# Patient Record
Sex: FEMALE | Race: WHITE | NOT HISPANIC OR LATINO | Employment: OTHER | URBAN - METROPOLITAN AREA
[De-identification: names, ages, dates, MRNs, and addresses within clinical notes are randomized per-mention and may not be internally consistent; named-entity substitution may affect disease eponyms.]

---

## 2021-01-21 DIAGNOSIS — Z23 ENCOUNTER FOR IMMUNIZATION: ICD-10-CM

## 2021-09-11 ENCOUNTER — APPOINTMENT (EMERGENCY)
Dept: RADIOLOGY | Facility: HOSPITAL | Age: 81
End: 2021-09-11
Attending: EMERGENCY MEDICINE
Payer: MEDICARE

## 2021-09-11 ENCOUNTER — HOSPITAL ENCOUNTER (EMERGENCY)
Facility: HOSPITAL | Age: 81
Discharge: HOME | End: 2021-09-11
Attending: EMERGENCY MEDICINE
Payer: MEDICARE

## 2021-09-11 VITALS
WEIGHT: 122 LBS | TEMPERATURE: 98.8 F | BODY MASS INDEX: 19.61 KG/M2 | HEIGHT: 66 IN | RESPIRATION RATE: 22 BRPM | OXYGEN SATURATION: 94 % | DIASTOLIC BLOOD PRESSURE: 74 MMHG | HEART RATE: 97 BPM | SYSTOLIC BLOOD PRESSURE: 174 MMHG

## 2021-09-11 DIAGNOSIS — S42.032A CLOSED DISPLACED FRACTURE OF ACROMIAL END OF LEFT CLAVICLE, INITIAL ENCOUNTER: ICD-10-CM
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
## 2021-09-11 LAB
ANION GAP SERPL CALC-SCNC: 12 MEQ/L (ref 3–15)
ANISOCYTOSIS BLD QL SMEAR: ABNORMAL
BACTERIA URNS QL MICRO: ABNORMAL /HPF
BASOPHILS # BLD: 0.32 K/UL (ref 0.01–0.1)
BASOPHILS NFR BLD: 2 %
BILIRUB UR QL STRIP.AUTO: NEGATIVE MG/DL
BUN SERPL-MCNC: 9 MG/DL (ref 8–20)
BURR CELLS BLD QL SMEAR: ABNORMAL
CALCIUM SERPL-MCNC: 9.8 MG/DL (ref 8.9–10.3)
CHLORIDE SERPL-SCNC: 107 MEQ/L (ref 98–109)
CLARITY UR REFRACT.AUTO: CLEAR
CO2 SERPL-SCNC: 22 MEQ/L (ref 22–32)
COLOR UR AUTO: YELLOW
CREAT SERPL-MCNC: 0.7 MG/DL (ref 0.6–1.1)
DACRYOCYTES BLD QL SMEAR: ABNORMAL
DIFFERENTIAL METHOD BLD: ABNORMAL
EOSINOPHIL # BLD: 0 K/UL (ref 0.04–0.36)
EOSINOPHIL NFR BLD: 0 %
ERYTHROCYTE [DISTWIDTH] IN BLOOD BY AUTOMATED COUNT: 18.5 % (ref 11.7–14.4)
GFR SERPL CREATININE-BSD FRML MDRD: >60 ML/MIN/1.73M*2
GLUCOSE BLD-MCNC: 124 MG/DL (ref 70–99)
GLUCOSE SERPL-MCNC: 131 MG/DL (ref 70–99)
GLUCOSE UR STRIP.AUTO-MCNC: NEGATIVE MG/DL
HCT VFR BLDCO AUTO: 36.7 % (ref 35–45)
HGB BLD-MCNC: 11.3 G/DL (ref 11.8–15.7)
HGB UR QL STRIP.AUTO: ABNORMAL
HYALINE CASTS #/AREA URNS LPF: ABNORMAL /LPF
KETONES UR STRIP.AUTO-MCNC: NEGATIVE MG/DL
LEUKOCYTE ESTERASE UR QL STRIP.AUTO: 1
LYMPHOCYTES # BLD: 0.47 K/UL (ref 1.2–3.5)
LYMPHOCYTES NFR BLD: 3 %
MCH RBC QN AUTO: 19.7 PG (ref 28–33.2)
MCHC RBC AUTO-ENTMCNC: 30.8 G/DL (ref 32.2–35.5)
MCV RBC AUTO: 63.9 FL (ref 83–98)
MICROCYTES BLD QL SMEAR: ABNORMAL
MONOCYTES # BLD: 0.63 K/UL (ref 0.28–0.8)
MONOCYTES NFR BLD: 4 %
NEUTS BAND # BLD: 0.16 K/UL (ref 0–0.53)
NEUTS BAND # BLD: 14.21 K/UL (ref 1.7–7)
NEUTS BAND NFR BLD: 1 %
NEUTS SEG NFR BLD: 90 %
NITRITE UR QL STRIP.AUTO: NEGATIVE
PDW BLD AUTO: 9.1 FL (ref 9.4–12.3)
PH UR STRIP.AUTO: 6 [PH]
PLAT MORPH BLD: NORMAL
PLATELET # BLD AUTO: 301 K/UL (ref 150–369)
PLATELET # BLD EST: ABNORMAL 10*3/UL
POCT TEST: ABNORMAL
POIKILOCYTOSIS BLD QL SMEAR: ABNORMAL
POTASSIUM SERPL-SCNC: 3.6 MEQ/L (ref 3.6–5.1)
PROT UR QL STRIP.AUTO: NEGATIVE
RBC # BLD AUTO: 5.74 M/UL (ref 3.93–5.22)
RBC #/AREA URNS HPF: ABNORMAL /HPF
SODIUM SERPL-SCNC: 141 MEQ/L (ref 136–144)
SP GR UR REFRACT.AUTO: 1.02
SQUAMOUS URNS QL MICRO: 1 /HPF
TARGETS BLD QL SMEAR: ABNORMAL
TROPONIN I SERPL-MCNC: <0.03 NG/ML
UROBILINOGEN UR STRIP-ACNC: 0.2 EU/DL
WBC # BLD AUTO: 15.79 K/UL (ref 3.8–10.5)
WBC #/AREA URNS HPF: ABNORMAL /HPF

## 2021-09-11 PROCEDURE — G1004 CDSM NDSC: HCPCS

## 2021-09-11 PROCEDURE — 73000 X-RAY EXAM OF COLLAR BONE: CPT | Mod: LT

## 2021-09-11 PROCEDURE — 81001 URINALYSIS AUTO W/SCOPE: CPT | Performed by: EMERGENCY MEDICINE

## 2021-09-11 PROCEDURE — 87086 URINE CULTURE/COLONY COUNT: CPT | Performed by: EMERGENCY MEDICINE

## 2021-09-11 PROCEDURE — 73030 X-RAY EXAM OF SHOULDER: CPT | Mod: LT

## 2021-09-11 PROCEDURE — 80048 BASIC METABOLIC PNL TOTAL CA: CPT | Mod: 59 | Performed by: EMERGENCY MEDICINE

## 2021-09-11 PROCEDURE — 93005 ELECTROCARDIOGRAM TRACING: CPT | Performed by: EMERGENCY MEDICINE

## 2021-09-11 PROCEDURE — 73060 X-RAY EXAM OF HUMERUS: CPT | Mod: LT

## 2021-09-11 PROCEDURE — 70450 CT HEAD/BRAIN W/O DYE: CPT | Mod: MG

## 2021-09-11 PROCEDURE — 36415 COLL VENOUS BLD VENIPUNCTURE: CPT | Performed by: EMERGENCY MEDICINE

## 2021-09-11 PROCEDURE — 84484 ASSAY OF TROPONIN QUANT: CPT | Performed by: EMERGENCY MEDICINE

## 2021-09-11 PROCEDURE — 71250 CT THORAX DX C-: CPT | Mod: ME

## 2021-09-11 PROCEDURE — 99284 EMERGENCY DEPT VISIT MOD MDM: CPT | Mod: 25

## 2021-09-11 PROCEDURE — 85025 COMPLETE CBC W/AUTO DIFF WBC: CPT | Performed by: EMERGENCY MEDICINE

## 2021-09-11 PROCEDURE — 63700000 HC SELF-ADMINISTRABLE DRUG: Performed by: EMERGENCY MEDICINE

## 2021-09-11 RX ORDER — LEVOTHYROXINE SODIUM 75 UG/1
75 TABLET ORAL
COMMUNITY
Start: 2021-07-19

## 2021-09-11 RX ORDER — OXYCODONE AND ACETAMINOPHEN 5; 325 MG/1; MG/1
1 TABLET ORAL EVERY 4 HOURS PRN
Qty: 12 TABLET | Refills: 0 | Status: SHIPPED | OUTPATIENT
Start: 2021-09-11 | End: 2021-09-21

## 2021-09-11 RX ORDER — DORZOLAMIDE HCL 20 MG/ML
1 SOLUTION/ DROPS OPHTHALMIC
COMMUNITY
Start: 2021-08-14

## 2021-09-11 RX ORDER — DILTIAZEM HYDROCHLORIDE 240 MG/1
240 CAPSULE, COATED, EXTENDED RELEASE ORAL DAILY
COMMUNITY
Start: 2021-07-09

## 2021-09-11 RX ORDER — RIFAMPIN 300 MG/1
CAPSULE ORAL
COMMUNITY
Start: 2021-08-17 | End: 2021-12-29 | Stop reason: ALTCHOICE

## 2021-09-11 RX ORDER — DILTIAZEM HYDROCHLORIDE 180 MG/1
CAPSULE, EXTENDED RELEASE ORAL
COMMUNITY
Start: 2021-09-10 | End: 2021-12-29 | Stop reason: DRUGHIGH

## 2021-09-11 RX ORDER — LATANOPROST 50 UG/ML
1 SOLUTION/ DROPS OPHTHALMIC NIGHTLY
COMMUNITY
Start: 2021-09-10

## 2021-09-11 RX ORDER — VIT C/E/ZN/COPPR/LUTEIN/ZEAXAN 250MG-90MG
CAPSULE ORAL
COMMUNITY

## 2021-09-11 RX ORDER — MULTIVITAMIN
1 TABLET ORAL
COMMUNITY

## 2021-09-11 RX ORDER — CLINDAMYCIN HYDROCHLORIDE 300 MG/1
CAPSULE ORAL
COMMUNITY
Start: 2021-07-12 | End: 2021-12-29

## 2021-09-11 RX ORDER — ETHAMBUTOL HYDROCHLORIDE 400 MG/1
TABLET, FILM COATED ORAL
COMMUNITY
Start: 2021-05-11 | End: 2021-12-29 | Stop reason: ALTCHOICE

## 2021-09-11 RX ORDER — OXYCODONE AND ACETAMINOPHEN 5; 325 MG/1; MG/1
1 TABLET ORAL ONCE
Status: COMPLETED | OUTPATIENT
Start: 2021-09-11 | End: 2021-09-11

## 2021-09-11 RX ORDER — DABIGATRAN ETEXILATE MESYLATE 150 MG/1
150 CAPSULE ORAL 2 TIMES DAILY
COMMUNITY
Start: 2021-07-03

## 2021-09-11 RX ORDER — DEXAMETHASONE 4 MG/1
TABLET ORAL
COMMUNITY
Start: 2021-07-24 | End: 2021-12-29 | Stop reason: SDUPTHER

## 2021-09-11 RX ORDER — TIOTROPIUM BROMIDE INHALATION SPRAY 3.12 UG/1
SPRAY, METERED RESPIRATORY (INHALATION)
COMMUNITY
Start: 2021-06-22 | End: 2021-12-29 | Stop reason: SDUPTHER

## 2021-09-11 RX ORDER — ALBUTEROL SULFATE 90 UG/1
2 INHALANT RESPIRATORY (INHALATION) EVERY 4 HOURS PRN
COMMUNITY
Start: 2021-07-16

## 2021-09-11 RX ORDER — AZITHROMYCIN 250 MG/1
TABLET, FILM COATED ORAL
COMMUNITY
Start: 2021-08-11 | End: 2021-12-29 | Stop reason: ALTCHOICE

## 2021-09-11 RX ORDER — DIGOXIN 250 MCG
250 TABLET ORAL
COMMUNITY
Start: 2021-06-21

## 2021-09-11 RX ADMIN — OXYCODONE HYDROCHLORIDE AND ACETAMINOPHEN 1 TABLET: 5; 325 TABLET ORAL at 18:53

## 2021-09-11 NOTE — DISCHARGE INSTRUCTIONS
Follow up with your doctor to have your blood pressure re-checked.  Return for headache, vomiting or confusion

## 2021-09-12 LAB
ATRIAL RATE: 90
P AXIS: 68
PATH REV BLD -IMP: NORMAL
PR INTERVAL: 158
QRS DURATION: 92
QT INTERVAL: 348
QTC CALCULATION(BAZETT): 425
R AXIS: 81
T WAVE AXIS: 58
VENTRICULAR RATE: 90

## 2021-09-12 PROCEDURE — 93010 ELECTROCARDIOGRAM REPORT: CPT | Performed by: INTERNAL MEDICINE

## 2021-09-12 ASSESSMENT — ENCOUNTER SYMPTOMS
LOSS OF CONSCIOUSNESS: 0
DIFFICULTY BREATHING: 0
ABDOMINAL PAIN: 0
HEADACHES: 0

## 2021-09-12 NOTE — PROGRESS NOTES
Orthopedic short note    Emergency department paged regarding left clavicle fracture management recommendations    Imaging was reviewed and consistent with a middle third clavicle shaft fracture.  CT chest reviewed.  Glenohumeral joint appropriate aligned without dislocation. Per emergency department physician there was no skin tenting and the fracture is not open.  ED provider had no concern for impending skin tenting or open fracture.    Plan:   Nonweightbearing and sling to left upper extremity  Outpatient follow-up with Dr. Hopper in his [facility] office the week of 9/13  Pain control per ED provider  Remainder of care per ED provider    Tom Gay DO PGY-3  Orthopedics  Pager [phone]     This note was created using voice-to-text dictation software, please excuse any errors in translation

## 2021-09-13 LAB
BACTERIA UR CULT: NORMAL
BACTERIA UR CULT: NORMAL

## 2021-09-13 NOTE — ED PROVIDER NOTES
Emergency Medicine Note  HPI   HISTORY OF PRESENT ILLNESS     81-year-old female past medical history of A. fib, high cholesterol, hypothyroidism presents status post fall.  She was walking up the basement stairs when she lost her footing.  She fell off the edge of the stairs down to the basement floor.  She landed on her left shoulder.  She does not think she struck her head or had a loss of consciousness.  She does not think she had a preceding syncopal event.  Has some mild left-sided chest pain around her ribs since the fall, no abdominal pain, no lower extremity pain.      History provided by:  Patient and EMS personnel  Trauma  Mechanism of injury: fall  Injury location: shoulder/arm  Injury location detail: L shoulder  Incident location: home  Arrived directly from scene: yes     Fall:       Fall occurred: down stairs       Height of fall: 4 feet       Impact surface: carpet       Point of impact: outstretched arms       Entrapped after fall: no    Protective equipment:        None       Suspicion of alcohol use: no       Suspicion of drug use: no    EMS/PTA data:       Bystander interventions: none       Ambulatory at scene: no       Blood loss: none       Responsiveness: alert       Oriented to: person, place, situation and time       Loss of consciousness: no       Amnesic to event: no       Airway interventions: none       Breathing interventions: none       Medications administered: none       Immobilization: none       Airway condition since incident: stable       Breathing condition since incident: stable       Circulation condition since incident: stable       Mental status condition since incident: stable       Disability condition since incident: stable    Current symptoms:       Pain quality: aching       Pain timing: constant       Associated symptoms:             Denies abdominal pain, chest pain, difficulty breathing, headache and loss of consciousness.     Relevant PMH:       Pharmacological  risk factors:             Anticoagulation therapy.        The patient has not been admitted to the hospital due to injury in the past year.        Patient History   PAST HISTORY     Reviewed from Nursing Triage:      Past Medical History:   Diagnosis Date   • Atrial fibrillation (CMS/HCC)    • Glaucoma    • History of transfusion    • Hyperthyroidism    • Lipid disorder    • Osteoporosis    • PAN (polyarteritis nodosa) (CMS/HCC)    • Thalassemia        Past Surgical History:   Procedure Laterality Date   • CARDIAC SURGERY     • TONSILLECTOMY         History reviewed. No pertinent family history.    Social History     Tobacco Use   • Smoking status: Never Smoker   • Smokeless tobacco: Never Used   Vaping Use   • Vaping Use: Never used   Substance Use Topics   • Alcohol use: Never   • Drug use: Never         Review of Systems   REVIEW OF SYSTEMS     Review of Systems   Cardiovascular: Negative for chest pain.   Gastrointestinal: Negative for abdominal pain.   Neurological: Negative for loss of consciousness and headaches.   All other systems reviewed and are negative.        VITALS     ED Vitals    Date/Time Temp Pulse Resp BP SpO2 [facility]   09/11/21 2000 -- 97 22 174/74 94 % MCA   09/11/21 1900 -- 93 20 192/78 92 % JLS   09/11/21 1800 -- 90 20 177/91 92 % JLS   09/11/21 1740 -- 99 20 210/80 94 % S   09/11/21 1638 37.1 °C (98.8 °F) 85 18 206/82 96 % [facility]                       Physical Exam   PHYSICAL EXAM     Physical Exam  Vitals and nursing note reviewed.   Constitutional:       Appearance: Normal appearance.   HENT:      Head: Normocephalic and atraumatic.      Nose: Nose normal.      Mouth/Throat:      Mouth: Mucous membranes are moist.   Eyes:      Extraocular Movements: Extraocular movements intact.      Pupils: Pupils are equal, round, and reactive to light.   Cardiovascular:      Rate and Rhythm: Normal rate and regular rhythm.      Pulses: Normal pulses.      Heart sounds: Normal heart sounds.   Pulmonary:       Effort: Pulmonary effort is normal.      Breath sounds: Normal breath sounds.   Abdominal:      General: Abdomen is flat.      Tenderness: There is no abdominal tenderness.   Musculoskeletal:         General: Tenderness and deformity present.        Arms:       Cervical back: Normal range of motion and neck supple. No tenderness.      Comments: Tenderness and deformity over left clavicle, tenderness over left shoulder, neurovascular intact   Skin:     General: Skin is warm.      Capillary Refill: Capillary refill takes less than 2 seconds.   Neurological:      General: No focal deficit present.      Mental Status: She is alert and oriented to person, place, and time.   Psychiatric:         Mood and Affect: Mood normal.           PROCEDURES     Procedures     DATA     Results     Procedure Component Value Units Date/Time    UA with reflex culture [[id number]]  (Abnormal) Collected: 09/11/21 1908    Specimen: Urine, Clean Catch Updated: 09/11/21 1933    Narrative:      The following orders were created for panel order UA with reflex culture.  Procedure                               Abnormality         Status                     ---------                               -----------         ------                     UA Reflex to Culture (Ma...[[id number]]  Abnormal            Final result               UA Microscopic[[id number]]               Abnormal            Final result                 Please view results for these tests on the individual orders.    UA Microscopic [[id number]]  (Abnormal) Collected: 09/11/21 1908    Specimen: Urine, Clean Catch Updated: 09/11/21 1933     RBC, Urine 20 TO 35 /HPF      WBC, Urine 4 TO 10 /HPF      Squamous Epithelial +1 /hpf      Hyaline Cast None Seen /lpf      Bacteria, Urine None Seen /HPF     UA Reflex to Culture (Macroscopic) [[id number]]  (Abnormal) Collected: 09/11/21 1908    Specimen: Urine, Clean Catch Updated: 09/11/21 1930     Color, Urine Yellow     Clarity, Urine Clear      Specific Gravity, Urine 1.018     pH, Urine 6.0     Leukocyte Esterase +1     Nitrite, Urine Negative     Protein, Urine Negative     Glucose, Urine Negative mg/dL      Ketones, Urine Negative mg/dL      Urobilinogen, Urine 0.2 EU/dL      Bilirubin, Urine Negative mg/dL      Blood, Urine Trace     Comment: The sensitivity of the occult blood test is equivalent to approximately 4 intact RBC/HPF.       CBC and differential [[id number]]  (Abnormal) Collected: 09/11/21 1654    Specimen: Blood, Venous Updated: 09/11/21 1745     WBC 15.79 K/uL      RBC 5.74 M/uL      Hemoglobin 11.3 g/dL      Hematocrit 36.7 %      MCV 63.9 fL      MCH 19.7 pg      MCHC 30.8 g/dL      RDW 18.5 %      Platelets 301 K/uL      MPV 9.1 fL      Differential Type Manu     Neutrophils 90 %      Lymphocytes 3 %      Monocytes 4 %      Eosinophils 0 %      Basophils 2 %      Bands 1 %      Neutrophils, Absolute 14.21 K/uL      Lymphocytes, Absolute 0.47 K/uL      Monocytes, Absolute 0.63 K/uL      Eosinophils, Absolute 0.00 K/uL      Basophils, Absolute 0.32 K/uL      Bands, Absolute 0.16 K/uL      PLT Morphology Normal     Platelet Estimate Adequate (150,000-400,000)     Anisocytosis 1+     Microcytes 2+     Poikilocytes 1+     Target Cells 1+     Teardrop Cells Occasional     Nandini Cells Occasional    Troponin I [[id number]]  (Normal) Collected: 09/11/21 1654    Specimen: Blood, Venous Updated: 09/11/21 1729     Troponin I <0.03 ng/mL     Basic metabolic panel [[id number]]  (Abnormal) Collected: 09/11/21 1654    Specimen: Blood, Venous Updated: 09/11/21 1726     Sodium 141 mEQ/L      Potassium 3.6 mEQ/L      Comment: Results obtained on plasma. Plasma Potassium values may be up to 0.4 mEQ/L less than serum values. The differences may be greater for patients with high platelet or white cell counts.        Chloride 107 mEQ/L      CO2 22 mEQ/L      BUN 9 mg/dL      Creatinine 0.7 mg/dL      Glucose 131 mg/dL      Calcium 9.8 mg/dL      eGFR >60.0  mL/min/1.73m*2      Anion Gap 12 mEQ/L           Imaging Results          X-RAY CLAVICLE LEFT (Final result)  Result time 09/12/21 08:17:43    Final result                 Impression:    IMPRESSION: Fracture left clavicle.  Minimal anteroinferior subluxation of the  humeral head.    COMMENT: Two views of the left clavicle, three views of the left shoulder and  three additional views of the left humerus were obtained.  There is a fracture  involving the lateral third of the left clavicle with overlap of fracture  fragments.  On images of the left shoulder in the left humerus there appears to  be slight anteroinferior subluxation of the left humeral head however on the  clavicular films it appears to be in appropriate articulation/position.  Correlation is suggested.             Narrative:    CLINICAL HISTORY: Injury.                    ED Interpretation    + clavicular fracture                             CT CERVICAL SPINE WITHOUT IV CONTRAST (Final result)  Result time 09/11/21 17:41:27    Final result                 Impression:    IMPRESSION:    No acute fracture in the cervical spine.  Degenerative changes in the cervical  spine.                 Narrative:    CLINICAL HISTORY: Neck trauma    COMMENT:    TECHNIQUE:Routine unenhanced CT performed through the cervical spine utilizing  department protocol. Sagittal and coronal reconstructions submitted as well.    CT DOSE:  One or more dose reduction techniques (e.g. automated exposure  control, adjustment of the mA and/or kV according to patient size, use of  iterative reconstruction technique) utilized for this examination.    No evidence of acute fracture or traumatic subluxation.  Alignment of the  cervical spine is normal.  Visualized neck and paraspinal soft tissues are  unremarkable.  Heterogeneity in the thyroid gland.  Pleural parenchymal changes in the lung  apices.  MRI can be performed if persistent clinical concern for ligamentous  integrity or  intrathecal injury.                             CT HEAD WITHOUT IV CONTRAST (Final result)  Result time 09/11/21 17:34:52    Final result                 Impression:    IMPRESSION:  No acute intracranial process.               Narrative:    CLINICAL HISTORY:  Dizziness, weakness    TECHNIQUE: CT of the head without contrast.Multiplanar reformats obtained.    COMPARISON: None available.    CT DOSE:  One or more dose reduction techniques (e.g. automated exposure  control, adjustment of the mA and/or kV according to patient size, use of  iterative reconstruction technique) utilized for this examination.    COMMENT:  Sulcal and ventricular prominence, compatible with age related involutional  changes. Patchy areas of hypoattenuation are noted in the subcortical and  periventricular white matter, likely related to microangiopathy. The gray-white  differentiation is preserved.  No extra axial collection. No intracranial  hemorrhage. No mass effect, midline shift, or edema. No acute, large vascular  territory infarct.  No apparent acute osseous findings seen.    Mastoid air cells are clear.  No calvarial fracture.                               CT CHEST WITHOUT IV CONTRAST (Final result)  Result time 09/11/21 18:16:38    Final result                 Impression:    IMPRESSION:    There is an acute left distal third clavicular fracture with overriding of the  fracture fragments and displacement.  Please note evaluation of the vessels is  limited on the noncontrast study.  There is significant bronchiectasis present  bilaterally with the mucous plugging and possibility of superimposed C[place]  infection is not excluded.  Please correlate with patient's history.  No  pneumothorax or pericardial effusion or pleural effusion noted.               Narrative:    CLINICAL HISTORY: Chest trauma, blunt, low energy    CONTRAST:  CT Chest obtained  without contrast. Axial images obtained with  coronal and sagittal  reformats..    COMMENT:    COMPARISON: None.    CT DOSE:  One or more dose reduction techniques (e.g. automated exposure  control, adjustment of the mA and/or kV according to patient size, use of  iterative reconstruction technique) utilized for this examination.      LUNG PARENCHYMA: There is tree-in-bud densities noted bilaterally, in keeping  with the mucous plugging.  There is bilateral bronchiectasis present.  Please  note that tree-in-bud densities bilaterally in the upper lobes, right middle  lobe lung bases can also be seen with the mycobacterium avium intracellulare  inflammation.  There is significant bronchiectatic changes bilaterally.  PULMONARY VESSELS:  Unremarkable within the limits of study.  MEDIASTINUM/MIGUELINA: Normal.  HEART AND PERICARDIUM: There is aortic valvular graft noted.  Atherosclerotic  vascular calcifications present.  Mild to moderate cardiomegaly present.  PLEURAL SPACE: No effusion or abnormal pleural thickening.  CHEST WALL: Normal.  AXILLA: No enlarged lymph nodes.  THYROID: The included portion is unremarkable.  VISUALIZED UPPER ABDOMEN: 2.1 cm right upper renal cyst.  BONES: Acute left clavicular fracture with overriding of the fracture fragments.  Mild-to-moderate degenerative changes in the thoracic spine.                               X-RAY SHOULDER LEFT 2+ VIEWS (Final result)  Result time 09/12/21 08:17:43    Final result                 Impression:    IMPRESSION: Fracture left clavicle.  Minimal anteroinferior subluxation of the  humeral head.    COMMENT: Two views of the left clavicle, three views of the left shoulder and  three additional views of the left humerus were obtained.  There is a fracture  involving the lateral third of the left clavicle with overlap of fracture  fragments.  On images of the left shoulder in the left humerus there appears to  be slight anteroinferior subluxation of the left humeral head however on the  clavicular films it appears to be in  appropriate articulation/position.  Correlation is suggested.             Narrative:    CLINICAL HISTORY: Injury.                               X-RAY HUMERUS LEFT (Final result)  Result time 09/12/21 08:17:43    Final result                 Impression:    IMPRESSION: Fracture left clavicle.  Minimal anteroinferior subluxation of the  humeral head.    COMMENT: Two views of the left clavicle, three views of the left shoulder and  three additional views of the left humerus were obtained.  There is a fracture  involving the lateral third of the left clavicle with overlap of fracture  fragments.  On images of the left shoulder in the left humerus there appears to  be slight anteroinferior subluxation of the left humeral head however on the  clavicular films it appears to be in appropriate articulation/position.  Correlation is suggested.             Narrative:    CLINICAL HISTORY: Injury.                                ECG 12 lead   Final Result          Scoring tools                                 ED Course & MDM   MDM / ED COURSE and CLINICAL IMPRESSIONS     [facility]    ED Course as of Sep 12 2039   Sat Sep 11, 2021   1824 CT CHEST WITHOUT IV CONTRAST [AAMIR]      ED Course User Index  [AAMIR] Stephanie Dent MD         Clinical Impressions as of Sep 12 2039   Fall, initial encounter   Closed displaced fracture of acromial end of left clavicle, initial encounter            Stephanie Dent MD  09/12/21 2043

## 2021-09-20 NOTE — PROGRESS NOTES
Bee Cobb is a left hand dominant 81 y.o. female presenting with 1 week of collarbone pain. Their pain is 4/10 and is localized to their Clavicle. It feels aching. It does not radiate. Better at rest, worse with movement. No fevers or chills. No tingling or numbness.    Review of systems is negative except for what was included in the above HPI.    Past Medical History:     Past Medical History:   Diagnosis Date   • Atrial fibrillation (CMS/HCC)    • Glaucoma    • History of transfusion    • Hyperthyroidism    • Lipid disorder    • Osteoporosis    • PAN (polyarteritis nodosa) (CMS/HCC)    • Thalassemia        Social History:     reports that she has never smoked. She has never used smokeless tobacco. She reports that she does not drink alcohol and does not use drugs.     Family History:  Noncontributory  No family history on file.     Physical Exam:  General: No acute distress  Neuro: Awake, Alert, Oriented x 3  Eyes: Pupils equal and round  Mouth: Oropharynx clear  Respiratory: Breathing unlabored  Cardio: no edema or cyanosis  Skin: no rashes    Left Upper Extremity: swollen, ecchymotic and tender to palpation  ROM: limited due to pain/immobility  +rad/ain/pin/uln motor  Senation intact to light touch rad/med/uln  Cap refill brisk    Imaging:  Xray of Left Clavicle was personally reviewed. My findings are: 100% displaced left clavicle fracture    Procedure: the Left clavicle was manipulated for stability and reduction.       Assessment/Plan:  Non-weight bearing left upper extremity will attemptt nonop reatment  PT Rx  Fu 4 weeks  Xray left clavicle

## 2021-09-21 DIAGNOSIS — M25.512 LEFT SHOULDER PAIN, UNSPECIFIED CHRONICITY: Primary | ICD-10-CM

## 2021-09-22 ENCOUNTER — OFFICE VISIT (OUTPATIENT)
Dept: ORTHOPEDICS | Facility: CLINIC | Age: 81
End: 2021-09-22
Payer: MEDICARE

## 2021-09-22 DIAGNOSIS — S42.022A CLOSED DISPLACED FRACTURE OF SHAFT OF LEFT CLAVICLE, INITIAL ENCOUNTER: Primary | ICD-10-CM

## 2021-09-22 PROCEDURE — 99204 OFFICE O/P NEW MOD 45 MIN: CPT | Mod: 25 | Performed by: ORTHOPAEDIC SURGERY

## 2021-09-22 PROCEDURE — 23505 CLTX CLAVICULAR FX W/MNPJ: CPT | Performed by: ORTHOPAEDIC SURGERY

## 2021-10-19 DIAGNOSIS — S42.022A CLOSED DISPLACED FRACTURE OF SHAFT OF LEFT CLAVICLE, INITIAL ENCOUNTER: Primary | ICD-10-CM

## 2021-10-19 NOTE — PROGRESS NOTES
Bee Cobb is a 81 y.o. female who is 5 weeks status post nonoperative treatment of Left clavicle.    Physical Exam:    Left Upper Extremity: swollen and nontender to palpation  ROM: full ROM but painful at extremes  +rad/ain/pin/uln motor  Senation intact to light touch rad/med/uln  Cap refill brisk    Imaging:  Xray of Left Clavicle was personally reviewed. My findings are: fracture in acceptable alignment. interval callus formation present    Assessment/Plan:  5lb weight bearing, advance as tolerated left upper extremity   PT Rx  Fu 8 weeks  Xray left clavicle

## 2021-10-20 ENCOUNTER — HOSPITAL ENCOUNTER (OUTPATIENT)
Dept: RADIOLOGY | Facility: HOSPITAL | Age: 81
Discharge: HOME | End: 2021-10-20
Attending: ORTHOPAEDIC SURGERY
Payer: MEDICARE

## 2021-10-20 ENCOUNTER — OFFICE VISIT (OUTPATIENT)
Dept: ORTHOPEDICS | Facility: CLINIC | Age: 81
End: 2021-10-20
Payer: MEDICARE

## 2021-10-20 DIAGNOSIS — S42.022A CLOSED DISPLACED FRACTURE OF SHAFT OF LEFT CLAVICLE, INITIAL ENCOUNTER: Primary | ICD-10-CM

## 2021-10-20 DIAGNOSIS — S42.022A CLOSED DISPLACED FRACTURE OF SHAFT OF LEFT CLAVICLE, INITIAL ENCOUNTER: ICD-10-CM

## 2021-10-20 PROCEDURE — 73000 X-RAY EXAM OF COLLAR BONE: CPT | Mod: LT

## 2021-10-20 PROCEDURE — 99024 POSTOP FOLLOW-UP VISIT: CPT | Performed by: ORTHOPAEDIC SURGERY

## 2021-12-14 DIAGNOSIS — S42.022A CLOSED DISPLACED FRACTURE OF SHAFT OF LEFT CLAVICLE, INITIAL ENCOUNTER: Primary | ICD-10-CM

## 2021-12-15 ENCOUNTER — HOSPITAL ENCOUNTER (OUTPATIENT)
Dept: RADIOLOGY | Facility: HOSPITAL | Age: 81
Discharge: HOME | End: 2021-12-15
Attending: ORTHOPAEDIC SURGERY
Payer: MEDICARE

## 2021-12-15 ENCOUNTER — OFFICE VISIT (OUTPATIENT)
Dept: ORTHOPEDICS | Facility: CLINIC | Age: 81
End: 2021-12-15
Payer: MEDICARE

## 2021-12-15 DIAGNOSIS — S42.022A CLOSED DISPLACED FRACTURE OF SHAFT OF LEFT CLAVICLE, INITIAL ENCOUNTER: Primary | ICD-10-CM

## 2021-12-15 DIAGNOSIS — S42.022A CLOSED DISPLACED FRACTURE OF SHAFT OF LEFT CLAVICLE, INITIAL ENCOUNTER: ICD-10-CM

## 2021-12-15 PROCEDURE — 99024 POSTOP FOLLOW-UP VISIT: CPT | Performed by: ORTHOPAEDIC SURGERY

## 2021-12-15 PROCEDURE — 73000 X-RAY EXAM OF COLLAR BONE: CPT | Mod: LT

## 2021-12-15 NOTE — PROGRESS NOTES
Bee Cobb is a 81 y.o. female who is 3 months status post nonoperative treatment of Left clavicle.    Physical Exam:    Left Upper Extremity: clean, dry, and intact  ROM: full ROM but painful at extremes  +rad/ain/pin/uln motor  Senation intact to light touch rad/med/uln  Cap refill brisk    Imaging:  Xray of Left Clavicle was personally reviewed. My findings are:  fracture in good alignment. interval callus formation present    Assessment/Plan:  Weight bearing as tolerated left upper extremity   PT RX  FU PRN

## 2021-12-29 ENCOUNTER — APPOINTMENT (EMERGENCY)
Dept: RADIOLOGY | Facility: HOSPITAL | Age: 81
DRG: 522 | End: 2021-12-29
Attending: EMERGENCY MEDICINE
Payer: MEDICARE

## 2021-12-29 ENCOUNTER — APPOINTMENT (INPATIENT)
Dept: RADIOLOGY | Facility: HOSPITAL | Age: 81
DRG: 522 | End: 2021-12-29
Attending: STUDENT IN AN ORGANIZED HEALTH CARE EDUCATION/TRAINING PROGRAM
Payer: MEDICARE

## 2021-12-29 ENCOUNTER — ANESTHESIA EVENT (INPATIENT)
Dept: OPERATING ROOM | Facility: HOSPITAL | Age: 81
DRG: 522 | End: 2021-12-29
Payer: MEDICARE

## 2021-12-29 ENCOUNTER — HOSPITAL ENCOUNTER (INPATIENT)
Facility: HOSPITAL | Age: 81
LOS: 3 days | DRG: 522 | End: 2022-01-01
Attending: EMERGENCY MEDICINE | Admitting: STUDENT IN AN ORGANIZED HEALTH CARE EDUCATION/TRAINING PROGRAM
Payer: MEDICARE

## 2021-12-29 ENCOUNTER — ANESTHESIA (INPATIENT)
Dept: OPERATING ROOM | Facility: HOSPITAL | Age: 81
DRG: 522 | End: 2021-12-29
Payer: MEDICARE

## 2021-12-29 DIAGNOSIS — S72.002A CLOSED FRACTURE OF LEFT HIP, INITIAL ENCOUNTER (CMS/HCC): Primary | ICD-10-CM

## 2021-12-29 PROBLEM — I50.32 CHRONIC DIASTOLIC CHF (CONGESTIVE HEART FAILURE) (CMS/HCC): Status: ACTIVE | Noted: 2019-02-14

## 2021-12-29 PROBLEM — E03.9 HYPOTHYROIDISM: Status: ACTIVE | Noted: 2019-01-24

## 2021-12-29 PROBLEM — H40.9 GLAUCOMA: Status: ACTIVE | Noted: 2019-11-13

## 2021-12-29 PROBLEM — A31.0 MYCOBACTERIUM AVIUM-INTRACELLULARE COMPLEX (CMS/HCC): Status: ACTIVE | Noted: 2021-01-12

## 2021-12-29 PROBLEM — E78.5 HYPERLIPIDEMIA: Status: ACTIVE | Noted: 2021-03-08

## 2021-12-29 PROBLEM — I10 HYPERTENSION: Status: ACTIVE | Noted: 2019-01-24

## 2021-12-29 LAB
ABO + RH BLD: NORMAL
ABO + RH BLD: NORMAL
ALBUMIN SERPL-MCNC: 3.4 G/DL (ref 3.4–5)
ALBUMIN SERPL-MCNC: 3.7 G/DL (ref 3.4–5)
ALP SERPL-CCNC: 113 IU/L (ref 35–126)
ALP SERPL-CCNC: 123 IU/L (ref 35–126)
ALT SERPL-CCNC: 19 IU/L (ref 11–54)
ALT SERPL-CCNC: 21 IU/L (ref 11–54)
ANION GAP SERPL CALC-SCNC: 10 MEQ/L (ref 3–15)
ANION GAP SERPL CALC-SCNC: 14 MEQ/L (ref 3–15)
ANISOCYTOSIS BLD QL SMEAR: ABNORMAL
ANISOCYTOSIS BLD QL SMEAR: ABNORMAL
APTT PPP: 42 SEC (ref 23–35)
AST SERPL-CCNC: 29 IU/L (ref 15–41)
AST SERPL-CCNC: 31 IU/L (ref 15–41)
ATRIAL RATE: 72
BACTERIA URNS QL MICRO: ABNORMAL /HPF
BASOPHILS # BLD: 0 K/UL (ref 0.01–0.1)
BASOPHILS # BLD: 0.05 K/UL (ref 0.01–0.1)
BASOPHILS NFR BLD: 0 %
BASOPHILS NFR BLD: 0.2 %
BILIRUB SERPL-MCNC: 0.8 MG/DL (ref 0.3–1.2)
BILIRUB SERPL-MCNC: 0.8 MG/DL (ref 0.3–1.2)
BILIRUB UR QL STRIP.AUTO: NEGATIVE MG/DL
BLD GP AB SCN SERPL QL: NEGATIVE
BUN SERPL-MCNC: 5 MG/DL (ref 8–20)
BUN SERPL-MCNC: 6 MG/DL (ref 8–20)
CALCIUM SERPL-MCNC: 8.6 MG/DL (ref 8.9–10.3)
CALCIUM SERPL-MCNC: 9.1 MG/DL (ref 8.9–10.3)
CHLORIDE SERPL-SCNC: 105 MEQ/L (ref 98–109)
CHLORIDE SERPL-SCNC: 98 MEQ/L (ref 98–109)
CLARITY UR REFRACT.AUTO: ABNORMAL
CO2 SERPL-SCNC: 20 MEQ/L (ref 22–32)
CO2 SERPL-SCNC: 23 MEQ/L (ref 22–32)
COLOR UR AUTO: YELLOW
CREAT SERPL-MCNC: 0.5 MG/DL (ref 0.6–1.1)
CREAT SERPL-MCNC: 0.5 MG/DL (ref 0.6–1.1)
D AG BLD QL: POSITIVE
D AG BLD QL: POSITIVE
DACRYOCYTES BLD QL SMEAR: ABNORMAL
DIFFERENTIAL METHOD BLD: ABNORMAL
DIFFERENTIAL METHOD BLD: ABNORMAL
EOSINOPHIL # BLD: 0 K/UL (ref 0.04–0.36)
EOSINOPHIL # BLD: 0.01 K/UL (ref 0.04–0.36)
EOSINOPHIL NFR BLD: 0 %
EOSINOPHIL NFR BLD: 0 %
ERYTHROCYTE [DISTWIDTH] IN BLOOD BY AUTOMATED COUNT: 16.1 % (ref 11.7–14.4)
ERYTHROCYTE [DISTWIDTH] IN BLOOD BY AUTOMATED COUNT: 17.1 % (ref 11.7–14.4)
FLUAV RNA SPEC QL NAA+PROBE: NEGATIVE
FLUBV RNA SPEC QL NAA+PROBE: NEGATIVE
GFR SERPL CREATININE-BSD FRML MDRD: >60 ML/MIN/1.73M*2
GFR SERPL CREATININE-BSD FRML MDRD: >60 ML/MIN/1.73M*2
GLUCOSE SERPL-MCNC: 147 MG/DL (ref 70–99)
GLUCOSE SERPL-MCNC: 161 MG/DL (ref 70–99)
GLUCOSE UR STRIP.AUTO-MCNC: NEGATIVE MG/DL
HCT VFR BLDCO AUTO: 35.3 % (ref 35–45)
HCT VFR BLDCO AUTO: 37.7 % (ref 35–45)
HGB BLD-MCNC: 11.2 G/DL (ref 11.8–15.7)
HGB BLD-MCNC: 12 G/DL (ref 11.8–15.7)
HGB UR QL STRIP.AUTO: 3
HYALINE CASTS #/AREA URNS LPF: ABNORMAL /LPF
IMM GRANULOCYTES # BLD AUTO: 0.13 K/UL (ref 0–0.08)
IMM GRANULOCYTES NFR BLD AUTO: 0.6 %
INR PPP: 1.2
KETONES UR STRIP.AUTO-MCNC: NEGATIVE MG/DL
LABORATORY COMMENT REPORT: NORMAL
LABORATORY COMMENT REPORT: NORMAL
LEUKOCYTE ESTERASE UR QL STRIP.AUTO: NEGATIVE
LYMPHOCYTES # BLD: 0.55 K/UL (ref 1.2–3.5)
LYMPHOCYTES # BLD: 1.87 K/UL (ref 1.2–3.5)
LYMPHOCYTES NFR BLD: 2.5 %
LYMPHOCYTES NFR BLD: 7 %
MCH RBC QN AUTO: 20.2 PG (ref 28–33.2)
MCH RBC QN AUTO: 20.2 PG (ref 28–33.2)
MCHC RBC AUTO-ENTMCNC: 31.7 G/DL (ref 32.2–35.5)
MCHC RBC AUTO-ENTMCNC: 31.8 G/DL (ref 32.2–35.5)
MCV RBC AUTO: 63.5 FL (ref 83–98)
MCV RBC AUTO: 63.7 FL (ref 83–98)
MICROCYTES BLD QL SMEAR: ABNORMAL
MICROCYTES BLD QL SMEAR: ABNORMAL
MONOCYTES # BLD: 0.27 K/UL (ref 0.28–0.8)
MONOCYTES # BLD: 0.5 K/UL (ref 0.28–0.8)
MONOCYTES NFR BLD: 1 %
MONOCYTES NFR BLD: 2.3 %
NEUTROPHILS # BLD: 20.37 K/UL (ref 1.7–7)
NEUTS BAND # BLD: 24.62 K/UL (ref 1.7–7)
NEUTS SEG NFR BLD: 92 %
NEUTS SEG NFR BLD: 94.4 %
NITRITE UR QL STRIP.AUTO: NEGATIVE
NRBC BLD-RTO: 0 %
OVALOCYTES BLD QL SMEAR: ABNORMAL
OVALOCYTES BLD QL SMEAR: ABNORMAL
P AXIS: 63
PDW BLD AUTO: 8.7 FL (ref 9.4–12.3)
PDW BLD AUTO: 9.3 FL (ref 9.4–12.3)
PH UR STRIP.AUTO: 7 [PH]
PLAT MORPH BLD: NORMAL
PLAT MORPH BLD: NORMAL
PLATELET # BLD AUTO: 273 K/UL (ref 150–369)
PLATELET # BLD AUTO: 299 K/UL (ref 150–369)
PLATELET # BLD EST: ABNORMAL 10*3/UL
PLATELET # BLD EST: ABNORMAL 10*3/UL
POIKILOCYTOSIS BLD QL SMEAR: ABNORMAL
POIKILOCYTOSIS BLD QL SMEAR: ABNORMAL
POTASSIUM SERPL-SCNC: 3.4 MEQ/L (ref 3.6–5.1)
POTASSIUM SERPL-SCNC: 3.6 MEQ/L (ref 3.6–5.1)
PR INTERVAL: 172
PROT SERPL-MCNC: 7.3 G/DL (ref 6–8.2)
PROT SERPL-MCNC: 7.6 G/DL (ref 6–8.2)
PROT UR QL STRIP.AUTO: NEGATIVE
PROTHROMBIN TIME: 14.9 SEC (ref 12.2–14.5)
QRS DURATION: 92
QT INTERVAL: 378
QTC CALCULATION(BAZETT): 413
R AXIS: 29
RBC # BLD AUTO: 5.54 M/UL (ref 3.93–5.22)
RBC # BLD AUTO: 5.94 M/UL (ref 3.93–5.22)
RBC #/AREA URNS HPF: ABNORMAL /HPF
RSV RNA SPEC QL NAA+PROBE: NEGATIVE
SARS-COV-2 RNA RESP QL NAA+PROBE: NEGATIVE
SCHISTOCYTES BLD QL SMEAR: ABNORMAL
SODIUM SERPL-SCNC: 135 MEQ/L (ref 136–144)
SODIUM SERPL-SCNC: 135 MEQ/L (ref 136–144)
SP GR UR REFRACT.AUTO: 1.01
SPECIMEN EXP DATE BLD: NORMAL
SQUAMOUS URNS QL MICRO: ABNORMAL /HPF
T WAVE AXIS: 57
TARGETS BLD QL SMEAR: ABNORMAL
TARGETS BLD QL SMEAR: ABNORMAL
UROBILINOGEN UR STRIP-ACNC: 0.2 EU/DL
VENTRICULAR RATE: 72
WBC # BLD AUTO: 21.61 K/UL (ref 3.8–10.5)
WBC # BLD AUTO: 26.76 K/UL (ref 3.8–10.5)
WBC #/AREA URNS HPF: ABNORMAL /HPF

## 2021-12-29 PROCEDURE — C1776 JOINT DEVICE (IMPLANTABLE): HCPCS | Performed by: ORTHOPAEDIC SURGERY

## 2021-12-29 PROCEDURE — 93010 ELECTROCARDIOGRAM REPORT: CPT | Performed by: INTERNAL MEDICINE

## 2021-12-29 PROCEDURE — 37000001 HC ANESTHESIA GENERAL: Performed by: ORTHOPAEDIC SURGERY

## 2021-12-29 PROCEDURE — 96374 THER/PROPH/DIAG INJ IV PUSH: CPT

## 2021-12-29 PROCEDURE — 0SRS01A REPLACEMENT OF LEFT HIP JOINT, FEMORAL SURFACE WITH METAL SYNTHETIC SUBSTITUTE, UNCEMENTED, OPEN APPROACH: ICD-10-PCS | Performed by: ORTHOPAEDIC SURGERY

## 2021-12-29 PROCEDURE — 93005 ELECTROCARDIOGRAM TRACING: CPT | Performed by: STUDENT IN AN ORGANIZED HEALTH CARE EDUCATION/TRAINING PROGRAM

## 2021-12-29 PROCEDURE — 36415 COLL VENOUS BLD VENIPUNCTURE: CPT | Performed by: EMERGENCY MEDICINE

## 2021-12-29 PROCEDURE — G1004 CDSM NDSC: HCPCS

## 2021-12-29 PROCEDURE — 63700000 HC SELF-ADMINISTRABLE DRUG: Performed by: STUDENT IN AN ORGANIZED HEALTH CARE EDUCATION/TRAINING PROGRAM

## 2021-12-29 PROCEDURE — 25000000 HC PHARMACY GENERAL: Performed by: NURSE ANESTHETIST, CERTIFIED REGISTERED

## 2021-12-29 PROCEDURE — 71000011 HC PACU PHASE 1 EA ADDL MIN: Performed by: ORTHOPAEDIC SURGERY

## 2021-12-29 PROCEDURE — 71045 X-RAY EXAM CHEST 1 VIEW: CPT

## 2021-12-29 PROCEDURE — 99285 EMERGENCY DEPT VISIT HI MDM: CPT | Mod: 25

## 2021-12-29 PROCEDURE — 73560 X-RAY EXAM OF KNEE 1 OR 2: CPT | Mod: LT

## 2021-12-29 PROCEDURE — 81001 URINALYSIS AUTO W/SCOPE: CPT | Performed by: EMERGENCY MEDICINE

## 2021-12-29 PROCEDURE — 25800000 HC PHARMACY IV SOLUTIONS: Performed by: NURSE ANESTHETIST, CERTIFIED REGISTERED

## 2021-12-29 PROCEDURE — 63600000 HC DRUGS/DETAIL CODE: Performed by: EMERGENCY MEDICINE

## 2021-12-29 PROCEDURE — 80053 COMPREHEN METABOLIC PANEL: CPT | Performed by: EMERGENCY MEDICINE

## 2021-12-29 PROCEDURE — 63600000 HC DRUGS/DETAIL CODE: Performed by: STUDENT IN AN ORGANIZED HEALTH CARE EDUCATION/TRAINING PROGRAM

## 2021-12-29 PROCEDURE — 1123F ACP DISCUSS/DSCN MKR DOCD: CPT | Performed by: STUDENT IN AN ORGANIZED HEALTH CARE EDUCATION/TRAINING PROGRAM

## 2021-12-29 PROCEDURE — 86920 COMPATIBILITY TEST SPIN: CPT

## 2021-12-29 PROCEDURE — 71000001 HC PACU PHASE 1 INITIAL 30MIN: Performed by: ORTHOPAEDIC SURGERY

## 2021-12-29 PROCEDURE — 85025 COMPLETE CBC W/AUTO DIFF WBC: CPT | Performed by: EMERGENCY MEDICINE

## 2021-12-29 PROCEDURE — 85730 THROMBOPLASTIN TIME PARTIAL: CPT | Performed by: STUDENT IN AN ORGANIZED HEALTH CARE EDUCATION/TRAINING PROGRAM

## 2021-12-29 PROCEDURE — 96375 TX/PRO/DX INJ NEW DRUG ADDON: CPT

## 2021-12-29 PROCEDURE — 36000015 HC OR LEVEL 5 EA ADDL MIN: Performed by: ORTHOPAEDIC SURGERY

## 2021-12-29 PROCEDURE — 25000000 HC PHARMACY GENERAL: Performed by: ORTHOPAEDIC SURGERY

## 2021-12-29 PROCEDURE — 99222 1ST HOSP IP/OBS MODERATE 55: CPT | Performed by: INTERNAL MEDICINE

## 2021-12-29 PROCEDURE — 85025 COMPLETE CBC W/AUTO DIFF WBC: CPT | Performed by: STUDENT IN AN ORGANIZED HEALTH CARE EDUCATION/TRAINING PROGRAM

## 2021-12-29 PROCEDURE — 27200000 HC STERILE SUPPLY: Performed by: ORTHOPAEDIC SURGERY

## 2021-12-29 PROCEDURE — 63600000 HC DRUGS/DETAIL CODE: Performed by: NURSE ANESTHETIST, CERTIFIED REGISTERED

## 2021-12-29 PROCEDURE — 86901 BLOOD TYPING SEROLOGIC RH(D): CPT

## 2021-12-29 PROCEDURE — 99223 1ST HOSP IP/OBS HIGH 75: CPT | Performed by: STUDENT IN AN ORGANIZED HEALTH CARE EDUCATION/TRAINING PROGRAM

## 2021-12-29 PROCEDURE — 63700000 HC SELF-ADMINISTRABLE DRUG: Performed by: NURSE PRACTITIONER

## 2021-12-29 PROCEDURE — 12000000 HC ROOM AND CARE MED/SURG

## 2021-12-29 PROCEDURE — 36000005 HC OR LEVEL 5 INITIAL 30MIN: Performed by: ORTHOPAEDIC SURGERY

## 2021-12-29 PROCEDURE — 80053 COMPREHEN METABOLIC PANEL: CPT | Performed by: STUDENT IN AN ORGANIZED HEALTH CARE EDUCATION/TRAINING PROGRAM

## 2021-12-29 PROCEDURE — 85610 PROTHROMBIN TIME: CPT | Performed by: STUDENT IN AN ORGANIZED HEALTH CARE EDUCATION/TRAINING PROGRAM

## 2021-12-29 PROCEDURE — 25800000 HC PHARMACY IV SOLUTIONS: Performed by: STUDENT IN AN ORGANIZED HEALTH CARE EDUCATION/TRAINING PROGRAM

## 2021-12-29 PROCEDURE — 87637 SARSCOV2&INF A&B&RSV AMP PRB: CPT | Performed by: EMERGENCY MEDICINE

## 2021-12-29 PROCEDURE — 25000000 HC PHARMACY GENERAL: Performed by: STUDENT IN AN ORGANIZED HEALTH CARE EDUCATION/TRAINING PROGRAM

## 2021-12-29 PROCEDURE — 63600000 HC DRUGS/DETAIL CODE

## 2021-12-29 PROCEDURE — 73502 X-RAY EXAM HIP UNI 2-3 VIEWS: CPT | Mod: LT

## 2021-12-29 PROCEDURE — 72170 X-RAY EXAM OF PELVIS: CPT

## 2021-12-29 DEVICE — IMPLANTABLE DEVICE: Type: IMPLANTABLE DEVICE | Site: HIP | Status: FUNCTIONAL

## 2021-12-29 DEVICE — HIP COMPONENT ARTICUL/EZE BALL 28MM +1.5: Type: IMPLANTABLE DEVICE | Site: HIP | Status: FUNCTIONAL

## 2021-12-29 RX ORDER — TIOTROPIUM BROMIDE INHALATION SPRAY 3.12 UG/1
2 SPRAY, METERED RESPIRATORY (INHALATION) DAILY
COMMUNITY

## 2021-12-29 RX ORDER — ASPIRIN 325 MG
325 TABLET, DELAYED RELEASE (ENTERIC COATED) ORAL DAILY
Status: DISCONTINUED | OUTPATIENT
Start: 2021-12-30 | End: 2022-01-01 | Stop reason: HOSPADM

## 2021-12-29 RX ORDER — FENTANYL CITRATE 50 UG/ML
INJECTION, SOLUTION INTRAMUSCULAR; INTRAVENOUS AS NEEDED
Status: DISCONTINUED | OUTPATIENT
Start: 2021-12-29 | End: 2021-12-29 | Stop reason: SURG

## 2021-12-29 RX ORDER — LIDOCAINE 560 MG/1
1 PATCH PERCUTANEOUS; TOPICAL; TRANSDERMAL DAILY
Status: DISCONTINUED | OUTPATIENT
Start: 2021-12-29 | End: 2021-12-29

## 2021-12-29 RX ORDER — FLUTICASONE PROPIONATE 110 UG/1
2 AEROSOL, METERED RESPIRATORY (INHALATION)
Status: DISCONTINUED | OUTPATIENT
Start: 2021-12-29 | End: 2021-12-29

## 2021-12-29 RX ORDER — DIGOXIN 250 MCG
250 TABLET ORAL
Status: DISCONTINUED | OUTPATIENT
Start: 2021-12-29 | End: 2022-01-01 | Stop reason: HOSPADM

## 2021-12-29 RX ORDER — IBUPROFEN 200 MG
16-32 TABLET ORAL AS NEEDED
Status: DISCONTINUED | OUTPATIENT
Start: 2021-12-29 | End: 2022-01-01 | Stop reason: HOSPADM

## 2021-12-29 RX ORDER — POTASSIUM CHLORIDE 1.5 G/1.58G
40 POWDER, FOR SOLUTION ORAL AS NEEDED
Status: DISCONTINUED | OUTPATIENT
Start: 2021-12-29 | End: 2021-12-30

## 2021-12-29 RX ORDER — LEVOTHYROXINE SODIUM 75 UG/1
75 TABLET ORAL
Status: DISCONTINUED | OUTPATIENT
Start: 2021-12-29 | End: 2022-01-01 | Stop reason: HOSPADM

## 2021-12-29 RX ORDER — PHENYLEPHRINE HCL IN 0.9% NACL 1 MG/10 ML
SYRINGE (ML) INTRAVENOUS AS NEEDED
Status: DISCONTINUED | OUTPATIENT
Start: 2021-12-29 | End: 2021-12-29 | Stop reason: SURG

## 2021-12-29 RX ORDER — TRANEXAMIC ACID 10 MG/ML
1000 INJECTION, SOLUTION INTRAVENOUS
Status: COMPLETED | OUTPATIENT
Start: 2021-12-29 | End: 2021-12-29

## 2021-12-29 RX ORDER — ACETAMINOPHEN 500 MG
1000 TABLET ORAL
Status: DISCONTINUED | OUTPATIENT
Start: 2021-12-29 | End: 2022-01-01 | Stop reason: HOSPADM

## 2021-12-29 RX ORDER — ROCURONIUM BROMIDE 10 MG/ML
INJECTION, SOLUTION INTRAVENOUS AS NEEDED
Status: DISCONTINUED | OUTPATIENT
Start: 2021-12-29 | End: 2021-12-29 | Stop reason: SURG

## 2021-12-29 RX ORDER — LATANOPROST 50 UG/ML
1 SOLUTION/ DROPS OPHTHALMIC NIGHTLY
Status: DISCONTINUED | OUTPATIENT
Start: 2021-12-29 | End: 2022-01-01 | Stop reason: HOSPADM

## 2021-12-29 RX ORDER — ROSUVASTATIN CALCIUM 5 MG/1
5 TABLET, COATED ORAL DAILY
COMMUNITY

## 2021-12-29 RX ORDER — IBUPROFEN 200 MG
16-32 TABLET ORAL AS NEEDED
Status: DISCONTINUED | OUTPATIENT
Start: 2021-12-29 | End: 2021-12-29 | Stop reason: HOSPADM

## 2021-12-29 RX ORDER — DEXTROSE 40 %
15-30 GEL (GRAM) ORAL AS NEEDED
Status: DISCONTINUED | OUTPATIENT
Start: 2021-12-29 | End: 2021-12-29 | Stop reason: HOSPADM

## 2021-12-29 RX ORDER — OXYCODONE HYDROCHLORIDE 5 MG/1
2.5 TABLET ORAL EVERY 6 HOURS PRN
Status: DISCONTINUED | OUTPATIENT
Start: 2021-12-29 | End: 2022-01-01 | Stop reason: HOSPADM

## 2021-12-29 RX ORDER — AMOXICILLIN 250 MG
1 CAPSULE ORAL 2 TIMES DAILY
Status: DISCONTINUED | OUTPATIENT
Start: 2021-12-29 | End: 2022-01-01 | Stop reason: HOSPADM

## 2021-12-29 RX ORDER — DILTIAZEM HYDROCHLORIDE 240 MG/1
240 CAPSULE, COATED, EXTENDED RELEASE ORAL DAILY
Status: DISCONTINUED | OUTPATIENT
Start: 2021-12-29 | End: 2022-01-01 | Stop reason: HOSPADM

## 2021-12-29 RX ORDER — BUPIVACAINE HYDROCHLORIDE 5 MG/ML
INJECTION, SOLUTION PERINEURAL
Status: DISCONTINUED | OUTPATIENT
Start: 2021-12-29 | End: 2021-12-29 | Stop reason: HOSPADM

## 2021-12-29 RX ORDER — ONDANSETRON HYDROCHLORIDE 2 MG/ML
INJECTION, SOLUTION INTRAVENOUS AS NEEDED
Status: DISCONTINUED | OUTPATIENT
Start: 2021-12-29 | End: 2021-12-29 | Stop reason: SURG

## 2021-12-29 RX ORDER — DEXTROSE 50 % IN WATER (D50W) INTRAVENOUS SYRINGE
25 AS NEEDED
Status: DISCONTINUED | OUTPATIENT
Start: 2021-12-29 | End: 2021-12-29 | Stop reason: HOSPADM

## 2021-12-29 RX ORDER — FLUTICASONE PROPIONATE 110 UG/1
1 AEROSOL, METERED RESPIRATORY (INHALATION) 2 TIMES DAILY
COMMUNITY

## 2021-12-29 RX ORDER — DEXAMETHASONE SODIUM PHOSPHATE 4 MG/ML
INJECTION, SOLUTION INTRA-ARTICULAR; INTRALESIONAL; INTRAMUSCULAR; INTRAVENOUS; SOFT TISSUE AS NEEDED
Status: DISCONTINUED | OUTPATIENT
Start: 2021-12-29 | End: 2021-12-29 | Stop reason: SURG

## 2021-12-29 RX ORDER — POTASSIUM CHLORIDE 750 MG/1
40 TABLET, FILM COATED, EXTENDED RELEASE ORAL AS NEEDED
Status: DISCONTINUED | OUTPATIENT
Start: 2021-12-29 | End: 2021-12-30

## 2021-12-29 RX ORDER — KETOROLAC TROMETHAMINE 15 MG/ML
INJECTION, SOLUTION INTRAMUSCULAR; INTRAVENOUS AS NEEDED
Status: DISCONTINUED | OUTPATIENT
Start: 2021-12-29 | End: 2021-12-29 | Stop reason: SURG

## 2021-12-29 RX ORDER — FENTANYL CITRATE 50 UG/ML
25 INJECTION, SOLUTION INTRAMUSCULAR; INTRAVENOUS
Status: DISCONTINUED | OUTPATIENT
Start: 2021-12-29 | End: 2021-12-29 | Stop reason: HOSPADM

## 2021-12-29 RX ORDER — HYDROMORPHONE HYDROCHLORIDE 1 MG/ML
INJECTION, SOLUTION INTRAMUSCULAR; INTRAVENOUS; SUBCUTANEOUS
Status: COMPLETED
Start: 2021-12-29 | End: 2021-12-29

## 2021-12-29 RX ORDER — LABETALOL HCL 20 MG/4 ML
5 SYRINGE (ML) INTRAVENOUS ONCE AS NEEDED
Status: DISCONTINUED | OUTPATIENT
Start: 2021-12-29 | End: 2021-12-30

## 2021-12-29 RX ORDER — ALBUTEROL SULFATE 0.83 MG/ML
2.5 SOLUTION RESPIRATORY (INHALATION) EVERY 6 HOURS PRN
Status: DISCONTINUED | OUTPATIENT
Start: 2021-12-29 | End: 2022-01-01 | Stop reason: HOSPADM

## 2021-12-29 RX ORDER — MORPHINE SULFATE 2 MG/ML
2 INJECTION, SOLUTION INTRAMUSCULAR; INTRAVENOUS EVERY 30 MIN PRN
Status: DISCONTINUED | OUTPATIENT
Start: 2021-12-29 | End: 2021-12-29

## 2021-12-29 RX ORDER — DABIGATRAN ETEXILATE 150 MG/1
150 CAPSULE ORAL 2 TIMES DAILY
Status: DISCONTINUED | OUTPATIENT
Start: 2021-12-30 | End: 2022-01-01 | Stop reason: HOSPADM

## 2021-12-29 RX ORDER — LIDOCAINE HYDROCHLORIDE 10 MG/ML
INJECTION, SOLUTION INFILTRATION; PERINEURAL AS NEEDED
Status: DISCONTINUED | OUTPATIENT
Start: 2021-12-29 | End: 2021-12-29 | Stop reason: SURG

## 2021-12-29 RX ORDER — ENOXAPARIN SODIUM 100 MG/ML
40 INJECTION SUBCUTANEOUS
Status: DISCONTINUED | OUTPATIENT
Start: 2021-12-29 | End: 2021-12-29

## 2021-12-29 RX ORDER — POTASSIUM CHLORIDE 1.5 G/1.58G
20 POWDER, FOR SOLUTION ORAL AS NEEDED
Status: DISCONTINUED | OUTPATIENT
Start: 2021-12-29 | End: 2021-12-30

## 2021-12-29 RX ORDER — HYDROMORPHONE HYDROCHLORIDE 1 MG/ML
0.5 INJECTION, SOLUTION INTRAMUSCULAR; INTRAVENOUS; SUBCUTANEOUS ONCE
Status: COMPLETED | OUTPATIENT
Start: 2021-12-29 | End: 2021-12-29

## 2021-12-29 RX ORDER — DEXTROSE 50 % IN WATER (D50W) INTRAVENOUS SYRINGE
25 AS NEEDED
Status: DISCONTINUED | OUTPATIENT
Start: 2021-12-29 | End: 2022-01-01 | Stop reason: HOSPADM

## 2021-12-29 RX ORDER — TIOTROPIUM BROMIDE 18 UG/1
1 CAPSULE ORAL; RESPIRATORY (INHALATION) DAILY
COMMUNITY
End: 2021-12-29 | Stop reason: SDUPTHER

## 2021-12-29 RX ORDER — MORPHINE SULFATE 2 MG/ML
2 INJECTION, SOLUTION INTRAMUSCULAR; INTRAVENOUS EVERY 4 HOURS PRN
Status: DISCONTINUED | OUTPATIENT
Start: 2021-12-29 | End: 2021-12-30

## 2021-12-29 RX ORDER — CEFAZOLIN SODIUM 2 G/100ML
2 INJECTION, SOLUTION INTRAVENOUS
Status: DISCONTINUED | OUTPATIENT
Start: 2021-12-29 | End: 2021-12-29

## 2021-12-29 RX ORDER — DORZOLAMIDE HCL 20 MG/ML
1 SOLUTION/ DROPS OPHTHALMIC
Status: DISCONTINUED | OUTPATIENT
Start: 2021-12-29 | End: 2022-01-01 | Stop reason: HOSPADM

## 2021-12-29 RX ORDER — CEFAZOLIN SODIUM 2 G/100ML
2 INJECTION, SOLUTION INTRAVENOUS
Status: COMPLETED | OUTPATIENT
Start: 2021-12-29 | End: 2021-12-29

## 2021-12-29 RX ORDER — ONDANSETRON HYDROCHLORIDE 2 MG/ML
4 INJECTION, SOLUTION INTRAVENOUS
Status: DISCONTINUED | OUTPATIENT
Start: 2021-12-29 | End: 2021-12-29 | Stop reason: HOSPADM

## 2021-12-29 RX ORDER — DEXTROSE 40 %
15-30 GEL (GRAM) ORAL AS NEEDED
Status: DISCONTINUED | OUTPATIENT
Start: 2021-12-29 | End: 2022-01-01 | Stop reason: HOSPADM

## 2021-12-29 RX ORDER — POTASSIUM CHLORIDE 750 MG/1
20 TABLET, FILM COATED, EXTENDED RELEASE ORAL AS NEEDED
Status: DISCONTINUED | OUTPATIENT
Start: 2021-12-29 | End: 2021-12-30

## 2021-12-29 RX ORDER — SODIUM CHLORIDE 9 MG/ML
INJECTION, SOLUTION INTRAVENOUS CONTINUOUS PRN
Status: DISCONTINUED | OUTPATIENT
Start: 2021-12-29 | End: 2021-12-29 | Stop reason: SURG

## 2021-12-29 RX ORDER — CEFAZOLIN SODIUM 2 G/100ML
2 INJECTION, SOLUTION INTRAVENOUS
Status: COMPLETED | OUTPATIENT
Start: 2021-12-30 | End: 2021-12-30

## 2021-12-29 RX ORDER — OXYCODONE HYDROCHLORIDE 5 MG/1
5 TABLET ORAL EVERY 6 HOURS PRN
Status: DISCONTINUED | OUTPATIENT
Start: 2021-12-29 | End: 2022-01-01 | Stop reason: HOSPADM

## 2021-12-29 RX ORDER — SODIUM CHLORIDE, SODIUM GLUCONATE, SODIUM ACETATE, POTASSIUM CHLORIDE AND MAGNESIUM CHLORIDE 30; 37; 368; 526; 502 MG/100ML; MG/100ML; MG/100ML; MG/100ML; MG/100ML
INJECTION, SOLUTION INTRAVENOUS CONTINUOUS PRN
Status: DISCONTINUED | OUTPATIENT
Start: 2021-12-29 | End: 2021-12-29 | Stop reason: SURG

## 2021-12-29 RX ORDER — PHENYLEPHRINE HYDROCHLORIDE 10 MG/ML
INJECTION INTRAVENOUS AS NEEDED
Status: DISCONTINUED | OUTPATIENT
Start: 2021-12-29 | End: 2021-12-29 | Stop reason: SURG

## 2021-12-29 RX ORDER — AZITHROMYCIN 250 MG/1
250 TABLET, FILM COATED ORAL DAILY
Status: DISCONTINUED | OUTPATIENT
Start: 2021-12-29 | End: 2021-12-29

## 2021-12-29 RX ORDER — ACETAMINOPHEN 325 MG/1
650 TABLET ORAL EVERY 4 HOURS PRN
Status: DISCONTINUED | OUTPATIENT
Start: 2021-12-29 | End: 2021-12-29

## 2021-12-29 RX ORDER — BUDESONIDE 0.5 MG/2ML
0.5 INHALANT ORAL
Status: DISCONTINUED | OUTPATIENT
Start: 2021-12-29 | End: 2022-01-01 | Stop reason: HOSPADM

## 2021-12-29 RX ORDER — PROPOFOL 10 MG/ML
INJECTION, EMULSION INTRAVENOUS AS NEEDED
Status: DISCONTINUED | OUTPATIENT
Start: 2021-12-29 | End: 2021-12-29 | Stop reason: SURG

## 2021-12-29 RX ORDER — CHOLECALCIFEROL (VITAMIN D3) 25 MCG
1000 TABLET ORAL DAILY
Status: DISCONTINUED | OUTPATIENT
Start: 2021-12-29 | End: 2022-01-01 | Stop reason: HOSPADM

## 2021-12-29 RX ADMIN — ROCURONIUM BROMIDE 50 MG: 10 INJECTION, SOLUTION INTRAVENOUS at 14:09

## 2021-12-29 RX ADMIN — DEXAMETHASONE SODIUM PHOSPHATE 4 MG: 4 INJECTION, SOLUTION INTRAMUSCULAR; INTRAVENOUS at 14:30

## 2021-12-29 RX ADMIN — Medication 100 MCG: at 15:03

## 2021-12-29 RX ADMIN — SODIUM CHLORIDE, SODIUM GLUCONATE, SODIUM ACETATE, POTASSIUM CHLORIDE AND MAGNESIUM CHLORIDE: 526; 502; 368; 37; 30 INJECTION, SOLUTION INTRAVENOUS at 14:16

## 2021-12-29 RX ADMIN — TRANEXAMIC ACID 1000 MG: 10 INJECTION, SOLUTION INTRAVENOUS at 14:27

## 2021-12-29 RX ADMIN — FENTANYL CITRATE 50 MCG: 50 INJECTION, SOLUTION INTRAMUSCULAR; INTRAVENOUS at 14:35

## 2021-12-29 RX ADMIN — LEVOTHYROXINE SODIUM 75 MCG: 0.07 TABLET ORAL at 05:54

## 2021-12-29 RX ADMIN — PROPOFOL INJECTABLE EMULSION 20 MG: 10 INJECTION, EMULSION INTRAVENOUS at 14:49

## 2021-12-29 RX ADMIN — PROPOFOL INJECTABLE EMULSION 30 MG: 10 INJECTION, EMULSION INTRAVENOUS at 14:35

## 2021-12-29 RX ADMIN — HYDROMORPHONE HYDROCHLORIDE 0.5 MG: 1 INJECTION, SOLUTION INTRAMUSCULAR; INTRAVENOUS; SUBCUTANEOUS at 03:00

## 2021-12-29 RX ADMIN — DORZOLAMIDE HYDROCHLORIDE 1 DROP: 20 SOLUTION/ DROPS OPHTHALMIC at 05:49

## 2021-12-29 RX ADMIN — DILTIAZEM HYDROCHLORIDE 240 MG: 240 CAPSULE, COATED, EXTENDED RELEASE ORAL at 05:50

## 2021-12-29 RX ADMIN — ACETAMINOPHEN 1000 MG: 500 TABLET ORAL at 08:34

## 2021-12-29 RX ADMIN — FENTANYL CITRATE 50 MCG: 50 INJECTION, SOLUTION INTRAMUSCULAR; INTRAVENOUS at 14:06

## 2021-12-29 RX ADMIN — Medication 1000 UNITS: at 08:34

## 2021-12-29 RX ADMIN — Medication 50 MCG: at 15:00

## 2021-12-29 RX ADMIN — MORPHINE SULFATE 2 MG: 2 INJECTION, SOLUTION INTRAMUSCULAR; INTRAVENOUS at 01:34

## 2021-12-29 RX ADMIN — ONDANSETRON HYDROCHLORIDE 4 MG: 2 SOLUTION INTRAMUSCULAR; INTRAVENOUS at 15:30

## 2021-12-29 RX ADMIN — KETOROLAC TROMETHAMINE 15 MG: 15 INJECTION, SOLUTION INTRAMUSCULAR; INTRAVENOUS at 15:39

## 2021-12-29 RX ADMIN — PHENYLEPHRINE HYDROCHLORIDE 100 MCG: 10 INJECTION INTRAVENOUS at 14:09

## 2021-12-29 RX ADMIN — Medication 100 MCG: at 15:12

## 2021-12-29 RX ADMIN — PROPOFOL INJECTABLE EMULSION 150 MG: 10 INJECTION, EMULSION INTRAVENOUS at 14:08

## 2021-12-29 RX ADMIN — CEFAZOLIN SODIUM 2 G: 2 INJECTION, SOLUTION INTRAVENOUS at 14:16

## 2021-12-29 RX ADMIN — SUGAMMADEX 120 MG: 100 INJECTION, SOLUTION INTRAVENOUS at 15:57

## 2021-12-29 RX ADMIN — VANCOMYCIN HYDROCHLORIDE 1 G: 1 INJECTION, POWDER, LYOPHILIZED, FOR SOLUTION INTRAVENOUS at 13:38

## 2021-12-29 RX ADMIN — Medication 200 MCG: at 15:18

## 2021-12-29 RX ADMIN — MORPHINE SULFATE 2 MG: 2 INJECTION, SOLUTION INTRAMUSCULAR; INTRAVENOUS at 08:58

## 2021-12-29 RX ADMIN — LIDOCAINE 1 PATCH: 246 PATCH TOPICAL at 08:40

## 2021-12-29 RX ADMIN — LIDOCAINE HYDROCHLORIDE 5 ML: 10 INJECTION, SOLUTION INFILTRATION; PERINEURAL at 14:08

## 2021-12-29 RX ADMIN — DOCUSATE SODIUM AND SENNOSIDES 1 TABLET: 8.6; 5 TABLET, FILM COATED ORAL at 08:34

## 2021-12-29 RX ADMIN — DIGOXIN 250 MCG: 250 TABLET ORAL at 05:49

## 2021-12-29 RX ADMIN — SODIUM CHLORIDE: 9 INJECTION, SOLUTION INTRAVENOUS at 14:04

## 2021-12-29 ASSESSMENT — ENCOUNTER SYMPTOMS
NAUSEA: 0
HEMATURIA: 0
SEIZURES: 0
DYSRHYTHMIAS: 1
BACK PAIN: 0
LOSS OF CONSCIOUSNESS: 0
HEADACHES: 0
DIFFICULTY URINATING: 0
DIARRHEA: 0
FEVER: 0
DIAPHORESIS: 0
ABDOMINAL PAIN: 0
VOMITING: 0
SORE THROAT: 0
FACIAL SWELLING: 0
COLOR CHANGE: 0
SPEECH DIFFICULTY: 0
AGITATION: 0
SHORTNESS OF BREATH: 0
WHEEZING: 0
WEAKNESS: 0
COUGH: 0
NECK PAIN: 0
ACTIVITY CHANGE: 0

## 2021-12-29 ASSESSMENT — PATIENT HEALTH QUESTIONNAIRE - PHQ9: SUM OF ALL RESPONSES TO PHQ9 QUESTIONS 1 & 2: 0

## 2021-12-29 NOTE — ANESTHESIA POSTPROCEDURE EVALUATION
Patient: Bee Cobb    Procedure Summary     Date: 12/29/21 Room / Location: [facility] OR 8 / [facility] OR    Anesthesia Start: 1403 Anesthesia Stop: 1617    Procedure: HIP HEMIARTHROPLASTY (Left Hip) Diagnosis:       Closed fracture of left hip, initial encounter (CMS/[facility])      (Closed fracture of left hip, initial encounter (CMS/[facility]) [S72.002A])    Surgeons: Fazal Pena DO Responsible Provider: Andres Bone MD    Anesthesia Type: general ASA Status: 3          Anesthesia Type: general  PACU Vitals  12/29/2021 1608 - 12/29/2021 1708      12/29/2021  1615 12/29/2021  1630 12/29/2021  1645 12/29/2021  1700    BP: 177/67 183/99 181/72 175/70    Temp: 36.5 °C (97.7 °F) -- -- --    Pulse: 88 94 80 76    Resp: 24 24 22 19    SpO2: 100 % 99 % 99 % 98 %            Anesthesia Post Evaluation    Pain management: adequate  Patient location during evaluation: PACU  Patient participation: complete - patient participated  Level of consciousness: awake and alert  Cardiovascular status: acceptable  Airway Patency: adequate  Respiratory status: acceptable  Hydration status: acceptable  Anesthetic complications: no

## 2021-12-29 NOTE — NURSING NOTE
Only responsible for dentures which came up with patient from ER.  I locked her dentures up in our safe on sds unit. Bag #[id number]

## 2021-12-29 NOTE — ANESTHESIOLOGIST PRE-PROCEDURE ATTESTATION
Pre-Procedure Patient Identification:  I am the Primary Anesthesiologist and have identified the patient on 12/29/21 at 1:59 PM.   I have confirmed the procedure(s) will be performed by the following surgeon/proceduralist Fazal Pena DO.

## 2021-12-29 NOTE — ASSESSMENT & PLAN NOTE
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

## 2021-12-29 NOTE — CONSULTS
Orthopedic Consult Note    Subjective     Bee Cobb is a 81 y.o. female w/PMHx of afib on Pradaxa, mechanical mitral valve, HLD, PAN, thalassemia presents to [facility] for left hip pain s/p fall earlier this evening.    Patient states she was attempting to get into bed when she missed the bed and landed directly onto her left hip. Felt immediate pain to left hip and inability to ambulate. Admits to hitting her head but denies LOC. No other pain or injury at this time. Last dose of pradaxa was 12/28 AM. Ambulates without assistive devices at baseline.    Ortho Attending - As above, case discussed and patient seen and images reviewed.       Medical History:   Past Medical History:   Diagnosis Date    Atrial fibrillation (CMS/HCC)     Glaucoma     History of transfusion     Hyperthyroidism     Lipid disorder     Osteoporosis     PAN (polyarteritis nodosa) (CMS/HCC)     Thalassemia        Surgical History:   Past Surgical History:   Procedure Laterality Date    CARDIAC SURGERY      TONSILLECTOMY         Social History:   Social History     Social History Narrative    Not on file       Family History: History reviewed. No pertinent family history.    Allergies: Atorvastatin; Penicillins; Shellfish derived; Iodinated contrast media; Rosuvastatin; and Covid-19 vaccine, mrna, [id number], lnp-s (moderna)    Current Inpatient Medications   Medication Dose Route Frequency Provider Last Rate Last Admin    acetaminophen (TYLENOL) tablet 650 mg  650 mg oral q4h PRN Jeevan Menendez II, DO        albuterol nebulizer solution 2.5 mg  2.5 mg nebulization q6h PRN Jeevan Menendez II, DO        azithromycin (ZITHROMAX) tablet 250 mg  250 mg oral Daily Jeevan Menendez II, DO        budesonide (PULMICORT) 0.5 mg/2 mL nebulizer solution 0.5 mg  0.5 mg nebulization BID (6a, 6p) Jeevan Menendez II, DO        cholecalciferol (vitamin D3) tablet 1,000 Units  1,000 Units oral Daily Jeevan Menendez II, DO        glucose chewable  tablet 16-32 g of dextrose  16-32 g of dextrose oral PRN Jeevan Menendez II, DO        Or    dextrose 40 % oral gel 15-30 g of dextrose  15-30 g of dextrose oral PRN Jeevan Menendez II, DO        Or    glucagon (GLUCAGEN) injection 1 mg  1 mg intramuscular PRN Jeevan Menendez II, DO        Or    dextrose in water injection 12.5 g  25 mL intravenous PRN Jeevan Menendez II, DO        digoxin (LANOXIN) tablet 250 mcg  250 mcg oral Daily (6a) Jeevan Menendez II, DO        dilTIAZem CD (CARDIZEM CD) 24 hr ER capsule 240 mg  240 mg oral Daily Jeevan Menendez II, DO        dorzolamide (TRUSOPT) 2 % ophthalmic solution 1 drop  1 drop Both Eyes BID (6a, 6p) Jeevan Menendez II, DO        enoxaparin (LOVENOX) syringe 40 mg  40 mg subcutaneous Daily (6p) Jeevan Menendez II, DO        latanoprost (XALATAN) 0.005 % ophthalmic solution 1 drop  1 drop Both Eyes Nightly Jeevan Menendez II,         levothyroxine (SYNTHROID) tablet 75 mcg  75 mcg oral Daily (6a) Jeevan Menendez II, DO        morphine injection 2 mg  2 mg intravenous q4h PRN Jeevan Menendez II, DO        potassium chloride (KLOR-CON) tablet extended release 20 mEq  20 mEq oral PRN Jeevan Menendez II, DO        potassium chloride (KLOR-CON) tablet extended release 40 mEq  40 mEq oral PRN Jeevan Menendez II, DO        potassium chloride 20 mEq packet 20 mEq  20 mEq oral PRN Jeevan Menendez II, DO        potassium chloride 20 mEq packet 40 mEq  40 mEq oral PRN Jeevan Menendez II, DO        sennosides-docusate sodium (SENOKOT-S) 8.6-50 mg per tablet 1 tablet  1 tablet oral BID Jeevan Menendez II, DO            Medication List        ASK your doctor about these medications      albuterol HFA 90 mcg/actuation inhaler  Commonly known as: VENTOLIN HFA  Inhale 2 puffs every 4 (four) hours as needed.  Dose: 2 puff     azithromycin 250 mg tablet  Commonly known as: ZITHROMAX     cholecalciferol (vitamin D3) 25 mcg (1,000 unit)  capsule  Take by mouth.     clindamycin 300 mg capsule  Commonly known as: CLEOCIN     denosumab 60 mg/mL syringe  Commonly known as: PROLIA  Inject 60 mg under the skin every 6 (six) months.  Dose: 60 mg     digoxin 250 mcg (0.25 mg) tablet  Commonly known as: LANOXIN  Take 250 mcg by mouth once daily.  Dose: 250 mcg     * dilTIAZem  mg 24 hr capsule  Commonly known as: CARDIZEM CD     * DILT- mg 24 hr capsule  Generic drug: diltiazem XR     dorzolamide 2 % ophthalmic solution  Commonly known as: TRUSOPT  Administer 1 drop into both eyes 2 (two) times a day.  Dose: 1 drop     ethambutoL 400 mg tablet  Commonly known as: MYAMBUTOL  Patient takes 4 pills Tuesday, Thursday and Saturday     FLOVENT  mcg/actuation inhaler  INHALE 2 PUFFS INTO THE LUNGS TWICE DAILY  Generic drug: fluticasone HFA     latanoprost 0.005 % ophthalmic solution  Commonly known as: XALATAN     multivitamin tablet  Commonly known as: THERAGRAN  Take 1 tablet by mouth.  Dose: 1 tablet     PRADAXA 150 mg capsu  Generic drug: dabigatran etexilate     rifAMPin 300 mg capsule  Commonly known as: RIFADIN     SPIRIVA RESPIMAT 2.5 mcg/actuation mist inhaler  INHALE 2 PUFFS INTO THE LUNGS DAILY  Generic drug: tiotropium bromide     SYNTHROID 75 mcg tablet  Take 75 mcg by mouth once daily.  Dose: 75 mcg  Generic drug: levothyroxine           * This list has 2 medication(s) that are the same as other medications prescribed for you. Read the directions carefully, and ask your doctor or other care provider to review them with you.                Review of Systems  Pertinent items are noted in HPI.    Objective   Labs    Lab Results   Component Value Date    WBC 26.76 (H) 12/29/2021    HGB 11.2 (L) 12/29/2021    HCT 35.3 12/29/2021     12/29/2021    ALT 19 12/29/2021    AST 31 12/29/2021     (L) 12/29/2021    K 3.4 (L) 12/29/2021     12/29/2021    CREATININE 0.5 (L) 12/29/2021    BUN 6 (L) 12/29/2021    CO2 20 (L)  12/29/2021         Imaging    X-rays of the pelvis demonstrate left femoral neck fracture    X-rays of the left knee demonstrate no fractures or dislocations    CT of the left hip demonstrates displaced transcervical femoral neck fracture      Physical Exam    General: no acute distress, well appearing  HEENT: normocephalic, atraumatic, pupils equal  Neuro: awake, alert and oriented x 3  Psych: appropriate mood and affect, calm and cooperative  Respiratory: non-labored breathing, good respiratory effort    MSK:    RUE  No obvious deformity.  No TTP bony prominences. No palpable crepitus.  No pain with ROM shoulder/elbow/wrist/fingers  Motor intact axillary/msk/median/radial/ulnar/AIN/PIN  SILT axillary/median/radial/ulnar n  Compartments soft and compressible  Hand WWP    LUE  No obvious deformity.  No TTP bony prominences. No palpable crepitus.  No pain with ROM shoulder/elbow/wrist/fingers  Motor intact axillary/msk/median/radial/ulnar/AIN/PIN  SILT axillary/median/radial/ulnar n  Compartments soft and compressible  Hand WWP    LLE  Short and externally rotated  TTP at the hip joint  + log roll  No pain with ROM ankle  SLR deferred  Motor intact ehl/fhl/df/pf/peroneals  SILT sural/saphenous/spn/dpn/tibial n  Compartments soft and compressible  Foot WWP    RLE  No obvious deformity.  No TTP bony prominences. No palpable crepitus.  Negative log roll  No pain with ROM ankle  Motor intact quads/hamstrings/ehl/fhl/df/pf/peroneals  SILT sural/saphenous/spn/dpn/tibial n  Compartments soft and compressible  Foot WWP    Assessment      81 y.o. female being consulted for left displaced femoral neck fracture s/p FFSH. On Pradaxa for A-fib, last dose 12/28 AM     Plan     - Will need orthopedic operative intervention  - Plan for OR 12/29 with Dr. Pena   - NPO for OR  - Ancef/vancomyxin/TXA to be on call to OR  - DVT ppx: SCD, hold chemoppx for OR  -last dose of Pradaxa 12/28 AM  - Pain control  - Bedrest  - CXR/EKG  -  CBC/BMP/Coags  - Type and Screen  - Witt  - COVID test  - Preoperative clearance per medical/cardiac team appreciated urgently prior to OR  - Medical management per primary team  - Consent for procedure and blood obtained and on chart      Case discussed with attending Dr. Pena, agrees with plan of care    Jonathan Fuentes, DO  Orthopedic Surgery PGY-2  Pager: [phone]    Ortho Attending    A/P     L Displaced Femoral neck Fracture    - For L Hip Felix today due to patient age, medical comorbidities  - Increased risk of periop blood loss secondary to Pradaxa use.  Monitor Hgb.  Please do not order anyu additional imaging studies if Hgbdrops postop; standard to lose blood in this scenario  - NPO, IVF  - can restart Pradaxa POD#1  - Will need SNF postop in hopeful 48-72 hrs  - Consents obtained  - Contact me with any issues/questions at     Thank You    Fazal Pena

## 2021-12-29 NOTE — ED PROVIDER NOTES
Emergency Medicine Note  HPI   HISTORY OF PRESENT ILLNESS     81-year-old female with a past medical history of atrial fibrillation on Pradaxa, hypothyroidism, hyperlipidemia, CARRINGTON, presents today from home with left-sided hip pain after an accidental fall when she missed getting into bed.  Patient has been unable to ambulate since then.  She also did hit her head      Trauma  Mechanism of injury: fall  Injury location: Head and left hip.  Incident location: home  Arrived directly from scene: yes     Fall:       Impact surface: carpet       Point of impact: Left buttocks and head.       Entrapped after fall: no    EMS/PTA data:       Blood loss: none       Responsiveness: alert       Oriented to: person, place, situation and time       Loss of consciousness: no       Amnesic to event: no       Airway interventions: none       Airway condition since incident: stable       Breathing condition since incident: stable       Circulation condition since incident: stable       Mental status condition since incident: stable       Disability condition since incident: stable    Current symptoms:       Associated symptoms:             Denies abdominal pain, back pain, chest pain, headache, loss of consciousness, nausea, neck pain, seizures and vomiting.         Patient History   PAST HISTORY     Reviewed from Nursing Triage:       Past Medical History:   Diagnosis Date   • Atrial fibrillation (CMS/HCC)    • Glaucoma    • History of transfusion    • Hyperthyroidism    • Lipid disorder    • Osteoporosis    • PAN (polyarteritis nodosa) (CMS/HCC)    • Thalassemia        Past Surgical History:   Procedure Laterality Date   • CARDIAC SURGERY     • TONSILLECTOMY         History reviewed. No pertinent family history.    Social History     Tobacco Use   • Smoking status: Never Smoker   • Smokeless tobacco: Never Used   Vaping Use   • Vaping Use: Never used   Substance Use Topics   • Alcohol use: Never   • Drug use: Never         Review  of Systems   REVIEW OF SYSTEMS     Review of Systems   Constitutional: Negative for activity change, diaphoresis and fever.   HENT: Negative for facial swelling and sore throat.    Eyes: Negative for visual disturbance.   Respiratory: Negative for cough, shortness of breath and wheezing.    Cardiovascular: Negative for chest pain and leg swelling.   Gastrointestinal: Negative for abdominal pain, diarrhea, nausea and vomiting.   Genitourinary: Negative for difficulty urinating and hematuria.   Musculoskeletal: Negative for back pain and neck pain.   Skin: Negative for color change and pallor.   Neurological: Negative for seizures, loss of consciousness, syncope, speech difficulty, weakness and headaches.   Psychiatric/Behavioral: Negative for agitation and behavioral problems.   All other systems reviewed and are negative.        VITALS     ED Vitals    Date/Time Temp Pulse Resp BP SpO2 [facility]   12/29/21 0023 37.1 °C (98.8 °F) 85 23 198/82 91 % AMW        Pulse Ox %: 91 % (12/29/21 0119)  Pulse Ox Interpretation: Low (12/29/21 0119)  Heart Rate: 78 (12/29/21 0119)  Rhythm Strip Interpretation: Normal Sinus Rhythm (12/29/21 0119)     Physical Exam   PHYSICAL EXAM     Physical Exam  Vitals and nursing note reviewed.   Constitutional:       General: She is not in acute distress.     Appearance: She is well-developed.   HENT:      Head: Normocephalic.      Comments: Small 2 cm hematoma noted to the left forehead  Eyes:      Conjunctiva/sclera: Conjunctivae normal.   Cardiovascular:      Rate and Rhythm: Normal rate and regular rhythm.      Heart sounds: No murmur heard.  Pulmonary:      Effort: Pulmonary effort is normal. No respiratory distress.      Comments: Dry crackles  Abdominal:      General: Bowel sounds are normal.      Palpations: Abdomen is soft.      Tenderness: There is no abdominal tenderness.   Musculoskeletal:         General: Normal range of motion.      Cervical back: Neck supple.      Comments: Unable to  do range of motion testing with her left hip secondary to pain, ecchymosis noted on the left knee although no pain with range of motion   Skin:     General: Skin is warm and dry.   Neurological:      Mental Status: She is alert.   Psychiatric:         Behavior: Behavior normal.           PROCEDURES     Procedures     DATA     Results     Procedure Component Value Units Date/Time    Comprehensive metabolic panel [[id number]] Collected: 12/29/21 0109    Specimen: Blood, Venous Updated: 12/29/21 0115    CBC and differential [[id number]] Collected: 12/29/21 0109    Specimen: Blood, Venous Updated: 12/29/21 0115    [facility] Draw Panel [[id number]] Collected: 12/29/21 0109    Specimen: Blood, Venous Updated: 12/29/21 0115    Narrative:      The following orders were created for panel order [facility] Draw Panel.  Procedure                               Abnormality         Status                     ---------                               -----------         ------                     RAINBOW LT BLUE[[id number]]                                  In process                   Please view results for these tests on the individual orders.    RAINBOW LT BLUE [[id number]] Collected: 12/29/21 0109    Specimen: Blood, Venous Updated: 12/29/21 0115    Type and Screen [facility] [[id number]] Collected: 12/29/21 0109    Specimen: Blood, Venous Updated: 12/29/21 0115          Imaging Results          X-RAY CHEST 1 VIEW (Preliminary result)  Result time 12/29/21 01:17:19    ED Interpretation    CARRINGTON changes                             X-RAY HIP WITH OR WITHOUT PELVIS 2-3 VW LEFT (Preliminary result)  Result time 12/29/21 01:17:24    ED Interpretation    Impacted femoral neck fracture                              No orders to display       Scoring tools                                 ED Course & MDM   MDM / ED COURSE and CLINICAL IMPRESSIONS     [facility]    ED Course as of 12/29/21 0119   Wed Dec 29, 2021   0119 81-year-old female presents today after an  accidental fall, found to have an impacted femoral neck fracture.  She has a history of CARRINGTON, mild hypoxia noted.  Awaiting CT scans and will admit [RL]      ED Course User Index  [RL] Tamia Talavera DO Labensky, Rachael Engle, DO  12/29/21 0120

## 2021-12-29 NOTE — ANESTHESIA PROCEDURE NOTES
Airway  Urgency: elective    Start Time: 12/29/2021 2:12 PM  Airway not difficult    General Information and Staff    Patient location during procedure: OR  Anesthesiologist: Andres Bone MD  Resident/CRNA: Luzma Ontiveros CRNA  Performed: resident/CRNA     Indications and Patient Condition  Indications for airway management: anesthesia  Sedation level: deep  Preoxygenated: yes  Patient position: sniffing  MILS maintained throughout  Mask difficulty assessment: 1 - vent by mask    Final Airway Details  Final airway type: endotracheal airway      Successful airway: ETT  Cuffed: yes   Successful intubation technique: video laryngoscopy  Facilitating devices/methods: intubating stylet  Endotracheal tube insertion site: oral  Blade type: glide.  Blade size: #3  ETT size (mm): 7.0  Cormack-Lehane Classification: grade I - full view of glottis  Placement verified by: chest auscultation and capnometry   Measured from: lips  ETT to lips (cm): 21  Number of attempts at approach: 1  Ventilation between attempts: none  Number of other approaches attempted: 0  Atraumatic airway insertion

## 2021-12-29 NOTE — OP NOTE
REPORT TYPE: Operative Note    DATE OF OPERATION: 12/29/2021    PREOPERATIVE DIAGNOSIS:  Left displaced subcapital femoral neck fracture.    POSTOPERATIVE DIAGNOSIS:  Left displaced subcapital femoral neck fracture.    PROCEDURE:  Uncemented bipolar hemiarthroplasty, left hip.    SURGEON:  Fazal Pena DO    ASSISTANT:  Alfredo, PGY2.    ANESTHESIA:  General endotracheal.    ESTIMATED BLOOD LOSS:  150 mL.    INTRAVENOUS FLUIDS:  Per anesthetic record.    APPROACH:  Anterolateral.    IMPLANTS:  J [employer] J DePuy [place] #5 uncemented femoral stem, 48 bipolar head, +1.5 neck standard offset.    COMPLICATIONS:  None.    DRAINS:  None.    COUNTS:  Correct.    DISPOSITION:  To recovery room in stable condition.    INDICATIONS FOR PROCEDURE AND OPERATIVE PROCEDURE:  The patient is an 81-year-old female who tripped and fell, sustaining mechanical fall in her household.  Injured her left hip, presented to the ER at [facility].  Imaging studies confirm   displaced subcapital femoral neck fracture on the left.  She was admitted to the medical service.  Orthopedics has been consulted.  We have recommended surgical stabilization, which is hip hemiarthroplasty of due to the patient's underlying medical   condition, ambulatory status, and physiologic age.  Extensive counseling in regard for the reason for surgical intervention, risks, benefits, and alternatives of the procedure were described to the patient, which she understood, and oral and written   consents were obtained.  She was then cleared from a medical standpoint and cardiac standpoint, identified as Bee Cobb by the patient, the left upper extremity at the [facility] on 12/29/2021.  Left leg was identified as the operative   site, was marked by surgical personnel.  Extensive counseling in regard for the reason for surgical intervention, risks, benefits and alternatives were described to the patient to include but not be restricted to  infection, neurovascular compromise,   intraoperative fracture, gilbert-implant fracture, instability, metallurgy, stem subsidence, component malposition, limb length discrepancy, wound complications, loss of limb, bleeding, blood loss, hematoma, need for transfusion, DVT, PE, anesthetic risks,   inability to achieve previous level, inability to achieve patient desired result, stiffness, limp, DVT, PE, death, anesthetic risks, and all additional complications not mentioned.  The patient was also made aware that 1 in 3 patients sustain mortality   in the first year status post hip fracture, that 1 in 4 patients get back to their pre-fracture level of activity at one year.  She did understand the urgent need for surgical intervention and oral and written consents were obtained.  She was then   identified as Bee Cobb, left leg was marked by the surgical personnel.  She was given a gram of vancomycin, 2 grams of cefazolin, and 1 gram of TXA preoperatively in the holding area, infused properly prior to skin incision to approach proper   tissue concentration.  She was then wheeled to the operative suite, placed onto the operative table, and given general endotracheal anesthesia without complication and placed in the lateral decubitus position, taking care to stabilize her C-spine,   monitor her blood pressure, and pad all appropriate pressure points at all times.  Left hip and thigh were then prepped and draped in a sterile fashion.  Timeout was performed.  Anterolateral incision was marked out with a marking pen.  A #10 scalpel   blade was utilized for skin incision.  Dissection was made through skin and subcutaneous tissue.  Hemostasis was maintained with Bovie electrocautery.  Tensor fascia laura was identified and incised in line with its fibers.  Trochanteric bursa was   excised.  Deep Charnley retractor was then placed.  Hip abductors were subperiosteally dissected with a Bovie and tagged appropriately with #1  Vicryl for a Howe-Patterson approach to the hip.  Anterior hip capsule, deep Charnley retractor was placed.  Trap   door capsulotomy was performed with a Bovie and tagged with suture.  Large hemarthrosis was evacuated.  Femoral neck osteotomy was performed with a reciprocating saw.  Femoral head was removed.  Acetabular debris was removed.  Femoral head was sized to   a 48 with excellent fit and fill.  We then removed all soft tissue from the proximal femur and the piriformis fossa.  We then entered the proximal femur with a box osteotome and a canal finder and sequentially reamed up to a 5 and broached up to a 5 with   anatomic anteversion.  We then trialed a #5 stem, standard offset from the J and J DePuy [place] hip system, a +1.5 neck, 48 bipolar head was then tapped on the trial and reduced, noting excellent soft tissue tension, no anterior or posterior   instability, equal leg lengths, and no shuck.  We then dislocated our trial.  We irrigated out the acetabulum, placed our final component, which was a #5 uncemented J and J DePuy [place] femoral stem.  It was impacted without complication.  Trunnion was   dried, +1.5 neck and 48 bipolar head was then impacted onto the Flores taper without complication.  Hip was then reduced, noting excellent soft tissue tension, stability, no anterior or posterior instability, equal leg lengths, and no evidence of shuck.    We then irrigated with pulse lavage, dilute Betadine soak for 2 minutes to prevent infection.  We then closed the hip capsule with #1 Vicryl.  We then utilized Genevieve as a hemostatic agent.  We then closed the hip abductors through drill holes with #5   Ethibond and #1 Vicryl in a bone-tendon-bone and tendon-tendon repair.  We then used additional irrigation and Genevieve for hemostasis.  Tensor fascia laura was closed with #1 Vicryl.  Prince's and subcutaneous tissue were closed with 0 and 2-0 Vicryl, skin   was closed with staples.  The wound was then dressed with  sterile Mepilex dressing.  The patient was then placed into a supine position.  Leg lengths were noted to be equal.  The patient was extubated and transferred to the hospital bed and to the   recovery room in stable condition.    Postop course is anterior hip precautions x6 weeks.  DVT prophylaxis to be discussed with Medicine; but probable to resume Pradaxa postop day #1 with enteric-coated aspirin 325 b.i.d. or daily x6 weeks based on medical recommendations.  She is   weightbearing to tolerance.  She will have her staples removed in 10 days and follow up with Dr. Pena in approximately 10-14 days with repeat radiographs of the left hip.      Fazal Pena DO      CC:Kanchan Gilliam MD    DD: 12/29/2021 15:50  DT: 12/29/2021 15:53  Voice ID: [id number]/Report ID: [id number]  ns

## 2021-12-29 NOTE — PROGRESS NOTES
I have assumed the care of this pt in collaboration with Dr. Cabrera from our admitting colleague Dr. Menendez.    All radiological studies, labs, allergies, ekg/tele personally reviewed.    Pt seen and examined this AM. Pt resting in bed, in no acute distress. Pt reports her left hip pain is much better than it was. Pt denies chest pain or SOB. Pt does not wear O2 at home. Pt denies nausea, abdominal pain, dysuria.    PE:  Frail elderly WF in no acute distress, AAOx3  Normocephalic  Neck supple  RRR  Lungs CTA b/l  Abdomen nondistended, +BS, soft, nontender  No lewis, purewick in place  No LE edema, LLE externally rotated and shortened. LLE neurovascularly intact.  Calm, cooperative    PLAN:    #L hip fracture  Orthopedic surgery consulted. Plan for operative intervention with Dr. Pena today. NPO. Preop abx per ortho. Bed rest. SCDs for VTE prophylaxis. Home pradaxa on hold and will need to discuss with ortho okay to restart postop from their standpoint. Multimodal analgesia, ice therapy. Ensure bowel regimen. Encourage pulmonary toilet. PT/OT eval post op. Given pt's history of AVR and diastolic dysfunction, we will have cardiology evaluate the pt for preop risk stratification.    REYNALDO Zavala  [id number]

## 2021-12-29 NOTE — ANESTHESIA PREPROCEDURE EVALUATION
Relevant Problems   CARDIOVASCULAR   (+) Hypertension   (+) Paroxysmal atrial fibrillation (CMS/HCC)      HEMATOLOGY   (+) Alpha trait thalassemia      RESPIRATORY SYSTEM   (+) COPD (chronic obstructive pulmonary disease) (CMS/HCC)      Other   (+) Hypothyroidism   (+) Mycobacterium avium-intracellulare complex (CMS/HCC)     81 y.o. female with a past medical history of CARRINGTON (reports off meds), glaucoma, HTN, HLD, osteoporosis, alpha thalassemia trait, PAF, hypothyroidism, chronic diastolic heart failure who presents with left hip pain after falling out of bed    Anesthesia ROS/MED HX      Cardiovascular   Valvular problems/murmurs   dyslipidemia   hypertension  Dysrhythmias and atrial fibrillation   CHF   Echocardiogram reviewed, stress test reviewed, Covid19 Test Reviewed and ECG reviewed  Hematological    anticoagulants  Endo/Other   Infectious disease   Hypothyroidism  Body Habitus: Normal  ROS/MED HX Comments:    Musculoskeletal: Osteoporosis       Past Surgical History:   Procedure Laterality Date   • CARDIAC SURGERY     • TONSILLECTOMY         Physical Exam    Airway   Mallampati: II   TM distance: >3 FB   Neck ROM: full  Cardiovascular - normal   Rhythm: regular   Rate: normalPulmonary - normal   clear to auscultation  Dental    Teeth Problems: missing        TRANSTHORACIC ECHO (TTE) COMPLETE 03/13/2020  Narrative  · There is mildly increased left ventricular wall thickness consistent with mild concentric hypertrophy. There are no segmental wall motion abnormalities. The left ventricular ejection fraction is 75%. Increased (hyperdynamic) left ventricular ejection fraction.· The right ventricle is normal in size.· There is trace mitral regurgitation. There is no mitral stenosis.· There is no aortic stenosis. There is mild aortic regurgitation. There is a transcatheter bioprosthetic aortic valve present.· There is trace tricuspid regurgitation. The pulmonary artery systolic pressure is mildly elevated.· There  is no pulmonic valve stenosis. There is none pulmonic valve regurgitation.· The estimated LV filling pressure is elevated.· The study quality was adequate.   Impression  A transthoracic echocardiogram (including 2D, M-mode, spectral and color Doppler imaging) was performed using the standard protocol. Overall hyperdynamic left ventricular systolic function with well-seated tissue aortic valve replacement with normal gradients.See below for full study findings.No significant change since the prior study.  Findings  Study Findings   Left Ventricle   There is mildly increased left ventricular wall thickness consistent with   mild concentric hypertrophy. There are no segmental wall motion   abnormalities. The left ventricular ejection fraction is 75%. The average   global longitudinal peak systolic strain was assessed but found to be   technically limited in nature. Hyperdynamic left ventricular function. The   estimated LV filling pressure is elevated.     Right Ventricle   The right ventricle is normal in size. The tricuspid annular plane   systolic excursion (TAPSE) is normal (2.9 cm). Right ventricular S'   measures 0.13 cm/s. Normal septal motion.     Left Atrium   The left atrium is normal in size.     Right Atrium   The right atrium is normal in size.     Mitral Valve   There is severe mitral annular calcification. There is trace mitral   regurgitation. There is no mitral stenosis.     Aortic Valve   There is no aortic stenosis. There is mild aortic regurgitation. There   is a transcatheter bioprosthetic aortic valve present. Prosthetic aortic   valve peak gradient measures 17 mmHg. Prosthetic aortic valve mean   gradient measures 10 mmHg. Dimensionless valve index is 0.94.     Tricuspid Valve   There is no tricuspid stenosis. There is trace tricuspid regurgitation.   The pulmonary artery systolic pressure is mildly elevated.     Pulmonic Valve   There is no pulmonic stenosis. There is no pulmonic valve  regurgitation.     Aorta   The proximal segment of the ascending aorta is not well visualized.     Pericardium   There are echocardiographic findings consistent with fat pad. No   pericardial effusion.     Interatrial Septum   A patent foramen ovale is not present. An agitated saline contrast study   was not performed. An agitated contrast study with Valsalva is not needed.   No interatrial septal defect present. There is no atrial septal occluder   device noted.          Anesthesia Plan    Plan: general    Technique: general endotracheal     Lines and Monitors: PIV     Airway: oral intubation   ASA 3  Blood Products:     Use of Blood Products Discussed: Yes     Consented to blood products  Anesthetic plan and risks discussed with: patient  Induction:    intravenous   Postop Plan:   Patient Disposition: phase II then home   Pain Management: IV analgesics

## 2021-12-29 NOTE — H&P
Hospital Medicine Service -  History & Physical        CHIEF COMPLAINT   Fall with left hip pain     HISTORY OF PRESENT ILLNESS      Bee Cobb is a 81 y.o. female with a past medical history of CARRINGTON (reports off meds), glaucoma, HTN, HLD, osteoporosis, PAN, alpha thalassemia trait, PAF, hypothyroidism, chronic diastolic heart failure who presents with left hip pain after falling out of bed. Patient states that earlier in the evening she went to get into bed, and ended up accidentally falling to the floor. She did hit her head but does not believe she had any extended period of LOC. She has been having extreme left hip pain which i has left her unable to ambulate. She otherwise denies any chest pain, nausea, vomiting, diarrhea, rash or fevers. In the ED she was afebrile, and hypertensive which gradually resolved home. CT of the head showed no hemorrhage or infarct. CT of the hip showed evidence of left hip fracture. Orthopedics notified and tentative plan is for OR later today. She was given pain medications while in the ED.    Patient is full code.  PAST MEDICAL AND SURGICAL HISTORY      Past Medical History:   Diagnosis Date   • Atrial fibrillation (CMS/HCC)    • Glaucoma    • History of transfusion    • Hyperthyroidism    • Lipid disorder    • Osteoporosis    • PAN (polyarteritis nodosa) (CMS/HCC)    • Thalassemia        Past Surgical History:   Procedure Laterality Date   • CARDIAC SURGERY     • TONSILLECTOMY         PCP: Kanchan Gilliam MD    MEDICATIONS      Prior to Admission medications    Medication Sig Start Date End Date Taking? Authorizing Provider   albuterol HFA (VENTOLIN HFA) 90 mcg/actuation inhaler Inhale 2 puffs every 4 (four) hours as needed. 7/16/21   Carol East MD   azithromycin (ZITHROMAX) 250 mg tablet  8/11/21   Carol East MD   cholecalciferol, vitamin D3, 25 mcg (1,000 unit) capsule Take by mouth.    Carol East MD   clindamycin (CLEOCIN) 300 mg  capsule  7/12/21   Carol East MD   denosumab (PROLIA) 60 mg/mL syringe Inject 60 mg under the skin every 6 (six) months.    Carol East MD   digoxin (LANOXIN) 250 mcg (0.25 mg) tablet Take 250 mcg by mouth once daily. 6/21/21   Carol East MD   DILT- mg 24 hr capsule  9/10/21   Carol East MD   dilTIAZem CD (CARDIZEM CD) 240 mg 24 hr capsule  7/9/21   Carol East MD   dorzolamide (TRUSOPT) 2 % ophthalmic solution Administer 1 drop into both eyes 2 (two) times a day. 8/14/21   Carol East MD   ethambutoL (MYAMBUTOL) 400 mg tablet Patient takes 4 pills Tuesday, Thursday and Saturday 5/11/21   Carol East MD   FLOVENT  mcg/actuation inhaler INHALE 2 PUFFS INTO THE LUNGS TWICE DAILY 7/24/21   Carol East MD   latanoprost (XALATAN) 0.005 % ophthalmic solution  9/10/21   Carol East MD   multivitamin (THERAGRAN) tablet Take 1 tablet by mouth.    Carol East MD   PRADAXA 150 mg capsu  7/3/21   Carol East MD   rifAMPin (RIFADIN) 300 mg capsule  8/17/21   Carol East MD   SPIRIVA RESPIMAT 2.5 mcg/actuation mist inhaler INHALE 2 PUFFS INTO THE LUNGS DAILY 6/22/21   Carol East MD   SYNTHROID 75 mcg tablet Take 75 mcg by mouth once daily. 7/19/21   Carol East MD       ALLERGIES      Atorvastatin; Penicillins; Shellfish derived; Iodinated contrast media; Rosuvastatin; and Covid-19 vaccine, mrna, [id number], lnp-s (moderna)    FAMILY HISTORY      History reviewed. No pertinent family history.    SOCIAL HISTORY      Social History     Socioeconomic History   • Marital status:      Spouse name: None   • Number of children: None   • Years of education: None   • Highest education level: None   Occupational History   • None   Tobacco Use   • Smoking status: Never Smoker   • Smokeless tobacco: Never Used   Vaping Use   • Vaping Use: Never used   Substance and Sexual Activity    • Alcohol use: Never   • Drug use: Never   • Sexual activity: None   Other Topics Concern   • None   Social History Narrative   • None     Social Determinants of Health     Financial Resource Strain: Not on file   Food Insecurity: No Food Insecurity   • Worried About Running Out of Food in the Last Year: Never true   • Ran Out of Food in the Last Year: Never true   Transportation Needs: Not on file   Physical Activity: Not on file   Stress: Not on file   Social Connections: Not on file   Intimate Partner Violence: Not on file   Housing Stability: Not on file       REVIEW OF SYSTEMS      All other systems reviewed and negative except as noted in HPI    PHYSICAL EXAMINATION      Temp:  [37.1 °C (98.8 °F)] 37.1 °C (98.8 °F)  Heart Rate:  [] 70  Resp:  [22-30] 22  BP: (108-210)/(73-82) 210/81  There is no height or weight on file to calculate BMI.    General: Uncomfortable appearing, AAO x3  HEENT: Symmetric, PERRL/EOMI, moist membranes, NCAT  Cardiovascular: Regular rate and rhythm, + S1 +S2, no murmurs, rubs, gallops, no JVD  Pulmonary: Clear to auscultation bilaterally, no wheezing, rhonchi, rales, good effort  GI: Soft, nontender, nondistended, bowel sounds normal, no organomegaly  Musculoskeletal: Normal bulk and tone, decreased ROM in L LE secondary to pain and tenderness to palpation along left hip  Extremities: Distal pulses intact, No LE edema bilaterally  Neuro: Cranial nerves II through XII grossly intact by nonfocal exam, sensation intact  Psych: Normal mood, affect and perception      LABS / IMAGING / EKG        Labs  CBC Results       12/29/21 09/11/21     0109 1654    WBC 26.76 15.79    RBC 5.54 5.74    HGB 11.2 11.3    HCT 35.3 36.7    MCV 63.7 63.9    MCH 20.2 19.7    MCHC 31.7 30.8     301        CMP Results       12/29/21 09/11/21     0109 1654     141    K 3.4 3.6    Cl 105 107    CO2 20 22    Glucose 147 131    BUN 6 9    Creatinine 0.5 0.7    Calcium 8.6 9.8    Anion Gap 10 12     AST 31 --    ALT 19 --    Albumin 3.4 --    EGFR >60.0 >60.0         Comment for K at 0109 on 12/29/21: Results obtained on plasma. Plasma Potassium values may be up to 0.4 mEQ/L less than serum values. The differences may be greater for patients with high platelet or white cell counts.    Comment for K at 1654 on 09/11/21: Results obtained on plasma. Plasma Potassium values may be up to 0.4 mEQ/L less than serum values. The differences may be greater for patients with high platelet or white cell counts.        Troponin I Results       09/11/21     1654    Troponin I <0.03            Imaging  ECG 12 lead         CT HEAD WITHOUT IV CONTRAST   ED Interpretation     History: fall    Preliminary Results:    Examination: CT of the head and cervical spine without contrast    Indication: Fall    Comparison: CT of the head and cervical spine performed 9/11/2021    Findings:  There is no acute intracranial hemorrhage or CT evidence of an acute transcortical infarct. There is no significant midline shift, mass effect, or extra-axial fluid collection. The gray-white matter differentiation is preserved. There is global cerebral atrophy with commensurate ventricular and sulcal dilatation. There is patchy and confluent hypoattenuation in the periventricular and subcortical white matter consistent with chronic small vessel ischemic change. The basal cisterns are patent.    Left frontal scalp swelling. The calvarium is intact without a focal suspicious osseous lesion or an acute displaced fracture. The visualized paranasal sinuses and mastoid air cells are clear.    No acute cervical spine fracture or traumatic malalignment is identified. There is preservat   ion of the normal cervical lordotic curvature. The vertebral bodies demonstrate normal alignment. There are multilevel degenerative changes with disc height loss, osteophytosis, and uncovertebral hypertrophy resulting in varying degrees of osseous neuroforaminal and central  canal stenosis, most severe at C5-6 and C6-7. 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 The soft tissues are unremarkable.    Impression:  1. No acute intracranial abnormality.  2. No acute cervical spine fracture or traumatic alignment.    Interpreted by: Daniella Torres MD, Dec 29, 2021 02:38 AM        CT CERVICAL SPINE WITHOUT IV CONTRAST   ED Interpretation     History: fall    Preliminary Results:    Examination: CT of the head and cervical spine without contrast    Indication: Fall    Comparison: CT of the head and cervical spine performed 9/11/2021    Findings:  There is no acute intracranial hemorrhage or CT evidence of an acute transcortical infarct. There is no significant midline shift, mass effect, or extra-axial fluid collection. The gray-white matter differentiation is preserved. There is global cerebral atrophy with commensurate ventricular and sulcal dilatation. There is patchy and confluent hypoattenuation in the periventricular and subcortical white matter consistent with chronic small vessel ischemic change. The basal cisterns are patent.    Left frontal scalp swelling. The calvarium is intact without a focal suspicious osseous lesion or an acute displaced fracture. The visualized paranasal sinuses and mastoid air cells are clear.    No acute cervical spine fracture or traumatic malalignment is identified. There is preservat   ion of the normal cervical lordotic curvature. The vertebral bodies demonstrate normal alignment. There are multilevel degenerative changes with disc height loss, osteophytosis, and uncovertebral hypertrophy resulting in varying degrees of osseous neuroforaminal and central canal stenosis, most severe at C5-6 and C6-7. 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 The soft tissues are unremarkable.    Impression:  1. No acute intracranial abnormality.  2. No acute cervical spine fracture or traumatic alignment.    Interpreted by: Daniella Torres MD, Dec  29, 2021 02:38 AM        X-RAY CHEST 1 VIEW   ED Interpretation   CARRINGTON changes      X-RAY HIP WITH OR WITHOUT PELVIS 2-3 VW LEFT   ED Interpretation   Impacted femoral neck fracture      X-RAY KNEE LEFT 1 OR 2 VIEWS   ED Interpretation   No fracture      CT HIP LEFT WITHOUT IV CONTRAST    (Results Pending)         SARS-CoV-2 (COVID-19) (no units)   Date/Time Value   12/29/2021 0110 Negative       ECG/Telemetry  I have independently reviewed the ECG. No significant findings.    ASSESSMENT AND PLAN           * Closed fracture of left hip (CMS/[facility])  Assessment & Plan  81-year-old female presents with left hip pain  She had a mechanical fall after falling out of bed and had severe pain and difficulty ambulating after  CT showing left hip fracture  Orthopedics made aware, tentative plans for OR today  Multimodal pain control  We will keep patient n.p.o.  CBC and BMP in a.m., replete electrolytes as needed  A.m. INR  PT/OT consulted  Fall precautions  Patient is RCRI class II risk, given any unforeseen changes she should be okay for surgery later today    Paroxysmal atrial fibrillation (CMS/[facility])  Assessment & Plan  Chronic  Rate controlled  Resume outpatient regimen  Pradaxa on hold     Mycobacterium avium-intracellulare complex (CMS/[facility])  Assessment & Plan  There is some inconsistencies with her medication fillings  Pharmacy medication reconciliation consult placed  She states she no longer takes any of the meds due to weight loss and was following with a pulm doc at [place]     Hypothyroidism  Assessment & Plan  Continue Synthroid    Hypertension  Assessment & Plan  Resume home antihypertensives    Glaucoma  Assessment & Plan  Continue home eyedrops    COPD (chronic obstructive pulmonary disease) (CMS/[facility])  Assessment & Plan  Chronic  Does not appear to have acute flare  Home INH/nebs as needed    Chronic diastolic CHF (congestive heart failure) (CMS/[facility])  Assessment & Plan  Chronic  Appears well compensated  We will  resume home regimen         VTE Assessment: Padua VTE Score: 9  VTE Prophylaxis: Current anticoagulants:  enoxaparin (LOVENOX) syringe 40 mg, subcutaneous, Daily (6p)      Code Status: Full Code  Palliative Care Screening Score: 4   Estimated Discharge Date: 2021  Disposition Plannin-4d     Jeevan Menendez II, DO  2021

## 2021-12-29 NOTE — OR SURGEON
Pre-Procedure patient identification:  I am the primary operating surgeon/proceduralist and I have identified the patient and confirmed laterality is L Hip on 12/29/21 at 1:52 PM Fazal Pena DO  Phone Number: [phone]

## 2021-12-29 NOTE — PROGRESS NOTES
Spoke with patient and confirmed with medication list from [facility] and/or patient's own pharmacy to complete the medication reconciliation.     Prior to admission medication list:  Current Outpatient Medications:   •  fluticasone  mcg/actuation inhaler, Inhale 1 puff 2 (two) times a day.  Rinse mouth with water after use to reduce aftertaste and incidence of candidiasis.  Do not swallow. For patients not on a ventilator, a spacer is recommended to be used with this medication/inhaler., Disp: , Rfl:   •  rosuvastatin 5 mg tablet, Take 5 mg by mouth daily., Disp: , Rfl:   •  tiotropium bromide (SPIRIVA RESPIMAT) 2.5 mcg/actuation mist inhaler, Inhale 2 puffs daily., Disp: , Rfl:   •  albuterol HFA (VENTOLIN HFA) 90 mcg/actuation inhaler, Inhale 2 puffs every 4 (four) hours as needed for wheezing or shortness of breath.  , Disp: , Rfl:   •  cholecalciferol, vitamin D3, 25 mcg (1,000 unit) capsule, Take by mouth., Disp: , Rfl:   •  digoxin (LANOXIN) 250 mcg (0.25 mg) tablet, Take 250 mcg by mouth once daily., Disp: , Rfl:   •  dilTIAZem CD (CARDIZEM CD) 240 mg 24 hr capsule, Take 240 mg by mouth daily.  , Disp: , Rfl:   •  dorzolamide (TRUSOPT) 2 % ophthalmic solution, Administer 1 drop into both eyes 2 (two) times a day., Disp: , Rfl:   •  latanoprost (XALATAN) 0.005 % ophthalmic solution, Administer 1 drop into both eyes nightly.  , Disp: , Rfl:   •  multivitamin (THERAGRAN) tablet, Take 1 tablet by mouth., Disp: , Rfl:   •  PRADAXA 150 mg capsu, Take 150 mg by mouth 2 (two) times a day.  , Disp: , Rfl:   •  SYNTHROID 75 mcg tablet, Take 75 mcg by mouth once daily., Disp: , Rfl:     Comments about home medications:  -patient states she is currently not on triple therapy for MAC infection (rifampin 300 mg, azithromycin 250 mg or ethambutol 400 mg), verified with Pulmonology notes from December 2021  -patient prescribed nitrofurantoin mono 100 mg for 10 day course on 11/12/21 and states she completed course  as directed    Compliance:   -Consistent fills, no compliance concerns based on patient interview

## 2021-12-29 NOTE — CONSULTS
CARDIOLOGY CONSULT NOTE      Reason for consult: Preoperative evaluation  Referred by: Rick Cabrera, DO      SUBJECTIVE    Bee Cobb is a 81 y.o. female who was admitted for Closed fracture of left hip, initial encounter (CMS/[facility]) [S72.002A].     81F with pmh pAF, AS s/p TAVR, HTN, HLD, [place]. Presents after fall out of bed.  Patient slipped out of bed.  She did not have loss of consciousness.  She landed on her left hip.  She came to the hospital and was found to have L hip fracture.  She is seen by orthopedic surgery and they are planning for surgery today.  Cardiology consulted for preoperative evaluation.  Patient has history of severe aortic stenosis and had a TAVR back in 2019.  She had a catheterization prior to her TAVR and was not found to have any significant coronary artery disease.  She has no history of MI or stroke.  She does not have any concerning cardiac symptoms.  She lives at home with her .  She is able to walk several blocks and go up and down stairs without any chest pain or shortness of breath.  She denies orthopnea or PND.  She has history of paroxysmal A. fib for which she is on Pradaxa.      Allergies: Atorvastatin; Penicillins; Shellfish derived; Iodinated contrast media; Rosuvastatin; Covid-19 vaccine, mrna, [id number], lnp-s (moderna); and Covid-19 vaccine, [id number], lnp-s (moderna)    Home medications  •  fluticasone HFA, Inhale 1 puff 2 (two) times a day.  Rinse mouth with water after use to reduce aftertaste and incidence of candidiasis.  Do not swallow. For patients not on a ventilator, a spacer is recommended to be used with this medication/inhaler.  •  rosuvastatin, Take 5 mg by mouth daily.  •  SPIRIVA RESPIMAT, Inhale 2 puffs daily.  •  albuterol HFA, Inhale 2 puffs every 4 (four) hours as needed for wheezing or shortness of breath.    •  cholecalciferol (vitamin D3), Take by mouth.  •  digoxin, Take 250 mcg by mouth once daily.  •  dilTIAZem CD, Take 240 mg by mouth  daily.    •  dorzolamide, Administer 1 drop into both eyes 2 (two) times a day.  •  latanoprost, Administer 1 drop into both eyes nightly.    •  multivitamin, Take 1 tablet by mouth.  •  PRADAXA, Take 150 mg by mouth 2 (two) times a day.    •  SYNTHROID, Take 75 mcg by mouth once daily.    Inpatient medications  •  acetaminophen, 1,000 mg, oral, q8h INT  •  albuterol, 2.5 mg, nebulization, q6h PRN  •  budesonide, 0.5 mg, nebulization, BID (6a, 6p)  •  ceFAZolin, 2 g, intravenous, On call to OR  •  cholecalciferol (vitamin D3), 1,000 Units, oral, Daily  •  glucose, 16-32 g of dextrose, oral, PRN **OR** dextrose, 15-30 g of dextrose, oral, PRN **OR** glucagon, 1 mg, intramuscular, PRN **OR** dextrose in water, 25 mL, intravenous, PRN  •  digoxin, 250 mcg, oral, Daily (6a)  •  dilTIAZem CD, 240 mg, oral, Daily  •  dorzolamide, 1 drop, Both Eyes, BID (6a, 6p)  •  [Provider Managed Hold] enoxaparin, 40 mg, subcutaneous, Daily (6p)  •  latanoprost, 1 drop, Both Eyes, Nightly  •  levothyroxine, 75 mcg, oral, Daily (6a)  •  lidocaine, 1 patch, Topical, Daily  •  morphine, 2 mg, intravenous, q4h PRN  •  oxyCODONE, 2.5 mg, oral, q6h PRN  •  oxyCODONE, 5 mg, oral, q6h PRN  •  potassium chloride, 20 mEq, oral, PRN  •  potassium chloride, 40 mEq, oral, PRN  •  potassium chloride, 20 mEq, oral, PRN  •  potassium chloride, 40 mEq, oral, PRN  •  sennosides-docusate sodium, 1 tablet, oral, BID  •  tranexamic acid, 1,000 mg, intravenous, On call to OR  •  vancomycin, 1 g, intravenous, On call to OR    History  Medical History:   Past Medical History:   Diagnosis Date   • Atrial fibrillation (CMS/HCC)    • Glaucoma    • History of transfusion    • Hyperthyroidism    • Lipid disorder    • Osteoporosis    • PAN (polyarteritis nodosa) (CMS/HCC)    • Thalassemia        Surgical History:   Past Surgical History:   Procedure Laterality Date   • CARDIAC SURGERY     • TONSILLECTOMY         Social History:   Social History     Socioeconomic  "History   • Marital status:      Spouse name: None   • Number of children: None   • Years of education: None   • Highest education level: None   Occupational History   • None   Tobacco Use   • Smoking status: Never Smoker   • Smokeless tobacco: Never Used   Vaping Use   • Vaping Use: Never used   Substance and Sexual Activity   • Alcohol use: Never   • Drug use: Never   • Sexual activity: None   Other Topics Concern   • None   Social History Narrative   • None     Social Determinants of Health     Financial Resource Strain: Not on file   Food Insecurity: No Food Insecurity   • Worried About Running Out of Food in the Last Year: Never true   • Ran Out of Food in the Last Year: Never true   Transportation Needs: Not on file   Physical Activity: Not on file   Stress: Not on file   Social Connections: Not on file   Intimate Partner Violence: Not on file   Housing Stability: Not on file       Family History: History reviewed. No pertinent family history.      Review of Systems : Otherwise negative.      OBJECTIVE  Visit Vitals  BP (!) 180/73   Pulse 82   Temp 36.7 °C (98 °F) (Temporal)   Resp 18   Ht 1.676 m (5' 6\")   Wt 57.7 kg (127 lb 3.2 oz)   SpO2 95%   BMI 20.53 kg/m²       Physical Exam   Constitutional: Lying in bed.  Not in distress.  HENT: Normocephalic and atraumatic.  Eyes: Normal conjunctiva.  Neck: No JVD present.   Cardiovascular: Normal rate and regular rhythm. No significant murmur.  Pulmonary/Chest: Normal effort and air entry. No wheezing, rales, ronchi.  Abdominal: Normal bowel sounds. Soft, non tender, no distension. No rebound or guarding.   Musculoskeletal: No lower extremity edema or deformity.   Neurological: Alert and oriented to person, place, and time.   Skin: Skin is warm and dry. No rash.  Psychiatric: Normal mood, affect and behavior.    Labs  Results from last 7 days   Lab Units 12/29/21  0611 12/29/21  0109   SODIUM mEQ/L 135* 135*   POTASSIUM mEQ/L 3.6 3.4*   CHLORIDE mEQ/L 98 105 " "  CO2 mEQ/L 23 20*   BUN mg/dL 5* 6*   CREATININE mg/dL 0.5* 0.5*   CALCIUM mg/dL 9.1 8.6*   ALBUMIN g/dL 3.7 3.4   BILIRUBIN TOTAL mg/dL 0.8 0.8   ALK PHOS IU/L 123 113   ALT IU/L 21 19   AST IU/L 29 31   GLUCOSE mg/dL 161* 147*             Results from last 7 days   Lab Units 12/29/21  0611 12/29/21  0109   WBC K/uL 21.61* 26.76*   HEMOGLOBIN g/dL 12.0 11.2*   HEMATOCRIT % 37.7 35.3   PLATELETS K/uL 299 273         Cardiology results  ECG : sinus rhythm    TTE:    3/5/21     1. Normal functioning bioprosthetic aortic valve replacement    2. Left ventricular hypertrophy with normal systolic function and moderate diastolic dysfunction     Catheterization:     [facility] 1/25/2019 \"Coronary angiography revealed no obstructive coronary artery disease in a codominant distribution.\"    ASSESSMENT AND PLAN  81 y.o. female, Cardiology being consulted for being consulted for:     Preoperative evaluation  -Patient is at acceptable risk for urgent procedure to repair her hip fracture.  She is well compensated from the cardiac standpoint.  She had an echo earlier this year and was found to have normal EF and normal aortic valve function after TAVR procedure. She has no significant CAD. No further cardiac testing indicated prior to surgery.    Hypertension  -Blood pressure is currently elevated.  Likely in the setting of pain and anxiety.  Suspect that this will be resolved after anesthesia/surgery.  Continue home diltiazem.    Paroxysmal atrial fibrillation  -Currently in sinus rhythm.  -Continue home diltiazem and digoxin.  -Pradaxa on hold for surgery.  Resume once safe from surgical standpoint.    Hyperlipidemia  -Continue Crestor.    Case discussed with Cardiology attending Dr. Gil Mejia.     Rikki Quintana MD  12/29/2021  12:32 PM      "

## 2021-12-29 NOTE — BRIEF OP NOTE
HIP HEMIARTHROPLASTY (L) Procedure Note    Procedure:    HIP HEMIARTHROPLASTY  CPT(R) Code:  90747 - MN FEMORAL FX, OPEN TX      Pre-op Diagnosis     * Closed fracture of left hip, initial encounter (CMS/[facility]) [S72.002A]       Post-op Diagnosis     * Closed fracture of left hip, initial encounter (CMS/[facility]) [S72.002A]    Surgeon(s) and Role:     * Fazal Pena DO - Primary     * Jonathan Fuetnes DO - Resident - Assisting    Anesthesia: General    Staff:   Circulator: Jerome Stein RN; Ena Cooper, CHRISS; July Lan, CHRISS  Scrub Person: Aram Kenny; Jenny Flower    Procedure Details   As above    Estimated Blood Loss: No blood loss documented.    Specimens:                No specimens collected during this procedure.      Drains: * No LDAs found *    Implants:   Implant Name Type Inv. Item Serial No.  Lot No. LRB No. Used Action   STEM HIP TAPERED 145MM - [id number] Femoral hip stem STEM HIP TAPERED 145MM  DEP"[employer] ORTH[employer]EDICS [id number] Left 1 Implanted   IMPLANT HIP SELF CENT 48MM COMPLETE - [id number] Femoral head IMPLANT HIP SELF CENT 48MM COMPLETE  [employer] [id number] Left 1 Implanted   HIP COMPONENT ARTICUL/HOLLEY BALL 28MM +1.5 - [id number] Femoral head HIP COMPONENT ARTICUL/HOLLEY BALL 28MM +1.5  [employer] ORTHOPED[employer] [id number] Left 1 Implanted              Complications:  None; patient tolerated the procedure well.           Disposition: PACU - hemodynamically stable.           Condition: stable    Fazal Pena DO  Phone Number: [phone]

## 2021-12-29 NOTE — ASSESSMENT & PLAN NOTE
81-year-old female presents with left hip pain. She had a mechanical fall after falling out of bed and had severe pain and difficulty ambulating after.  CT showing left hip fracture  Ortho consult appreciated  Cardiology consult appreciated for perioperative risk stratification  S/p L hip hemiarthroplasty 12/29 with Dr. Pena  Per Ortho, okay to resume Pradaxa  as well as ASA 325mg QD for 6 weeks  WBAT to LLE  Anterior hip precautions for 6 weeks  Per ortho; she had an expectant drop in Hgb post-operatively due to Pradaxa use. No additional studies warranted at this time. OR dressing saturation and bleeding expected  Continue multimodal analgesia with scheduled tylenol, lido patch, PRN oxy 2.5-5mg, ice therapy  Ensure bowel regimen and encourage pulmonary toilet, had BM yesterday  PT/OT tariq rec acute rehab, stable for transfer to [facility] today

## 2021-12-29 NOTE — INTERVAL H&P NOTE
Ortho Attending    No interval change from H and P.  Patient cleared for OR    For L Hip Felix today; all questions answered and consents obtained.  RN present in Room    Fazal Pena

## 2021-12-30 ENCOUNTER — APPOINTMENT (INPATIENT)
Dept: RADIOLOGY | Facility: HOSPITAL | Age: 81
DRG: 522 | End: 2021-12-30
Attending: NURSE PRACTITIONER
Payer: MEDICARE

## 2021-12-30 PROBLEM — Z95.2 S/P AVR (AORTIC VALVE REPLACEMENT): Status: ACTIVE | Noted: 2021-12-30

## 2021-12-30 PROBLEM — D64.9 POSTOPERATIVE ANEMIA: Status: ACTIVE | Noted: 2021-12-30

## 2021-12-30 LAB
ANION GAP SERPL CALC-SCNC: 11 MEQ/L (ref 3–15)
BUN SERPL-MCNC: 8 MG/DL (ref 8–20)
CALCIUM SERPL-MCNC: 7.8 MG/DL (ref 8.9–10.3)
CHLORIDE SERPL-SCNC: 101 MEQ/L (ref 98–109)
CO2 SERPL-SCNC: 20 MEQ/L (ref 22–32)
CREAT SERPL-MCNC: 0.6 MG/DL (ref 0.6–1.1)
ERYTHROCYTE [DISTWIDTH] IN BLOOD BY AUTOMATED COUNT: 15.8 % (ref 11.7–14.4)
ERYTHROCYTE [DISTWIDTH] IN BLOOD BY AUTOMATED COUNT: 16.1 % (ref 11.7–14.4)
GFR SERPL CREATININE-BSD FRML MDRD: >60 ML/MIN/1.73M*2
GLUCOSE SERPL-MCNC: 149 MG/DL (ref 70–99)
HCT VFR BLDCO AUTO: 27.5 % (ref 35–45)
HCT VFR BLDCO AUTO: 29.3 % (ref 35–45)
HGB BLD-MCNC: 8.8 G/DL (ref 11.8–15.7)
HGB BLD-MCNC: 9.2 G/DL (ref 11.8–15.7)
MAGNESIUM SERPL-MCNC: 1.8 MG/DL (ref 1.8–2.5)
MCH RBC QN AUTO: 20.1 PG (ref 28–33.2)
MCH RBC QN AUTO: 20.6 PG (ref 28–33.2)
MCHC RBC AUTO-ENTMCNC: 31.4 G/DL (ref 32.2–35.5)
MCHC RBC AUTO-ENTMCNC: 32 G/DL (ref 32.2–35.5)
MCV RBC AUTO: 64 FL (ref 83–98)
MCV RBC AUTO: 64.4 FL (ref 83–98)
PDW BLD AUTO: 10.6 FL (ref 9.4–12.3)
PDW BLD AUTO: 9.2 FL (ref 9.4–12.3)
PLATELET # BLD AUTO: 217 K/UL (ref 150–369)
PLATELET # BLD AUTO: 307 K/UL (ref 150–369)
POTASSIUM SERPL-SCNC: 4.5 MEQ/L (ref 3.6–5.1)
RBC # BLD AUTO: 4.27 M/UL (ref 3.93–5.22)
RBC # BLD AUTO: 4.58 M/UL (ref 3.93–5.22)
SODIUM SERPL-SCNC: 132 MEQ/L (ref 136–144)
WBC # BLD AUTO: 19.45 K/UL (ref 3.8–10.5)
WBC # BLD AUTO: 23.35 K/UL (ref 3.8–10.5)

## 2021-12-30 PROCEDURE — 83735 ASSAY OF MAGNESIUM: CPT | Performed by: NURSE PRACTITIONER

## 2021-12-30 PROCEDURE — 25000000 HC PHARMACY GENERAL: Performed by: STUDENT IN AN ORGANIZED HEALTH CARE EDUCATION/TRAINING PROGRAM

## 2021-12-30 PROCEDURE — 12000000 HC ROOM AND CARE MED/SURG

## 2021-12-30 PROCEDURE — 99233 SBSQ HOSP IP/OBS HIGH 50: CPT | Performed by: INTERNAL MEDICINE

## 2021-12-30 PROCEDURE — 73560 X-RAY EXAM OF KNEE 1 OR 2: CPT | Mod: RT

## 2021-12-30 PROCEDURE — 63600000 HC DRUGS/DETAIL CODE: Performed by: STUDENT IN AN ORGANIZED HEALTH CARE EDUCATION/TRAINING PROGRAM

## 2021-12-30 PROCEDURE — 97162 PT EVAL MOD COMPLEX 30 MIN: CPT | Mod: GP

## 2021-12-30 PROCEDURE — 63700000 HC SELF-ADMINISTRABLE DRUG: Performed by: STUDENT IN AN ORGANIZED HEALTH CARE EDUCATION/TRAINING PROGRAM

## 2021-12-30 PROCEDURE — 97166 OT EVAL MOD COMPLEX 45 MIN: CPT | Mod: GO

## 2021-12-30 PROCEDURE — 36415 COLL VENOUS BLD VENIPUNCTURE: CPT | Performed by: NURSE PRACTITIONER

## 2021-12-30 PROCEDURE — 85027 COMPLETE CBC AUTOMATED: CPT | Performed by: NURSE PRACTITIONER

## 2021-12-30 PROCEDURE — 97535 SELF CARE MNGMENT TRAINING: CPT | Mod: GO

## 2021-12-30 PROCEDURE — 63700000 HC SELF-ADMINISTRABLE DRUG: Performed by: INTERNAL MEDICINE

## 2021-12-30 PROCEDURE — 80048 BASIC METABOLIC PNL TOTAL CA: CPT | Performed by: NURSE PRACTITIONER

## 2021-12-30 RX ORDER — PANTOPRAZOLE SODIUM 40 MG/1
40 TABLET, DELAYED RELEASE ORAL DAILY
Status: DISCONTINUED | OUTPATIENT
Start: 2021-12-30 | End: 2022-01-01 | Stop reason: HOSPADM

## 2021-12-30 RX ADMIN — CEFAZOLIN SODIUM 2 G: 2 INJECTION, SOLUTION INTRAVENOUS at 05:30

## 2021-12-30 RX ADMIN — ACETAMINOPHEN 1000 MG: 500 TABLET ORAL at 17:21

## 2021-12-30 RX ADMIN — Medication 1000 UNITS: at 08:26

## 2021-12-30 RX ADMIN — BUDESONIDE 0.5 MG: 0.5 INHALANT RESPIRATORY (INHALATION) at 06:34

## 2021-12-30 RX ADMIN — CEFAZOLIN SODIUM 2 G: 2 INJECTION, SOLUTION INTRAVENOUS at 13:13

## 2021-12-30 RX ADMIN — BUDESONIDE 0.5 MG: 0.5 INHALANT RESPIRATORY (INHALATION) at 17:22

## 2021-12-30 RX ADMIN — LEVOTHYROXINE SODIUM 75 MCG: 0.07 TABLET ORAL at 06:36

## 2021-12-30 RX ADMIN — DORZOLAMIDE HYDROCHLORIDE 1 DROP: 20 SOLUTION/ DROPS OPHTHALMIC at 17:21

## 2021-12-30 RX ADMIN — DILTIAZEM HYDROCHLORIDE 240 MG: 240 CAPSULE, COATED, EXTENDED RELEASE ORAL at 08:26

## 2021-12-30 RX ADMIN — LATANOPROST 1 DROP: 50 SOLUTION OPHTHALMIC at 21:29

## 2021-12-30 RX ADMIN — ACETAMINOPHEN 1000 MG: 500 TABLET ORAL at 08:25

## 2021-12-30 RX ADMIN — ASPIRIN 325 MG: 325 TABLET, COATED ORAL at 08:26

## 2021-12-30 RX ADMIN — DORZOLAMIDE HYDROCHLORIDE 1 DROP: 20 SOLUTION/ DROPS OPHTHALMIC at 06:36

## 2021-12-30 RX ADMIN — OXYCODONE HYDROCHLORIDE 5 MG: 5 TABLET ORAL at 13:20

## 2021-12-30 RX ADMIN — DIGOXIN 250 MCG: 250 TABLET ORAL at 06:36

## 2021-12-30 RX ADMIN — DOCUSATE SODIUM AND SENNOSIDES 1 TABLET: 8.6; 5 TABLET, FILM COATED ORAL at 21:29

## 2021-12-30 RX ADMIN — PANTOPRAZOLE SODIUM 40 MG: 40 TABLET, DELAYED RELEASE ORAL at 21:29

## 2021-12-30 RX ADMIN — DABIGATRAN ETEXILATE MESYLATE 150 MG: 150 CAPSULE ORAL at 21:29

## 2021-12-30 RX ADMIN — DABIGATRAN ETEXILATE MESYLATE 150 MG: 150 CAPSULE ORAL at 11:32

## 2021-12-30 ASSESSMENT — COGNITIVE AND FUNCTIONAL STATUS - GENERAL
MOVING TO AND FROM BED TO CHAIR: 3 - A LITTLE
CLIMB 3 TO 5 STEPS WITH RAILING: 2 - A LOT
STANDING UP FROM CHAIR USING ARMS: 3 - A LITTLE
DRESSING REGULAR UPPER BODY CLOTHING: 3 - A LITTLE
HELP NEEDED FOR PERSONAL GROOMING: 3 - A LITTLE
AFFECT: WFL;FLAT/BLUNTED AFFECT
TOILETING: 2 - A LOT
WALKING IN HOSPITAL ROOM: 3 - A LITTLE
HELP NEEDED FOR BATHING: 2 - A LOT
DRESSING REGULAR LOWER BODY CLOTHING: 2 - A LOT
EATING MEALS: 4 - NONE

## 2021-12-30 NOTE — PROGRESS NOTES
Hospital Medicine Service -  Daily Progress Note       SUBJECTIVE   Interval History: Pt reports L hip pain well controlled at rest. Had some bleeding/saturation to OR dressing overnight which was reinforced by ortho. Denies CP, SOB. On RA and afebrile.     OBJECTIVE      Vital signs in last 24 hours:  Temp:  [35.8 °C (96.4 °F)-36.8 °C (98.2 °F)] 36.3 °C (97.3 °F)  Heart Rate:  [] 98  Resp:  [16-24] 16  BP: (144-195)/(65-99) 144/67    Intake/Output Summary (Last 24 hours) at 12/30/2021 1227  Last data filed at 12/29/2021 1554  Gross per 24 hour   Intake 1000 ml   Output --   Net 1000 ml       PHYSICAL EXAMINATION      Physical Exam  Frail elderly WF in no acute distress, AAOx3  Normocephalic  Neck supple  RRR  Lungs CTA b/l  Abdomen nondistended, +BS, soft, nontender  No lewis, purewick in place  No LE edema,  LLE neurovascularly intact, L hip OR dressing reinforced  Calm, cooperative     LINES, CATHETERS, DRAINS, AIRWAYS, AND WOUNDS   Lines, Drains, and Airways:  Wounds (agree with documentation and present on admission):  Peripheral IV (Adult) 12/29/21 Left;Posterior Hand (Active)   Number of days: 1       Peripheral IV (Adult) 12/29/21 Right Wrist (Active)   Number of days: 1       Surgical Incision Hip Left (Active)   Number of days: 1         Comments:      LABS / IMAGING / TELE      Labs  Results from last 7 days   Lab Units 12/30/21  0736   WBC K/uL 19.45*   HEMOGLOBIN g/dL 9.2*   HEMATOCRIT % 29.3*   PLATELETS K/uL 217     Results from last 7 days   Lab Units 12/30/21  0736   SODIUM mEQ/L 132*   POTASSIUM mEQ/L 4.5   CHLORIDE mEQ/L 101   CO2 mEQ/L 20*   BUN mg/dL 8   CREATININE mg/dL 0.6   GLUCOSE mg/dL 149*   CALCIUM mg/dL 7.8*     Results from last 7 days   Lab Units 12/30/21  0736   MAGNESIUM mg/dL 1.8         SARS-CoV-2 (COVID-19) (no units)   Date/Time Value   12/29/2021 0110 Negative       Imaging  Xray pelvis 12/29:  IMPRESSION:   Status post total left hip arthroplasty.      ECG/Telemetry  Reviewed    ASSESSMENT AND PLAN      * Closed fracture of left hip (CMS/[facility])  Assessment & Plan  81-year-old female presents with left hip pain. She had a mechanical fall after falling out of bed and had severe pain and difficulty ambulating after.  CT showing left hip fracture  Ortho consult appreciated  Cardiology consult appreciated for perioperative risk stratification  S/p L hip hemiarthroplasty 12/29 with Dr. Pena  Per Ortho, okay to resume Pradaxa today as well as initiate ASA 325mg QD for 6 weeks  WBAT to LLE  Anterior hip precautions for 6 weeks  Per ortho; she had an expectant drop in Hgb post-operatively due to Pradaxa use. No additional studies warranted at this time. OR dressing saturation and bleeding expected  Continue multimodal analgesia with scheduled tylenol, lido patch, PRN oxy 2.5-5mg, ice therapy  Ensure bowel regimen and encourage pulmonary toilet  PT/OT eval pending    S/P AVR (aortic valve replacement)  Assessment & Plan  Recent TTE showed normal aortic valve function  Cardiology consult appreciated  Will restart pradaxa postop if afternoon hgb stable    Postoperative anemia  Assessment & Plan  Hgb 12-->9.2 today  Per ortho this is expected  Will repeat hgb this afternoon and if stable, will restart pradaxa    Paroxysmal atrial fibrillation (CMS/[facility])  Assessment & Plan  -Currently in sinus rhythm.  -Continue home diltiazem and digoxin.  -Will restart pradaxa today    Mycobacterium avium-intracellulare complex (CMS/[facility])  Assessment & Plan  patient states she is currently not on triple therapy for MAC infection (rifampin 300 mg, azithromycin 250 mg or ethambutol 400 mg), verified with Pulmonology notes from December 2021    Hypothyroidism  Assessment & Plan  Continue Synthroid    Hypertension  Assessment & Plan  Resume home antihypertensives    Glaucoma  Assessment & Plan  Continue home eyedrops    COPD (chronic obstructive pulmonary disease) (CMS/[facility])  Assessment &  Plan  Chronic  Does not appear to have acute flare  Home INH/nebs as needed    Chronic diastolic CHF (congestive heart failure) (CMS/HCC)  Assessment & Plan  Chronic  Appears well compensated  We will resume home regimen         VTE Assessment: Padua VTE Score: 9  VTE Prophylaxis:  Current anticoagulants:  dabigatran etexilate (PRADAXA) capsule 150 mg, oral, BID      Code Status: Full Code  Palliative Care Screening Score: 4   Estimated Discharge Date: 12/31/2021     Disposition Planning: pending PT/OT eval and clinical course     REYNALDO Leyva  12/30/2021

## 2021-12-30 NOTE — PATIENT CARE CONFERENCE
Care Progression Rounds Note  Date: 12/30/2021  Time: 11:42 AM     Patient Name: Bee Cobb     Medical Record Number: [id number]   YOB: 1940  Sex: Female      Room/Bed: 0182    Admitting Diagnosis: Closed fracture of left hip, initial encounter (CMS/[facility]) [S72.002A]   Admit Date/Time: 12/29/2021 12:14 AM    Primary Diagnosis: Closed fracture of left hip (CMS/HCC)  Principal Problem: Closed fracture of left hip (CMS/HCC)    GMLOS: pending  Anticipated Discharge Date: 12/31/2021    AM-PAC:  Mobility Score:      Discharge Planning:  Anticipated Discharge Disposition: acute rehab/Inpatient Rehab Facility    Barriers to Discharge:  Medical issues not resolved    Comments:       Participants:  ,physical therapy,physician,nursing,social work/services

## 2021-12-30 NOTE — PLAN OF CARE
Problem: Adult Inpatient Plan of Care  Goal: Plan of Care Review  Outcome: Progressing  Flowsheets (Taken 12/30/2021 2378)  Progress: improving  Plan of Care Reviewed With: patient  Outcome Summary: mobility limited by decreased strength, endurance, balance and pain.

## 2021-12-30 NOTE — NURSING NOTE
Patient post op, SCDs applied, incentive spirometer taught and demonstrated. 2L supplemental O2 continued, patient voided without difficulty.    RN attempted to get patient OOB, dressing completely saturated, MD paged and dressing reinforced with ABD pad and foam tape. Neurovascular checks complete and within normal limits, vitals are stable.

## 2021-12-30 NOTE — PROGRESS NOTES
Orthopedic Surgery Progress Note    Subjective     No acute events overnight. Pt seen and examined at bedside, resting comfortably. Pain well controlled to L hip. Denies numbness or tingling down LLE. Dressing reinforced overnight due to saturation.      Objective       Vitals:    12/29/21 2352   BP: (!) 148/66   Pulse: 83   Resp: 18   Temp: 36.4 °C (97.6 °F)   SpO2: 98%       Lab Results   Component Value Date    WBC 21.61 (H) 12/29/2021    HGB 12.0 12/29/2021    HCT 37.7 12/29/2021     12/29/2021    ALT 21 12/29/2021    AST 29 12/29/2021     (L) 12/29/2021    K 3.6 12/29/2021    CL 98 12/29/2021    CREATININE 0.5 (L) 12/29/2021    BUN 5 (L) 12/29/2021    CO2 23 12/29/2021    INR 1.2 12/29/2021        Imaging    Post-operative x-rays of the pelvis demonstrate stable left hemiarthroplasty in good alignment.    Physical Exam:    General: VSS, NAD  Neuro: awake, alert and oriented x 3  Respiratory: non-labored breathing, good respiratory effort  Extremities:    LLE:   Dressing CDI  Appropriate TTP gilbert-incisionally  No gross deformities  Compartments soft and compressible  Sensation intact sural/saphenous/DP/SP/tibial  + motor EHL/FHL/TA/Gastroc/Peroneals  + dorsalis pedis pulse, toes WWP with BCR.          Assessment     81 y.o. female POD #1 s/p L hip hemiarthroplasty on 12/29 by Dr. Pena for L displaced femoral neck fracture after fall from standing height     Plan     Antibiotics - perioperative ancef  DVT prophylaxis per primary team - OK to resume Pradaxa today, please also initiate ASA 325mg QD vs BID per medical recommendation for 6 weeks  WBAT to LLE  Anterior hip precautions for 6 weeks  Expectant drop in Hgb post-operatively due to Pradaxa use. Please do not order any additional imaging studies if Hgb drops  Dressing changed and reinforced 12/30 AM by ortho, please reinforce as needed per nursing. Saturation and bleeding expected  Transfusion as needed per medical team  PT/OT for  mobility  Continue multimodal pain control  Dispo pending  Remainder of care per primary team    Jonathan Fuentes, DO  Orthopedic Surgery, PGY-2  Pager: [phone]

## 2021-12-30 NOTE — CONSULTS
Physical Medicine and Rehabilitation Consult Note    Subjective     Bee Cobb is a 81 y.o. female who was admitted for Closed fracture of left hip, initial encounter (CMS/[facility]) [S72.002A]. We were asked to see for rehabilitation needs. Patient is 81-year-old woman with a past medical history significant for hypertension, hyperlipidemia, AVR, atrial fibrillation on Pradaxa, CHF, hypothyroidism who was admitted on 12-29-21 status post a mechanical fall when she missed the bed attempting to get into it landing on her left hip.  Immediate onset of left hip pain inability to ambulate.  No LOC.  Radiographic evaluation with left femoral neck fracture by x-ray and displaced transcervical femoral neck fracture by CT.On 12-29-21 she underwent left hip hemiarthroplasty.    Medical History:   Past Medical History:   Diagnosis Date   • Atrial fibrillation (CMS/[facility])    • Glaucoma    • History of transfusion    • Hyperthyroidism    • Lipid disorder    • Osteoporosis    • PAN (polyarteritis nodosa) (CMS/[facility])    • Thalassemia        Surgical History:   Past Surgical History:   Procedure Laterality Date   • CARDIAC SURGERY     • TONSILLECTOMY         Social History: , multilevel dwelling  Social History     Social History Narrative   • Not on file     Lives with:    Prior Function Level: Prior Level of Function  Dominant Hand: left  Ambulation: independent  Transferring: independent  Toileting: independent  Bathing: assistive equipment  Dressing: independent  Eating: independent  Prior Level of Function Comment: independent for ADL and functional mobility no AD  Family History: History reviewed. No pertinent family history.  History also provided by: Patient, electronic medical records, medical team  Allergies: Atorvastatin; Penicillins; Shellfish derived; Iodinated contrast media; Rosuvastatin; Covid-19 vaccine, mrna, [id number], lnp-s (moderna); and Covid-19 vaccine, [id number], lnp-s (moderna)    • acetaminophen  1,000  mg oral q8h INT   • aspirin  325 mg oral Daily   • budesonide  0.5 mg nebulization BID (6a, 6p)   • cholecalciferol (vitamin D3)  1,000 Units oral Daily   • dabigatran etexilate  150 mg oral BID   • digoxin  250 mcg oral Daily (6a)   • dilTIAZem CD  240 mg oral Daily   • dorzolamide  1 drop Both Eyes BID (6a, 6p)   • latanoprost  1 drop Both Eyes Nightly   • levothyroxine  75 mcg oral Daily (6a)   • sennosides-docusate sodium  1 tablet oral BID        Medication List      ASK your doctor about these medications    albuterol HFA 90 mcg/actuation inhaler  Commonly known as: VENTOLIN HFA  Inhale 2 puffs every 4 (four) hours as needed for wheezing or shortness of breath.  Dose: 2 puff     cholecalciferol (vitamin D3) 25 mcg (1,000 unit) capsule  Take by mouth.     digoxin 250 mcg (0.25 mg) tablet  Commonly known as: LANOXIN  Take 250 mcg by mouth once daily.  Dose: 250 mcg     dilTIAZem  mg 24 hr capsule  Commonly known as: CARDIZEM CD  Take 240 mg by mouth daily.  Dose: 240 mg  Ask about: Which instructions should I use?     dorzolamide 2 % ophthalmic solution  Commonly known as: TRUSOPT  Administer 1 drop into both eyes 2 (two) times a day.  Dose: 1 drop     fluticasone  mcg/actuation inhaler  Commonly known as: FLOVENT HFA  Inhale 1 puff 2 (two) times a day.   Rinse mouth with water after use to reduce aftertaste and incidence of candidiasis.   Do not swallow.  For patients not on a ventilator, a spacer is recommended to be used with this medication/inhaler.  Dose: 1 puff  Ask about: Which instructions should I use?     latanoprost 0.005 % ophthalmic solution  Commonly known as: XALATAN  Administer 1 drop into both eyes nightly.  Dose: 1 drop     multivitamin tablet  Commonly known as: THERAGRAN  Take 1 tablet by mouth.  Dose: 1 tablet     PRADAXA 150 mg capsu  Take 150 mg by mouth 2 (two) times a day.  Dose: 150 mg  Generic drug: dabigatran etexilate     rosuvastatin 5 mg tablet  Commonly known as:  CRESTOR  Take 5 mg by mouth daily.  Dose: 5 mg     SPIRIVA RESPIMAT 2.5 mcg/actuation mist inhaler  Inhale 2 puffs daily.  Dose: 2 puff  Generic drug: tiotropium bromide  Ask about: Which instructions should I use?     SYNTHROID 75 mcg tablet  Take 75 mcg by mouth once daily.  Dose: 75 mcg  Generic drug: levothyroxine            REVIEW OF SYSTEMS:  CONSTITUTIONAL: Good appetite  PULMONARY: Status post tobacco in the past  RHEUMATOLOGIC: Right knee DJD  NEUROLOGIC: No numbness no tingling  PSYCHIATRIC: Memory preserved  Mobility: No assistive device, secondary to knee DJD had to step to the stairs leading with left    Remainder of eleven point review of systems is unremarkable.      Objective   Labs  I have reviewed the patient's labs.  Significant abnormals are Anemia.  Lab Results   Component Value Date    WBC 19.45 (H) 12/30/2021    HGB 9.2 (L) 12/30/2021    HCT 29.3 (L) 12/30/2021     12/30/2021    ALT 21 12/29/2021    AST 29 12/29/2021     (L) 12/30/2021    K 4.5 12/30/2021     12/30/2021    CREATININE 0.6 12/30/2021    BUN 8 12/30/2021    CO2 20 (L) 12/30/2021    INR 1.2 12/29/2021     Imaging    CT left hip, 12-29-21, reviewed  TECHNIQUE:  Helical acquisition of the left hip without intravenous contrast.  Coronal and sagittal reformats also obtained.        CT DOSE:  One or more dose reduction techniques (e.g. automated exposure  control, adjustment of the mA and/or kV according to patient size, use of  iterative reconstruction technique) utilized for this examination     COMPARISON: Radiographs of the pelvis and left hip earlier same date     FINDINGS:     Diffuse osteopenia. There is a fracture left femoral neck with approximately 2  cm superior migration an slight anterior displacement of the distal fracture  fragment.  Acetabulum and included left hemipelvis intact.  --  IMPRESSION:  Displaced fracture left femoral neck.    TTE, 3-2021, report reviewed      1. Normal functioning  bioprosthetic aortic valve replacement    2. Left ventricular hypertrophy with normal systolic function and moderate diastolic dysfunction     CT cervical spine 12-29-21, reviewed  COMMENT: Noncontrast CT examination of the cervical spine performed following  the [facility] standard protocol. Sagittal and coronal reformations rendered from axial  source images. Images reviewed in bone and soft tissue windows.     CT DOSE:  One or more dose reduction techniques (e.g. automated exposure  control, adjustment of the mA and/or kV according to patient size, use of  iterative reconstruction technique) utilized for this examination.     COMPARISON: None.     Cervicothoracic alignment: Straightening of the cervical lordosis. No  subluxation.  Prevertebral soft tissues: Normal in thickness.  Vertebral bodies: Normal in height, noting endplate irregularity and  degenerative endplate sclerosis bordering the C5-C6 and C6-C7 disc spaces.  Intervertebral discs: Multilevel loss of intervertebral disc height, appearing  mild at C4-C5 and C5-C6 and moderate at C6-C7.  Cervical and upper thoracic spinal canal: Mild acquired compromise at C5-C6  and C6-C7.     Axial images: No evidence of fracture or traumatic subluxation. There are disc  osteophyte complexes and there is uncovertebral hypertrophy and facet  hypertrophy at multiple levels. This results in varying degrees of neural  foraminal stenosis, appearing moderate on the left at C3-C4 and moderate  bilaterally at C5-C6 and C6-C7.     Extra vertebral soft tissues: Pleural-parenchymal thickening at the lung  apices bilaterally.     --  IMPRESSION:  1. No evidence of fracture or traumatic subluxation.  2. Multilevel spondylosis.          PHYSICAL EXAM: 127 pounds, BMI 21  Vitals:    12/30/21 1100   BP: (!) 144/67   Pulse: 98   Resp: 16   Temp: 36.3 °C (97.3 °F)   SpO2:    96% saturation, room air    General: Well-developed thin woman no acute distress  HEENT: Normocephalic left frontal  ecchymosis   Lungs: Respirations unlabored  Cardiac: Regular rate no JVD no pedal edema  Abdomen: Soft protuberant  Extremities: No atrophy no increased tone  Rheumatologic: Advanced degenerative change of the right knee with effusion and warmth  Dermatologic: Left hip surgical incision/dressing clean and dry  Spine: Kyphoscoliosis, no cervical thoracic tenderness  Psychiatric: Affect is appropriate she is cooperative  Neurologic: Alert and oriented x3, cranial nerves are symmetrical, speech is fluent she follows commands.  Motor examination 5/5 proximal and distal upper and lower extremities other than limited by pain left flexor.  Sensation is intact.  Deep tendon reflexes are symmetrical.  Mobility: Moderate assistance with bed mobility      ASSESSMENT/PLAN:    Left hip fracture, POD #1 status post Hemiarthroplasty.  Pain controlled at rest    AVR, preserved left ventricular systolic function, with diastolic dysfunction by TTE..  Clinically compensated    Degenerative joint disease, clinically advanced right knee, with effusion warmth and tenderness that she describes as chronic.  Historically ascended the stairs step to leading with the left.  No radiographic studies available, recommend right knee x-ray.    Mobility deficit, acute secondary to postoperative left Hemiarthroplasty, advanced right knee DJD may be a limiting factor with respect to weightbearing tolerance.    Rehabilitation/disposition, recommend acute rehabilitation for individualized physical therapy, Occupational Therapy, nursing care and education in light of comorbidities including advanced right knee DJD with effusion and status post AVR with diastolic dysfunction, requiring daily physician oversight.    Plan of care was discussed with patient and team

## 2021-12-30 NOTE — PLAN OF CARE
Problem: Adult Inpatient Plan of Care  Goal: Plan of Care Review  Outcome: Progressing  Flowsheets (Taken 12/30/2021 1354)  Progress: improving  Plan of Care Reviewed With:   patient   spouse  Goal: Readiness for Transition of Care  Outcome: Progressing  Intervention: Mutually Develop Transition Plan  Flowsheets (Taken 12/30/2021 1354)  Anticipated Discharge Disposition: acute rehab/Inpatient Rehab Facility  Equipment Needed After Discharge: none  Assistive Device/Animal Currently Used at Home: nebulizer  Anticipated Changes Related to Illness: inability to care for self  Outpatient/Agency/Support Group Needs: inpatient rehabilitation facility  Transportation Concerns: car, none  Readmission Within the Last 30 Days: no previous admission in last 30 days  Patient/Family Anticipated Services at Transition: rehabilitation services  Patient/Family Anticipates Transition to: inpatient rehabilitation facility  Transportation Anticipated: health plan transportation  Concerns to be Addressed: discharge planning     Per info in medical rounds, pt is not medically stable for d/c today, DARREL is tomorrow. SW s/w pt and spouse at bedside to complete assessment.    Pt reports she lives w/  in a 2SH, has 9STE, bed and bath on 2nd fl w/ FF up to 2nd fl. Pt has tub shower w/ grab bars. Pt reports she was fully independent w/ ADLs PTA, uses a nebulizer, and denies previous rehab stays or HHC.    Pt confirmed her PCP, pharmacy, and emergency contacts. Pt does not have LW or HCPOA. Pt is not a . Pt is fully COVID vaccinated.    Pt states she is reluctant but agreeable to rehab if rec'd. PT/OT rec'd acute, PM&R consult is pending. If PM&R agrees w/ rec for acute, pt would like referrals sent to [facility] & [facility]. SW will follow up and send referrals once PM&R note is in.    SW will continue to follow for emotional support and dispo planning. NANCY Santana [phone]/

## 2021-12-30 NOTE — PROGRESS NOTES
"Cardiology Progress Note    SUBJECTIVE    No acute events overnight. Surgery yesterday was uneventful.     Inpatient medications:  •  acetaminophen, 1,000 mg, oral, q8h INT  •  albuterol, 2.5 mg, nebulization, q6h PRN  •  aspirin, 325 mg, oral, Daily  •  budesonide, 0.5 mg, nebulization, BID (6a, 6p)  •  ceFAZolin, 2 g, intravenous, q8h INT  •  cholecalciferol (vitamin D3), 1,000 Units, oral, Daily  •  dabigatran etexilate, 150 mg, oral, BID  •  glucose, 16-32 g of dextrose, oral, PRN **OR** dextrose, 15-30 g of dextrose, oral, PRN **OR** glucagon, 1 mg, intramuscular, PRN **OR** dextrose in water, 25 mL, intravenous, PRN  •  digoxin, 250 mcg, oral, Daily (6a)  •  dilTIAZem CD, 240 mg, oral, Daily  •  dorzolamide, 1 drop, Both Eyes, BID (6a, 6p)  •  latanoprost, 1 drop, Both Eyes, Nightly  •  levothyroxine, 75 mcg, oral, Daily (6a)  •  oxyCODONE, 2.5 mg, oral, q6h PRN  •  oxyCODONE, 5 mg, oral, q6h PRN  •  sennosides-docusate sodium, 1 tablet, oral, BID    Review of Systems: Review of systems otherwise normal.    OBJECTIVE:   Visit Vitals  BP (!) 151/66   Pulse 91   Temp (!) 35.8 °C (96.4 °F)   Resp 18   Ht 1.676 m (5' 6\")   Wt 57.7 kg (127 lb 3.2 oz)   SpO2 98%   BMI 20.53 kg/m²       Physical Exam   Constitutional: Sitting in chair.  Not in distress.  HENT: Normocephalic and atraumatic.  Eyes: Normal conjunctiva.  Neck: No JVD present.   Cardiovascular: Normal rate and regular rhythm. No significant murmur.  Pulmonary/Chest: Normal effort and air entry. No wheezing, rales, ronchi.  Abdominal: Normal bowel sounds. Soft, non tender, no distension. No rebound or guarding.   Musculoskeletal: No lower extremity edema or deformity.   Neurological: Alert and oriented to person, place, and time.   Skin: Skin is warm and dry. No rash.  Psychiatric: Normal mood, affect and behavior.    Labs  Results from last 7 days   Lab Units 12/30/21  0736 12/29/21  0611 12/29/21  0109   SODIUM mEQ/L 132* 135* 135*   POTASSIUM mEQ/L 4.5 " "3.6 3.4*   CHLORIDE mEQ/L 101 98 105   CO2 mEQ/L 20* 23 20*   BUN mg/dL 8 5* 6*   CREATININE mg/dL 0.6 0.5* 0.5*   CALCIUM mg/dL 7.8* 9.1 8.6*   ALBUMIN g/dL  --  3.7 3.4   BILIRUBIN TOTAL mg/dL  --  0.8 0.8   ALK PHOS IU/L  --  123 113   ALT IU/L  --  21 19   AST IU/L  --  29 31   GLUCOSE mg/dL 149* 161* 147*     Results from last 7 days   Lab Units 12/30/21  0736 12/29/21  0611 12/29/21  0109   WBC K/uL 19.45* 21.61* 26.76*   HEMOGLOBIN g/dL 9.2* 12.0 11.2*   HEMATOCRIT % 29.3* 37.7 35.3   PLATELETS K/uL 217 299 273             Cardiology results    TTE:     3/5/21      1. Normal functioning bioprosthetic aortic valve replacement    2. Left ventricular hypertrophy with normal systolic function and moderate diastolic dysfunction      Catheterization:      [facility] 1/25/2019 \"Coronary angiography revealed no obstructive coronary artery disease in a codominant distribution.\"    Imaging: Imaging reviewed.    ASSESSMENT AND PLAN  81 y.o. female admitted for Closed fracture of left hip, initial encounter (CMS/[facility]) [S72.002A], Cardiology consulted for:    Hypertension  -Blood pressure remains slightly elevated.  Likely due to pain / post op stress. Continue home diltiazem.     Paroxysmal atrial fibrillation  -Currently in sinus rhythm.  -Continue home diltiazem and digoxin.  -Resume Pradaxa once safe from surgical standpoint.     Hyperlipidemia  -Continue Crestor.    Rikki Quintana MD  12/30/2021  10:22 AM  "

## 2021-12-30 NOTE — PROGRESS NOTES
Patient:  Bee Cobb  Location:  [place] [address]  MRN:  [id number]  Today's date:  12/30/2021     Patient received:  supine  RN aware of session.   End of session: Pt seated in recliner on sheet and incontinence pad. , call bell and personal items within reach, all needs met. Nursing staff aware of patient status.       Bee is a 81 y.o. female admitted on 12/29/2021 with Closed fracture of left hip, initial encounter (CMS/[facility]) [S72.002A]. Principal problem is Closed fracture of left hip (CMS/[facility]).    Past Medical History  Bee has a past medical history of Atrial fibrillation (CMS/[facility]), Glaucoma, History of transfusion, Hyperthyroidism, Lipid disorder, Osteoporosis, PAN (polyarteritis nodosa) (CMS/[facility]), and Thalassemia.    History of Present Illness   Fall with L hip fx, s/p L hip hemiarthroplasty (12/29)       OT Vitals    Date/Time Pulse SpO2 Pt Activity O2 Therapy BP BP Location BP Method Pt Position Observations [facility]   12/30/21 0838 106 96 % At rest None (Room air) 174/72 Right upper arm Automatic Sitting OT/ PT eval OK   12/30/21 0901 -- -- -- -- 156/65 Right upper arm Automatic Sitting -- OK      OT Pain    Date/Time Pain Type Side/Orientation Location Rating: Rest Rating: Activity Interventions [facility]   12/30/21 0838 Pain Assessment left hip 4 - moderate pain 6 - moderate-severe pain position adjusted OK          Prior Living Environment      Most Recent Value   Current Living Arrangements home   Living Environment Comment [place] house with spouse, 5+4 MADY and FF to bed and bath w tub shower        Prior Level of Function      Most Recent Value   Dominant Hand left   Ambulation independent   Transferring independent   Toileting independent   Bathing assistive equipment   Dressing independent   Eating independent   Prior Level of Function Comment independent for ADL and functional mobility no AD   Assistive Device Currently Used at Home shower chair        Occupational Profile       Most Recent Value   Reason for Services/Referral sp fall   Environmental Supports and Barriers lives with supportive spouse           OT Evaluation and Treatment - 12/30/21 0835        OT Time Calculation    Start Time 0835     Stop Time 0905     Time Calculation (min) 30 min        Session Details    Document Type initial evaluation     Mode of Treatment occupational therapy        General Information    Patient Profile Reviewed yes     Patient/Family/Caregiver Comments/Observations RN aware of session     General Observations of Patient recd supine, requesting to get to commode, agreeable     Existing Precautions/Restrictions fall;hip;weight bearing   ANTERIOR hip px    Limitations/Impairments safety/cognitive        Weight-bearing Status    Left LE Weight-Bearing Status weight-bearing as tolerated (WBAT)        Living Environment    Primary Care Provided by self        Cognition/Psychosocial    Affect/Mental Status (Cognition) WFL;flat/blunted affect     Orientation Status (Cognition) oriented x 3     Follows Commands (Cognition) follows multi-step commands     Cognitive Function executive function deficit     Executive Function Deficit (Cognition) information processing     Comment, Cognition Pt is able to make  needs known, flat and benefits from increased processing time        Hearing Assessment    Hearing Status hearing impairment, augmentation device not available        Vision Assessment/Intervention    Visual Impairment/Limitations corrective lenses for reading        Sensory Assessment (Somatosensory)    Sensory Assessment (Somatosensory) UE sensation intact        Range of Motion (ROM)    Range of Motion ROM is WFL;bilateral upper extremities        Strength (Manual Muscle Testing)    Strength (Manual Muscle Testing) strength is WFL;bilateral upper extremities        Strength Comprehensive (MMT)    Comment assessed functionally at least 4/5 BUE        Bed Mobility    [place], Roll Left moderate  assist (50-74% patient effort)     [place], Supine to Sit moderate assist (50-74% patient effort)     Assistive Device bed rails;draw sheet;head of bed elevated     Comment (Bed Mobility) ModA for supine, sit with increased pain        Transfers    Transfers toilet transfer        Bed to Chair Transfer    [place], Bed to Chair minimum assist (75% or more patient effort);1 person assist     Verbal Cues hand placement;proper use of assistive device;safety;technique     Assistive Device walker, front-wheeled        Sit to Stand Transfer    [place], Sit to Stand Transfer minimum assist (75% or more patient effort);2 person assist     Verbal Cues hand placement;maintaining precautions;proper use of assistive device;safety;technique     Assistive Device other (see comments)   HHA x2 form EOB then RW from commode    Comment MinAx2 progressed to MinAx1 from commode        Stand to Sit Transfer    [place], Stand to Sit Transfer minimum assist (75% or more patient effort);2 person assist     Verbal Cues hand placement;preparatory posture;proper use of assistive device;safety;technique     Assistive Device walker, front-wheeled     Comment effortful, MinAx2 to commode then MinAx1 to chair        Toilet Transfer    Transfer Technique sit-stand;stand-sit     [place], Toilet Transfer minimum assist (75% or more patient effort);2 person assist     Verbal Cues hand placement;maintaining precautions;safety;preparatory posture;proper use of assistive device;technique     Assistive Device commode, 3-in-1;walker, front-wheeled        Safety Issues, Functional Mobility    Safety Issues Affecting Function (Mobility) sequencing abilities     Impairments Affecting Function (Mobility) balance;endurance/activity tolerance;pain;postural/trunk control;strength        Balance    Static Sitting Balance WFL;supported     Dynamic Sitting Balance WFL;supported     Static Standing Balance mild impairment;supported      Dynamic Standing Balance moderate impairment;supported     Balance Test Results Functional Reach Test     Balance Interventions occupation based/functional task;sitting     Comment, Balance seated on commode with S, MinAx1-2 for transfers and mobility with RW t/o for balance        Functional Reach Test    Trial One: Functional Reach Test (in) --   pt is unable to tolerate safely at this time       Motor Skills    Motor Skills functional endurance     Functional Endurance limited from functional baselline, fatigued with limited mobility        Lower Body Dressing    Comment seated in recliner, OT educated on adapted techniques and AE for increased independence, is receptive though will benefit from followup edu        Toileting    [place] perform bowel hygiene;dependent (less than 25% patient effort)     Position supported standing     Comment Pt is able to tolerated supported standing with Ax1 for total A BM hygiene. also with purewick prior to session        Self-Feeding    [place] independent     Position supported sitting     Comment sitting up in recliner chair        BADL Safety/Performance    Impairments, BADL Safety/Performance balance;endurance/activity tolerance;pain;strength        ADL Interventions    Energy Conservation Techniques activity pacing encouraged;correct posture facilitated;equipment and device use facilitated        AM-PAC (TM) - ADL (Current Function)    Putting on and taking off regular lower body clothing? 2 - A Lot     Bathing? 2 - A Lot     Toileting? 2 - A Lot     Putting on/taking off regular upper body clothing? 3 - A Little     How much help for taking care of personal grooming? 3 - A Little     Eating meals? 4 - None     AM-PAC (TM) ADL Score 16        Assessment/Plan (OT)    Daily Outcome Statement Pt seen for OT eval. Pt sp fall and L hip fernando-arthroplasty. Pt req ModA for bed mob,  MinAx1-2 for functional transfers and short mobility with decreased activity tolerance  req increased assistance for all ADL this session from independent baseline. Pt will benefit from skilled OT intervnetion t/o admission, rec acute rehab upon dc pending progress     Rehab Potential good, to achieve stated therapy goals     Therapy Frequency 5 times/wk     Planned Therapy Interventions activity tolerance training;adaptive equipment training;BADL retraining;functional balance retraining;IADL retraining;occupation/activity based interventions;patient/caregiver education/training;ROM/therapeutic exercise;strengthening exercise;transfer/mobility retraining               OT Assessment/Plan      Most Recent Value   OT Recommended Discharge Disposition acute rehab/Inpatient Rehab Facility at 12/30/2021 0835   Anticipated Equipment Needs At Discharge (OT) --  [tbd] at 12/30/2021 0835   Patient/Family Therapy Goal Statement to use the commode at 12/30/2021 0835                    Education Documentation  Treatment Plan, taught by Delmy Arceo OT at 12/30/2021 12:39 PM.  Learner: Patient  Readiness: Acceptance  Method: Explanation  Response: Needs Reinforcement  Comment: role and goals of OT, adapted ADL, LH AE, plan of care          OT Goals      Most Recent Value   Transfer Goal 1    Activity/Assistive Device all transfers at 12/30/2021 0835   [place] modified independence at 12/30/2021 0835   Time Frame by discharge at 12/30/2021 0835   Progress/Outcome goal ongoing at 12/30/2021 0835   Dressing Goal 1    Activity/Adaptive Equipment dressing skills, all at 12/30/2021 0835   [place] modified independence at 12/30/2021 0835   Time Frame by discharge at 12/30/2021 0835   Strategies/Barriers LH AE as needed at 12/30/2021 0835   Progress/Outcome goal ongoing at 12/30/2021 0835   Toileting Goal 1    Activity/Assistive Device toileting skills, all at 12/30/2021 0835   [place] modified independence at 12/30/2021 0835   Time Frame by discharge at 12/30/2021 0835   Progress/Outcome goal ongoing at  12/30/2021 0835   Grooming Goal 1    Activity/Assistive Device grooming skills, all at 12/30/2021 0835   [place] independent at 12/30/2021 0835   Time Frame by discharge at 12/30/2021 0835   Progress/Outcome goal ongoing at 12/30/2021 0835

## 2021-12-30 NOTE — PLAN OF CARE
Problem: Adult Inpatient Plan of Care  Goal: Plan of Care Review  Outcome: Progressing  Flowsheets (Taken 12/30/2021 1232)  Progress: improving  Plan of Care Reviewed With: patient  Outcome Summary: Pt seen for OT eval. Pt sp fall and L hip fernando-arthroplasty. Pt req ModA for bed mob,  MinAx1-2 for functional transfers and short mobility with decreased activity tolerance req increased assistance for all ADL this session from independent baseline. Pt will benefit from skilled OT intervnetion t/o admission, rec acute rehab upon dc pending progress

## 2021-12-30 NOTE — HOSPITAL COURSE
Bee is a 81 y.o. female admitted on 12/29/2021 with Closed fracture of left hip, initial encounter (CMS/[facility]) [S72.002A]. Principal problem is Closed fracture of left hip (CMS/[facility]).    Past Medical History  Bee has a past medical history of Atrial fibrillation (CMS/[facility]), Glaucoma, History of transfusion, Hyperthyroidism, Lipid disorder, Osteoporosis, PAN (polyarteritis nodosa) (CMS/[facility]), and Thalassemia.    History of Present Illness   Fall with L hip fx, s/p L hip hemiarthroplasty (12/29)

## 2021-12-30 NOTE — DISCHARGE SUMMARY
[facility] Medicine Service -  Inpatient Discharge Summary        BRIEF OVERVIEW   Admitting Provider: Jeevan Menendez II, DO  Attending Provider: Rick Cabrera DO Attending phys phone: [phone]    PCP: Kanchan Gilliam [phone]    Admission Date: 12/29/2021  Discharge Date: 1/1/2022     DISCHARGE DIAGNOSES      Primary Discharge Diagnosis  Closed fracture of left hip (CMS/[facility])    Secondary Discharge Diagnoses  Active Hospital Problems    Diagnosis Date Noted   • Closed fracture of left hip (CMS/HCC) 12/29/2021     Priority: High   • Postoperative anemia 12/30/2021   • S/P AVR (aortic valve replacement) 12/30/2021   • Mycobacterium avium-intracellulare complex (CMS/HCC) 01/12/2021   • Glaucoma 11/13/2019   • Chronic diastolic CHF (congestive heart failure) (CMS/HCC) 02/14/2019   • Hypertension 01/24/2019   • Hypothyroidism 01/24/2019   • COPD (chronic obstructive pulmonary disease) (CMS/HCC) 01/01/2010   • Paroxysmal atrial fibrillation (CMS/HCC) 01/01/2000      Resolved Hospital Problems   No resolved problems to display.     SUMMARY OF HOSPITALIZATION      Presenting Problem/History of Present Illness  Bee Cobb is a 81 y.o. female with a past medical history of CARRINGTON (reports off meds), glaucoma, HTN, HLD, osteoporosis, PAN, alpha thalassemia trait, PAF, hypothyroidism, chronic diastolic heart failure who presents with left hip pain after falling out of bed. Patient states that earlier in the evening she went to get into bed, and ended up accidentally falling to the floor. She did hit her head but does not believe she had any extended period of LOC. She has been having extreme left hip pain which i has left her unable to ambulate. She otherwise denies any chest pain, nausea, vomiting, diarrhea, rash or fevers.    Hospital Course    Pt presented with L hip pain after a mechanical fall after falling out of bed and had severe pain and difficulty ambulating after. CTH on admission without  acute intracranial abnormalities, CT c-spine without evidence of fx/subluxation. Unfortunately L hip CT showed L hip fracture. Orthopedic surgery was consulted to evaluate and participate in the care of this pt, they recommend operative fixation. Given history of D-CHF and s/p AVR, cardiology was consulted for pre-op risk stratification. Cardiology cleared pt for surgery without further work up warranted prior to the OR. Pt underwent L hip hemiarthroplasty 12/29 with Dr. Pena. Pt is WBAT to LLE and anterior hip precautions x6 weeks. Ortho recommends aspirin 325mg/day x6 weeks in addition to pt's home pradaxa which was restarted postoperatively. Pt did well postoperatively, but was noted to have postop anemia with a hgb drop of 12-->9. This was stable prior to discharge. Pain managed with multimodal analgesia; scheduled tylenol, lido patch, PRN oxycodone, ice therapy. Ensure bowel regimen and encourage pulmonary toilet.    PT/OT/PM&R evaluated pt and recommend acute rehab at discharge.    Important Issues to Address in Follow-Up  Follow up with PCP  Follow up with Ortho within 2 weeks    Exam on Day of Discharge  Physical Exam  Vitals reviewed.   Constitutional:       General: She is not in acute distress.     Appearance: She is well-developed.   HENT:      Head: Normocephalic and atraumatic.   Eyes:      Conjunctiva/sclera: Conjunctivae normal.      Pupils: Pupils are equal, round, and reactive to light.   Cardiovascular:      Rate and Rhythm: Normal rate and regular rhythm.      Heart sounds: Normal heart sounds.   Pulmonary:      Effort: Pulmonary effort is normal.      Breath sounds: Normal breath sounds.   Abdominal:      General: Bowel sounds are normal.      Palpations: Abdomen is soft.      Tenderness: There is no abdominal tenderness.   Musculoskeletal:      Cervical back: Normal range of motion and neck supple.      Comments: Limited at left hip, bandage intact   Skin:     General: Skin is warm.    Neurological:      Mental Status: She is alert and oriented to person, place, and time.   Psychiatric:         Behavior: Behavior normal.       Consults During Admission  IP CONSULT TO ORTHOPEDIC SURGERY  IP CONSULT TO CARDIOLOGY  IP CONSULT TO PHYSICAL MEDICINE REHAB    DISCHARGE MEDICATIONS        Medication List      START taking these medications    acetaminophen 500 mg tablet  Commonly known as: TYLENOL  Take 2 tablets (1,000 mg total) by mouth every 8 (eight) hours.  Dose: 1,000 mg     aspirin 325 mg EC tablet  Take 1 tablet (325 mg total) by mouth daily.  Dose: 325 mg     oxyCODONE 5 mg immediate release tablet  Commonly known as: ROXICODONE  Take 1 tablet (5 mg total) by mouth every 6 (six) hours as needed (severe pain (7-10) on a 0-10 pain scale) for up to 5 days.  Dose: 5 mg     pantoprazole 40 mg EC tablet  Commonly known as: PROTONIX  Take 1 tablet (40 mg total) by mouth daily Indications: stress ulcer prevention. While on aspirin  Dose: 40 mg     sennosides-docusate sodium 8.6-50 mg  Commonly known as: SENOKOT-S  Take 1 tablet by mouth 2 (two) times a day.  Dose: 1 tablet        CONTINUE taking these medications    albuterol HFA 90 mcg/actuation inhaler  Commonly known as: VENTOLIN HFA  Inhale 2 puffs every 4 (four) hours as needed for wheezing or shortness of breath.  Dose: 2 puff     cholecalciferol (vitamin D3) 25 mcg (1,000 unit) capsule  Take by mouth.     digoxin 250 mcg (0.25 mg) tablet  Commonly known as: LANOXIN  Take 250 mcg by mouth once daily.  Dose: 250 mcg     dilTIAZem  mg 24 hr capsule  Commonly known as: CARDIZEM CD  Take 240 mg by mouth daily.  Dose: 240 mg     dorzolamide 2 % ophthalmic solution  Commonly known as: TRUSOPT  Administer 1 drop into both eyes 2 (two) times a day.  Dose: 1 drop     fluticasone  mcg/actuation inhaler  Commonly known as: FLOVENT HFA  Inhale 1 puff 2 (two) times a day.   Rinse mouth with water after use to reduce aftertaste and incidence of  candidiasis.   Do not swallow.  For patients not on a ventilator, a spacer is recommended to be used with this medication/inhaler.  Dose: 1 puff     latanoprost 0.005 % ophthalmic solution  Commonly known as: XALATAN  Administer 1 drop into both eyes nightly.  Dose: 1 drop     multivitamin tablet  Commonly known as: THERAGRAN  Take 1 tablet by mouth.  Dose: 1 tablet     PRADAXA 150 mg capsu  Take 150 mg by mouth 2 (two) times a day.  Dose: 150 mg  Generic drug: dabigatran etexilate     rosuvastatin 5 mg tablet  Commonly known as: CRESTOR  Take 5 mg by mouth daily.  Dose: 5 mg     SPIRIVA RESPIMAT 2.5 mcg/actuation mist inhaler  Inhale 2 puffs daily.  Dose: 2 puff  Generic drug: tiotropium bromide     SYNTHROID 75 mcg tablet  Take 75 mcg by mouth once daily.  Dose: 75 mcg  Generic drug: levothyroxine                      PROCEDURES / LABS / IMAGING      Operative Procedures  As above    Other Procedures  NONE    Pertinent Labs  Results from last 7 days   Lab Units 12/30/21  0736 12/29/21  0611 12/29/21  0109   SODIUM mEQ/L 132* 135* 135*   POTASSIUM mEQ/L 4.5 3.6 3.4*   CHLORIDE mEQ/L 101 98 105   CO2 mEQ/L 20* 23 20*   BUN mg/dL 8 5* 6*   CREATININE mg/dL 0.6 0.5* 0.5*   CALCIUM mg/dL 7.8* 9.1 8.6*   ALBUMIN g/dL  --  3.7 3.4   BILIRUBIN TOTAL mg/dL  --  0.8 0.8   ALK PHOS IU/L  --  123 113   ALT IU/L  --  21 19   AST IU/L  --  29 31   GLUCOSE mg/dL 149* 161* 147*     Results from last 7 days   Lab Units 12/30/21  0736 12/29/21  0611 12/29/21  0109   WBC K/uL 19.45* 21.61* 26.76*   HEMOGLOBIN g/dL 9.2* 12.0 11.2*   HEMATOCRIT % 29.3* 37.7 35.3   PLATELETS K/uL 217 299 273   DIFF TYPE   --  Auto Manu   NRBC %  --  0.0  --    IMM GRANULOCYTES %  --  0.6  --    NEUTROPHILS %  --  94.4  --    NEUTROS PCT MAN %  --   --  92   LYMPHOCYTES %  --  2.5  --    LYMPHO PCT MAN %  --   --  7   MONOCYTES %  --  2.3  --    MONO PCT MAN %  --   --  1   EOSINOPHILS %  --  0.0  --    EOSINO PCT MAN %  --   --  0   BASOPHILS %  --   0.2  --    BASOS PCT MAN %  --   --  0   IMM GRANUCOCYTES ABS K/uL  --  0.13*  --    SEGS ABS MAN K/uL  --   --  24.62*   LYMPHO ABS MAN K/uL  --   --  1.87   MONO ABS AUTO K/uL  --  0.50  --    MONO ABS MAN K/uL  --   --  0.27*   EOS ABS AUTO K/uL  --  0.01*  --    EOS ABS MAN K/uL  --   --  0.00*   BASO ABS AUTO K/uL  --  0.05  --    BASOS ABS MAN K/uL  --   --  0.00*     Results from last 7 days   Lab Units 12/30/21  0736   MAGNESIUM mg/dL 1.8             SARS-CoV-2 (COVID-19) (no units)   Date/Time Value   12/29/2021 0110 Negative       Pertinent Imaging  X-RAY CLAVICLE LEFT    Result Date: 12/15/2021  IMPRESSION:Displaced left midclavicular diaphysis fracture again seen. COMMENT:2 views of left clavicle performed and compared to September 11, 2021 examination. Left mid clavicular fracture again seen with one shaft length inferior displacement of the distal fracture fragment and overlap of the fracture fragments. Imaged portions of the lungs are grossly clear. Partially imaged transcatheter aortic valve replacement.    X-RAY KNEE LEFT 1 OR 2 VIEWS    Result Date: 12/29/2021  IMPRESSION: No obvious acute left knee fracture. Additional imaging would be recommended if there is a concern for occult fracture. Also note that a portion of the proximal left fibula is excluded from the AP view and is not optimally evaluated.    CT HEAD WITHOUT IV CONTRAST    Result Date: 12/29/2021  IMPRESSION: 1. No acute hemorrhage. No acute infarction in a major vascular territory. No mass or mass effect. 2. Chronic changes as described in the discussion. Preliminary report of findings made by Dr. Torres at 0238 hours on 12/29/2021.    CT CERVICAL SPINE WITHOUT IV CONTRAST    Result Date: 12/29/2021  IMPRESSION: 1. No evidence of fracture or traumatic subluxation. 2. Multilevel spondylosis.     X-RAY CHEST 1 VIEW    Result Date: 12/29/2021  IMPRESSION: Diffuse reticulonodular opacities and bronchiectasis grossly similar to prior CT,  probably representing ongoing bronchiolitis and mucous plugging, suggestive of [place] infection. Superimposed pneumonitis is not entirely excluded.    X-RAY PELVIS 1 OR 2 VIEWS    Result Date: 12/29/2021  IMPRESSION: Status post total left hip arthroplasty.     CT HIP LEFT WITHOUT IV CONTRAST    Result Date: 12/29/2021  IMPRESSION: Displaced fracture left femoral neck.     X-RAY HIP WITH OR WITHOUT PELVIS 2-3 VW LEFT    Result Date: 12/29/2021  IMPRESSION: Left femoral neck fracture with mild superior migration of the distal fragment.      OUTPATIENT  FOLLOW-UP / REFERRALS / PENDING TESTS        Outpatient Follow-Up Appointments  Encounter Information    This patient does not currently have any appointments scheduled.         Referrals  No orders of the defined types were placed in this encounter.        DISCHARGE DISPOSITION AND DESTINATION      Disposition:  [place] [facility]  Destination:                              Code Status At Discharge: Full Code    Physician Order for Life-Sustaining Treatment Document Status      No documents found

## 2021-12-30 NOTE — CONSULTS
"Brief Nutrition Note    Recommendations   1. Continue regular diet       Clinical Course: Patient is a 81 y.o. female who was admitted on 12/29/2021 with a diagnosis of Closed fracture of left hip, initial encounter (CMS/[facility]) [S72.002A].     Past Medical History:   Diagnosis Date   • Atrial fibrillation (CMS/[facility])    • Glaucoma    • History of transfusion    • Hyperthyroidism    • Lipid disorder    • Osteoporosis    • PAN (polyarteritis nodosa) (CMS/[facility])    • Thalassemia      Past Surgical History:   Procedure Laterality Date   • CARDIAC SURGERY     • TONSILLECTOMY         Reason for Assessment  Reason For Assessment: nurse/nurse practitioner consult     [facility] Nutrition Screen Tool  Has patient lost weight without trying?: 0-->No  If yes,how much weight has been lost?: 2-->Unsure  Has patient been eating poorly due to decreased appetite?: 0-->No  [facility] Nutrition Screen Score: 2     Physical Findings  Overall Physical Appearance: underweight  Last Bowel Movement: 12/30/21  Skin: surgical incision     Nutrition Order  Nutrition Order: meets nutritional requirements  Nutrition Order Comments: regular     Anthropometrics  Height: 167.6 cm (5' 6\")     Current Weight  Weight Method: Bed scale  Weight: 57.7 kg (127 lb 3.2 oz)     Ideal Body Weight (IBW)  Ideal Body Weight (IBW) (kg): 59.58  % Ideal Body Weight: 96.85     Body Mass Index (BMI)  BMI (Calculated): 20.5     Labs/Procedures/Meds  Lab Results Reviewed: reviewed   BMP Results       12/30/21 12/29/21 12/29/21     0736 0611 0109     135 135    K 4.5 3.6 3.4    Cl 101 98 105    CO2 20 23 20    Glucose 149 161 147    BUN 8 5 6    Creatinine 0.6 0.5 0.5    Calcium 7.8 9.1 8.6    Anion Gap 11 14 10    EGFR >60.0 >60.0 >60.0                      Medications  Pertinent Medications Reviewed: reviewed   • acetaminophen  1,000 mg oral q8h INT   • aspirin  325 mg oral Daily   • budesonide  0.5 mg nebulization BID (6a, 6p)   • ceFAZolin  2 g intravenous q8h INT   • " "cholecalciferol (vitamin D3)  1,000 Units oral Daily   • dabigatran etexilate  150 mg oral BID   • digoxin  250 mcg oral Daily (6a)   • dilTIAZem CD  240 mg oral Daily   • dorzolamide  1 drop Both Eyes BID (6a, 6p)   • latanoprost  1 drop Both Eyes Nightly   • levothyroxine  75 mcg oral Daily (6a)   • sennosides-docusate sodium  1 tablet oral BID     Clinical comments:  Screened for MST. Pt reported her appetite and wt are stable. She said her appetite is \"too good\". Denied drinking any oral nutrition supplements at home or any nutrition related concerns. BMI is low for age. Continue regular diet.     Goals: to meet >75% of needs  Monitor: po intake,labs, plan of care    Recommendations: See above       Date: 12/30/21  Signature: Shreya Anderson RD  "

## 2021-12-30 NOTE — PROGRESS NOTES
Patient: Bee Cobb  Location:  [place] [address]  MRN:  [id number]  Today's date:  12/30/2021  Reviewed case with RN and cleared for PT session. Concluded session with pt sitting in chair, RN-remote, phone, all belongings in reach and needs addressed. Instructed pt not to get out of chair without nursing staff assisting. Nursing staff aware pt is in chair and will be monitoring.    Bee is a 81 y.o. female admitted on 12/29/2021 with Closed fracture of left hip, initial encounter (CMS/[facility]) [S72.002A]. Principal problem is Closed fracture of left hip (CMS/[facility]).    Past Medical History  Bee has a past medical history of Atrial fibrillation (CMS/[facility]), Glaucoma, History of transfusion, Hyperthyroidism, Lipid disorder, Osteoporosis, PAN (polyarteritis nodosa) (CMS/[facility]), and Thalassemia.    History of Present Illness   Fall with L hip fx, s/p L hip hemiarthroplasty (12/29)       PT Vitals    Date/Time Pulse SpO2 Pt Activity O2 Therapy BP BP Location BP Method Pt Position Observations [facility]   12/30/21 0838 106 96 % At rest None (Room air) 174/72 Right upper arm Automatic Sitting OT/ PT eval OK   12/30/21 0901 -- -- -- -- 156/65 Right upper arm Automatic Sitting -- OK      PT Pain    Date/Time Pain Type Side/Orientation Location Rating: Rest Rating: Activity Rating: Rest Rating: Activity Description Interventions [facility]   12/30/21 0838 Pain Assessment left hip -- -- 4 - moderate pain 6 - moderate-severe pain -- position adjusted OK   12/30/21 0845 Pain Assessment left hip 6 8 -- -- aching position adjusted SHERRY          Prior Living Environment      Most Recent Value   Current Living Arrangements --  [2 SH 5 and 4 MADY with rail, FF inside with rail]   Living Environment Comment MS house with spouse, 5+4 MADY and FF to bed and bath w tub shower        Prior Level of Function      Most Recent Value   Dominant Hand left   Ambulation independent   Transferring independent   Toileting independent    Bathing assistive equipment   Dressing independent   Eating independent   Prior Level of Function Comment independent for ADL and functional mobility no AD   Assistive Device Currently Used at Home shower chair           PT Evaluation and Treatment - 12/30/21 1300        PT Time Calculation    Start Time 0836     Stop Time 0906     Time Calculation (min) 30 min        Session Details    Document Type initial evaluation     Mode of Treatment co-treatment;physical therapy        General Information    Onset of Illness/Injury or Date of Surgery 12/29/21        Vision Assessment/Intervention    Visual Impairment/Limitations WFL        Sensory Assessment (Somatosensory)    Sensory Assessment (Somatosensory) sensation intact        Range of Motion (ROM)    Range of Motion ROM is WFL        Strength (Manual Muscle Testing)    Strength (Manual Muscle Testing) strength is WFL        Bed Mobility    [place], Supine to Sit moderate assist (50-74% patient effort)        Bed to Chair Transfer    [place], Bed to Chair minimum assist (75% or more patient effort);2 person assist        Chair to Bed Transfer    [place], Chair to Bed not tested        Sit to Stand Transfer    [place], Sit to Stand Transfer minimum assist (75% or more patient effort);2 person assist     Verbal Cues hand placement        Stand to Sit Transfer    [place], Stand to Sit Transfer minimum assist (75% or more patient effort);2 person assist        Gait Training    [place], Gait minimum assist (75% or more patient effort)     Assistive Device walker, front-wheeled     Distance in Feet 5 feet     Pattern (Gait) step-to     Deviations/Abnormal Patterns (Gait) digna decreased;gait speed decreased;antalgic        Stairs Training    [place], Stairs not tested        Balance    Static Sitting Balance WFL     Dynamic Sitting Balance WFL     Static Standing Balance mild impairment     Dynamic Standing Balance mild impairment         AM-PAC (TM) - Mobility (Current Function)    Turning from your back to your side while in a flat bed without using bedrails? 2 - A Lot     Moving from lying on your back to sitting on the side of a flat bed without using bedrails? 2 - A Lot     Moving to and from a bed to a chair? 3 - A Little     Standing up from a chair using your arms? 3 - A Little     To walk in a hospital room? 3 - A Little     Climbing 3-5 steps with a railing? 2 - A Lot     AM-PAC (TM) Mobility Score 15        Assessment/Plan (PT)    Daily Outcome Statement Pt required A for LE and trunk positioning for supine to sit. Vc's for hand placement for sit <> stand Amb a short distance with antalgic pattern. Pt will benefit from additional therapy before returning home.     Rehab Potential good, to achieve stated therapy goals     Therapy Frequency daily     Planned Therapy Interventions balance training;bed mobility training;gait training;stair training;transfer training               PT Assessment/Plan      Most Recent Value   PT Recommended Discharge Disposition acute rehab/Inpatient Rehab Facility at 12/30/2021 1300                    Education Documentation  Fall Prevention, taught by Bryan Vogel PT at 12/30/2021  1:39 PM.  Learner: Patient  Readiness: Eager  Method: Explanation  Response: Verbalizes Understanding  Comment: instructed on safe transfers.          PT Goals      Most Recent Value   Bed Mobility Goal 1    Activity/Assistive Device bed mobility activities, all at 12/30/2021 1300   [place] supervision required at 12/30/2021 1300   Time Frame 2-3 days at 12/30/2021 1300   Progress/Outcome goal ongoing at 12/30/2021 1300   Transfer Goal 1    Activity/Assistive Device all transfers at 12/30/2021 1300   [place] modified independence at 12/30/2021 1300   Time Frame 2-3 days at 12/30/2021 1300   Progress/Outcome goal ongoing at 12/30/2021 1300   Gait Training Goal 1    Activity/Assistive Device gait (walking locomotion) at  12/30/2021 1300   [place] supervision required at 12/30/2021 1300   Distance 25 at 12/30/2021 1300   Time Frame 2-3 days at 12/30/2021 1300   Progress/Outcome goal ongoing at 12/30/2021 1300   Stairs Goal 1    Activity/Assistive Device stairs, all skills at 12/30/2021 1300   [place] minimum assist (75% or more patient effort) at 12/30/2021 1300   Number of Stairs 13 at 12/30/2021 1300   Time Frame 2-3 days at 12/30/2021 1300   Progress/Outcome goal ongoing at 12/30/2021 1300

## 2021-12-30 NOTE — ASSESSMENT & PLAN NOTE
Hgb 12 -->9.2 post op  Per ortho this is expected acute blood loss anemia  Stable after initial drop

## 2021-12-31 ENCOUNTER — BMR PREADMISSION ASSESSMENT (OUTPATIENT)
Dept: ADMISSIONS | Facility: REHABILITATION | Age: 81
End: 2021-12-31
Payer: MEDICARE

## 2021-12-31 LAB
ANION GAP SERPL CALC-SCNC: 8 MEQ/L (ref 3–15)
BUN SERPL-MCNC: 13 MG/DL (ref 8–20)
CALCIUM SERPL-MCNC: 8.1 MG/DL (ref 8.9–10.3)
CHLORIDE SERPL-SCNC: 103 MEQ/L (ref 98–109)
CO2 SERPL-SCNC: 23 MEQ/L (ref 22–32)
CREAT SERPL-MCNC: 0.6 MG/DL (ref 0.6–1.1)
ERYTHROCYTE [DISTWIDTH] IN BLOOD BY AUTOMATED COUNT: 15.9 % (ref 11.7–14.4)
GFR SERPL CREATININE-BSD FRML MDRD: >60 ML/MIN/1.73M*2
GLUCOSE SERPL-MCNC: 125 MG/DL (ref 70–99)
HCT VFR BLDCO AUTO: 27.3 % (ref 35–45)
HGB BLD-MCNC: 8.6 G/DL (ref 11.8–15.7)
MCH RBC QN AUTO: 20 PG (ref 28–33.2)
MCHC RBC AUTO-ENTMCNC: 31.5 G/DL (ref 32.2–35.5)
MCV RBC AUTO: 63.3 FL (ref 83–98)
PDW BLD AUTO: 9.7 FL (ref 9.4–12.3)
PLATELET # BLD AUTO: 322 K/UL (ref 150–369)
POTASSIUM SERPL-SCNC: 4.2 MEQ/L (ref 3.6–5.1)
RBC # BLD AUTO: 4.31 M/UL (ref 3.93–5.22)
SODIUM SERPL-SCNC: 134 MEQ/L (ref 136–144)
WBC # BLD AUTO: 17.77 K/UL (ref 3.8–10.5)

## 2021-12-31 PROCEDURE — 63700000 HC SELF-ADMINISTRABLE DRUG: Performed by: STUDENT IN AN ORGANIZED HEALTH CARE EDUCATION/TRAINING PROGRAM

## 2021-12-31 PROCEDURE — 25000000 HC PHARMACY GENERAL: Performed by: STUDENT IN AN ORGANIZED HEALTH CARE EDUCATION/TRAINING PROGRAM

## 2021-12-31 PROCEDURE — 36415 COLL VENOUS BLD VENIPUNCTURE: CPT | Performed by: NURSE PRACTITIONER

## 2021-12-31 PROCEDURE — 80048 BASIC METABOLIC PNL TOTAL CA: CPT | Performed by: NURSE PRACTITIONER

## 2021-12-31 PROCEDURE — 12000000 HC ROOM AND CARE MED/SURG

## 2021-12-31 PROCEDURE — 63700000 HC SELF-ADMINISTRABLE DRUG: Performed by: INTERNAL MEDICINE

## 2021-12-31 PROCEDURE — 85027 COMPLETE CBC AUTOMATED: CPT | Performed by: NURSE PRACTITIONER

## 2021-12-31 PROCEDURE — 99233 SBSQ HOSP IP/OBS HIGH 50: CPT | Performed by: HOSPITALIST

## 2021-12-31 PROCEDURE — 97530 THERAPEUTIC ACTIVITIES: CPT | Mod: GP,CQ

## 2021-12-31 RX ORDER — OXYCODONE HYDROCHLORIDE 5 MG/1
5 TABLET ORAL EVERY 6 HOURS PRN
Qty: 15 TABLET | Refills: 0 | Status: ON HOLD | OUTPATIENT
Start: 2021-12-31 | End: 2022-01-14

## 2021-12-31 RX ADMIN — ACETAMINOPHEN 1000 MG: 500 TABLET ORAL at 08:34

## 2021-12-31 RX ADMIN — Medication 1000 UNITS: at 08:33

## 2021-12-31 RX ADMIN — OXYCODONE HYDROCHLORIDE 5 MG: 5 TABLET ORAL at 10:33

## 2021-12-31 RX ADMIN — OXYCODONE HYDROCHLORIDE 5 MG: 5 TABLET ORAL at 21:46

## 2021-12-31 RX ADMIN — LATANOPROST 1 DROP: 50 SOLUTION OPHTHALMIC at 21:44

## 2021-12-31 RX ADMIN — ACETAMINOPHEN 1000 MG: 500 TABLET ORAL at 00:35

## 2021-12-31 RX ADMIN — DORZOLAMIDE HYDROCHLORIDE 1 DROP: 20 SOLUTION/ DROPS OPHTHALMIC at 06:17

## 2021-12-31 RX ADMIN — LEVOTHYROXINE SODIUM 75 MCG: 0.07 TABLET ORAL at 06:17

## 2021-12-31 RX ADMIN — DABIGATRAN ETEXILATE MESYLATE 150 MG: 150 CAPSULE ORAL at 21:44

## 2021-12-31 RX ADMIN — PANTOPRAZOLE SODIUM 40 MG: 40 TABLET, DELAYED RELEASE ORAL at 08:33

## 2021-12-31 RX ADMIN — DABIGATRAN ETEXILATE MESYLATE 150 MG: 150 CAPSULE ORAL at 08:35

## 2021-12-31 RX ADMIN — BUDESONIDE 0.5 MG: 0.5 INHALANT RESPIRATORY (INHALATION) at 18:35

## 2021-12-31 RX ADMIN — BUDESONIDE 0.5 MG: 0.5 INHALANT RESPIRATORY (INHALATION) at 06:16

## 2021-12-31 RX ADMIN — DOCUSATE SODIUM AND SENNOSIDES 1 TABLET: 8.6; 5 TABLET, FILM COATED ORAL at 21:43

## 2021-12-31 RX ADMIN — DILTIAZEM HYDROCHLORIDE 240 MG: 240 CAPSULE, COATED, EXTENDED RELEASE ORAL at 08:32

## 2021-12-31 RX ADMIN — DOCUSATE SODIUM AND SENNOSIDES 1 TABLET: 8.6; 5 TABLET, FILM COATED ORAL at 08:32

## 2021-12-31 RX ADMIN — DORZOLAMIDE HYDROCHLORIDE 1 DROP: 20 SOLUTION/ DROPS OPHTHALMIC at 18:39

## 2021-12-31 RX ADMIN — DIGOXIN 250 MCG: 250 TABLET ORAL at 06:17

## 2021-12-31 ASSESSMENT — COGNITIVE AND FUNCTIONAL STATUS - GENERAL
STANDING UP FROM CHAIR USING ARMS: 3 - A LITTLE
WALKING IN HOSPITAL ROOM: 3 - A LITTLE
MOVING TO AND FROM BED TO CHAIR: 3 - A LITTLE
CLIMB 3 TO 5 STEPS WITH RAILING: 2 - A LOT

## 2021-12-31 NOTE — PROGRESS NOTES
Patient: Bee Cobb  Location:  [place] [address]  MRN:  [id number]  Today's date:  12/31/2021   Spoke with RN prior to session and cleared for PT treatment.    Pt seated in bedside chair w/ call bell in reach.  All needs met at this time.  RN notified of end of session and pt performance.      Bee is a 81 y.o. female admitted on 12/29/2021 with Closed fracture of left hip, initial encounter (CMS/[facility]) [S72.002A]. Principal problem is Closed fracture of left hip (CMS/[facility]).    Past Medical History  Bee has a past medical history of Atrial fibrillation (CMS/[facility]), Glaucoma, History of transfusion, Hyperthyroidism, Lipid disorder, Osteoporosis, PAN (polyarteritis nodosa) (CMS/[facility]), and Thalassemia.    History of Present Illness   Fall with L hip fx, s/p L hip hemiarthroplasty (12/29)       PT Vitals    Date/Time Pulse HR Source SpO2 Pt Activity O2 Therapy [facility]   12/31/21 0812 90 Monitor 93 % At rest None (Room air) [facility]   12/31/21 0830 99 -- 88 % recovered to 93% Walking None (Room air) [facility]      PT Pain    Date/Time Pain Type Side/Orientation Location Rating: Rest Rating: Activity Interventions [facility]   12/31/21 0812 Pain Assessment left hip 2 - mild pain 6 - moderate-severe pain position adjusted [facility]          Prior Living Environment      Most Recent Value   Current Living Arrangements --  [2 SH 5 and 4 MADY with rail, FF inside with rail]   Living Environment Comment MS house with spouse, 5+4 MADY and FF to bed and bath w tub shower        Prior Level of Function      Most Recent Value   Dominant Hand left   Ambulation independent   Transferring independent   Toileting independent   Bathing assistive equipment   Dressing independent   Eating independent   Prior Level of Function Comment independent for ADL and functional mobility no AD   Assistive Device Currently Used at Home nebulizer           PT Evaluation and Treatment - 12/31/21 0812        PT Time Calculation    Start Time 0812      Stop Time 0830     Time Calculation (min) 18 min        Session Details    Document Type daily treatment/progress note     Mode of Treatment physical therapy        General Information    Patient Profile Reviewed yes     Patient/Family/Caregiver Comments/Observations agreeable to session     General Observations of Patient Pt receive awake in bed     Existing Precautions/Restrictions fall;hip;weight bearing   anterior hip prec    Limitations/Impairments safety/cognitive        Weight-bearing Status    Left LE Weight-Bearing Status weight-bearing as tolerated (WBAT)        Bed Mobility    [place], Supine to Sit moderate assist (50-74% patient effort);1 person assist;verbal cues     Verbal Cues (Supine to Sit) hand placement;preparatory posture;technique     Assistive Device bed rails;draw sheet;head of bed elevated     Comment (Bed Mobility) RLE assisted throughout movement.        Sit to Stand Transfer    [place], Sit to Stand Transfer minimum assist (75% or more patient effort);1 person assist;verbal cues     Verbal Cues hand placement;preparatory posture;technique     Assistive Device walker, front-wheeled     Comment from edge of bed.        Stand to Sit Transfer    [place], Stand to Sit Transfer minimum assist (75% or more patient effort);1 person assist;verbal cues     Verbal Cues hand placement;safety;technique     Assistive Device walker, front-wheeled        Gait Training    [place], Gait minimum assist (75% or more patient effort);1 person assist;verbal cues     Assistive Device walker, front-wheeled     Distance in Feet 5 feet     Pattern (Gait) step-to     Deviations/Abnormal Patterns (Gait) antalgic;base of support, narrow;digna decreased;festinating/shuffling;gait speed decreased;step length decreased     Maintains Weight-bearing Status (Gait) able to maintain     Comment (Gait/Stairs) very slow gait, shortened step length and foot clearance due to pain.        Stairs Training     [place], Stairs not tested        Balance    Static Sitting Balance WFL     Static Standing Balance mild impairment     Dynamic Standing Balance mild impairment        AM-PAC (TM) - Mobility (Current Function)    Turning from your back to your side while in a flat bed without using bedrails? 2 - A Lot     Moving from lying on your back to sitting on the side of a flat bed without using bedrails? 2 - A Lot     Moving to and from a bed to a chair? 3 - A Little     Standing up from a chair using your arms? 3 - A Little     To walk in a hospital room? 3 - A Little     Climbing 3-5 steps with a railing? 2 - A Lot     AM-PAC (TM) Mobility Score 15        Assessment/Plan (PT)    Daily Outcome Statement Pt continues to require A for all transfers and mobility attempts.  Ambulated short distance from bed to chair w/ RW and min A.  Rec remains acute rehab.     Rehab Potential good, to achieve stated therapy goals     Therapy Frequency daily               PT Assessment/Plan      Most Recent Value   PT Recommended Discharge Disposition acute rehab/Inpatient Rehab Facility at 12/31/2021 0812   Anticipated Equipment Needs at Discharge (PT) none at 12/31/2021 0812                    Education Documentation  Rehabilitation Therapy, taught by Avel Perry PTA at 12/31/2021  8:36 AM.  Learner: Patient  Readiness: Acceptance  Method: Explanation  Response: Needs Reinforcement  Comment: safe transfers and mob w/ RW.          PT Goals      Most Recent Value   Bed Mobility Goal 1    Activity/Assistive Device bed mobility activities, all at 12/30/2021 1300   [place] supervision required at 12/30/2021 1300   Time Frame 2-3 days at 12/30/2021 1300   Progress/Outcome goal ongoing at 12/30/2021 1300   Transfer Goal 1    Activity/Assistive Device all transfers at 12/30/2021 1300   [place] modified independence at 12/30/2021 1300   Time Frame 2-3 days at 12/30/2021 1300   Progress/Outcome goal ongoing at 12/30/2021  1300   Gait Training Goal 1    Activity/Assistive Device gait (walking locomotion) at 12/30/2021 1300   [place] supervision required at 12/30/2021 1300   Distance 25 at 12/30/2021 1300   Time Frame 2-3 days at 12/30/2021 1300   Progress/Outcome goal ongoing at 12/30/2021 1300   Stairs Goal 1    Activity/Assistive Device stairs, all skills at 12/30/2021 1300   [place] minimum assist (75% or more patient effort) at 12/30/2021 1300   Number of Stairs 13 at 12/30/2021 1300   Time Frame 2-3 days at 12/30/2021 1300   Progress/Outcome goal ongoing at 12/30/2021 1300

## 2021-12-31 NOTE — PROGRESS NOTES
Hospital Medicine Service -  Daily Progress Note       SUBJECTIVE   Interval History: Frustrated by difficulty working with PT today. Says pain is controlled at rest, but severe with movement. She is eating well, had a BM yesterday.      OBJECTIVE      Vital signs in last 24 hours:  Temp:  [36.4 °C (97.5 °F)-36.6 °C (97.9 °F)] 36.6 °C (97.9 °F)  Heart Rate:  [82-99] 99  Resp:  [16-20] 20  BP: (134-147)/(60-65) 138/60    Intake/Output Summary (Last 24 hours) at 12/31/2021 1233  Last data filed at 12/30/2021 1721  Gross per 24 hour   Intake 240 ml   Output --   Net 240 ml       PHYSICAL EXAMINATION      Physical Exam  Vitals reviewed.   Constitutional:       General: She is not in acute distress.     Appearance: She is well-developed.   HENT:      Head: Normocephalic and atraumatic.   Eyes:      Conjunctiva/sclera: Conjunctivae normal.      Pupils: Pupils are equal, round, and reactive to light.   Cardiovascular:      Rate and Rhythm: Normal rate and regular rhythm.      Heart sounds: Normal heart sounds.   Pulmonary:      Effort: Pulmonary effort is normal.      Breath sounds: Normal breath sounds.   Abdominal:      General: Bowel sounds are normal.      Palpations: Abdomen is soft.      Tenderness: There is no abdominal tenderness.   Musculoskeletal:      Cervical back: Normal range of motion and neck supple.      Comments: Limited at left hip, bandage intact   Skin:     General: Skin is warm.   Neurological:      Mental Status: She is alert and oriented to person, place, and time.   Psychiatric:         Behavior: Behavior normal.            LINES, CATHETERS, DRAINS, AIRWAYS, AND WOUNDS   Lines, Drains, and Airways:  Wounds (agree with documentation and present on admission):  Peripheral IV (Adult) 12/29/21 Left;Posterior Hand (Active)   Number of days: 2       Peripheral IV (Adult) 12/29/21 Right Wrist (Active)   Number of days: 2       Surgical Incision Hip Left (Active)   Number of days: 2         Comments:       LABS / IMAGING / TELE      Labs  I have reviewed the patient's labs to the time of note. No new clinical concern.    SARS-CoV-2 (COVID-19) (no units)   Date/Time Value   12/29/2021 0110 Negative       Imaging  I have independently reviewed the pertinent imaging from the last 24 hrs.        ASSESSMENT AND PLAN      S/P AVR (aortic valve replacement)  Assessment & Plan  Recent TTE showed normal aortic valve function  Cardiology consult appreciated  Continue pradaxa    Postoperative anemia  Assessment & Plan  Hgb 12 -->9.2 post op  Per ortho this is expected acute blood loss anemia  Is stable today      Paroxysmal atrial fibrillation (CMS/HCC)  Assessment & Plan  -Currently in sinus rhythm.  -Continue home diltiazem and digoxin.  -pradaxa for ac    Mycobacterium avium-intracellulare complex (CMS/[facility])  Assessment & Plan  patient states she is currently not on triple therapy for MAC infection (rifampin 300 mg, azithromycin 250 mg or ethambutol 400 mg), verified with Pulmonology notes from December 2021    Hypothyroidism  Assessment & Plan  Continue Synthroid    Hypertension  Assessment & Plan  Resume home antihypertensives    Glaucoma  Assessment & Plan  Continue home eyedrops    COPD (chronic obstructive pulmonary disease) (CMS/HCC)  Assessment & Plan  Chronic  Does not appear to have acute flare  Home INH/nebs as needed    Chronic diastolic CHF (congestive heart failure) (CMS/[facility])  Assessment & Plan  Chronic  Appears well compensated  We will resume home regimen      * Closed fracture of left hip (CMS/HCC)  Assessment & Plan  81-year-old female presents with left hip pain. She had a mechanical fall after falling out of bed and had severe pain and difficulty ambulating after.  CT showing left hip fracture  Ortho consult appreciated  Cardiology consult appreciated for perioperative risk stratification  S/p L hip hemiarthroplasty 12/29 with arnaud [place] to resume Pradaxa  as well as ASA 325mg QD for  6 weeks  WBAT to LLE  Anterior hip precautions for 6 weeks  Per ortho; she had an expectant drop in Hgb post-operatively due to Pradaxa use. No additional studies warranted at this time. OR dressing saturation and bleeding expected  Continue multimodal analgesia with scheduled tylenol, lido patch, PRN oxy 2.5-5mg, ice therapy  Ensure bowel regimen and encourage pulmonary toilet, had BM yesterday  PT/OT eval rec acute rehab, medically stable for d/c when placement is found       VTE Assessment: [place] VTE Score: 9  VTE Prophylaxis:  Current anticoagulants:  dabigatran etexilate (PRADAXA) capsule 150 mg, oral, BID      Code Status: Full Code  Palliative Care Screening Score: 4   Estimated Discharge Date: 12/31/2021       Disposition Planning: stable for rehab when placement is found     Karley Peguero MD  12/31/2021

## 2021-12-31 NOTE — PLAN OF CARE
Problem: Adult Inpatient Plan of Care  Goal: Plan of Care Review  Outcome: Progressing  Flowsheets (Taken 12/31/2021 1328)  Progress: improving  Plan of Care Reviewed With:   patient   spouse     Per info in medical rounds, pt is stable for d/c pending placement. DANIKA s/w Jojo at [facility] who states they can accept pt for admission tomorrow. DANIKA arranged BLS transport for 12pm tomorrow to [facility]. DANIKA s/w pt for updates on dispo. SW reviewed patient's medicare right to appeal. They are in agreement with dispo plan. Verbal consent for [facility] letter provided by patient.

## 2021-12-31 NOTE — PROGRESS NOTES
Orthopedic Surgery Progress Note    Subjective     No acute events overnight. Pt seen and examined at bedside, resting comfortably. Pain well controlled to L hip. Was OOB with PT. Denies numbness or tingling down LLE.      Objective       Vitals:    12/31/21 0830   BP:    Pulse: 99   Resp:    Temp:    SpO2: (!) 88%       Lab Results   Component Value Date    WBC 23.35 (H) 12/30/2021    HGB 8.8 (L) 12/30/2021    HCT 27.5 (L) 12/30/2021     12/30/2021    ALT 21 12/29/2021    AST 29 12/29/2021     (L) 12/30/2021    K 4.5 12/30/2021     12/30/2021    CREATININE 0.6 12/30/2021    BUN 8 12/30/2021    CO2 20 (L) 12/30/2021    INR 1.2 12/29/2021        Imaging    Post-operative x-rays of the pelvis demonstrate stable left hemiarthroplasty in good alignment.    Physical Exam:    General: VSS, NAD  Neuro: awake, alert and oriented x 3  Respiratory: non-labored breathing, good respiratory effort  Extremities:    LLE:   Dressing CDI  Appropriate TTP gilbert-incisionally  No gross deformities  Compartments soft and compressible  Sensation intact sural/saphenous/DP/SP/tibial  + motor EHL/FHL/TA/Gastroc/Peroneals  + dorsalis pedis pulse, toes WWP with BCR.          Assessment     81 y.o. female POD #2 s/p L hip hemiarthroplasty on 12/29 by Dr. Pena for L displaced femoral neck fracture after fall from standing height     Plan     Antibiotics - perioperative ancef complete  DVT prophylaxis per primary team - Pradaxa, ASA 325mg QD vs BID per medical recommendation for 6 weeks  WBAT to LLE  Anterior hip precautions for 6 weeks  Expectant drop in Hgb post-operatively due to Pradaxa use. Please do not order any additional imaging studies if Hgb drops  Dressing changed and reinforced 12/30 AM by ortho, please reinforce as needed per nursing. Saturation and bleeding expected  Transfusion as needed per medical team  PT/OT for mobility  Continue multimodal pain control  Dispo pending  Remainder of care per primary  team    Jonathan Fuentes, DO  Orthopedic Surgery, PGY-2  Pager: [phone]

## 2021-12-31 NOTE — PATIENT CARE CONFERENCE
Care Progression Rounds Note  Date: 12/31/2021  Time: 2:58 PM     Patient Name: Bee Cobb     Medical Record Number: [id number]   YOB: 1940  Sex: Female      Room/Bed: 0182    Admitting Diagnosis: Closed fracture of left hip, initial encounter (CMS/[facility]) [S72.002A]   Admit Date/Time: 12/29/2021 12:14 AM    Primary Diagnosis: Closed fracture of left hip (CMS/[facility])  Principal Problem: Closed fracture of left hip (CMS/[facility])    GMLOS: 4.1  Anticipated Discharge Date: 1/1/2022    AM-PAC:  Mobility Score: 15    Discharge Planning:  Current Living Arrangements:  (2 SH 5 and 4 MADY with rail, FF inside with rail)  Concerns to be Addressed: discharge planning  Anticipated Discharge Disposition: acute rehab/Inpatient Rehab Facility    Barriers to Discharge:  Facility availability    Comments:       Participants:  ,physical therapy,physician,nursing,social work/services

## 2021-12-31 NOTE — HOSPITAL COURSE
81 y.o. female with a past medical history of CARRINGTON (reports off meds), glaucoma, HTN, HLD, osteoporosis, PAN, alpha thalassemia trait, PAF, hypothyroidism, chronic diastolic heart failure who presents with left hip pain after falling out of bed. She had an immediate onset of left hip pain and inability to ambulate.  No LOC.  Radiographic evaluation showed a displaced transcervical femoral neck fracture by CT.  On 12-29-21 she underwent left hip hemiarthroplasty.  Pt is WBAT to LLE and anterior hip precautions x 6 weeks. Ortho recommends aspirin 325mg/day x 6 weeks in addition to pt's home pradaxa which was restarted postoperatively. Pt did well postoperatively, but was noted to have postop anemia with a hgb drop of 12-->9. Per [place], this is expected due to Pradaxa use.  Pain is being managed with multimodal analgesia; scheduled tylenol, lido patch, PRN oxycodone, ice therapy.  PT/OT/PM&R evaluated pt and recommend acute rehab at discharge.     12/30 PM&R ASSESSMENT/PLAN:     Left hip fracture, POD #1 status post Hemiarthroplasty.  Pain controlled at rest     AVR, preserved left ventricular systolic function, with diastolic dysfunction by TTE..  Clinically compensated     Degenerative joint disease, clinically advanced right knee, with effusion warmth and tenderness that she describes as chronic.  Historically ascended the stairs step to leading with the left.  No radiographic studies available, recommend right knee x-ray.     Mobility deficit, acute secondary to postoperative left Hemiarthroplasty, advanced right knee DJD may be a limiting factor with respect to weightbearing tolerance.     Rehabilitation/disposition, recommend acute rehabilitation for individualized physical therapy, Occupational Therapy, nursing care and education in light of comorbidities including advanced right knee DJD with effusion and status post AVR with diastolic dysfunction, requiring daily physician oversight.

## 2021-12-31 NOTE — BMR PREADMISSION NOTE
[facility]  P[facility] Assessment    Patient Name:  Bee Cobb  YOB: 1940    Referral Date:  12/31/21  Evaluation Date:  12/31/21  Referring Facility Admission Date: 12/29/21  Referring Facility: [facility]   Referring Provider: Karley Peguero MDKazanjian, Jack (surgeon)    Reason for Referral: Bee Cobb is a 81 y.o. female whose primary indication for inpatient rehabilitation is Ortho.     Pertinent History of Current Functional Problem:  81 y.o. female with a past medical history of CARRINGTON (reports off meds), glaucoma, HTN, HLD, osteoporosis, PAN, alpha thalassemia trait, PAF, hypothyroidism, chronic diastolic heart failure who presents with left hip pain after falling out of bed. She had an immediate onset of left hip pain and inability to ambulate.  No LOC.  Radiographic evaluation showed a displaced transcervical femoral neck fracture by CT.  On 12-29-21 she underwent left hip hemiarthroplasty.  Pt is WBAT to LLE and anterior hip precautions x 6 weeks. Ortho recommends aspirin 325mg/day x 6 weeks in addition to pt's home pradaxa which was restarted postoperatively. Pt did well postoperatively, but was noted to have postop anemia with a hgb drop of 12-->9. Per [place], this is expected due to Pradaxa use.  Pain is being managed with multimodal analgesia; scheduled tylenol, lido patch, PRN oxycodone, ice therapy.  PT/OT/PM&R evaluated pt and recommend acute rehab at discharge.     12/30 PM&R ASSESSMENT/PLAN:     Left hip fracture, POD #1 status post Hemiarthroplasty.  Pain controlled at rest     AVR, preserved left ventricular systolic function, with diastolic dysfunction by TTE..  Clinically compensated     Degenerative joint disease, clinically advanced right knee, with effusion warmth and tenderness that she describes as chronic.  Historically ascended the stairs step to leading with the left.  No radiographic studies available, recommend right knee x-ray.      Mobility deficit, acute secondary to postoperative left Hemiarthroplasty, advanced right knee DJD may be a limiting factor with respect to weightbearing tolerance.     Rehabilitation/disposition, recommend acute rehabilitation for individualized physical therapy, Occupational Therapy, nursing care and education in light of comorbidities including advanced right knee DJD with effusion and status post AVR with diastolic dysfunction, requiring daily physician oversight.      Active Medical Conditions:  Patient Active Problem List   Diagnosis   • Chronic diastolic CHF (congestive heart failure) (CMS/[facility])   • COPD (chronic obstructive pulmonary disease) (CMS/[facility])   • Glaucoma   • Hypertension   • Hyperlipidemia   • Hypothyroidism   • Malignant melanoma (CMS/[facility])   • Mycobacterium avium-intracellulare complex (CMS/[facility])   • Paroxysmal atrial fibrillation (CMS/[facility])   • Alpha trait thalassemia   • Closed fracture of left hip (CMS/[facility])   • Postoperative anemia   • S/P AVR (aortic valve replacement)       Active Medications:  Facility-Administered Medications Ordered in Other Visits   Medication Dose Route Frequency Provider Last Rate Last Admin   • acetaminophen (TYLENOL) tablet 1,000 mg  1,000 mg oral q8h INT Jeevan Menendez II, DO   1,000 mg at 12/31/21 0834   • albuterol nebulizer solution 2.5 mg  2.5 mg nebulization q6h PRN Jeevan Menendez II, DO       • aspirin EC tablet 325 mg  325 mg oral Daily Jeevan Menendez II, DO   325 mg at 12/30/21 0826   • budesonide (PULMICORT) 0.5 mg/2 mL nebulizer solution 0.5 mg  0.5 mg nebulization BID (6a, 6p) Jeevan Menendez II, DO   0.5 mg at 12/31/21 0616   • cholecalciferol (vitamin D3) tablet 1,000 Units  1,000 Units oral Daily Jeevan Menendez II, DO   1,000 Units at 12/31/21 0833   • dabigatran etexilate (PRADAXA) capsule 150 mg  150 mg oral BID Jeevan Menendez II, DO   150 mg at 12/31/21 0835   • glucose chewable tablet 16-32 g of dextrose  16-32 g of dextrose  oral PRN Jeevan Menendez II, DO        Or   • dextrose 40 % oral gel 15-30 g of dextrose  15-30 g of dextrose oral PRN Jeevan Menendez II, DO        Or   • glucagon (GLUCAGEN) injection 1 mg  1 mg intramuscular PRN Jeevan Menendez II, DO        Or   • dextrose in water injection 12.5 g  25 mL intravenous PRN Jeevan Menendez II, DO       • digoxin (LANOXIN) tablet 250 mcg  250 mcg oral Daily (6a) Jeevan Menendez II, DO   250 mcg at 12/31/21 0617   • dilTIAZem CD (CARDIZEM CD) 24 hr ER capsule 240 mg  240 mg oral Daily Jeevan Menendez II, DO   240 mg at 12/31/21 0832   • dorzolamide (TRUSOPT) 2 % ophthalmic solution 1 drop  1 drop Both Eyes BID (6a, 6p) Jeevan Menendez II, DO   1 drop at 12/31/21 0617   • latanoprost (XALATAN) 0.005 % ophthalmic solution 1 drop  1 drop Both Eyes Nightly Jeevan Menendez II, DO   1 drop at 12/30/21 2129   • levothyroxine (SYNTHROID) tablet 75 mcg  75 mcg oral Daily (6a) Jeevan Menendez II, DO   75 mcg at 12/31/21 0617   • oxyCODONE (ROXICODONE) immediate release tablet 2.5 mg  2.5 mg oral q6h PRN Jeevan Menendez II, DO       • oxyCODONE (ROXICODONE) immediate release tablet 5 mg  5 mg oral q6h PRN Jeevan Menendez II, DO   5 mg at 12/31/21 1033   • pantoprazole (PROTONIX) tablet,delayed release (DR/EC) 40 mg  40 mg oral Daily Rick Cabrera, DO   40 mg at 12/31/21 0833   • sennosides-docusate sodium (SENOKOT-S) 8.6-50 mg per tablet 1 tablet  1 tablet oral BID Jeevan Menendez II, DO   1 tablet at 12/31/21 0832   No medication comments found.    HISTORY:    Past Medical History:   Diagnosis Date   • Atrial fibrillation (CMS/HCC)    • Glaucoma    • History of transfusion    • Hyperthyroidism    • Lipid disorder    • Osteoporosis    • PAN (polyarteritis nodosa) (CMS/HCC)    • Thalassemia      Past Surgical History:   Procedure Laterality Date   • CARDIAC SURGERY     • TONSILLECTOMY       Tobacco Use as of 12/31/2021     Smoking Status Smoking Start Date  Smoking Quit Date Packs/Day Years Used    Never Smoker -- -- -- --    Types Comments Smokeless Tobacco Status Smokeless Tobacco Quit Date Source    -- -- Never Used -- Provider            Alcohol Use as of 12/31/2021     Alcohol Use Drinks/Week Alcohol/Week Comments Source    Never   -- -- Provider            Drug Use as of 12/31/2021     Drug Use Types Frequency Comments Source    Never -- -- -- Provider            Sexual Activity as of 12/31/2021     Sexually Active Birth Control Partners Comments Source    -- -- -- -- Provider            Socioeconomic as of 12/31/2021     Marital Status Spouse Name Number of Children Years Education Education Level Preferred Language Ethnicity Race Source     -- -- -- -- English Not , /a, or Greek origin White --        Allergies  Allergies   Allergen Reactions   • Atorvastatin      Other reaction(s): Muscle Pain  Other reaction(s): Muscle Pain     • Penicillins Hives     Other reaction(s): Hives   • Shellfish Derived Anaphylaxis   • Iodinated Contrast Media      vomiting   • Rosuvastatin      Other reaction(s): Other (See Comments)  Leg heaviness   • Covid-19 Vaccine, Mrna, [id number], Lnp-S (Moderna) Rash     largeLocal reaction around site, itchy red,warm for both vaccines  largeLocal reaction around site, itchy red,warm for both vaccines     • Covid-19 Vaccine, [id number], Lnp-S (Moderna) Rash       Premorbid Functional Status:   Dominant Hand: left  Ambulation: independent  Transferring: independent  Toileting: independent  Bathing: assistive equipment  Dressing: independent  Eating: independent  Communication: understands/communicates without difficulty  Swallowing: swallows foods/liquids without difficulty  Baseline Diet/Method of Nutritional Intake: no diet restrictions  Past History of Dysphagia: No hx of dysphagia  Assistive Device/Animal Currently Used at Home: shower chair  Prior Level of Function Comment: independent for ADL and functional  mobility no AD    Living Environment:  People in Home: spouse  Name(s) of People in Home: Ok  Current Living Arrangements: home  Living Environment Comment: [place] house with spouse, 5+4 MADY and FF to bed and bath w tub shower    TEST RESULTS:  Chemistry (Up to last 3 results from the past 720 hours)      12/29 0611 12/30 0736 12/31 0909    Sodium       135            132            134         Potassium       3.6  Comment: Results obtained on plasma. Plasma Potassium values may be up to 0.4 mEQ/L less than serum values. The differences may be greater for patients with high platelet or white cell counts.            4.5  Comment: SLIGHT HEMOLYSIS, RESULT MAY BE INCREASED.            4.2         BUN       5            8            13         Creatinine       0.5            0.6            0.6         Glucose       161            149            125         CO2       23            20            23         Chloride       98            101            103           Hepatic (Up to last 3 results from the past 720 hours)      12/29 0109 12/29 0611    ALT (SGPT)       19            21         AST (SGOT)       31            29         Alkaline Phosphatase       113            123         Bilirubin, Total       0.8            0.8           Metabolic (Up to last 3 results from the past 720 hours)    None      Hematologic (Up to last 3 results from the past 720 hours)      12/30 0736 12/30 1456 12/31 0909    WBC       19.45            23.35            17.77         Hemoglobin       9.2            8.8            8.6         Hematocrit       29.3            27.5            27.3         Platelets       217  Comment: PLT CLUMPING SUSPECTED. PLT COUNT MAY BE SLIGHTLY HIGHER THAN REPORTED. IF CLINICALLY WARRANTED, SUGGEST COLLECTING SODIUM CITRATE TUBE (BLUE TOP) IN ADDITION TO EDTA TUBE FOR FOLLOW UP CBC TESTING.            307            322         Protime       --            --            --         INR       --            --             --           12/29 0611        WBC       --           Hemoglobin       --           Hematocrit       --           Platelets       --           Protime       14.9           INR       1.2  Comment: Moderate Intensity Anticoagulation = 2.0 to 3.0, High Intensity = 2.5 to 3.5             Other (Up to last 3 results from the past 720 hours)      12/29 0611    INR       1.2  Comment: Moderate Intensity Anticoagulation = 2.0 to 3.0, High Intensity = 2.5 to 3.5                  ASSESSMENT:  Vitals:  Temp:  [36.4 °C (97.5 °F)-36.6 °C (97.9 °F)] 36.6 °C (97.9 °F)  Heart Rate:  [82-99] 99  Resp:  [16-20] 20  BP: (134-147)/(60-65) 138/60    Lines/Drains/Airways:       Risk for Clinical Complications:  Falls: High    Precautions:  Existing Precautions/Restrictions: fall; hip; weight bearing  Left LE Weight-Bearing Status: weight-bearing as tolerated (WBAT)      Current Diet:   Diet: thin liquids,regular solids    Current Functional Status:   Preadmission Current Function     Row Name 12/30/21 0835       Cognition/Psychosocial    Affect/Mental Status (Cognition) WFL;flat/blunted affect    Orientation Status (Cognition) oriented x 3    Follows Commands (Cognition) follows multi-step commands    Cognitive Function executive function deficit    Executive Function Deficit (Cognition) information processing    Comment, Cognition Pt is able to make  needs known, flat and benefits from increased processing time       Basic Activities of Daily Living (BADLs)    Energy Conservation Techniques activity pacing encouraged;correct posture facilitated;equipment and device use facilitated       Lower Body Dressing    Comment seated in recliner, OT educated on adapted techniques and AE for increased independence, is receptive though will benefit from followup edu       Toileting    [place] perform bowel hygiene;dependent (less than 25% patient effort)    Position supported standing    Comment Pt is able to tolerated supported standing  with Ax1 for total A BM hygiene. also with purewick prior to session       Self-Feeding    [place] independent    Position supported sitting    Comment sitting up in recliner chair       Bed Mobility    [place], Roll Left moderate assist (50-74% patient effort)    [place], Supine to Sit moderate assist (50-74% patient effort)    Assistive Device bed rails;draw sheet;head of bed elevated    Comment (Bed Mobility) ModA for supine, sit with increased pain       Bed to Chair Transfer    [place], Bed to Chair minimum assist (75% or more patient effort);1 person assist    Verbal Cues hand placement;proper use of assistive device;safety;technique    Assistive Device walker, front-wheeled       Sit to Stand Transfer    [place], Sit to Stand Transfer minimum assist (75% or more patient effort);2 person assist    Verbal Cues hand placement;maintaining precautions;proper use of assistive device;safety;technique    Assistive Device other (see comments)  HHA x2 form EOB then RW from commode    Comment MinAx2 progressed to MinAx1 from commode       Stand to Sit Transfer    [place], Stand to Sit Transfer minimum assist (75% or more patient effort);2 person assist    Verbal Cues hand placement;preparatory posture;proper use of assistive device;safety;technique    Assistive Device walker, front-wheeled    Comment effortful, MinAx2 to commode then MinAx1 to chair       Toilet Transfer    Transfer Technique sit-stand;stand-sit    [place], Toilet Transfer minimum assist (75% or more patient effort);2 person assist    Verbal Cues hand placement;maintaining precautions;safety;preparatory posture;proper use of assistive device;technique    Assistive Device commode, 3-in-1;walker, front-wheeled       Safety Issues, Functional Mobility    Safety Issues Affecting Function (Mobility) sequencing abilities    Impairments Affecting Function (Mobility) balance;endurance/activity tolerance;pain;postural/trunk  control;strength       Balance    Static Sitting Balance WFL;supported    Dynamic Sitting Balance WFL;supported    Static Standing Balance mild impairment;supported    Dynamic Standing Balance moderate impairment;supported    Balance Test Results Functional Reach Test    Balance Interventions occupation based/functional task;sitting    Comment, Balance seated on commode with S, MinAx1-2 for transfers and mobility with RW t/o for balance    Row Name 12/30/21 1300       Bed Mobility    [place], Supine to Sit moderate assist (50-74% patient effort)       Bed to Chair Transfer    [place], Bed to Chair minimum assist (75% or more patient effort);2 person assist       Chair to Bed Transfer    [place], Chair to Bed not tested       Sit to Stand Transfer    [place], Sit to Stand Transfer minimum assist (75% or more patient effort);2 person assist    Verbal Cues hand placement       Stand to Sit Transfer    [place], Stand to Sit Transfer minimum assist (75% or more patient effort);2 person assist       Gait Training    [place], Gait minimum assist (75% or more patient effort)    Assistive Device walker, front-wheeled    Distance in Feet 5 feet    Pattern (Gait) step-to    Deviations/Abnormal Patterns (Gait) digna decreased;gait speed decreased;antalgic       Stairs Training    [place], Stairs not tested       Balance    Static Sitting Balance WFL    Dynamic Sitting Balance WFL    Static Standing Balance mild impairment    Dynamic Standing Balance mild impairment    Row Name 12/31/21 0812       Bed Mobility    [place], Supine to Sit moderate assist (50-74% patient effort);1 person assist;verbal cues    Verbal Cues (Supine to Sit) hand placement;preparatory posture;technique    Assistive Device bed rails;draw sheet;head of bed elevated    Comment (Bed Mobility) RLE assisted throughout movement.       Sit to Stand Transfer    [place], Sit to Stand Transfer minimum assist (75% or  more patient effort);1 person assist;verbal cues    Verbal Cues hand placement;preparatory posture;technique    Assistive Device walker, front-wheeled    Comment from edge of bed.       Stand to Sit Transfer    [place], Stand to Sit Transfer minimum assist (75% or more patient effort);1 person assist;verbal cues    Verbal Cues hand placement;safety;technique    Assistive Device walker, front-wheeled       Gait Training    [place], Gait minimum assist (75% or more patient effort);1 person assist;verbal cues    Assistive Device walker, front-wheeled    Distance in Feet 5 feet    Pattern (Gait) step-to    Deviations/Abnormal Patterns (Gait) antalgic;base of support, narrow;digna decreased;festinating/shuffling;gait speed decreased;step length decreased    Maintains Weight-bearing Status (Gait) able to maintain    Comment (Gait/Stairs) very slow gait, shortened step length and foot clearance due to pain.       Stairs Training    [place], Stairs not tested       Balance    Static Sitting Balance WFL    Static Standing Balance mild impairment    Dynamic Standing Balance mild impairment                Support System:  Designated Primary Caregiver: Ok Cobb - Spouse      Patient/Family Goals:  Patient's Goals For Discharge: return home      Educational Background:      RECOMMENDATIONS / PLAN:  Special Needs:  Is an  Needed/Used?: N  DNR is current code status at referring facility?: No      Plan:  Identified Referral Needs: physical therapy,occupational therapy,medical consultative services  OT Frequency: 5-7 times per week  OT Intensity: 1.5 hours  PT Frequency: 5-7 times per week  PT Intensity: 1.5 hours    Therapy Intensity: Requires, can tolerate and will benefit from 3 hours of therapy at least 5 days per week  Projected Length of Stay (days): 5 days  Patient is willing to participate in rehab program: yes    Impairments to be addressed: mobility,motor  dysfunction,safety,self-care  Medical Necessity Admission Criteria: other active medical conditions (see comments),abnormal labs,uncontrolled pain,orthostasis/unstable blood pressure,severe anemia (Hip fx)    Expected Level of Function at Discharge:  Expected Functional Improvement: mobility; motor dysfunction; safety; self-care  Self-Care: Independent  Sphincter Control: Independent  Transfers: Independent  Locomotion: Independent  Communication: Independent  Social Cognition: Independent      Post-Discharge Needs:  Anticipated Discharge Disposition: home with home health  Type of Home Care Services: nursing,home PT,home OT

## 2022-01-01 ENCOUNTER — HOSPITAL ENCOUNTER (INPATIENT)
Facility: REHABILITATION | Age: 82
LOS: 15 days | Discharge: HOME HEALTH | DRG: 560 | End: 2022-01-16
Attending: PHYSICAL MEDICINE & REHABILITATION | Admitting: PHYSICAL MEDICINE & REHABILITATION
Payer: MEDICARE

## 2022-01-01 VITALS
DIASTOLIC BLOOD PRESSURE: 63 MMHG | TEMPERATURE: 98.1 F | HEIGHT: 66 IN | WEIGHT: 127.2 LBS | OXYGEN SATURATION: 95 % | RESPIRATION RATE: 16 BRPM | SYSTOLIC BLOOD PRESSURE: 151 MMHG | HEART RATE: 105 BPM | BODY MASS INDEX: 20.44 KG/M2

## 2022-01-01 DIAGNOSIS — I48.0 PAF (PAROXYSMAL ATRIAL FIBRILLATION) (CMS/HCC): Primary | ICD-10-CM

## 2022-01-01 DIAGNOSIS — F43.22 ADJUSTMENT DISORDER WITH ANXIETY: ICD-10-CM

## 2022-01-01 PROBLEM — S72.002A HIP FRACTURE REQUIRING OPERATIVE REPAIR, LEFT, CLOSED, INITIAL ENCOUNTER (CMS/HCC): Status: ACTIVE | Noted: 2022-01-01

## 2022-01-01 PROCEDURE — 25000000 HC PHARMACY GENERAL: Performed by: PHYSICAL MEDICINE & REHABILITATION

## 2022-01-01 PROCEDURE — 63700000 HC SELF-ADMINISTRABLE DRUG: Performed by: INTERNAL MEDICINE

## 2022-01-01 PROCEDURE — 63700000 HC SELF-ADMINISTRABLE DRUG: Performed by: STUDENT IN AN ORGANIZED HEALTH CARE EDUCATION/TRAINING PROGRAM

## 2022-01-01 PROCEDURE — 99238 HOSP IP/OBS DSCHRG MGMT 30/<: CPT | Performed by: HOSPITALIST

## 2022-01-01 PROCEDURE — 25000000 HC PHARMACY GENERAL: Performed by: STUDENT IN AN ORGANIZED HEALTH CARE EDUCATION/TRAINING PROGRAM

## 2022-01-01 PROCEDURE — 12800000 HC ROOM AND CARE SEMIPRIVATE REHAB

## 2022-01-01 PROCEDURE — 97535 SELF CARE MNGMENT TRAINING: CPT | Mod: GO

## 2022-01-01 PROCEDURE — 63700000 HC SELF-ADMINISTRABLE DRUG: Performed by: PHYSICAL MEDICINE & REHABILITATION

## 2022-01-01 RX ORDER — CHOLECALCIFEROL (VITAMIN D3) 25 MCG
1000 TABLET ORAL DAILY
Status: DISCONTINUED | OUTPATIENT
Start: 2022-01-02 | End: 2022-01-16 | Stop reason: HOSPADM

## 2022-01-01 RX ORDER — RIFAMPIN 300 MG/1
300 CAPSULE ORAL DAILY
COMMUNITY
End: 2022-01-16 | Stop reason: HOSPADM

## 2022-01-01 RX ORDER — CHOLECALCIFEROL (VITAMIN D3) 25 MCG
1000 TABLET ORAL DAILY
Status: DISCONTINUED | OUTPATIENT
Start: 2022-01-01 | End: 2022-01-01

## 2022-01-01 RX ORDER — ALBUTEROL SULFATE 90 UG/1
2 INHALANT RESPIRATORY (INHALATION) EVERY 4 HOURS PRN
Status: DISCONTINUED | OUTPATIENT
Start: 2022-01-01 | End: 2022-01-16 | Stop reason: HOSPADM

## 2022-01-01 RX ORDER — ACETAMINOPHEN 500 MG
1000 TABLET ORAL EVERY 8 HOURS
Status: DISCONTINUED | OUTPATIENT
Start: 2022-01-01 | End: 2022-01-16 | Stop reason: HOSPADM

## 2022-01-01 RX ORDER — LEVOTHYROXINE SODIUM 75 UG/1
75 TABLET ORAL
Status: DISCONTINUED | OUTPATIENT
Start: 2022-01-02 | End: 2022-01-16 | Stop reason: HOSPADM

## 2022-01-01 RX ORDER — CLINDAMYCIN HYDROCHLORIDE 150 MG/1
300 CAPSULE ORAL DAILY
COMMUNITY
End: 2022-01-16 | Stop reason: HOSPADM

## 2022-01-01 RX ORDER — LATANOPROST 50 UG/ML
1 SOLUTION/ DROPS OPHTHALMIC NIGHTLY
Status: DISCONTINUED | OUTPATIENT
Start: 2022-01-01 | End: 2022-01-16 | Stop reason: HOSPADM

## 2022-01-01 RX ORDER — PANTOPRAZOLE SODIUM 40 MG/1
40 TABLET, DELAYED RELEASE ORAL DAILY
Qty: 30 TABLET | Refills: 0
Start: 2022-01-01 | End: 2022-01-16 | Stop reason: HOSPADM

## 2022-01-01 RX ORDER — DEXTROSE 50 % IN WATER (D50W) INTRAVENOUS SYRINGE
25 AS NEEDED
Status: DISCONTINUED | OUTPATIENT
Start: 2022-01-01 | End: 2022-01-03

## 2022-01-01 RX ORDER — AMOXICILLIN 250 MG
1 CAPSULE ORAL 2 TIMES DAILY
Status: DISCONTINUED | OUTPATIENT
Start: 2022-01-01 | End: 2022-01-16 | Stop reason: HOSPADM

## 2022-01-01 RX ORDER — AZITHROMYCIN 250 MG/1
250 TABLET, FILM COATED ORAL DAILY
COMMUNITY
End: 2022-01-16 | Stop reason: HOSPADM

## 2022-01-01 RX ORDER — SODIUM CHLORIDE FOR INHALATION 3 %
3 VIAL, NEBULIZER (ML) INHALATION EVERY 6 HOURS PRN
Status: DISCONTINUED | OUTPATIENT
Start: 2022-01-01 | End: 2022-01-16 | Stop reason: HOSPADM

## 2022-01-01 RX ORDER — AMOXICILLIN 250 MG
1 CAPSULE ORAL 2 TIMES DAILY
Qty: 60 TABLET | Refills: 0 | Status: ON HOLD
Start: 2022-01-01 | End: 2022-01-14 | Stop reason: SDUPTHER

## 2022-01-01 RX ORDER — DORZOLAMIDE HCL 20 MG/ML
1 SOLUTION/ DROPS OPHTHALMIC
Status: DISCONTINUED | OUTPATIENT
Start: 2022-01-01 | End: 2022-01-16 | Stop reason: HOSPADM

## 2022-01-01 RX ORDER — OXYCODONE HYDROCHLORIDE 5 MG/1
5 TABLET ORAL EVERY 6 HOURS PRN
Status: DISCONTINUED | OUTPATIENT
Start: 2022-01-01 | End: 2022-01-04

## 2022-01-01 RX ORDER — ASPIRIN 325 MG
325 TABLET, DELAYED RELEASE (ENTERIC COATED) ORAL DAILY
Qty: 30 TABLET | Refills: 0
Start: 2022-01-01 | End: 2022-01-16 | Stop reason: HOSPADM

## 2022-01-01 RX ORDER — PANTOPRAZOLE SODIUM 40 MG/1
40 TABLET, DELAYED RELEASE ORAL DAILY
Status: DISCONTINUED | OUTPATIENT
Start: 2022-01-02 | End: 2022-01-01

## 2022-01-01 RX ORDER — BISACODYL 10 MG/1
10 SUPPOSITORY RECTAL DAILY PRN
Status: DISCONTINUED | OUTPATIENT
Start: 2022-01-01 | End: 2022-01-16 | Stop reason: HOSPADM

## 2022-01-01 RX ORDER — BUDESONIDE 0.5 MG/2ML
0.5 INHALANT ORAL
Status: DISCONTINUED | OUTPATIENT
Start: 2022-01-01 | End: 2022-01-16 | Stop reason: HOSPADM

## 2022-01-01 RX ORDER — ASPIRIN 325 MG
325 TABLET, DELAYED RELEASE (ENTERIC COATED) ORAL DAILY
Status: DISCONTINUED | OUTPATIENT
Start: 2022-01-02 | End: 2022-01-01

## 2022-01-01 RX ORDER — DILTIAZEM HYDROCHLORIDE 120 MG/1
240 CAPSULE, COATED, EXTENDED RELEASE ORAL DAILY
Status: DISCONTINUED | OUTPATIENT
Start: 2022-01-02 | End: 2022-01-16 | Stop reason: HOSPADM

## 2022-01-01 RX ORDER — IBUPROFEN 200 MG
16-32 TABLET ORAL AS NEEDED
Status: DISCONTINUED | OUTPATIENT
Start: 2022-01-01 | End: 2022-01-03

## 2022-01-01 RX ORDER — DIGOXIN 250 MCG
250 TABLET ORAL
Status: DISCONTINUED | OUTPATIENT
Start: 2022-01-02 | End: 2022-01-16 | Stop reason: HOSPADM

## 2022-01-01 RX ORDER — ACETAMINOPHEN 500 MG
1000 TABLET ORAL
Qty: 180 TABLET | Refills: 0
Start: 2022-01-01 | End: 2022-01-16 | Stop reason: HOSPADM

## 2022-01-01 RX ORDER — DEXTROSE 40 %
15-30 GEL (GRAM) ORAL AS NEEDED
Status: DISCONTINUED | OUTPATIENT
Start: 2022-01-01 | End: 2022-01-03

## 2022-01-01 RX ORDER — DABIGATRAN ETEXILATE 150 MG/1
150 CAPSULE ORAL 2 TIMES DAILY
Status: DISCONTINUED | OUTPATIENT
Start: 2022-01-01 | End: 2022-01-16 | Stop reason: HOSPADM

## 2022-01-01 RX ADMIN — OXYCODONE HYDROCHLORIDE 5 MG: 5 TABLET ORAL at 11:55

## 2022-01-01 RX ADMIN — ACETAMINOPHEN 1000 MG: 500 TABLET, FILM COATED ORAL at 19:29

## 2022-01-01 RX ADMIN — ACETAMINOPHEN 1000 MG: 500 TABLET ORAL at 08:51

## 2022-01-01 RX ADMIN — SENNOSIDES AND DOCUSATE SODIUM 1 TABLET: 50; 8.6 TABLET ORAL at 22:00

## 2022-01-01 RX ADMIN — DILTIAZEM HYDROCHLORIDE 240 MG: 240 CAPSULE, COATED, EXTENDED RELEASE ORAL at 08:55

## 2022-01-01 RX ADMIN — Medication 1000 UNITS: at 08:56

## 2022-01-01 RX ADMIN — DABIGATRAN ETEXILATE MESYLATE 150 MG: 150 CAPSULE ORAL at 08:56

## 2022-01-01 RX ADMIN — OXYCODONE HYDROCHLORIDE 5 MG: 5 TABLET ORAL at 22:00

## 2022-01-01 RX ADMIN — LATANOPROST 1 DROP: 50 SOLUTION/ DROPS OPHTHALMIC at 22:02

## 2022-01-01 RX ADMIN — DABIGATRAN ETEXILATE MESYLATE 150 MG: 150 CAPSULE ORAL at 22:00

## 2022-01-01 RX ADMIN — DOCUSATE SODIUM AND SENNOSIDES 1 TABLET: 8.6; 5 TABLET, FILM COATED ORAL at 11:50

## 2022-01-01 RX ADMIN — ACETAMINOPHEN 1000 MG: 500 TABLET, FILM COATED ORAL at 22:00

## 2022-01-01 RX ADMIN — DORZOLAMIDE HYDROCHLORIDE 1 DROP: 20 SOLUTION/ DROPS OPHTHALMIC at 05:24

## 2022-01-01 RX ADMIN — DIGOXIN 250 MCG: 250 TABLET ORAL at 05:24

## 2022-01-01 RX ADMIN — BUDESONIDE 0.5 MG: 0.5 INHALANT RESPIRATORY (INHALATION) at 05:23

## 2022-01-01 RX ADMIN — BUDESONIDE 0.5 MG: 0.5 SUSPENSION RESPIRATORY (INHALATION) at 22:25

## 2022-01-01 RX ADMIN — PANTOPRAZOLE SODIUM 40 MG: 40 TABLET, DELAYED RELEASE ORAL at 08:55

## 2022-01-01 RX ADMIN — LEVOTHYROXINE SODIUM 75 MCG: 0.07 TABLET ORAL at 05:23

## 2022-01-01 ASSESSMENT — PATIENT HEALTH QUESTIONNAIRE - PHQ9: SUM OF ALL RESPONSES TO PHQ9 QUESTIONS 1 & 2: 0

## 2022-01-01 ASSESSMENT — COGNITIVE AND FUNCTIONAL STATUS - GENERAL
HELP NEEDED FOR PERSONAL GROOMING: 3 - A LITTLE
TOILETING: 2 - A LOT
DRESSING REGULAR LOWER BODY CLOTHING: 2 - A LOT
DRESSING REGULAR UPPER BODY CLOTHING: 3 - A LITTLE
AFFECT: WFL
HELP NEEDED FOR BATHING: 2 - A LOT
EATING MEALS: 4 - NONE

## 2022-01-01 NOTE — DISCHARGE INSTRUCTIONS
81 y.o. female with a past medical history of CARRINGTON (reports off meds), glaucoma, HTN, HLD, osteoporosis, PAN, alpha thalassemia trait, PAF, hypothyroidism, chronic diastolic heart failure who presents with left hip pain after falling out of bed. Patient states that earlier in the evening she went to get into bed, and ended up accidentally falling to the floor. She did hit her head but does not believe she had any extended period of LOC. She has been having extreme left hip pain which i has left her unable to ambulate. She otherwise denies any chest pain, nausea, vomiting, diarrhea, rash or fevers.    Hospital Course    Pt presented with L hip pain after a mechanical fall after falling out of bed and had severe pain and difficulty ambulating after. CTH on admission without acute intracranial abnormalities, CT c-spine without evidence of fx/subluxation. Unfortunately L hip CT showed L hip fracture. Orthopedic surgery was consulted to evaluate and participate in the care of this pt, they recommend operative fixation. Given history of D-CHF and s/p AVR, cardiology was consulted for pre-op risk stratification. Cardiology cleared pt for surgery without further work up warranted prior to the OR. Pt underwent L hip hemiarthroplasty 12/29 with Dr. Pena. Pt is WBAT to LLE and anterior hip precautions x6 weeks. Ortho recommends aspirin 325mg/day x6 weeks in addition to pt's home pradaxa which was restarted postoperatively. Pt did well postoperatively, but was noted to have postop anemia with a hgb drop of 12-->9. This was stable prior to discharge. Pain managed with multimodal analgesia; scheduled tylenol, lido patch, PRN oxycodone, ice therapy. Ensure bowel regimen and encourage pulmonary toilet.        If you have had a fracture recently, you probably have many questions on what this means to you. So, it's vital that you take the necessary actions and precautions to not only recover from your existing fracture, but also  to help lower your risk of another fracture from occurring.    Once you have had a fracture due to osteoporosis, the chance of a future fracture is even higher.  Fractures can be debilitating, and many patients can lose their independence and experience chronic pain.    After a fracture, there are things you can do to help take care of your bones. With the help of your primary care physician or specialist doctor(endocrinologist or rheumatologist), a care plan will be created that focuses on keeping your bones strong and helping you prevent another fracture.    •• Talk to your doctor to get screened for Osteoporosis and bone mineral density (BMD), one of the first steps you can take in your care  •• Medication for treatment of your osteoporosis  •• Proper nutrition, supplements, and vitamins that may help your bones  •• Factors that may increase your risk for another fracture and simple steps you can take to reduce that risk  •• Physical activity to increase or maintain bone mass and help prevent falls  Considerations for your bone health  Below is a checklist of steps to help you protect your bones and prevent another fracture:  • Measure your bone health as often as every 2 years with a bone mineral density test, also called a DXA scan  • Take appropriate medications that your doctor can prescribe  • Follow a diet rich in calcium and vitamin D  • Maintain an active lifestyle with appropriate types of exercise  • Minimize risk of falling, especially in your home environment  • Avoid certain habits that can increase your risk of osteoporosis, such as smoking and excessive drinking of alcohol  • Be aware of medications that can contribute to bone loss such as antiseizure medications, hormone deprivation therapy, and steroids    Upon returning home from the hospital, call your primary care doctor to discuss your fracture and next steps for osteoporosis screening and/or treatment. If you need help locating a doctor or a  specialist please call [phone] ([phone]) for a list.      Post-op Hip Fracture Surgery Instructions    Date of Procedure:     Procedure: 12/29/2021    Weight Bearing Status: WBAT LLE    DVT prophylaxis and duration: ***Aspirin 325mg twice a day for 6 weeks from date of surgery    Staples removed at 10-14 days post-op; place steristrips on incision site    Wound care: Dressing may be removed after post-operative day 5 (1/3/2022). Daily wound checks, any questions please call the office at [phone]    X-ray follow up: to be done at or prior to office visit.  For Hemiarthroplasty; repeat xrays of Hip at 6 weeks post-op  For ORIF; repeat xrays of Hip at 2 weeks post-op    Office Follow Up: Call [phone] for appointment  For Hemiarthroplasty - schedule visit 6 weeks from surgery unless otherwise noted  For ORIF- schedule visit 6 weeks from surgery unless otherwise noted      Any questions or concerns call [phone] please!    Fazal Pena DO, FAOAO  [address] [place]  [address] [phone]

## 2022-01-01 NOTE — PROGRESS NOTES
Orthopedic Surgery Progress Note    Subjective     No acute events overnight. Pt seen and examined at bedside, resting comfortably. Pain well controlled to L hip. Was OOB with PT. Denies numbness or tingling down LLE.      Objective       Vitals:    01/01/22 0524   BP: (!) 155/70   Pulse: 98   Resp:    Temp:    SpO2:        Lab Results   Component Value Date    WBC 17.77 (H) 12/31/2021    HGB 8.6 (L) 12/31/2021    HCT 27.3 (L) 12/31/2021     12/31/2021    ALT 21 12/29/2021    AST 29 12/29/2021     (L) 12/31/2021    K 4.2 12/31/2021     12/31/2021    CREATININE 0.6 12/31/2021    BUN 13 12/31/2021    CO2 23 12/31/2021    INR 1.2 12/29/2021        Imaging    Post-operative x-rays of the pelvis demonstrate stable left hemiarthroplasty in good alignment.    Physical Exam:    General: VSS, NAD  Neuro: awake, alert and oriented x 3  Respiratory: non-labored breathing, good respiratory effort  Extremities:    LLE:   Dressing CDI  Appropriate TTP gilbert-incisionally  No gross deformities  Compartments soft and compressible  Sensation intact sural/saphenous/DP/SP/tibial  + motor EHL/FHL/TA/Gastroc/Peroneals  + dorsalis pedis pulse, toes WWP with BCR.          Assessment     81 y.o. female POD #3 s/p L hip hemiarthroplasty on 12/29 by Dr. Pena for L displaced femoral neck fracture after fall from standing height     Plan     Antibiotics - perioperative ancef complete  DVT prophylaxis per primary team - Pradaxa, ASA 325mg QD vs BID per medical recommendation for 6 weeks  WBAT to LLE  Anterior hip precautions for 6 weeks  Expectant drop in Hgb post-operatively due to Pradaxa use. Please do not order any additional imaging studies if Hgb drops  Dressing changed and reinforced 12/30 AM by ortho, please reinforce as needed per nursing. Saturation and bleeding expected  Transfusion as needed per medical team  PT/OT for mobility  Continue multimodal pain control  Dispo pending  Remainder of care per primary  team    Osito Bacon, DO  Orthopedic Surgery, PGY-1  Pager: [phone]

## 2022-01-01 NOTE — PLAN OF CARE
Problem: Rehabilitation (IRF) Plan of Care  Goal: Plan of Care Review  Outcome: Progressing  Flowsheets (Taken 1/1/2022 1721)  Progress: improving  Plan of Care Reviewed With: patient  Outcome Summary: Pt oriented to the [facility] and rehab program, safety protocol and use of call bell for assistance. Pain management plan discussed. Dressing dry and intact.

## 2022-01-01 NOTE — PROGRESS NOTES
Physical Medicine and Rehabilitation Progress Note      Subjective   Bee Cobb is a 81 y.o. female admitted to inpatient rehabilitation for L hip fracture.  The patient's medical records have been reviewed.    81 y.o. female PMH of CARRINGTON (reports off meds due to weight loss of ~15lbs 134 to 110s in last few months never had colonoscopy, does [employer] but gets mammorgrams yearly), Afib on Pradaxa, AS s/p TAVR 2019, glaucoma, HTN, HLD (not on statin due to myalgias), osteoporosis (on a monthly injection that starts with E last given 12/23/21, was on Prolia for 9 years before, follows with Dr. Escobar Pozo from Rheum), PAN, alpha thalassemia trait, PAF, hypothyroidism, chronic diastolic heart failure who presents with left hip pain after falling out of bed when trying to get in it 12/29, did hit her head but does not believe she had any extended period of LOC. CTH on admission without acute intracranial abnormalities, CT c-spine without evidence of fx/subluxation. L hip CT showed L hip fracture. Orthopedic surgery recommended operative fixation. Given history of D-CHF and s/p AVR, Cardiology cleared pt for surgery. Pt underwent L hip hemiarthroplasty 12/29 with Dr. Pena. Pt is WBAT to LLE and anterior hip precautions x6 weeks. Ortho recommends aspirin 325mg/day x6 weeks in addition to pt's home pradaxa which was restarted postoperatively. Pt did well postoperatively, but was noted to have postop anemia with a hgb drop of 12-->9. Pain managed with multimodal analgesia; scheduled tylenol, lido patch, PRN oxycodone, ice therapy. PT/OT/PM&R evaluated pt and recommend acute rehab at discharge.    Patient seen with  at bedside.  She rates pain 6/10.  Asking about a purewick and refused ASA as she says she can't take it as she is on Pradaxa.  She states no one talked about indication at other hospital.  She denies fevers, chills, chest pain, SOB, abdominal pain, LBM Wed 12/29.  She states in April she was  "started on Cardizem for high blood pressure and before surgery it has been elevated.    Review of Systems  Pertinent items are noted in HPI.      Objective   Physical Exam:  Visit Vitals  BP (!) 173/74 (BP Location: Right upper arm, Patient Position: Lying)   Pulse (!) 107   Temp 36.8 °C (98.3 °F) (Oral)   Resp 20   Ht 1.676 m (5' 5.98\")   Wt 56.7 kg (125 lb)   SpO2 (!) 89%   BMI 20.19 kg/m²     General: pleasant in no acute distress  HEENT: normocephalic, glasses in place  Cardio: Systolic murmur, regular rate and rhythm  Pulm: CTAB  Abdomen: +BS, soft, nontender  MSK: surgical dressings over b/l knees.  Pain over Left knee  Neuro: AAOx3.  CN III-XII intact.  Strength 5/5 b/l UE.  Antigravity in RLE HF, KE, 5/5 DF and PF.  L HF limited by pain, 5/5 DF, PF  Psych: pleasant      Labs  BMP:  Results from last 7 days   Lab Units 12/31/21  0909   CREATININE mg/dL 0.6   BUN mg/dL 13   SODIUM mEQ/L 134*   POTASSIUM mEQ/L 4.2   CHLORIDE mEQ/L 103   CO2 mEQ/L 23     CBC:  Results from last 7 days   Lab Units 12/31/21  0909   WBC K/uL 17.77*   HEMOGLOBIN g/dL 8.6*   HEMATOCRIT % 27.3*   MCV fL 63.3*   PLATELETS K/uL 322     PTT:  Results from last 7 days   Lab Units 12/29/21  0109   PTT sec 42*     Coagulation:  Results from last 7 days   Lab Units 12/29/21  0611   INR  1.2        Assessment   Hip fracture requiring operative repair, left, closed, initial encounter (CMS/[facility]) [S72.002A]    Multi-Disciplinary Problems (from IRF Functional Mobility Impairment)    Active Problems     Not on file              Plan     L hip fracture s/p HEMIARTHROPLASTY: anterior hip precautions x6 weeks.  Tylenol 1g q8h for pain, Oxycodone 5mg q6h prn pain.  Per Ortho note after surgery\" DVT prophylaxis per primary team - OK to resume Pradaxa today, please also initiate ASA 325mg QD vs BID per medical recommendation for 6 weeks\" though patient says she won't take ASA as she is on Pradaxa and was refusing at other hospital.  I have DC'ed ASA " 325mg and the Protonix 40mg for while on ASA.  Primary team to confirm with Ortho if needed additional med besides Pradaxa as patient currently not amendable.  I discussed risk of DVT and PE and signs and she understands.  WBAT to LLE.    Discussed risk of PI with use of purewick and encourage her to get up to use the restroom and premedicate with Oxycodone if needed.    Weight loss of ~15lbs 134 to 110s in last few months never had colonoscopy, does [employer] but gets mammorgrams yearly  - would see if Dietician can talk to patient.  Needs follow-up outpatient, to consider colonoscopy.    Osteoporosis: if here beyond 1/23, will need home monthly injection    HTN: only on Cardizem, nothing else at home.  Suspect not just related to pain and may need an additional agent started if remains elevated.    Meds for COPD, Afib, AS s/p TAVR 2019, glaucoma, hypothyroidism ordered.    Full Code, NOK     Medically stable for inpatient rehab.    Assessment and plan discussed and agreed with the following team members: patient, , nurse.       Karley Mar, DO  [facility]

## 2022-01-01 NOTE — PLAN OF CARE
Per medical rounds, pt is stable for d/c today. Sw is following for d/c plans to Acute Rehab at [facility].     Sw confirmed a bed with [facility] at [facility].    Transport is arranged via [facility] for a 12 noon  time.    Sw spoke with RN who will call report to [phone].    Sw called and spoke with pt's spouse, Mr. Cobb P: [phone] and provided an update on d/c plans. Both he and the pt are agreeable to the d/c plan. Emotional support provided. Carissa Larson, MSW

## 2022-01-01 NOTE — PROGRESS NOTES
Patient:  Bee Cobb  Location:  [place] [address]  MRN:  [id number]  Today's date:  1/1/2022     Pt setup in recliner, all needs met/in reach. RN updated and aware of pt status.     Bee is a 81 y.o. female admitted on 12/29/2021 with Closed fracture of left hip, initial encounter (CMS/[facility]) [S72.002A]. Principal problem is Closed fracture of left hip (CMS/[facility]).    Past Medical History  Bee has a past medical history of Atrial fibrillation (CMS/[facility]), Glaucoma, History of transfusion, Hyperthyroidism, Lipid disorder, Osteoporosis, PAN (polyarteritis nodosa) (CMS/[facility]), and Thalassemia.    History of Present Illness   Fall with L hip fx, s/p L hip hemiarthroplasty (12/29)       OT Vitals    Date/Time Pulse HR Source SpO2 Pt Activity O2 Therapy BP BP Location BP Method Pt Position Observations [facility]   01/01/22 1005 95 Monitor 94 % At rest None (Room air) 142/62 Right upper arm Automatic Lying in bed pre OT SLB   01/01/22 1015 110 Monitor 90 % At rest None (Room air) 200/131 Right upper arm Automatic Sitting up to chair SLB   01/01/22 1018 -- -- 94 % At rest None (Room air) 171/97 Right upper arm Automatic Sitting chair SLB   01/01/22 1025 105 Monitor 95 % At rest None (Room air) 151/63 Right upper arm Automatic Sitting chair; RN notified of BP t/o session SLB      OT Pain    Date/Time Side/Orientation Location Rating: Rest Rating: Activity [facility]   01/01/22 1005 left hip 4 - moderate pain -- SLB   01/01/22 1015 left hip 6 - moderate-severe pain 8 - severe pain SLB   01/01/22 1018 left hip 6 - moderate-severe pain 6 - moderate-severe pain SLB   01/01/22 1025 left hip 6 - moderate-severe pain -- SLB          Prior Living Environment      Most Recent Value   Current Living Arrangements --  [2 SH 5 and 4 MADY with rail, FF inside with rail]   Living Environment Comment MS house with spouse, 5+4 MADY and FF to bed and bath w tub shower        Prior Level of Function      Most Recent Value    Dominant Hand left   Ambulation independent   Transferring independent   Toileting independent   Bathing assistive equipment   Dressing independent   Eating independent   Prior Level of Function Comment independent for ADL and functional mobility no AD   Assistive Device Currently Used at Home nebulizer        Occupational Profile      Most Recent Value   Reason for Services/Referral sp fall   Environmental Supports and Barriers lives with supportive spouse           OT Evaluation and Treatment - 01/01/22 1003        OT Time Calculation    Start Time 1003     Stop Time 1031     Time Calculation (min) 28 min        Session Details    Document Type daily treatment/progress note     Mode of Treatment occupational therapy        General Information    Patient Profile Reviewed yes     Onset of Illness/Injury or Date of Surgery 12/29/21     Referring Physician Sudhir     Patient/Family/Caregiver Comments/Observations RN cleared for therapy     General Observations of Patient Pt in bed, agreeable to OT tx session     Existing Precautions/Restrictions fall;hip;weight bearing     Limitations/Impairments safety/cognitive        Cognition/Psychosocial    Affect/Mental Status (Cognition) WFL     Orientation Status (Cognition) oriented x 3     Comment, Cognition Pt requires incr processing time for prompts and questions        Bed Mobility    [place], Roll Left minimum assist (75% or more patient effort)     [place], Supine to Sit minimum assist (75% or more patient effort)     Comment (Bed Mobility) OOB to L; up to chair        Sit to Stand Transfer    [place], Sit to Stand Transfer minimum assist (75% or more patient effort)     Verbal Cues hand placement;preparatory posture;proper use of assistive device;technique     Assistive Device walker, front-wheeled     Comment from EOB        Stand to Sit Transfer    [place], Stand to Sit Transfer minimum assist (75% or more patient effort)     Verbal Cues hand  placement;proper use of assistive device;technique     Assistive Device walker, front-wheeled     Comment to chair; assist for eccentric control        Balance    Static Sitting Balance WFL     Dynamic Sitting Balance WFL     Static Standing Balance mild impairment     Dynamic Standing Balance mild impairment        Motor Skills    Functional Endurance limited from baseline d/t pain and fatigue        Grooming    [place] set up     Comment Pt completed setup in recliner        Toileting    [place] dependent (less than 25% patient effort)     Comment +ravindra johns for hygiene while standing with RW        Self-Feeding    [place] independent        BADL Safety/Performance    Impairments, BADL Safety/Performance balance;endurance/activity tolerance        ADL Interventions    Energy Conservation Techniques activity adapted to sitting;activity pacing encouraged;regular rest breaks encouraged        AM-PAC (TM) - ADL (Current Function)    Putting on and taking off regular lower body clothing? 2 - A Lot     Bathing? 2 - A Lot     Toileting? 2 - A Lot     Putting on/taking off regular upper body clothing? 3 - A Little     How much help for taking care of personal grooming? 3 - A Little     Eating meals? 4 - None     AM-PAC (TM) ADL Score 16        Assessment/Plan (OT)    Daily Outcome Statement OT tx session. Pt requires Min A w/ RW for txfers functional mobility. Mod to Max A for ADLs.     Rehab Potential good, to achieve stated therapy goals     Therapy Frequency 5 times/wk     Planned Therapy Interventions activity tolerance training;adaptive equipment training;functional balance retraining;occupation/activity based interventions;patient/caregiver education/training;ROM/therapeutic exercise;strengthening exercise               OT Assessment/Plan      Most Recent Value   OT Recommended Discharge Disposition acute rehab/Inpatient Rehab Facility at 01/01/2022 1003   Anticipated Equipment Needs At Discharge  (OT) --  [TBD] at 01/01/2022 1003   Patient/Family Therapy Goal Statement to use the commode at 12/30/2021 0835                    Education Documentation  Unresolved/Worsening Symptoms, taught by Mary Lou Kim OT at 1/1/2022  1:23 PM.  Learner: Patient  Readiness: Acceptance  Method: Explanation  Response: Verbalizes Understanding  Comment: OT role/POC. ADL/mobilty training. fall prevention/safety.    Self-Care, taught by Mary Lou Kim OT at 1/1/2022  1:23 PM.  Learner: Patient  Readiness: Acceptance  Method: Explanation  Response: Verbalizes Understanding  Comment: OT role/POC. ADL/mobilty training. fall prevention/safety.    Signs/Symptoms, taught by Mary Lou Kim OT at 1/1/2022  1:23 PM.  Learner: Patient  Readiness: Acceptance  Method: Explanation  Response: Verbalizes Understanding  Comment: OT role/POC. ADL/mobilty training. fall prevention/safety.    Risk Factors, taught by Mary Lou Kim OT at 1/1/2022  1:23 PM.  Learner: Patient  Readiness: Acceptance  Method: Explanation  Response: Verbalizes Understanding  Comment: OT role/POC. ADL/mobilty training. fall prevention/safety.          OT Goals      Most Recent Value   Transfer Goal 1    Activity/Assistive Device all transfers at 12/30/2021 0835   [place] modified independence at 12/30/2021 0835   Time Frame by discharge at 12/30/2021 0835   Progress/Outcome goal ongoing at 12/30/2021 0835   Dressing Goal 1    Activity/Adaptive Equipment dressing skills, all at 12/30/2021 0835   [place] modified independence at 12/30/2021 0835   Time Frame by discharge at 12/30/2021 0835   Strategies/Barriers LH AE as needed at 12/30/2021 0835   Progress/Outcome goal ongoing at 12/30/2021 0835   Toileting Goal 1    Activity/Assistive Device toileting skills, all at 12/30/2021 0835   [place] modified independence at 12/30/2021 0835   Time Frame by discharge at 12/30/2021 0835   Progress/Outcome goal ongoing at 12/30/2021  0835   Grooming Goal 1    Activity/Assistive Device grooming skills, all at 12/30/2021 0835   [place] independent at 12/30/2021 0835   Time Frame by discharge at 12/30/2021 0835   Progress/Outcome goal ongoing at 12/30/2021 0835

## 2022-01-02 ENCOUNTER — APPOINTMENT (INPATIENT)
Dept: PHYSICAL THERAPY | Facility: REHABILITATION | Age: 82
DRG: 560 | End: 2022-01-02
Payer: MEDICARE

## 2022-01-02 ENCOUNTER — APPOINTMENT (INPATIENT)
Dept: OCCUPATIONAL THERAPY | Facility: REHABILITATION | Age: 82
DRG: 560 | End: 2022-01-02
Payer: MEDICARE

## 2022-01-02 PROBLEM — Z91.81 RISK FOR FALLS: Status: ACTIVE | Noted: 2022-01-02

## 2022-01-02 PROBLEM — Z91.89 AT HIGH RISK FOR PRESSURE INJURY OF SKIN: Status: ACTIVE | Noted: 2022-01-02

## 2022-01-02 PROBLEM — R52 PAIN: Status: ACTIVE | Noted: 2022-01-02

## 2022-01-02 PROBLEM — Z09 FOLLOW UP: Status: ACTIVE | Noted: 2022-01-02

## 2022-01-02 LAB
ALBUMIN SERPL-MCNC: 2.4 G/DL (ref 3.4–5)
ALP SERPL-CCNC: 95 IU/L (ref 35–126)
ALT SERPL-CCNC: 15 IU/L (ref 11–54)
ANION GAP SERPL CALC-SCNC: 6 MEQ/L (ref 3–15)
AST SERPL-CCNC: 26 IU/L (ref 15–41)
BACTERIA #/AREA URNS HPF: ABNORMAL /HPF
BASO STIPL BLD QL SMEAR: ABNORMAL
BASOPHILS # BLD: 0.08 K/UL (ref 0.01–0.1)
BASOPHILS NFR BLD: 0.7 %
BILIRUB SERPL-MCNC: 0.9 MG/DL (ref 0.3–1.2)
BILIRUB UR QL STRIP.AUTO: NEGATIVE MG/DL
BUN SERPL-MCNC: 12 MG/DL (ref 8–20)
CALCIUM SERPL-MCNC: 8 MG/DL (ref 8.9–10.3)
CHLORIDE SERPL-SCNC: 104 MEQ/L (ref 98–109)
CLARITY UR REFRACT.AUTO: ABNORMAL
CO2 SERPL-SCNC: 26 MEQ/L (ref 22–32)
COLOR UR AUTO: YELLOW
CREAT SERPL-MCNC: 0.3 MG/DL (ref 0.6–1.1)
CROSSMATCH: NORMAL
CROSSMATCH: NORMAL
DIFFERENTIAL METHOD BLD: ABNORMAL
EOSINOPHIL # BLD: 0.38 K/UL (ref 0.04–0.36)
EOSINOPHIL NFR BLD: 3.2 %
ERYTHROCYTE [DISTWIDTH] IN BLOOD BY AUTOMATED COUNT: 15.6 % (ref 11.7–14.4)
GFR SERPL CREATININE-BSD FRML MDRD: >60 ML/MIN/1.73M*2
GLUCOSE SERPL-MCNC: 89 MG/DL (ref 70–99)
GLUCOSE UR STRIP.AUTO-MCNC: NEGATIVE MG/DL
HCT VFR BLDCO AUTO: 26.1 % (ref 35–45)
HGB BLD-MCNC: 8.1 G/DL (ref 11.8–15.7)
HGB UR QL STRIP.AUTO: 3
HYALINE CASTS #/AREA URNS LPF: ABNORMAL /LPF
HYPOCHROMIA BLD QL SMEAR: ABNORMAL
IMM GRANULOCYTES # BLD AUTO: 0.09 K/UL (ref 0–0.08)
IMM GRANULOCYTES NFR BLD AUTO: 0.7 %
ISBT CODE: 5100
ISBT CODE: 5100
KETONES UR STRIP.AUTO-MCNC: NEGATIVE MG/DL
LEUKOCYTE ESTERASE UR QL STRIP.AUTO: NEGATIVE
LYMPHOCYTES # BLD: 1.62 K/UL (ref 1.2–3.5)
LYMPHOCYTES NFR BLD: 13.5 %
MCH RBC QN AUTO: 20.2 PG (ref 28–33.2)
MCHC RBC AUTO-ENTMCNC: 31 G/DL (ref 32.2–35.5)
MCV RBC AUTO: 65.1 FL (ref 83–98)
MONOCYTES # BLD: 1.15 K/UL (ref 0.28–0.8)
MONOCYTES NFR BLD: 9.6 %
NEUTROPHILS # BLD: 8.7 K/UL (ref 1.7–7)
NEUTS SEG NFR BLD: 72.3 %
NITRITE UR QL STRIP.AUTO: NEGATIVE
NRBC BLD-RTO: 0 %
OVALOCYTES BLD QL SMEAR: ABNORMAL
PDW BLD AUTO: 9.7 FL (ref 9.4–12.3)
PH UR STRIP.AUTO: 7 [PH]
PLAT MORPH BLD: NORMAL
PLATELET # BLD AUTO: 309 K/UL (ref 150–369)
PLATELET # BLD EST: ABNORMAL 10*3/UL
POLYCHROMASIA BLD QL SMEAR: ABNORMAL
POTASSIUM SERPL-SCNC: 3.9 MEQ/L (ref 3.6–5.1)
PRODUCT CODE: NORMAL
PRODUCT CODE: NORMAL
PRODUCT STATUS: NORMAL
PRODUCT STATUS: NORMAL
PROT SERPL-MCNC: 5.5 G/DL (ref 6–8.2)
PROT UR QL STRIP.AUTO: 1
RBC # BLD AUTO: 4.01 M/UL (ref 3.93–5.22)
RBC #/AREA URNS HPF: ABNORMAL /HPF
SODIUM SERPL-SCNC: 136 MEQ/L (ref 136–144)
SP GR UR REFRACT.AUTO: 1.01
SPECIMEN EXP DATE BLD: NORMAL
SPECIMEN EXP DATE BLD: NORMAL
SQUAMOUS #/AREA URNS HPF: 1 /HPF
UNIT ABO: NORMAL
UNIT ABO: NORMAL
UNIT ID: NORMAL
UNIT ID: NORMAL
UNIT RH: POSITIVE
UNIT RH: POSITIVE
UROBILINOGEN UR STRIP-ACNC: 0.2 EU/DL
WBC # BLD AUTO: 12.02 K/UL (ref 3.8–10.5)
WBC #/AREA URNS HPF: ABNORMAL /HPF

## 2022-01-02 PROCEDURE — 81001 URINALYSIS AUTO W/SCOPE: CPT | Performed by: PHYSICAL MEDICINE & REHABILITATION

## 2022-01-02 PROCEDURE — 36415 COLL VENOUS BLD VENIPUNCTURE: CPT | Performed by: PHYSICAL MEDICINE & REHABILITATION

## 2022-01-02 PROCEDURE — 97535 SELF CARE MNGMENT TRAINING: CPT | Mod: GO

## 2022-01-02 PROCEDURE — 97530 THERAPEUTIC ACTIVITIES: CPT | Mod: GP

## 2022-01-02 PROCEDURE — 12800000 HC ROOM AND CARE SEMIPRIVATE REHAB

## 2022-01-02 PROCEDURE — 85025 COMPLETE CBC W/AUTO DIFF WBC: CPT | Performed by: PHYSICAL MEDICINE & REHABILITATION

## 2022-01-02 PROCEDURE — 97167 OT EVAL HIGH COMPLEX 60 MIN: CPT | Mod: GO

## 2022-01-02 PROCEDURE — 80053 COMPREHEN METABOLIC PANEL: CPT | Performed by: PHYSICAL MEDICINE & REHABILITATION

## 2022-01-02 PROCEDURE — 63700000 HC SELF-ADMINISTRABLE DRUG: Performed by: PHYSICAL MEDICINE & REHABILITATION

## 2022-01-02 PROCEDURE — 97116 GAIT TRAINING THERAPY: CPT | Mod: GP

## 2022-01-02 PROCEDURE — 25000000 HC PHARMACY GENERAL: Performed by: PHYSICAL MEDICINE & REHABILITATION

## 2022-01-02 PROCEDURE — 97162 PT EVAL MOD COMPLEX 30 MIN: CPT | Mod: GP

## 2022-01-02 RX ADMIN — TIOTROPIUM BROMIDE INHALATION SPRAY 2 PUFF: 3.12 SPRAY, METERED RESPIRATORY (INHALATION) at 07:46

## 2022-01-02 RX ADMIN — SENNOSIDES AND DOCUSATE SODIUM 1 TABLET: 50; 8.6 TABLET ORAL at 07:37

## 2022-01-02 RX ADMIN — OXYCODONE HYDROCHLORIDE 5 MG: 5 TABLET ORAL at 07:38

## 2022-01-02 RX ADMIN — DILTIAZEM HYDROCHLORIDE 240 MG: 120 CAPSULE, COATED, EXTENDED RELEASE ORAL at 07:37

## 2022-01-02 RX ADMIN — DORZOLAMIDE HYDROCHLORIDE 1 DROP: 20 SOLUTION/ DROPS OPHTHALMIC at 06:08

## 2022-01-02 RX ADMIN — Medication 1000 UNITS: at 07:37

## 2022-01-02 RX ADMIN — DORZOLAMIDE HYDROCHLORIDE 1 DROP: 20 SOLUTION/ DROPS OPHTHALMIC at 16:39

## 2022-01-02 RX ADMIN — BUDESONIDE 0.5 MG: 0.5 SUSPENSION RESPIRATORY (INHALATION) at 06:06

## 2022-01-02 RX ADMIN — DIGOXIN 250 MCG: 250 TABLET ORAL at 06:08

## 2022-01-02 RX ADMIN — ACETAMINOPHEN 1000 MG: 500 TABLET, FILM COATED ORAL at 06:07

## 2022-01-02 RX ADMIN — SENNOSIDES AND DOCUSATE SODIUM 1 TABLET: 50; 8.6 TABLET ORAL at 19:50

## 2022-01-02 RX ADMIN — BUDESONIDE 0.5 MG: 0.5 SUSPENSION RESPIRATORY (INHALATION) at 16:36

## 2022-01-02 RX ADMIN — ACETAMINOPHEN 1000 MG: 500 TABLET, FILM COATED ORAL at 16:36

## 2022-01-02 RX ADMIN — BISACODYL 10 MG: 10 SUPPOSITORY RECTAL at 19:50

## 2022-01-02 RX ADMIN — OXYCODONE HYDROCHLORIDE 5 MG: 5 TABLET ORAL at 21:42

## 2022-01-02 RX ADMIN — ACETAMINOPHEN 1000 MG: 500 TABLET, FILM COATED ORAL at 21:42

## 2022-01-02 RX ADMIN — LATANOPROST 1 DROP: 50 SOLUTION/ DROPS OPHTHALMIC at 21:43

## 2022-01-02 RX ADMIN — LEVOTHYROXINE SODIUM 75 MCG: 75 TABLET ORAL at 06:06

## 2022-01-02 RX ADMIN — DABIGATRAN ETEXILATE MESYLATE 150 MG: 150 CAPSULE ORAL at 07:37

## 2022-01-02 RX ADMIN — THERA TABS 1 TABLET: TAB at 07:37

## 2022-01-02 RX ADMIN — DABIGATRAN ETEXILATE MESYLATE 150 MG: 150 CAPSULE ORAL at 19:50

## 2022-01-02 ASSESSMENT — COGNITIVE AND FUNCTIONAL STATUS - GENERAL: AFFECT: WFL

## 2022-01-02 NOTE — ASSESSMENT & PLAN NOTE
Mrs. Cobb is an 81-year-old female who presented to [facility] on 12/29/2021 with complaints of left hip pain after falling out of bed.  X-ray left hip revealed left femoral neck fracture.  CT left hip confirmed displaced fracture left femoral neck.  X-ray left knee was negative.  Head CT was negative for skull fracture or intracranial hemorrhage.  CT cervical spine was negative for fracture dislocation.  Dr. Pena from orthopedic surgery was consulted and performed left total hip replacement on 12/29/2021.  She is cleared for weightbearing to left lower extremity with anterior hip precautions for 6 weeks.  She is anticoagulated with Pradaxa for atrial fibrillation.  Aspirin 325 mg daily for 6 weeks was added for DVT prophylaxis on top of Pradaxa.  Postoperative anemia stabilized at 8.6.  She was ultimately determined to be medically stable for transfer to [place] rehab on 1/1/2022 for an acute inpatient rehabilitation program to address deficits related to left hip fracture status post total hip replacement.    She is weightbearing as tolerated.  She will be maintained on left hip precautions and aspirin for 6 weeks.  She will participate in 3 hours of physical and occupational therapy as well as receive 24-hour rehab nursing care.  She will follow-up with Dr. Pena from orthopedic surgery in 2 weeks.    The patient is tolerating comprehensive inpatient rehabilitation program.  Tolerating initial therapies.    Continue with pain management and adjust medications as needed.

## 2022-01-02 NOTE — PROGRESS NOTES
Patient: Bee Cobb  [facility] [address] [facility]  MRN: [id number]  Today's date: 1/2/2022    History of Present Illness  Bee is a 81 y.o. female admitted on 1/1/2022 with Hip fracture requiring operative repair, left, closed, initial encounter (CMS/[facility]) [S72.002A]. Principal problem is Hip fracture requiring operative repair, left, closed, initial encounter (CMS/[facility]).    81 y.o. f who presents with left hip pain after falling out of bed. She had an immediate onset of left hip pain and inability to ambulate.  No LOC.  Radiographic evaluation showed a displaced transcervical femoral neck fracture by CT.  On 12-29-21 she underwent left hip hemiarthroplasty.  Pt is WBAT to LLE and anterior hip precautions x 6 weeks. Ortho recommends aspirin 325mg/day x 6 weeks in addition to pt's home pradaxa which was restarted postoperatively. Pt did well postoperatively, but was noted to have postop anemia with a hgb drop of 12-->9. Per [place], this is expected due to Pradaxa use.  Pain is being managed with multimodal analgesia; scheduled tylenol, lido patch, PRN oxycodone, ice therapy.  PT/OT/PM&R evaluated pt and recommend acute rehab at discharge.          Past Medical History  Bee has a past medical history of Atrial fibrillation (CMS/[facility]), Glaucoma, History of transfusion, Hyperthyroidism, Lipid disorder, Osteoporosis, PAN (polyarteritis nodosa) (CMS/[facility]), and Thalassemia. H/o recent clavicle fx 9/11/21 Completed course of therapy and has full ROM      PT Vitals    Date/Time Pulse HR Source SpO2 Pt Activity O2 Therapy BP MAP BP Location BP Method Pt Position [facility]   01/02/22 1145 107 Monitor 93 % At rest None (Room air) 187/73 105 mmHg Left upper arm Automatic Lying SJM   01/02/22 1152 -- -- -- -- -- 165/72 102 mmHg Left upper arm Automatic Lying SJM      PT Pain    Date/Time Pain Type Side/Orientation Location Rating: Activity Description Interventions [facility]   01/02/22 1145 Pain Reassessment  left hip 6 dull position adjusted SJM          Prior Living Environment      Most Recent Value   People in Home spouse   Current Living Arrangements home, apartment   Home Accessibility not wheelchair accessible   Living Environment Comment Row home 13 steps into house, 9 steps to BR on 2nd floor   Number of Stairs, Main Entrance 9   Surface of Stairs, Main Entrance concrete   Stair Railings, Main Entrance railings on both sides of stairs   Location, Bathroom second floor, must negotiate stairs to access   [place], Bathroom tile floor   Bathroom Access Comment ztub/shower combo, hand held shower, bench and suction grab bars ( checks stability prior to each shower)   Number of Stairs, Within Home, Primary --  [13]   Stair Railings, Within Home, Primary railing on right side (ascending)          Prior Level of Function      Most Recent Value   Dominant Hand left   Ambulation independent   Transferring independent   Toileting independent   Bathing independent   Dressing independent   Eating independent   Assistive Device Currently Used at Home shower chair, grab bar           IRF PT Evaluation and Treatment - 01/02/22 1144        PT Time Calculation    Start Time 1130     Stop Time 1200     Time Calculation (min) 30 min        Session Details    Document Type daily treatment/progress note     Mode of Treatment individual therapy;physical therapy        Transfers    Transfers toilet transfer        Sit to Stand Transfer    [place], Sit to Stand Transfer minimum assist (75% or more patient effort)     Verbal Cues hand placement;proper use of assistive device;safety;technique     Assistive Device gait belt;walker, front-wheeled     Comment For steadying, anterior weight shift provided via gait belt        Stand to Sit Transfer    [place], Stand to Sit Transfer minimum assist (75% or more patient effort)     Verbal Cues hand placement;proper use of assistive device;safety;technique     Assistive Device  gait belt;walker, front-wheeled     Comment for steadying and controlled lowering via gait belt        Stand Pivot Transfer    [place], Stand Pivot/Stand Step Transfer minimum assist (75% or more patient effort)     Verbal Cues hand placement;safety;technique     Assistive Device gait belt;walker, front-wheeled     Comment for safety, steadying and balance via amb approach        Toilet Transfer    Transfer Technique stand pivot     [place], Toilet Transfer minimum assist (75% or more patient effort)     Verbal Cues hand placement;proper use of assistive device;safety;technique     Assistive Device commode, 3-in-1;gait belt;walker, front-wheeled     Comment via amb approach to padded commode over toilet, Min A via gait belt provided for safety, steadying and balance        Gait Training    [place], Gait minimum assist (75% or more patient effort)     Assistive Device gait belt;walker, front-wheeled     Distance in Feet 20 feet   with 2 turns and 8 ft x 1 initial bout    Pattern (Gait) step-through     Deviations/Abnormal Patterns (Gait) antalgic;base of support, narrow;gait speed decreased     Comment (Gait/Stairs) for safety, steadying and balance; patient amb WC> bed at start of session and to/from bed to bathroom        Cryotherapy    Location 1 lower extremity treatment area     Indications pain     Treatment Details (Cryotherapy) Applied intermittently to L anterior thigh during PT session, x 20 minutes     Response to Treatment pain decreased        Daily Progress Summary (PT)    Daily Outcome Statement Patient requires min A to complete sit<>Stand, SPT and short distance amb with use of RW. Activity bibi limited by increased LLE pain, decreased endurance.     Recommendations (PT) recommended outcome measures: TUGs, 5x sit<>stand when appropriate/able. Ongoing assessment of elevations, car transfer and amb over uneven surface as appropriate/ patient able.                      Education  Documentation  Orientation to Care Setting, Routine, taught by Karoline Aguilar PT at 1/2/2022 11:55 AM.  Learner: Patient  Readiness: Acceptance  Method: Explanation  Response: Verbalizes Understanding  Comment: role of PT, PT-related goals, therapy daily schedule          IRF PT Goals      Most Recent Value   Bed Mobility Goal 1    Activity/Assistive Device scooting, sit to supine, supine to sit at 01/02/2022 1102   [place] minimum assist (75% or more patient effort) at 01/02/2022 1102   Time Frame short-term goal (STG), 1 week at 01/02/2022 1102   Bed Mobility Goal 2    Activity/Assistive Device scooting, sit to supine, supine to sit at 01/02/2022 1102   [place] modified independence at 01/02/2022 1102   Time Frame long-term goal (LTG), 14 days or less, by discharge at 01/02/2022 1102   Transfer Goal 1    Activity/Assistive Device sit-to-stand/stand-to-sit, stand pivot, walker, front-wheeled at 01/02/2022 1102   [place] supervision required at 01/02/2022 1102   Time Frame short-term goal (STG), 1 week at 01/02/2022 1102   Transfer Goal 2    Activity/Assistive Device sit-to-stand/stand-to-sit, stand pivot, walker, front-wheeled at 01/02/2022 1102   [place] modified independence at 01/02/2022 1102   Time Frame long-term goal (LTG), 14 days or less, by discharge at 01/02/2022 1102   Gait/Walking Locomotion Goal 1    Activity/Assistive Device gait (walking locomotion), decrease asymmetrical patterns, decrease fall risk, forward stepping, improve balance and speed at 01/02/2022 1102   Distance 200 feet at 01/02/2022 1102   [place] supervision required at 01/02/2022 1102   Time Frame short-term goal (STG), 1 week at 01/02/2022 1102   Gait/Walking Locomotion Goal 2    Activity/Assistive Device gait (walking locomotion), decrease asymmetrical patterns, decrease fall risk, forward stepping, improve balance and speed at 01/02/2022 1102   Distance 200 feet at 01/02/2022 1102   [place] modified  independence at 01/02/2022 1102   Time Frame long-term goal (LTG), 14 days or less, by discharge at 01/02/2022 1102

## 2022-01-02 NOTE — PROGRESS NOTES
Patient: Bee Cobb  [facility]  MRN: [id number]  Today's date: 1/2/2022    History of Present Illness  Bee is a 81 y.o. female admitted on 1/1/2022 with Hip fracture requiring operative repair, left, closed, initial encounter (CMS/[facility]) [S72.002A]. Principal problem is Hip fracture requiring operative repair, left, closed, initial encounter (CMS/[facility]).    81 y.o. f who presents with left hip pain after falling out of bed. She had an immediate onset of left hip pain and inability to ambulate.  No LOC.  Radiographic evaluation showed a displaced transcervical femoral neck fracture by CT.  On 12-29-21 she underwent left hip hemiarthroplasty.  Pt is WBAT to LLE and anterior hip precautions x 6 weeks. Ortho recommends aspirin 325mg/day x 6 weeks in addition to pt's home pradaxa which was restarted postoperatively. Pt did well postoperatively, but was noted to have postop anemia with a hgb drop of 12-->9. Per [place], this is expected due to Pradaxa use.  Pain is being managed with multimodal analgesia; scheduled tylenol, lido patch, PRN oxycodone, ice therapy.  PT/OT/PM&R evaluated pt and recommend acute rehab at discharge.          Past Medical History  Bee has a past medical history of Atrial fibrillation (CMS/[facility]), Glaucoma, History of transfusion, Hyperthyroidism, Lipid disorder, Osteoporosis, PAN (polyarteritis nodosa) (CMS/[facility]), and Thalassemia. H/o recent clavicle fx 9/11/21 Completed course of therapy and has full ROM      PT Vitals    Date/Time Pulse SpO2 Pt Activity O2 Therapy O2 Del Method BP MAP BP Location BP Method Pt Position Observations [facility]   01/02/22 1043 94 98 % At rest Supplemental oxygen Nasal cannula 140/63 91 mmHg Left upper arm Automatic Sitting -- [facility]   01/02/22 1059 113 97 % At rest -- -- 172/76 -- Left upper arm Automatic Sitting -- [facility]   01/02/22 1110 -- -- -- -- -- 169/74 106 mmHg Left upper arm Automatic Lying post-amb, transition  sit>supine SJ      PT Pain    Date/Time Pain Type Side/Orientation Location Rating: Rest Rating: Activity Description Interventions [facility]   01/02/22 1043 Pain Assessment left hip 10 -- aching position adjusted [facility]   01/02/22 1059 Pain Reassessment;Post Activity left hip -- 10 aching position adjusted [facility]   01/02/22 1110 Pain Reassessment left hip -- 6 aching cold applied SJM          Prior Living Environment      Most Recent Value   People in Home spouse   Current Living Arrangements home, apartment   Home Accessibility not wheelchair accessible   Living Environment Comment Row home 13 steps into house, 9 steps to BR on 2nd floor   Number of Stairs, Main Entrance 9   Surface of Stairs, Main Entrance concrete   Stair Railings, Main Entrance railings on both sides of stairs   Location, Bathroom second floor, must negotiate stairs to access   [place], Bathroom tile floor   Bathroom Access Comment ztub/shower combo, hand held shower, bench and suction grab bars ( checks stability prior to each shower)   Number of Stairs, Within Home, Primary --  [13]   Stair Railings, Within Home, Primary railing on right side (ascending)          Prior Level of Function      Most Recent Value   Dominant Hand left   Ambulation independent   Transferring independent   Toileting independent   Bathing independent   Dressing independent   Eating independent   Assistive Device Currently Used at Home shower chair, grab bar           IRF PT Evaluation and Treatment - 01/02/22 1102        PT Time Calculation    Start Time 1030     Stop Time 1130     Time Calculation (min) 60 min        Session Details    Document Type initial evaluation     Mode of Treatment individual therapy;occupational therapy        General Information    Patient Profile Reviewed yes     Existing Precautions/Restrictions fall;hip;weight bearing     Limitations/Impairments hearing        Weight-bearing Status    Left LE Weight-Bearing Status weight-bearing as  tolerated (WBAT)        Living Environment    Primary Care Provided by self        Coping/Community Reintegration    Observed Emotional State anxious     Verbalized Emotional State anxiety        Cognition/Psychosocial    Affect/Mental Status (Cognition) WFL     Orientation Status (Cognition) oriented x 4     Follows Commands (Cognition) WFL;follows one-step commands;follows two-step commands        Sensory Assessment (Somatosensory)    Left LE Sensory Assessment general sensation;light touch awareness;light touch localization;proprioception;intact   assessed plantar surface of foot, proprioception at great toe    Right LE Sensory Assessment general sensation;light touch awareness;light touch localization;proprioception;intact   assessed plantar surface of foot, proprioception at great toe       Range of Motion (ROM)    Range of Motion ROM is WFL;bilateral lower extremities        Strength (Manual Muscle Testing)    Strength (Manual Muscle Testing) strength is WFL;right lower extremity;left lower extremity strength deficit     Left Lower Extremity Strength left LE strength is WFL except;hip;knee     Hip, Left (Strength) Flexion 3-/5, extension 3+/5, abd 2/5, add 2/5, ER 2/5, IR NT d/t THPs     Knee, Left (Strength) Knee extension 3-/5, flexion 3+/5     Ankle, Left (Strength) WFL/4/5 t/o all motions including DF, PF, Inv, Ev     Right Lower Extremity Strength right LE strength is WFL        Bed Mobility    [place], Roll Left safety considerations;unable to assess;not tested     [place], Roll Right safety considerations;unable to assess;not tested     [place], Supine to Sit moderate assist (50-74% patient effort)     [place], Sit to Supine moderate assist (50-74% patient effort)     Assistive Device bed rails     Comment (Bed Mobility) Assist for trunk and LLE management into and OOB with VCs for technique and increased time provided, rolling L/R deferred d/t recent hip fx with THPs        Sit to  Stand Transfer    [place], Sit to Stand Transfer safety considerations;unable to assess;not tested     Comment unsafe/unable to assess prior to the benefit of therapeutic intervention        Stand to Sit Transfer    Comment unsafe/unable to assess prior to the benefit of therapeutic intervention        Stand Pivot Transfer    Comment unsafe/unable to assess prior to the benefit of therapeutic intervention        Toilet Transfer    [place], Toilet Transfer safety considerations;unable to assess;not tested   unsafe/unable to assess prior to the benefit of therapeutic intervention       Car Transfer    [place], Car Transfer safety considerations;unable to assess;not tested     Comment Deferred d/t increased LLE pain, strength/balance deficits and increased fatigue; TBA as appropriate/able        Gait Training    [place], Gait safety considerations;unable to assess;not tested     Advanced Gait Activity curb negotiation;rough/uneven surfaces;sloped surfaces     [place], Picking Up Object unable to assess;not tested   d/t safety concerns, balanace deficits and increased fatigue    Comment (Gait/Stairs) unsafe/unable to assess prior to the benefit of therapeutic intervention        Curb Negotiation    [place] safety considerations;unable to assess;not tested     Comment Deferred d/t increased LLE pain, strength/balance deficits and increased fatigue; TBA as appropriate/able        Rough/Uneven Surface Gait Skills    [place] safety considerations;unable to assess;not tested     Comment Deferred d/t increased LLE pain, strength/balance deficits and increased fatigue; TBA as appropriate/able        Sloped Surface Gait Skills    [place] safety considerations;unable to assess;not tested     Comment Deferred d/t increased LLE pain, strength/balance deficits and increased fatigue; TBA as appropriate/able        Stairs Training    [place], Stairs safety considerations;unable to  assess;not tested     Comment Deferred d/t increased LLE pain, strength/balance deficits and increased fatigue; TBA as appropriate/able        Wheelchair Mobility/Management    Comment, Wheelchair Mobility Not an anticipated need for d/c, patient expected to be amb level        Balance    Static Sitting Balance WFL;unsupported;sitting, edge of bed     Dynamic Sitting Balance WFL;unsupported;sitting, edge of bed     Static Standing Balance mild impairment;supported;standing   following therapeutic intervention of DME, unable to stand unsupported    Dynamic Standing Balance mild impairment;supported;standing   following therapeutic intervention of DME, unable to stand unsupported    Comment, Balance following therapeutic intervention of DME, unable to stand unsupported        Motor Skills    Coordination WFL;bilateral;lower extremity     Functional Endurance fair, limited by pain, + LEMOS, elevated BP response     Muscle Tone WNL;bilateral;lower extremity(s)        Postural Deviations    Postural Deviations head and neck;shoulder;upper back;pelvis     Head and Neck forward head     Shoulder left shoulder forward;right shoulder forward     Upper Back kyphosis     Pelvis posterior pelvic tilt        Gait/Walking Locomotion Goal 1    Distance 200 feet        Gait/Walking Locomotion Goal 2    Distance 200 feet        Patient/Family Goals    Patient's Goals For Discharge take care of myself at home;return to all previous roles/activities        Therapy Assessment/Plan (PT)    Rehab Potential/Prognosis (PT) good, to achieve stated therapy goals     Frequency of Treatment (PT) 5-7 times per week;60-90 minutes per day     Estimated Duration of Therapy (PT) 2 weeks     Problem List (PT) problems related to;balance;mobility     Activity Limitations Related to Problem List unable to ambulate safely;unable to transfer safely     Planned Therapy Interventions balance training;bed mobility training;gait training;home exercise  program;patient/family education;stair training;strengthening;transfer training     Comment, Therapy Assessment/Plan (PT) Patient is an 82 yo female who underwent a left hip hemiarthroplasty s/p fall.  Pt adm [facility] with the following impairments: Increased L hip pain, decreased strength LLE, decreased endurance w/ occasional use of supplemental O2, impaired balance w/ need for therapeutic intervention of DME. PTA, patient indep without use of AD. See PT daily treatment note for details regarding mobility assessment following therapeutic intervention. Patient will benefit from comprehensive inpatient rehab to address the above impairments to maximize safety and indep following d/c. LTGs sets for mod I level with EMMA BENNETTD at first team meeting.                      Education Documentation  Orientation to Care Setting, Routine, taught by Karoline Aguilar, PT at 1/2/2022 11:55 AM.  Learner: Patient  Readiness: Acceptance  Method: Explanation  Response: Verbalizes Understanding  Comment: role of PT, PT-related goals, therapy daily schedule          IRF PT Goals      Most Recent Value   Bed Mobility Goal 1    Activity/Assistive Device scooting, sit to supine, supine to sit at 01/02/2022 1102   [place] minimum assist (75% or more patient effort) at 01/02/2022 1102   Time Frame short-term goal (STG), 1 week at 01/02/2022 1102   Bed Mobility Goal 2    Activity/Assistive Device scooting, sit to supine, supine to sit at 01/02/2022 1102   [place] modified independence at 01/02/2022 1102   Time Frame long-term goal (LTG), 14 days or less, by discharge at 01/02/2022 1102   Transfer Goal 1    Activity/Assistive Device sit-to-stand/stand-to-sit, stand pivot, walker, front-wheeled at 01/02/2022 1102   [place] supervision required at 01/02/2022 1102   Time Frame short-term goal (STG), 1 week at 01/02/2022 1102   Transfer Goal 2    Activity/Assistive Device sit-to-stand/stand-to-sit, stand pivot, walker,  front-wheeled at 01/02/2022 1102   [place] modified independence at 01/02/2022 1102   Time Frame long-term goal (LTG), 14 days or less, by discharge at 01/02/2022 1102   Gait/Walking Locomotion Goal 1    Activity/Assistive Device gait (walking locomotion), decrease asymmetrical patterns, decrease fall risk, forward stepping, improve balance and speed at 01/02/2022 1102   Distance 200 feet at 01/02/2022 1102   [place] supervision required at 01/02/2022 1102   Time Frame short-term goal (STG), 1 week at 01/02/2022 1102   Gait/Walking Locomotion Goal 2    Activity/Assistive Device gait (walking locomotion), decrease asymmetrical patterns, decrease fall risk, forward stepping, improve balance and speed at 01/02/2022 1102   Distance 200 feet at 01/02/2022 1102   [place] modified independence at 01/02/2022 1102   Time Frame long-term goal (LTG), 14 days or less, by discharge at 01/02/2022 1102

## 2022-01-02 NOTE — PLAN OF CARE
Problem: Rehabilitation (IRF) Plan of Care  Goal: Plan of Care Review  Outcome: Progressing  Flowsheets (Taken 1/2/2022 0439)  Plan of Care Reviewed With: patient  Outcome Summary: Patient pleasant, cooperative. Had infrequent nonproductive coughs, neb given, O2 RA 90-93%. Continent of urine with bedpan, -300. Incision covered with thick foam tape, dry intact, warm to touch, +1-2 edema noted. Hip precaution maintained. LBM 12/29 per patient, discussed about options of bowel meds, patient refused suppository tonight. Pain manageable with PRN oxy and scheduled dose tylenol, did not require additional dose during the night.

## 2022-01-02 NOTE — PLAN OF CARE
Problem: Rehabilitation (IRF) Plan of Care  Goal: Plan of Care Review  Outcome: Progressing  Flowsheets (Taken 1/2/2022 1120)  Progress: improving  Plan of Care Reviewed With: patient  Outcome Summary: Physical therapy initial evaluation completed this date, goals established per plan of care.     Problem: Rehabilitation (IRF) Plan of Care  Goal: Patient-Specific Goal (Individualized)  Outcome: Progressing  Flowsheets (Taken 1/2/2022 1120)  Patient-Specific Goals (Include Timeframe): TO be independent, to take care of myself and to do things for myself at home

## 2022-01-02 NOTE — PROGRESS NOTES
Patient: Bee Cobb  [facility]  MRN: [id number]  Today's date: 1/2/2022    History of Present Illness  Bee is a 81 y.o. female admitted on 1/1/2022 with Hip fracture requiring operative repair, left, closed, initial encounter (CMS/[facility]) [S72.002A]. Principal problem is Hip fracture requiring operative repair, left, closed, initial encounter (CMS/[facility]).    81 y.o. f who presents with left hip pain after falling out of bed. She had an immediate onset of left hip pain and inability to ambulate.  No LOC.  Radiographic evaluation showed a displaced transcervical femoral neck fracture by CT.  On 12-29-21 she underwent left hip hemiarthroplasty.  Pt is WBAT to LLE and anterior hip precautions x 6 weeks. Ortho recommends aspirin 325mg/day x 6 weeks in addition to pt's home pradaxa which was restarted postoperatively. Pt did well postoperatively, but was noted to have postop anemia with a hgb drop of 12-->9. Per [facility], this is expected due to Pradaxa use.  Pain is being managed with multimodal analgesia; scheduled tylenol, lido patch, PRN oxycodone, ice therapy.  PT/OT/PM&R evaluated pt and recommend acute rehab at discharge.          Past Medical History  Bee has a past medical history of Atrial fibrillation (CMS/[facility]), Glaucoma, History of transfusion, Hyperthyroidism, Lipid disorder, Osteoporosis, PAN (polyarteritis nodosa) (CMS/[facility]), and Thalassemia. H/o recent clavicle fx 9/11/21 Completed course of therapy and has full ROM      OT Vitals    Date/Time Pulse HR Source SpO2 Pt Activity O2 Therapy [facility]   01/02/22 0928 104 Monitor 93 % At rest None (Room air) AEB      OT Pain    Date/Time Pain Type Side/Orientation Location Rating: Rest Rating: Activity Description [facility]   01/02/22 0928 Pain Reassessment left hip 3 3 intermittent;aching tolerated during shower AEB          Prior Living Environment      Most Recent Value   People in Home spouse   Current Living Arrangements  "home, apartment   Home Accessibility not wheelchair accessible   Living Environment Comment Row home 13 steps into house, 9 steps to BR on 2nd floor   Number of Stairs, Main Entrance 9   Surface of Stairs, Main Entrance concrete   Stair Railings, Main Entrance railings on both sides of stairs   Location, Bathroom second floor, must negotiate stairs to access   [place], Bathroom tile floor   Bathroom Access Comment ztub/shower combo, hand held shower, bench and suction grab bars ( checks stability prior to each shower)   Number of Stairs, Within Home, Primary --  [13]   Stair Railings, Within Home, Primary railing on right side (ascending)          Prior Level of Function      Most Recent Value   Dominant Hand left   Ambulation independent   Transferring independent   Toileting independent   Bathing independent   Dressing independent   Eating independent   Assistive Device Currently Used at Home shower chair, grab bar          Occupational Profile      Most Recent Value   Reason for Services/Referral ADL deficit   Successful Occupations wife,- mother   Occupational History/Life Experiences retired [employer]    Performance Patterns independent in all areas   Patient Goals \"I want to be functional, walk and take care of myself\"           [facility] OT Evaluation and Treatment - 01/02/22 0908        OT Time Calculation    Start Time 0900     Stop Time 0930     Time Calculation (min) 30 min        Session Details    Document Type daily treatment/progress note     Mode of Treatment occupational therapy;individual therapy        General Information    General Observations of Patient Intervention provided during initial evaluation to maximize safety, prevent falls        Transfers    Transfers shower transfer;toilet transfer        Toilet Transfer    Transfer Technique stand pivot     [place], Toilet Transfer maximum assist (25-49% patient effort)     Verbal Cues safety;technique     Assistive Device " commode, 3-in-1;walker, 4-wheeled     Comment Based on pt report, Reviewed use of DME during toilet transfer to reduce pain, improve sit to/from stand        Shower Transfer    Transfer Technique stand pivot     [place], Shower Transfer maximum assist (25-49% patient effort)     Verbal Cues safety     Assistive Device grab bars/tub rail;shower chair     Comment wet shower in barrier free stall using bench w back and grab bars        Daily Progress Summary (OT)    Symptoms Noted During/After Treatment fatigue;increased pain     Daily Outcome Statement Pt oriented to objectives and expectations of therapy. Skills services provided during functional transfers w DME recs made to staff.     Recommendations (OT) continue POC                           IRF OT Goals      Most Recent Value   Transfer Goal 1    Activity/Assistive Device toilet at 01/02/2022 0805   [place] moderate assist (50-74% patient effort) at 01/02/2022 0805   Time Frame short-term goal (STG), 5 - 7 days at 01/02/2022 0805   Strategies/Barriers DME assessment at 01/02/2022 0805   Transfer Goal 2    Activity/Assistive Device toilet at 01/02/2022 0805   [place] supervision required at 01/02/2022 0805   Time Frame long-term goal (LTG), 4 weeks at 01/02/2022 0805   Strategies/Barriers DME at 01/02/2022 0805   Transfer Goal 3    Activity/Assistive Device shower at 01/02/2022 0805   [place] moderate assist (50-74% patient effort) at 01/02/2022 0805   Time Frame short-term goal (STG), 5 - 7 days at 01/02/2022 0805   Strategies/Barriers DME at 01/02/2022 0805   Transfer Goal 4    Activity/Assistive Device shower at 01/02/2022 0805   [place] tactile cues required at 01/02/2022 0805   Time Frame long-term goal (LTG), 4 weeks at 01/02/2022 0805   Strategies/Barriers DME at 01/02/2022 0805   Bathing Goal 1    Activity/Assistive Device bathing skills, all at 01/02/2022 0805   [place] minimum assist (75% or more patient effort) at  01/02/2022 0805   Time Frame short-term goal (STG), 5 - 7 days at 01/02/2022 0805   Strategies/Barriers DME at 01/02/2022 0805   Bathing Goal 2    Activity/Assistive Device bathing skills, all at 01/02/2022 0805   [place] tactile cues required at 01/02/2022 0805   Time Frame long-term goal (LTG), 4 weeks at 01/02/2022 0805   Strategies/Barriers DME at 01/02/2022 0805   UB Dressing Goal 1    Activity/Assistive Device upper body dressing at 01/02/2022 0805   [place] minimum assist (75% or more patient effort) at 01/02/2022 0805   Time Frame short-term goal (STG), 5 - 7 days at 01/02/2022 0805   Strategies/Barriers incl s/u at 01/02/2022 0805   UB Dressing Goal 2    Activity/Assistive Device upper body dressing at 01/02/2022 0805   [place] modified independence at 01/02/2022 0805   Time Frame long-term goal (LTG), 4 weeks at 01/02/2022 0805   Strategies/Barriers incl s/u at 01/02/2022 0805   LB Dressing Goal 1    Activity/Assistive Device lower body dressing at 01/02/2022 0805   [place] moderate assist (50-74% patient effort) at 01/02/2022 0805   Time Frame short-term goal (STG), 5 - 7 days at 01/02/2022 0805   Strategies/Barriers AD at 01/02/2022 0805   LB Dressing Goal 2    Activity/Assistive Device lower body dressing at 01/02/2022 0805   [place] supervision required at 01/02/2022 0805   Time Frame long-term goal (LTG), 4 weeks at 01/02/2022 0805   Strategies/Barriers AD at 01/02/2022 0805   Grooming Goal 1    Activity/Assistive Device grooming skills, all at 01/02/2022 0805   [place] tactile cues required at 01/02/2022 0805   Time Frame short-term goal (STG), 5 - 7 days at 01/02/2022 0805   Strategies/Barriers std @ sink at 01/02/2022 0805   Grooming Goal 2    Activity/Assistive Device grooming skills, all at 01/02/2022 0805   [place] modified independence at 01/02/2022 0805   Time Frame long-term goal (LTG), 4 weeks at 01/02/2022 0805   Strategies/Barriers std @ sink at  01/02/2022 0805   Toileting Goal 1    Activity/Assistive Device toileting skills, all at 01/02/2022 0805   [place] minimum assist (75% or more patient effort) at 01/02/2022 0805   Time Frame short-term goal (STG), 5 - 7 days at 01/02/2022 0805   Strategies/Barriers DME at 01/02/2022 0805   Toileting Goal 2    Activity/Assistive Device toileting skills, all at 01/02/2022 0805   [place] supervision required at 01/02/2022 0805   Time Frame long-term goal (LTG), 4 weeks at 01/02/2022 0805   Strategies/Barriers DME at 01/02/2022 0805

## 2022-01-02 NOTE — HOSPITAL COURSE
History of Present Illness  Bee is a 81 y.o. female admitted on 1/1/2022 with Hip fracture requiring operative repair, left, closed, initial encounter (CMS/[facility]) [S72.002A]. Principal problem is Hip fracture requiring operative repair, left, closed, initial encounter (CMS/[facility]).    81 y.o. f who presents with left hip pain after falling out of bed. She had an immediate onset of left hip pain and inability to ambulate.  No LOC.  Radiographic evaluation showed a displaced transcervical femoral neck fracture by CT.  On 12-29-21 she underwent left hip hemiarthroplasty.  Pt is WBAT to LLE and anterior hip precautions x 6 weeks. Ortho recommends aspirin 325mg/day x 6 weeks in addition to pt's home pradaxa which was restarted postoperatively. Pt did well postoperatively, but was noted to have postop anemia with a hgb drop of 12-->9. Per [facility], this is expected due to Pradaxa use.  Pain is being managed with multimodal analgesia; scheduled tylenol, lido patch, PRN oxycodone, ice therapy.  PT/OT/PM&R evaluated pt and recommend acute rehab at discharge.          Past Medical History  Bee has a past medical history of Atrial fibrillation (CMS/[facility]), Glaucoma, History of transfusion, Hyperthyroidism, Lipid disorder, Osteoporosis, PAN (polyarteritis nodosa) (CMS/[facility]), and Thalassemia. H/o recent clavicle fx 9/11/21 Completed course of therapy and has full ROM

## 2022-01-02 NOTE — ASSESSMENT & PLAN NOTE
PCP Dr. Kanchan Zamarripa after discharge  [phone]     Cardiology after discharge  Ortho Dr. Pena in 2 weeks

## 2022-01-02 NOTE — H&P
PMR H&P  Admitting Diagnosis: Hip fracture requiring operative repair, left, closed, initial encounter (CMS/Formerly [facility]) [S72.002A]  HPI     Bee Cobb is admitted to [facility] for comprehensive inpatient rehabilitation for Ortho with functional deficits in mobility; motor dysfunction; safety; self-care. Patient is receiving the following services: physical therapy; occupational therapy; medical consultative services.  Mrs. Cobb is an 81-year-old female who presented to [facility], [place] on 12/29/2021 with complaints of left hip pain after falling out of bed.  X-ray left hip revealed left femoral neck fracture.  CT left hip confirmed displaced fracture left femoral neck.  X-ray left knee was negative.  Head CT was negative for skull fracture or intracranial hemorrhage.  CT cervical spine was negative for fracture dislocation.  Dr. Pena from orthopedic surgery was consulted and performed left total hip replacement on 12/29/2021.  She is cleared for weightbearing to left lower extremity with anterior hip precautions for 6 weeks.  She is anticoagulated with Pradaxa for atrial fibrillation.  Aspirin 325 mg daily for 6 weeks was added for DVT prophylaxis on top of Pradaxa.  Postoperative anemia stabilized at 8.6.  She was ultimately determined to be medically stable for transfer to [facility] on 1/1/2022 for an acute inpatient rehabilitation program to address deficits related to left hip fracture status post total hip replacement.    Admits to persistent L hip pain and decreased ROM, gait dysfunction.  She is urinating without difficulty and moved her bowels yesterday.  Current pain meds helping.    Active medical management is required for   Patient Active Problem List   Diagnosis   • Chronic diastolic CHF (congestive heart failure) (CMS/Formerly [facility])   • COPD (chronic obstructive pulmonary disease) (CMS/Formerly [facility])   • Glaucoma   • Hypertension   • Hyperlipidemia   • Hypothyroidism   • Malignant  melanoma (CMS/HCC)   • Mycobacterium avium-intracellulare complex (CMS/HCC)   • Paroxysmal atrial fibrillation (CMS/HCC)   • Alpha trait thalassemia   • Closed fracture of left hip (CMS/HCC)   • Postoperative anemia   • S/P AVR (aortic valve replacement)   • Hip fracture requiring operative repair, left, closed, initial encounter (CMS/HCC)   • Pain   • Risk for falls   • At high risk for pressure injury of skin   • Follow up       Medical History:   Past Medical History:   Diagnosis Date   • Atrial fibrillation (CMS/HCC)    • Glaucoma    • History of transfusion    • Hyperthyroidism    • Lipid disorder    • Osteoporosis    • PAN (polyarteritis nodosa) (CMS/HCC)    • Thalassemia        Surgical History:   Past Surgical History:   Procedure Laterality Date   • CARDIAC SURGERY     • TONSILLECTOMY         Social History:   Social History     Social History Narrative   • Not on file     Prior Living Arrangements  People in Home: spouse  Name(s) of People in Home: Ok  Current Living Arrangements: home; apartment  Home Accessibility: not wheelchair accessible  Living Environment Comment: Row home 13 steps into house, 9 steps to BR on 2nd floor  Number of Stairs, Main Entrance: 9  Surface of Stairs, Main Entrance: concrete  Stair Railings, Main Entrance: railings on both sides of stairs  Location, Bathroom: second floor, must negotiate stairs to access  [place], Bathroom: tile floor  Bathroom Access Comment: ztub/shower combo, hand held shower, bench and suction grab bars ( checks stability prior to each shower)  Stair Railings, Within Home, Primary: railing on right side (ascending)    Premorbid Function  Dominant Hand: left  Ambulation: independent  Transferring: independent  Toileting: independent  Bathing: independent  Dressing: independent  Eating: independent  Communication: understands/communicates without difficulty  Swallowing: swallows foods/liquids without difficulty  Baseline Diet/Method of  Nutritional Intake: no diet restrictions  Past History of Dysphagia: No hx of dysphagia  Assistive Device/Animal Currently Used at Home: shower chair; grab bar  Prior Level of Function Comment: independent for ADL and functional mobility no AD      Family History: No family history on file.  History also provided by:     Allergies: Atorvastatin; Penicillins; Shellfish derived; Iodinated contrast media; Rosuvastatin; Covid-19 vaccine, mrna, [id number], lnp-s (moderna); and Covid-19 vaccine, [id number], lnp-s (moderna)       Medication List      ASK your doctor about these medications    acetaminophen 500 mg tablet  Commonly known as: TYLENOL  Take 2 tablets (1,000 mg total) by mouth every 8 (eight) hours.  Dose: 1,000 mg     albuterol HFA 90 mcg/actuation inhaler  Commonly known as: VENTOLIN HFA  Inhale 2 puffs every 4 (four) hours as needed for wheezing or shortness of breath.  Dose: 2 puff     aspirin 325 mg EC tablet  Take 1 tablet (325 mg total) by mouth daily.  Dose: 325 mg     azithromycin 250 mg tablet  Commonly known as: ZITHROMAX  Take 250 mg by mouth daily. Take 2 tablets the first day, then 1 tablet daily for 4 days.  Dose: 250 mg     cholecalciferol (vitamin D3) 25 mcg (1,000 unit) capsule  Take by mouth.     clindamycin 150 mg capsule  Commonly known as: CLEOCIN  Take 300 mg by mouth daily.  Dose: 300 mg     denosumab 120 mg/1.7 mL (70 mg/mL) injection  Commonly known as: XGEVA  Inject 120 mg under the skin once. Every 6 month  Dose: 120 mg     digoxin 250 mcg (0.25 mg) tablet  Commonly known as: LANOXIN  Take 250 mcg by mouth once daily.  Dose: 250 mcg     dilTIAZem  mg 24 hr capsule  Commonly known as: CARDIZEM CD  Take 240 mg by mouth daily.  Dose: 240 mg     dorzolamide 2 % ophthalmic solution  Commonly known as: TRUSOPT  Administer 1 drop into both eyes 2 (two) times a day.  Dose: 1 drop     fluticasone  mcg/actuation inhaler  Commonly known as: FLOVENT HFA  Inhale 1 puff 2 (two) times a  day.   Rinse mouth with water after use to reduce aftertaste and incidence of candidiasis.   Do not swallow.  For patients not on a ventilator, a spacer is recommended to be used with this medication/inhaler.  Dose: 1 puff     latanoprost 0.005 % ophthalmic solution  Commonly known as: XALATAN  Administer 1 drop into both eyes nightly.  Dose: 1 drop     multivitamin tablet  Commonly known as: THERAGRAN  Take 1 tablet by mouth.  Dose: 1 tablet     oxyCODONE 5 mg immediate release tablet  Commonly known as: ROXICODONE  Take 1 tablet (5 mg total) by mouth every 6 (six) hours as needed (severe pain (7-10) on a 0-10 pain scale) for up to 5 days.  Dose: 5 mg     pantoprazole 40 mg EC tablet  Commonly known as: PROTONIX  Take 1 tablet (40 mg total) by mouth daily Indications: stress ulcer prevention. While on aspirin  Dose: 40 mg     PRADAXA 150 mg capsu  Take 150 mg by mouth 2 (two) times a day.  Dose: 150 mg  Generic drug: dabigatran etexilate     rifAMPin 300 mg capsule  Commonly known as: RIFADIN  Take 300 mg by mouth daily.  Dose: 300 mg     rosuvastatin 5 mg tablet  Commonly known as: CRESTOR  Take 5 mg by mouth daily.  Dose: 5 mg     sennosides-docusate sodium 8.6-50 mg  Commonly known as: SENOKOT-S  Take 1 tablet by mouth 2 (two) times a day.  Dose: 1 tablet     SPIRIVA RESPIMAT 2.5 mcg/actuation mist inhaler  Inhale 2 puffs daily.  Dose: 2 puff  Generic drug: tiotropium bromide     SYNTHROID 75 mcg tablet  Take 75 mcg by mouth once daily.  Dose: 75 mcg  Generic drug: levothyroxine          Review of Systems  All other systems reviewed and negative except as noted in the HPI.    Objective     Vital Signs for the last 24 hours:  Temp:  [36.8 °C (98.3 °F)-36.9 °C (98.5 °F)] 36.9 °C (98.4 °F)  Heart Rate:  [] 107  Resp:  [20] 20  BP: (140-187)/(60-76) 165/72    Physical Exam  Physical Exam  Constitutional:       Appearance: Normal appearance. She is well-developed.   HENT:      Head: Normocephalic and  atraumatic.   Eyes:      Conjunctiva/sclera: Conjunctivae normal.      Pupils: Pupils are equal, round, and reactive to light.   Cardiovascular:      Rate and Rhythm: Normal rate. Rhythm irregularly irregular.   Pulmonary:      Effort: Pulmonary effort is normal.      Breath sounds: Normal breath sounds.   Abdominal:      General: Bowel sounds are normal.      Palpations: Abdomen is soft.   Genitourinary:     Comments: No lewis catheter  Musculoskeletal:         General: Normal range of motion.      Comments: Decreased ROM L hip and knee secondary to pain and swelling   Skin:     General: Skin is warm and dry.          Neurological:      Mental Status: She is alert and oriented to person, place, and time.      Cranial Nerves: No cranial nerve deficit.      Sensory: No sensory deficit.      Motor: Weakness present. No tremor, abnormal muscle tone or seizure activity.      Coordination: Coordination normal.      Gait: Gait abnormal.      Comments: Fluent, follows commands, facial mm symmetric, tongue midline, S LT intact, strength 5/5 UEs and RLE and 1/5 Hip flexors, Abd and 2/5 KF, KE and 5/5 ankle DF, PF LLE.  No fix or drift, tone 0/4, no clonus.   Psychiatric:         Mood and Affect: Mood is anxious.         Speech: Speech normal.         Behavior: Behavior normal.           Current Function  Mobility  Gait  [place], Gait: minimum assist (75% or more patient effort)  Assistive Device: gait belt; walker, front-wheeled  Comment (Gait/Stairs): for safety, steadying and balance; patient amb WC> bed at start of session and to/from bed to bathroom    Stairs  [place], Stairs: safety considerations; unable to assess; not tested  Comment: Deferred d/t increased LLE pain, strength/balance deficits and increased fatigue; TBA as appropriate/able    Wheelchair  Comment, Wheelchair Mobility: Not an anticipated need for d/c, patient expected to be amb level    Transfers  [place], Roll Left: safety considerations;  unable to assess; not tested  [place], Roll Right: safety considerations; unable to assess; not tested  [place], Supine to Sit: moderate assist (50-74% patient effort)  Verbal Cues (Supine to Sit): hand placement; preparatory posture; technique  [place], Sit to Supine: moderate assist (50-74% patient effort)  Assistive Device: bed rails  Comment (Bed Mobility): Assist for trunk and LLE management into and OOB with VCs for technique and increased time provided, rolling L/R deferred d/t recent hip fx with THPs  [place], Bed to Chair: minimum assist (75% or more patient effort); 2 person assist  Verbal Cues: hand placement; proper use of assistive device; safety; technique  Assistive Device: walker, front-wheeled  [place], Chair to Bed: not tested  [place], Sit to Stand Transfer: minimum assist (75% or more patient effort)  Verbal Cues: hand placement; proper use of assistive device; safety; technique  Assistive Device: gait belt; walker, front-wheeled  Comment: For steadying, anterior weight shift provided via gait belt  [place], Stand to Sit Transfer: minimum assist (75% or more patient effort)  Verbal Cues: hand placement; proper use of assistive device; safety; technique  Assistive Device: gait belt; walker, front-wheeled  Comment: for steadying and controlled lowering via gait belt  [place], Stand Pivot/Stand Step Transfer: minimum assist (75% or more patient effort)  Verbal Cues: hand placement; safety; technique  Assistive Device: gait belt; walker, front-wheeled  Comment: for safety, steadying and balance via amb approach    Transfer Technique: stand pivot  [place], Toilet Transfer: minimum assist (75% or more patient effort)  Verbal Cues: hand placement; proper use of assistive device; safety; technique  Assistive Device: commode, 3-in-1; gait belt; walker, front-wheeled  Comment: via amb approach to padded commode over toilet, Min A via gait belt provided for  safety, steadying and balance    Self Care  Comment: seated in recliner, OT educated on adapted techniques and AE for increased independence, is receptive though will benefit from followup edu  [place]: dependent (less than 25% patient effort)  Adaptive Equipment: commode, 3-in-1; grab bar/safety frame; accessible height toilet  Setup Assistance: adaptive equipment setup  Comment: +purewick, dep for hygiene while standing with RW  [place]: set up  Comment: Pt completed setup in recliner  [place]: independent  Comment: sitting up in recliner chair    Cognition  Affect/Mental Status (Cognition): WFL  Orientation Status (Cognition): oriented x 4  Follows Commands (Cognition): WFL; follows one-step commands; follows two-step commands  Cognitive Function: executive function deficit  Executive Function Deficit (Cognition): information processing  Comment, Cognition: Alert, oriented, able to follow simple instructions, BIMS  Immediate recall 3/3, Delayed recall 3/3, safety awareness appears WFL but to be further assessed.    Communication       Labs  I have reviewed the patient's labs.  Significant abnormals are hgb 8.1.    Imaging  Not applicable        Assessment/Plan     Hip fracture requiring operative repair, left, closed, initial encounter (CMS/Formerly [facility])  Mrs. Cobb is an 81-year-old female who presented to [facility] on 12/29/2021 with complaints of left hip pain after falling out of bed.  X-ray left hip revealed left femoral neck fracture.  CT left hip confirmed displaced fracture left femoral neck.  X-ray left knee was negative.  Head CT was negative for skull fracture or intracranial hemorrhage.  CT cervical spine was negative for fracture dislocation.  Dr. Pena from orthopedic surgery was consulted and performed left total hip replacement on 12/29/2021.  She is cleared for weightbearing to left lower extremity with anterior hip precautions for 6 weeks.  She is anticoagulated with Pradaxa  for atrial fibrillation.  Aspirin 325 mg daily for 6 weeks was added for DVT prophylaxis on top of Pradaxa.  Postoperative anemia stabilized at 8.6.  She was ultimately determined to be medically stable for transfer to [facility] on 1/1/2022 for an acute inpatient rehabilitation program to address deficits related to left hip fracture status post total hip replacement.    She is weightbearing as tolerated.  She will be maintained on left hip precautions and aspirin for 6 weeks.  She will participate in 3 hours of physical and occupational therapy as well as receive 24-hour rehab nursing care.  She will follow-up with Dr. Pena from orthopedic surgery in 2 weeks.    Hypothyroidism  Continue synthroid    Glaucoma  Trusopt, xalatan gtt    COPD (chronic obstructive pulmonary disease) (CMS/HCC)  Cont pulmicort, spiriva    Paroxysmal atrial fibrillation (CMS/HCC)  Rate controlled on dig and cardizem, anti-coag on pradaxa    Hypertension  Cont cardizem    Postoperative anemia  hgb 8.6 at time of transfer.  Transfuse for hgb <7.0.  Continue to monitor cbc    Pain  Tylenol, oxycodone    Risk for falls  wean off restraints as able    At high risk for pressure injury of skin  Turns q2hr    Follow up  PCP Dr. Kanchan Zamarripa after discharge  [phone]     Cardiology after discharge  Ortho Dr. Pena in 2 weeks        Risk for Complications  Falls: High      Expected Level of Function  Expected Functional Improvement: mobility; motor dysfunction; safety; self-care  Self-Care: Independent  Sphincter Control: Independent  Transfers: Independent  Locomotion: Independent  Communication: Independent  Social Cognition: Independent      Anticipated Discharge Plan  Anticipated Discharge Disposition: acute rehab/Inpatient Rehab Facility  Type of Home Care Services: nursing; home PT; home OT      Plan of care was discussed with patient  I have reviewed the pre-admission screening and I note the following changes anemia,  hgb dropped to 8.1.  Expected length of stay: 5 days        Code Status: Full Code    Post Admission Physician Evaluation    Bee Cobb is admitted to [facility] for comprehensive inpatient rehabilitation for Ortho with functional deficits in mobility; motor dysfunction; safety; self-care. Patient is receiving the following services: physical therapy; occupational therapy; medical consultative services.    Active medical management is required for  Patient Active Problem List   Diagnosis   • Chronic diastolic CHF (congestive heart failure) (CMS/Formerly [facility])   • COPD (chronic obstructive pulmonary disease) (CMS/[facility]ly [facility])   • Glaucoma   • Hypertension   • Hyperlipidemia   • Hypothyroidism   • Malignant melanoma (CMS/[facility]ly [facility])   • Mycobacterium avium-intracellulare complex (CMS/[facility]ly [facility])   • Paroxysmal atrial fibrillation (CMS/[facility]ly [facility])   • Alpha trait thalassemia   • Closed fracture of left hip (CMS/[facility]ly [facility])   • Postoperative anemia   • S/P AVR (aortic valve replacement)   • Hip fracture requiring operative repair, left, closed, initial encounter (CMS/HCC)   • Pain   • Risk for falls   • At high risk for pressure injury of skin   • Follow up       Premorbid Function  Dominant Hand: left  Ambulation: independent  Transferring: independent  Toileting: independent  Bathing: independent  Dressing: independent  Eating: independent  Communication: understands/communicates without difficulty  Swallowing: swallows foods/liquids without difficulty  Baseline Diet/Method of Nutritional Intake: no diet restrictions  Past History of Dysphagia: No hx of dysphagia  Assistive Device/Animal Currently Used at Home: shower chair; grab bar  Prior Level of Function Comment: independent for ADL and functional mobility no AD      Current Function  Gait  [place], Gait: minimum assist (75% or more patient effort)  Assistive Device: gait belt; walker, front-wheeled  Comment (Gait/Stairs): for safety, steadying and balance; patient amb WC> bed  at start of session and to/from bed to bathroom    Stairs  [place], Stairs: safety considerations; unable to assess; not tested  Comment: Deferred d/t increased LLE pain, strength/balance deficits and increased fatigue; TBA as appropriate/able    Wheelchair  Comment, Wheelchair Mobility: Not an anticipated need for d/c, patient expected to be amb level    Transfers  [place], Roll Left: safety considerations; unable to assess; not tested  [place], Roll Right: safety considerations; unable to assess; not tested  [place], Supine to Sit: moderate assist (50-74% patient effort)  Verbal Cues (Supine to Sit): hand placement; preparatory posture; technique  [place], Sit to Supine: moderate assist (50-74% patient effort)  Assistive Device: bed rails  Comment (Bed Mobility): Assist for trunk and LLE management into and OOB with VCs for technique and increased time provided, rolling L/R deferred d/t recent hip fx with THPs  [place], Bed to Chair: minimum assist (75% or more patient effort); 2 person assist  Verbal Cues: hand placement; proper use of assistive device; safety; technique  Assistive Device: walker, front-wheeled  [place], Chair to Bed: not tested  [place], Sit to Stand Transfer: minimum assist (75% or more patient effort)  Verbal Cues: hand placement; proper use of assistive device; safety; technique  Assistive Device: gait belt; walker, front-wheeled  Comment: For steadying, anterior weight shift provided via gait belt  [place], Stand to Sit Transfer: minimum assist (75% or more patient effort)  Verbal Cues: hand placement; proper use of assistive device; safety; technique  Assistive Device: gait belt; walker, front-wheeled  Comment: for steadying and controlled lowering via gait belt  [place], Stand Pivot/Stand Step Transfer: minimum assist (75% or more patient effort)  Verbal Cues: hand placement; safety; technique  Assistive Device: gait belt; walker,  front-wheeled  Comment: for safety, steadying and balance via amb approach    Transfer Technique: stand pivot  [place], Toilet Transfer: minimum assist (75% or more patient effort)  Verbal Cues: hand placement; proper use of assistive device; safety; technique  Assistive Device: commode, 3-in-1; gait belt; walker, front-wheeled  Comment: via amb approach to padded commode over toilet, Min A via gait belt provided for safety, steadying and balance    Self Care  Comment: seated in recliner, OT educated on adapted techniques and AE for increased independence, is receptive though will benefit from followup edu  [place]: dependent (less than 25% patient effort)  Adaptive Equipment: commode, 3-in-1; grab bar/safety frame; accessible height toilet  Setup Assistance: adaptive equipment setup  Comment: +ravindra johns for hygiene while standing with RW  [place]: set up  Comment: Pt completed setup in recliner  [place]: independent  Comment: sitting up in recliner chair    Cognition  Affect/Mental Status (Cognition): WFL  Orientation Status (Cognition): oriented x 4  Follows Commands (Cognition): WFL; follows one-step commands; follows two-step commands  Cognitive Function: executive function deficit  Executive Function Deficit (Cognition): information processing  Comment, Cognition: Alert, oriented, able to follow simple instructions, BIMS  Immediate recall 3/3, Delayed recall 3/3, safety awareness appears WFL but to be further assessed.    Communication     Swallow       Risk for Complications  Falls: High      Expected Level of Function  Expected Functional Improvement: mobility; motor dysfunction; safety; self-care  Self-Care: Independent  Sphincter Control: Independent  Transfers: Independent  Locomotion: Independent  Communication: Independent  Social Cognition: Independent      Anticipated Discharge Plan  Anticipated Discharge Disposition: acute rehab/Inpatient Rehab Facility  Type of Home Care  Services: nursing; home PT; home OT      I have reviewed the pre-admission screening and I note the following changes anemia, hgb dropped to 8.1.    Expected length of stay: 5 days

## 2022-01-02 NOTE — PROGRESS NOTES
Patient: Bee Cobb  Location: [facility]  MRN: [id number]  Today's date: 1/2/2022    History of Present Illness  Bee is a 81 y.o. female admitted on 1/1/2022 with Hip fracture requiring operative repair, left, closed, initial encounter (CMS/[facility]) [S72.002A]. Principal problem is Hip fracture requiring operative repair, left, closed, initial encounter (CMS/[facility]).    81 y.o. f who presents with left hip pain after falling out of bed. She had an immediate onset of left hip pain and inability to ambulate.  No LOC.  Radiographic evaluation showed a displaced transcervical femoral neck fracture by CT.  On 12-29-21 she underwent left hip hemiarthroplasty.  Pt is WBAT to LLE and anterior hip precautions x 6 weeks. Ortho recommends aspirin 325mg/day x 6 weeks in addition to pt's home pradaxa which was restarted postoperatively. Pt did well postoperatively, but was noted to have postop anemia with a hgb drop of 12-->9. Per [place], this is expected due to Pradaxa use.  Pain is being managed with multimodal analgesia; scheduled tylenol, lido patch, PRN oxycodone, ice therapy.  PT/OT/PM&R evaluated pt and recommend acute rehab at discharge.          Past Medical History  Bee has a past medical history of Atrial fibrillation (CMS/[facility]), Glaucoma, History of transfusion, Hyperthyroidism, Lipid disorder, Osteoporosis, PAN (polyarteritis nodosa) (CMS/[facility]), and Thalassemia. H/o recent clavicle fx 9/11/21 Completed course of therapy and has full ROM      OT Vitals    Date/Time Pulse HR Source SpO2 Pt Activity O2 Therapy BP BP Location BP Method Pt Position [facility]   01/02/22 0808 102 Monitor 95 % At rest None (Room air) 140/53 Left upper arm Manual Sitting AEB      OT Pain    Date/Time Pain Type Side/Orientation Location Rating: Rest Rating: Activity Description Interventions [facility]   01/02/22 0808 Pain Assessment left hip 4 4 intermittent;aching position adjusted;premedicated for activity AEB     "      Prior Living Environment      Most Recent Value   People in Home spouse   Current Living Arrangements home, apartment   Home Accessibility not wheelchair accessible   Living Environment Comment Row home 13 steps into house, 9 steps to BR on 2nd floor   Number of Stairs, Main Entrance 9   Surface of Stairs, Main Entrance concrete   Stair Railings, Main Entrance railings on both sides of stairs   Location, Bathroom second floor, must negotiate stairs to access   [place], Bathroom tile floor   Bathroom Access Comment ztub/shower combo, hand held shower, bench and suction grab bars ( checks stability prior to each shower)   Number of Stairs, Within Home, Primary --  [13]   Stair Railings, Within Home, Primary railing on right side (ascending)          Prior Level of Function      Most Recent Value   Dominant Hand left   Ambulation independent   Transferring independent   Toileting independent   Bathing independent   Dressing independent   Eating independent   Assistive Device Currently Used at Home shower chair, grab bar          Occupational Profile      Most Recent Value   Reason for Services/Referral ADL deficit   Successful Occupations wife,- mother   Occupational History/Life Experiences retired [employer]    Performance Patterns independent in all areas   Patient Goals \"I want to be functional, walk and take care of myself\"           [facility] OT Evaluation and Treatment - 01/02/22 0805        OT Time Calculation    Start Time 0800     Stop Time 0900     Time Calculation (min) 60 min        Session Details    Document Type initial evaluation     Mode of Treatment occupational therapy;individual therapy        General Information    Patient Profile Reviewed yes     General Observations of Patient Pt received sitting up in bed awake     Existing Precautions/Restrictions fall;hip;weight bearing     Limitations/Impairments hearing;safety/cognitive;visual        Weight-bearing Status    Left LE " Weight-Bearing Status weight-bearing as tolerated (WBAT)        Living Environment    Name(s) of People in Home Ok     Primary Care Provided by self        Safety Awareness/Health Promotion    Fall Prevention demonstrates fall risk prevention techniques;demonstrates safety awareness related to fall risk;implements strategies to prevent falls        Coping/Community Reintegration    Observed Emotional State calm;cooperative        Cognition/Psychosocial    Comment, Cognition Alert, oriented, able to follow simple instructions, BIMS  Immediate recall 3/3, Delayed recall 3/3, safety awareness appears WFL but to be further assessed.        Hearing Assessment    Hearing Status hearing aid, left;hearing aid, right        Vision Assessment/Intervention    Visual Impairment/Limitations WFL;corrective lenses for reading        Sensory Assessment (Somatosensory)    Sensory Assessment (Somatosensory) UE sensation intact;left UE;right UE     Left UE Sensory Assessment light touch awareness;proprioception        Range of Motion (ROM)    Range of Motion ROM is WFL;bilateral upper extremities     Comment: Range of Motion recent h/o L clavical fx but received therapy and has full AROM        Strength (Manual Muscle Testing)    Strength (Manual Muscle Testing) strength is WFL;bilateral upper extremities     Left Upper Extremity Strength left UE strength is WFL     Shoulder, Left (Strength) 4-/5     Elbow, Left (Strength) 4/5     Wrist, Left (Strength) 4/5     Hand, Left (Strength) 4/5     Right Upper Extremity Strength right UE strength is WFL     Shoulder, Right (Strength) 4-/5     Elbow, Right (Strength) 4/5     Wrist, Right (Strength) 4/5     Hand, Right (Strength) 4/5        Transfers    Transfers toilet transfer;shower transfer     Comment unsafe without intervention of DME        Toilet Transfer    Comment Unsafe without intervention and training with DME        Shower Transfer    Comment Unsafe without intervention and  training with [facility]        Basic Activities of Daily Living (BADLs)    Energy Conservation Techniques activity adapted to sitting;activity pacing encouraged;asking for necessary assistance promoted;breathing techniques encouraged;correct body mechanics utilized;correct posture facilitated;equipment and device use facilitated;prioritizing activities promoted;regular rest breaks encouraged;relaxation techniques promoted;storing supplies in easy reach encouraged        Bathing    Self-Performance chest;left arm;right arm;abdomen;front perineal area;left upper leg;right upper leg     [place] Assistance buttocks;left lower leg, including foot;right lower leg, including foot     [place] moderate assist (50-74% patient effort)     Position supported sitting;supported standing     Setup Assistance obtain supplies     Adaptive Equipment grab bar/tub rail;hand-held shower spray hose;shower chair     Comment seated for safety        Upper Body Dressing    Self-Performance threads left arm, bra/undershirt;threads right arm, bra/undershirt;pulls bra/undershirt over head/around back;pulls bra/undershirt down/adjusts;threads left arm, shirt;threads right arm, shirt;pulls shirt over head/around back;pulls shirt down/adjusts     [place] Assistance pulls bra/undershirt down/adjusts;pulls shirt down/adjusts     [place] minimum assist (75% or more patient effort);set up;1 person assist     Position supported sitting     Adaptive Equipment none        Lower Body Dressing    Self-Performance pulls underpants up or down;pulls pants/shorts up or down     [place] Assistance obtains clothes;threads left leg, underpants;threads right leg, underpants;pulls underpants up or down;threads left leg, pants/shorts;threads right leg, pants/shorts;pulls pants/shorts up or down;dons/doffs left sock;dons/doffs right sock     [place] maximum assist (25-49% patient effort);1 person assist;safety considerations     Position supported sitting      Adaptive Equipment none     [place], Footwear maximum assist (25-49% patient effort)     Comment shoes N/T        Grooming    [place] Assistance washes, rinses and dries face;washes, rinses and dries hands;brushes/baker hair;oral care (brushing teeth, cleaning dentures)     [place] close supervision;1 person assist;safety considerations     Position supported sitting     Setup Assistance obtain supplies     Adaptive Equipment none     [place], Oral Hygiene close supervision;1 person assist;safety considerations     Comment seated for safety, energy conservation        Toileting    [place] dependent (less than 25% patient effort);1 person assist     Position supported sitting;supported standing     Setup Assistance adaptive equipment setup;obtain supplies     Adaptive Equipment commode, 3-in-1     Comment recommend commode over toilet        BADL Safety/Performance    Impairments, BADL Safety/Performance balance;strength;trunk/postural control        Therapy Assessment/Plan (OT)    Functional Level at Time of Evaluation (OT) max A ADL/unsafe bathroom txs     OT Diagnosis ADL deficit     Rehab Potential/Prognosis (OT) good, to achieve stated therapy goals     Frequency of Treatment (OT) 5-7 times per week;60-90 minutes per day     Estimated Duration of Therapy (OT) 4 weeks     Problem List (OT) balance;coordination;mobility;motor control;range of motion (ROM);strength;postural control     Activity Limitations Related to Problem List unable to ambulate safely;unable to transfer safely;BADLs not performed adequately or safely;IADLs not performed adequately or safely        Daily Progress Summary (OT)    Symptoms Noted During/After Treatment fatigue;increased pain     Progress Toward Functional Goals (OT) progressing toward functional goals as expected     Daily Outcome Statement Initial eval completed to include ADL assessment in areas of bathing, dressing, grooming, toileting. Pt is unsafe for  bathroom transfers without skilled intervention of OT for complete AD/DME assessment and training. Prior to admission, pt was independent ADL and living w supportive husb. Pt now requires max A for basic tasks due to limitations in balance, pain, THP. Pt is a good candidate for intensive in-pt rehab to improve safety and decrease risk of falls in prep for d/c to least restrictive environment.     Barriers to Overall Progress (OT) balance, THP, pain, general weakness/endurance, anxiety     Recommendations (OT) begin rehab process                      Education Documentation  Safety, taught by Chantelle Murillo OT at 1/2/2022  4:51 PM.  Learner: Patient  Readiness: Acceptance  Method: Explanation, Demonstration  Response: Verbalizes Understanding, Demonstrated Understanding, Needs Reinforcement  Comment: Oriented to objective of rehab and safety protocal, use of call bell to minimize falls          IR[facility] OT Goals      Most Recent Value   Transfer Goal 1    Activity/Assistive Device toilet at 01/02/2022 0805   [place] moderate assist (50-74% patient effort) at 01/02/2022 0805   Time Frame short-term goal (STG), 5 - 7 days at 01/02/2022 0805   Strategies/Barriers DME assessment at 01/02/2022 0805   Transfer Goal 2    Activity/Assistive Device toilet at 01/02/2022 0805   [place] supervision required at 01/02/2022 0805   Time Frame long-term goal (LTG), 4 weeks at 01/02/2022 0805   Strategies/Barriers DME at 01/02/2022 0805   Transfer Goal 3    Activity/Assistive Device shower at 01/02/2022 0805   [place] moderate assist (50-74% patient effort) at 01/02/2022 0805   Time Frame short-term goal (STG), 5 - 7 days at 01/02/2022 0805   Strategies/Barriers DME at 01/02/2022 0805   Transfer Goal 4    Activity/Assistive Device shower at 01/02/2022 0805   [place] tactile cues required at 01/02/2022 0805   Time Frame long-term goal (LTG), 4 weeks at 01/02/2022 0805   Strategies/Barriers DME at 01/02/2022 0805   Bathing  Goal 1    Activity/Assistive Device bathing skills, all at 01/02/2022 0805   [place] minimum assist (75% or more patient effort) at 01/02/2022 0805   Time Frame short-term goal (STG), 5 - 7 days at 01/02/2022 0805   Strategies/Barriers DME at 01/02/2022 0805   Bathing Goal 2    Activity/Assistive Device bathing skills, all at 01/02/2022 0805   [place] tactile cues required at 01/02/2022 0805   Time Frame long-term goal (LTG), 4 weeks at 01/02/2022 0805   Strategies/Barriers DME at 01/02/2022 0805   UB Dressing Goal 1    Activity/Assistive Device upper body dressing at 01/02/2022 0805   [place] minimum assist (75% or more patient effort) at 01/02/2022 0805   Time Frame short-term goal (STG), 5 - 7 days at 01/02/2022 0805   Strategies/Barriers incl s/u at 01/02/2022 0805   UB Dressing Goal 2    Activity/Assistive Device upper body dressing at 01/02/2022 0805   [place] modified independence at 01/02/2022 0805   Time Frame long-term goal (LTG), 4 weeks at 01/02/2022 0805   Strategies/Barriers incl s/u at 01/02/2022 0805   LB Dressing Goal 1    Activity/Assistive Device lower body dressing at 01/02/2022 0805   [place] moderate assist (50-74% patient effort) at 01/02/2022 0805   Time Frame short-term goal (STG), 5 - 7 days at 01/02/2022 0805   Strategies/Barriers AD at 01/02/2022 0805   LB Dressing Goal 2    Activity/Assistive Device lower body dressing at 01/02/2022 0805   [place] supervision required at 01/02/2022 0805   Time Frame long-term goal (LTG), 4 weeks at 01/02/2022 0805   Strategies/Barriers AD at 01/02/2022 0805   Grooming Goal 1    Activity/Assistive Device grooming skills, all at 01/02/2022 0805   [place] tactile cues required at 01/02/2022 0805   Time Frame short-term goal (STG), 5 - 7 days at 01/02/2022 0805   Strategies/Barriers std @ sink at 01/02/2022 0805   Grooming Goal 2    Activity/Assistive Device grooming skills, all at 01/02/2022 0805   [place] modified  independence at 01/02/2022 0805   Time Frame long-term goal (LTG), 4 weeks at 01/02/2022 0805   Strategies/Barriers std @ sink at 01/02/2022 0805   Toileting Goal 1    Activity/Assistive Device toileting skills, all at 01/02/2022 0805   [place] minimum assist (75% or more patient effort) at 01/02/2022 0805   Time Frame short-term goal (STG), 5 - 7 days at 01/02/2022 0805   Strategies/Barriers DME at 01/02/2022 0805   Toileting Goal 2    Activity/Assistive Device toileting skills, all at 01/02/2022 0805   [place] supervision required at 01/02/2022 0805   Time Frame long-term goal (LTG), 4 weeks at 01/02/2022 0805   Strategies/Barriers DME at 01/02/2022 0805

## 2022-01-03 ENCOUNTER — APPOINTMENT (INPATIENT)
Dept: PHYSICAL THERAPY | Facility: REHABILITATION | Age: 82
DRG: 560 | End: 2022-01-03
Payer: MEDICARE

## 2022-01-03 ENCOUNTER — APPOINTMENT (INPATIENT)
Dept: OCCUPATIONAL THERAPY | Facility: REHABILITATION | Age: 82
DRG: 560 | End: 2022-01-03
Payer: MEDICARE

## 2022-01-03 ENCOUNTER — APPOINTMENT (OUTPATIENT)
Dept: PSYCHOLOGY | Facility: CLINIC | Age: 82
End: 2022-01-03
Attending: PHYSICAL MEDICINE & REHABILITATION
Payer: MEDICARE

## 2022-01-03 LAB
ANISOCYTOSIS BLD QL SMEAR: ABNORMAL
BASOPHILS # BLD: 0.07 K/UL (ref 0.01–0.1)
BASOPHILS NFR BLD: 0.6 %
DIFFERENTIAL METHOD BLD: ABNORMAL
EOSINOPHIL # BLD: 0.48 K/UL (ref 0.04–0.36)
EOSINOPHIL NFR BLD: 4.3 %
ERYTHROCYTE [DISTWIDTH] IN BLOOD BY AUTOMATED COUNT: 15.4 % (ref 11.7–14.4)
HCT VFR BLDCO AUTO: 26.2 % (ref 35–45)
HGB BLD-MCNC: 8.1 G/DL (ref 11.8–15.7)
HYPOCHROMIA BLD QL SMEAR: ABNORMAL
IMM GRANULOCYTES # BLD AUTO: 0.09 K/UL (ref 0–0.08)
IMM GRANULOCYTES NFR BLD AUTO: 0.8 %
LYMPHOCYTES # BLD: 1.9 K/UL (ref 1.2–3.5)
LYMPHOCYTES NFR BLD: 16.8 %
MCH RBC QN AUTO: 20 PG (ref 28–33.2)
MCHC RBC AUTO-ENTMCNC: 30.9 G/DL (ref 32.2–35.5)
MCV RBC AUTO: 64.5 FL (ref 83–98)
MICROCYTES BLD QL SMEAR: ABNORMAL
MONOCYTES # BLD: 0.98 K/UL (ref 0.28–0.8)
MONOCYTES NFR BLD: 8.7 %
NEUTROPHILS # BLD: 7.77 K/UL (ref 1.7–7)
NEUTS SEG NFR BLD: 68.8 %
NRBC BLD-RTO: 0 %
OVALOCYTES BLD QL SMEAR: ABNORMAL
PDW BLD AUTO: 9.9 FL (ref 9.4–12.3)
PLAT MORPH BLD: NORMAL
PLATELET # BLD AUTO: 351 K/UL (ref 150–369)
PLATELET # BLD EST: ABNORMAL 10*3/UL
POIKILOCYTOSIS BLD QL SMEAR: ABNORMAL
POLYCHROMASIA BLD QL SMEAR: ABNORMAL
RBC # BLD AUTO: 4.06 M/UL (ref 3.93–5.22)
TARGETS BLD QL SMEAR: ABNORMAL
WBC # BLD AUTO: 11.29 K/UL (ref 3.8–10.5)

## 2022-01-03 PROCEDURE — 90791 PSYCH DIAGNOSTIC EVALUATION: CPT | Performed by: PSYCHOLOGIST

## 2022-01-03 PROCEDURE — 25000000 HC PHARMACY GENERAL: Performed by: PHYSICAL MEDICINE & REHABILITATION

## 2022-01-03 PROCEDURE — 63700000 HC SELF-ADMINISTRABLE DRUG: Performed by: PHYSICAL MEDICINE & REHABILITATION

## 2022-01-03 PROCEDURE — 97530 THERAPEUTIC ACTIVITIES: CPT | Mod: GP

## 2022-01-03 PROCEDURE — 97535 SELF CARE MNGMENT TRAINING: CPT | Mod: GO

## 2022-01-03 PROCEDURE — 97530 THERAPEUTIC ACTIVITIES: CPT | Mod: GO

## 2022-01-03 PROCEDURE — 63700000 HC SELF-ADMINISTRABLE DRUG: Performed by: HOSPITALIST

## 2022-01-03 PROCEDURE — 12800000 HC ROOM AND CARE SEMIPRIVATE REHAB

## 2022-01-03 PROCEDURE — 36415 COLL VENOUS BLD VENIPUNCTURE: CPT | Performed by: PHYSICAL MEDICINE & REHABILITATION

## 2022-01-03 PROCEDURE — 97116 GAIT TRAINING THERAPY: CPT | Mod: GP

## 2022-01-03 PROCEDURE — 97110 THERAPEUTIC EXERCISES: CPT | Mod: GO

## 2022-01-03 PROCEDURE — 85025 COMPLETE CBC W/AUTO DIFF WBC: CPT | Performed by: PHYSICAL MEDICINE & REHABILITATION

## 2022-01-03 RX ORDER — FERROUS SULFATE 325(65) MG
325 TABLET ORAL
Status: DISCONTINUED | OUTPATIENT
Start: 2022-01-04 | End: 2022-01-16 | Stop reason: HOSPADM

## 2022-01-03 RX ORDER — PANTOPRAZOLE SODIUM 40 MG/1
40 TABLET, DELAYED RELEASE ORAL
Status: DISCONTINUED | OUTPATIENT
Start: 2022-01-04 | End: 2022-01-16 | Stop reason: HOSPADM

## 2022-01-03 RX ORDER — ALUMINUM HYDROXIDE, MAGNESIUM HYDROXIDE, AND SIMETHICONE 1200; 120; 1200 MG/30ML; MG/30ML; MG/30ML
30 SUSPENSION ORAL EVERY 6 HOURS PRN
Status: DISCONTINUED | OUTPATIENT
Start: 2022-01-03 | End: 2022-01-16 | Stop reason: HOSPADM

## 2022-01-03 RX ORDER — ROSUVASTATIN CALCIUM 5 MG/1
5 TABLET, COATED ORAL
Status: DISCONTINUED | OUTPATIENT
Start: 2022-01-03 | End: 2022-01-16 | Stop reason: HOSPADM

## 2022-01-03 RX ADMIN — ROSUVASTATIN CALCIUM 5 MG: 5 TABLET, FILM COATED ORAL at 18:45

## 2022-01-03 RX ADMIN — DABIGATRAN ETEXILATE MESYLATE 150 MG: 150 CAPSULE ORAL at 22:09

## 2022-01-03 RX ADMIN — ACETAMINOPHEN 1000 MG: 500 TABLET, FILM COATED ORAL at 22:09

## 2022-01-03 RX ADMIN — LATANOPROST 1 DROP: 50 SOLUTION/ DROPS OPHTHALMIC at 22:09

## 2022-01-03 RX ADMIN — OXYCODONE HYDROCHLORIDE 5 MG: 5 TABLET ORAL at 14:26

## 2022-01-03 RX ADMIN — ACETAMINOPHEN 1000 MG: 500 TABLET, FILM COATED ORAL at 13:17

## 2022-01-03 RX ADMIN — BUDESONIDE 0.5 MG: 0.5 SUSPENSION RESPIRATORY (INHALATION) at 18:48

## 2022-01-03 RX ADMIN — SENNOSIDES AND DOCUSATE SODIUM 1 TABLET: 50; 8.6 TABLET ORAL at 08:22

## 2022-01-03 RX ADMIN — DORZOLAMIDE HYDROCHLORIDE 1 DROP: 20 SOLUTION/ DROPS OPHTHALMIC at 18:47

## 2022-01-03 RX ADMIN — THERA TABS 1 TABLET: TAB at 08:21

## 2022-01-03 RX ADMIN — OXYCODONE HYDROCHLORIDE 5 MG: 5 TABLET ORAL at 22:08

## 2022-01-03 RX ADMIN — ACETAMINOPHEN 1000 MG: 500 TABLET, FILM COATED ORAL at 06:29

## 2022-01-03 RX ADMIN — SENNOSIDES AND DOCUSATE SODIUM 1 TABLET: 50; 8.6 TABLET ORAL at 22:09

## 2022-01-03 RX ADMIN — DILTIAZEM HYDROCHLORIDE 240 MG: 120 CAPSULE, COATED, EXTENDED RELEASE ORAL at 08:22

## 2022-01-03 RX ADMIN — LEVOTHYROXINE SODIUM 75 MCG: 75 TABLET ORAL at 06:30

## 2022-01-03 RX ADMIN — TIOTROPIUM BROMIDE INHALATION SPRAY 2 PUFF: 3.12 SPRAY, METERED RESPIRATORY (INHALATION) at 08:27

## 2022-01-03 RX ADMIN — DIGOXIN 250 MCG: 250 TABLET ORAL at 06:30

## 2022-01-03 RX ADMIN — DORZOLAMIDE HYDROCHLORIDE 1 DROP: 20 SOLUTION/ DROPS OPHTHALMIC at 06:32

## 2022-01-03 RX ADMIN — OXYCODONE HYDROCHLORIDE 5 MG: 5 TABLET ORAL at 08:28

## 2022-01-03 RX ADMIN — BUDESONIDE 0.5 MG: 0.5 SUSPENSION RESPIRATORY (INHALATION) at 06:29

## 2022-01-03 RX ADMIN — Medication 1000 UNITS: at 08:21

## 2022-01-03 RX ADMIN — DABIGATRAN ETEXILATE MESYLATE 150 MG: 150 CAPSULE ORAL at 08:21

## 2022-01-03 SDOH — ECONOMIC STABILITY: FOOD INSECURITY: WITHIN THE PAST 12 MONTHS, THE FOOD YOU BOUGHT JUST DIDN'T LAST AND YOU DIDN'T HAVE MONEY TO GET MORE.: NEVER TRUE

## 2022-01-03 SDOH — ECONOMIC STABILITY: FOOD INSECURITY: WITHIN THE PAST 12 MONTHS, YOU WORRIED THAT YOUR FOOD WOULD RUN OUT BEFORE YOU GOT MONEY TO BUY MORE.: NEVER TRUE

## 2022-01-03 ASSESSMENT — COGNITIVE AND FUNCTIONAL STATUS - GENERAL
MOOD: ANXIOUS
THOUGHT_PROCESS: WNL;WORRY
SLEEP_WAKE_CYCLE: NO CHANGE
DELUSIONS: NONE OR AGE APPROPRIATE
SPEECH: REGULAR
PSYCHOMOTOR FUNCTIONING: WNL
CONCENTRATION: WNL
APPETITE: NO CHANGE
EYE_CONTACT: WNL
AROUSAL LEVEL: ATTENTIVE
APPEARANCE: WELL GROOMED
REMOTE MEMORY: WNL
IMPULSE CONTROL: INTACT
RECENT MEMORY: WNL
ORIENTATION: FULLY ORIENTED
INSIGHT: INTACT
AFFECT: FULL RANGE
THOUGHT_CONTENT: APPROPRIATE
LIBIDO: NON-CONTRIBUTORY
EST. PREMORBID INTELLIGENCE: AVERAGE
ATTENTION: WNL
PERCEPTUAL FUNCTION: PAIN

## 2022-01-03 NOTE — PLAN OF CARE
Plan of Care Review  Plan of Care Reviewed With: patient  Progress: improving  Outcome Summary: A&O, needs encouragement but does well. Denies SOB or chestdiscomfort, infrequent cough productive of clear mucus. C/o scratchy throat with some improvement throughout the day. Dr. Rojas aware

## 2022-01-03 NOTE — PROGRESS NOTES
Patient: Bee Cobb  [facility]  MRN: [id number]  Today's date: 1/3/2022    History of Present Illness  Bee is a 81 y.o. female admitted on 1/1/2022 with Hip fracture requiring operative repair, left, closed, initial encounter (CMS/[facility]) [S72.002A]. Principal problem is Hip fracture requiring operative repair, left, closed, initial encounter (CMS/[facility]).    81 y.o. f who presents with left hip pain after falling out of bed. She had an immediate onset of left hip pain and inability to ambulate.  No LOC.  Radiographic evaluation showed a displaced transcervical femoral neck fracture by CT.  On 12-29-21 she underwent left hip hemiarthroplasty.  Pt is WBAT to LLE and anterior hip precautions x 6 weeks. Ortho recommends aspirin 325mg/day x 6 weeks in addition to pt's home pradaxa which was restarted postoperatively. Pt did well postoperatively, but was noted to have postop anemia with a hgb drop of 12-->9. Per Daniels, this is expected due to Pradaxa use.  Pain is being managed with multimodal analgesia; scheduled tylenol, lido patch, PRN oxycodone, ice therapy.  PT/OT/PM&R evaluated pt and recommend acute rehab at discharge.          Past Medical History  Bee has a past medical history of Atrial fibrillation (CMS/[facility]), Glaucoma, History of transfusion, Hyperthyroidism, Lipid disorder, Osteoporosis, PAN (polyarteritis nodosa) (CMS/[facility]), and Thalassemia. H/o recent clavicle fx 9/11/21 Completed course of therapy and has full ROM      OT Vitals    Date/Time Pulse HR Source BP BP Location BP Method Pt Position [facility]   01/03/22 1259 101 Monitor 151/65 Left upper arm Automatic Sitting KW   01/03/22 1428 87 Monitor 137/62 Left upper arm Automatic Lying KW      OT Pain    Date/Time Pain Type Side/Orientation Location Rating: Rest Rating: Activity Interventions [facility]   01/03/22 1259 Pain Assessment left;upper leg 8 8 position adjusted    01/03/22 1428 Pain Reassessment left;upper  "leg 6 6 premedicated for activity KW          Prior Living Environment      Most Recent Value   People in Home spouse   Current Living Arrangements home  [9 MADY, FF inside]   Home Accessibility stairs to enter home (Group), stairs within home (Group)   Living Environment Comment Row home 13 steps into house, 9 steps to BR on 2nd floor   Number of Stairs, Main Entrance 9   Surface of Stairs, Main Entrance concrete   Stair Railings, Main Entrance railings on both sides of stairs   Location, Bathroom second floor, must negotiate stairs to access   [place], Bathroom tile floor   Bathroom Access Comment ztub/shower combo, hand held shower, bench and suction grab bars ( checks stability prior to each shower)   Number of Stairs, Within Home, Primary --  [13]   Stair Railings, Within Home, Primary railing on right side (ascending)          Prior Level of Function      Most Recent Value   Dominant Hand left   Ambulation independent   Transferring independent   Toileting independent   Bathing independent   Dressing independent   Eating independent   Assistive Device Currently Used at Home shower chair, grab bar          Occupational Profile      Most Recent Value   Reason for Services/Referral ADL deficit   Successful Occupations wife,- mother   Occupational History/Life Experiences retired [employer]    Performance Patterns independent in all areas   Patient Goals \"I want to be functional, walk and take care of myself\"           IRF OT Evaluation and Treatment - 01/03/22 1259        OT Time Calculation    Start Time 1259     Stop Time 1429     Time Calculation (min) 90 min        Session Details    Document Type daily treatment/progress note     Mode of Treatment occupational therapy;individual therapy        General Information    General Observations of Patient Pt. reports significant L hip pain. She is agreeable to start seated OT session and OT to follow-up with nursing re: pain med schedule.        Bed " Mobility    [facility], Sit to Supine minimum assist (75% or more patient effort)     Assistive Device head of bed elevated;bed rails     Comment (Bed Mobility) Min S for L LE management sitting EOB to supine with use of bed rails and HOB elevated        Transfers    Transfers stand pivot transfer        Sit to Stand Transfer    [facility], Sit to Stand Transfer minimum assist (75% or more patient effort)     Verbal Cues hand placement;safety;technique;preparatory posture     Assistive Device gait belt;walker, front-wheeled        Stand to Sit Transfer    [facility], Stand to Sit Transfer minimum assist (75% or more patient effort)     Verbal Cues preparatory posture;hand placement;safety;technique     Assistive Device gait belt;walker, front-wheeled        Stand Pivot Transfer    [facility], Stand Pivot/Stand Step Transfer minimum assist (75% or more patient effort)     Verbal Cues preparatory posture;hand placement;safety;technique     Assistive Device gait belt;walker, front-wheeled     Comment via short amb approach with RW w/c to bed at end of session        Upper Extremity (Therapeutic Exercise)    Exercise Position/Type seated;resistive exercises     General Exercise bilateral     Range of Motion Exercises shoulder horizontal abduction/adduction;other (see comments);shoulder flexion/extension;elbow flexion/extension   scap abd/add    Weight/Resistance resistance band     Reps and Sets x15 reps     Comment Orange theraband for alternating UB strengthening seated in w/c.        Lower Extremity (Therapeutic Exercise)    Comment 1. Glute sets 2x10 reps seated in w/c  2. Supine ankle pumps x30 reps        Lower Body Dressing    Tasks socks     Self-Performance dons/doffs left sock;dons/doffs right sock     [place] Assistance dons/doffs left sock     Position supported sitting     Adaptive Equipment dressing stick;reacher;sock aid     [facility], Footwear minimum assist (75% or more patient effort)      Comment Introduced LHAE for sock management with Min A required to thoroughly don L sock. Trialed reacher, dressing stick, and sock aid with good effect.        Modality Interventions Comprehensive    Modality Interventions cryotherapy        Cryotherapy    Location 1 lower extremity treatment area     Indications pain     Treatment Details (Cryotherapy) Applied ice pack intermittently throughout session for pain management to L hip with good effect.     Response to Treatment pain decreased        Daily Progress Summary (OT)    Daily Outcome Statement Initiated education and trialing use of LHAE for optimal performance with LBD. Issued reacher, dressing stick, and flexible sock aid this session. Recommend continued skilled OT services to encourage equilateral weight bearing through LEs during static standing ADL and transfers, activity tolerance, pain management, and ongoing AE training for optimal ADL performance.                      Education Documentation  Self-Care, taught by Falguni Pion, OT at 1/3/2022  1:57 PM.  Learner: Patient  Readiness: Acceptance  Method: Explanation, Demonstration  Response: Verbalizes Understanding, Demonstrated Understanding, Needs Reinforcement  Comment: [place] trialed sock aid, dressing stick, and reacher with good effect for sock management.          IRF OT Goals      Most Recent Value   Transfer Goal 1    Activity/Assistive Device toilet at 01/02/2022 0805   [place] moderate assist (50-74% patient effort) at 01/02/2022 0805   Time Frame short-term goal (STG), 5 - 7 days at 01/02/2022 0805   Strategies/Barriers DME assessment at 01/02/2022 0805   Transfer Goal 2    Activity/Assistive Device toilet at 01/02/2022 0805   [place] supervision required at 01/02/2022 0805   Time Frame long-term goal (LTG), 4 weeks at 01/02/2022 0805   Strategies/Barriers DME at 01/02/2022 0805   Transfer Goal 3    Activity/Assistive Device shower at 01/02/2022 0805   [place]  moderate assist (50-74% patient effort) at 01/02/2022 0805   Time Frame short-term goal (STG), 5 - 7 days at 01/02/2022 0805   Strategies/Barriers DME at 01/02/2022 0805   Transfer Goal 4    Activity/Assistive Device shower at 01/02/2022 0805   [place] tactile cues required at 01/02/2022 0805   Time Frame long-term goal (LTG), 4 weeks at 01/02/2022 0805   Strategies/Barriers DME at 01/02/2022 0805   Bathing Goal 1    Activity/Assistive Device bathing skills, all at 01/02/2022 0805   [place] minimum assist (75% or more patient effort) at 01/02/2022 0805   Time Frame short-term goal (STG), 5 - 7 days at 01/02/2022 0805   Strategies/Barriers DME at 01/02/2022 0805   Bathing Goal 2    Activity/Assistive Device bathing skills, all at 01/02/2022 0805   [place] tactile cues required at 01/02/2022 0805   Time Frame long-term goal (LTG), 4 weeks at 01/02/2022 0805   Strategies/Barriers DME at 01/02/2022 0805   UB Dressing Goal 1    Activity/Assistive Device upper body dressing at 01/02/2022 0805   [place] minimum assist (75% or more patient effort) at 01/02/2022 0805   Time Frame short-term goal (STG), 5 - 7 days at 01/02/2022 0805   Strategies/Barriers incl s/u at 01/02/2022 0805   UB Dressing Goal 2    Activity/Assistive Device upper body dressing at 01/02/2022 0805   [place] modified independence at 01/02/2022 0805   Time Frame long-term goal (LTG), 4 weeks at 01/02/2022 0805   Strategies/Barriers incl s/u at 01/02/2022 0805   LB Dressing Goal 1    Activity/Assistive Device lower body dressing at 01/02/2022 0805   [place] moderate assist (50-74% patient effort) at 01/02/2022 0805   Time Frame short-term goal (STG), 5 - 7 days at 01/02/2022 0805   Strategies/Barriers AD at 01/02/2022 0805   LB Dressing Goal 2    Activity/Assistive Device lower body dressing at 01/02/2022 0805   [place] supervision required at 01/02/2022 0805   Time Frame long-term goal (LTG), 4 weeks at 01/02/2022 0805    Strategies/Barriers AD at 01/02/2022 0805   Grooming Goal 1    Activity/Assistive Device grooming skills, all at 01/02/2022 0805   [place] tactile cues required at 01/02/2022 0805   Time Frame short-term goal (STG), 5 - 7 days at 01/02/2022 0805   Strategies/Barriers std @ sink at 01/02/2022 0805   Grooming Goal 2    Activity/Assistive Device grooming skills, all at 01/02/2022 0805   [place] modified independence at 01/02/2022 0805   Time Frame long-term goal (LTG), 4 weeks at 01/02/2022 0805   Strategies/Barriers std @ sink at 01/02/2022 0805   Toileting Goal 1    Activity/Assistive Device toileting skills, all at 01/02/2022 0805   [place] minimum assist (75% or more patient effort) at 01/02/2022 0805   Time Frame short-term goal (STG), 5 - 7 days at 01/02/2022 0805   Strategies/Barriers DME at 01/02/2022 0805   Toileting Goal 2    Activity/Assistive Device toileting skills, all at 01/02/2022 0805   [place] supervision required at 01/02/2022 0805   Time Frame long-term goal (LTG), 4 weeks at 01/02/2022 0805   Strategies/Barriers DME at 01/02/2022 0805

## 2022-01-03 NOTE — PROGRESS NOTES
"Daily Progress Note       Patient was seen and examined.   Attestation Notes: Face to face encounter completed    Subjective    The patient was fair sleep overnight.  Mild left hip pain at rest and significant pain with mobility in therapies.  Tolerating initial therapies though with discomfort.  Objective     Visit Vitals  BP (!) 157/70 (BP Location: Left upper arm, Patient Position: Lying)   Pulse 92   Temp 36.7 °C (98.1 °F) (Oral)   Resp 18   Ht 1.676 m (5' 5.98\")   Wt 56.7 kg (125 lb)   SpO2 95%   BMI 20.19 kg/m²       Review of Systems:  Pertinent items are noted in HPI.  Denies chest pain and shortness of breath.  Review of systems otherwise negative.    Labs     Results from last 7 days   Lab Units 01/03/22  0433   WBC K/uL 11.29*   HEMOGLOBIN g/dL 8.1*   HEMATOCRIT % 26.2*   PLATELETS K/uL 351     Results from last 7 days   Lab Units 01/02/22  0604   SODIUM mEQ/L 136   POTASSIUM mEQ/L 3.9   CHLORIDE mEQ/L 104   CO2 mEQ/L 26   BUN mg/dL 12   CREATININE mg/dL 0.3*   CALCIUM mg/dL 8.0*   ALBUMIN g/dL 2.4*   BILIRUBIN TOTAL mg/dL 0.9   ALK PHOS IU/L 95   ALT IU/L 15   AST IU/L 26   GLUCOSE mg/dL 89     Full Code    Physical Exam  General      Alert, cooperative, no distress, appears stated age.   Head:    Normocephalic, without obvious abnormality, atraumatic.   Eyes:    PERRL, conjunctiva/corneas clear, EOM's intact.        Nose:   Nares normal, septum midline, mucosa normal, no drainage or            sinus tenderness.   Throat:   Lips, mucosa, and tongue normal.    Neck:   Supple, symmetrical, trachea midline.    Back:     Symmetric, no curvature.   Lungs:     Clear to auscultation bilaterally, respirations unlabored.   Chest wall:    No tenderness or deformity.   Heart:    Regular rate and rhythm, S1 and S2 normal.   Abdomen:     Soft, non-tender, bowel sounds active all four quadrants,     no masses, no organomegaly.   Extremities:  Musculoskeletal:  1+ lower extremity edema bilaterally.   Left hip " replacement.    Pulses:   1+ and symmetric all extremities.   Skin:   Postsurgical dressing in place.   Neurologic:          Behavior/  Emotional:  CNII-XII intact.  Alert and oriented ×3.  Motor exam with left hip weakness post surgery.Sensory exam intact.  Reflexes stable decreased reflexes.      Appropriate, cooperative           Plan of care was discussed with patient    Assessment & Plan  Follow up  Assessment & Plan  PCP Dr. Kanchan Zamarripa after discharge  [phone]     Cardiology after discharge  Ortho Dr. Pena in 2 weeks    At high risk for pressure injury of skin  Assessment & Plan  Turns q2hr    Risk for falls  Assessment & Plan  wean off restraints as able    Pain  Assessment & Plan  Tylenol 1000 mg every 8 hours.  Oxycodone as needed.  Adjust medications as needed.    Postoperative anemia  Assessment & Plan  Hemoglobin 8.1.  Continue to monitor.  Iron replacement.    Paroxysmal atrial fibrillation (CMS/[facility])  Assessment & Plan  Rate controlled on dig and cardizem, anti-coag on pradaxa    Hypothyroidism  Assessment & Plan  Continue synthroid    Hypertension  Assessment & Plan  Cont cardizem    Glaucoma  Assessment & Plan  Trusopt, xalatan gtt    COPD (chronic obstructive pulmonary disease) (CMS/[facility])  Assessment & Plan  Cont pulmicort, spiriva    * Hip fracture requiring operative repair, left, closed, initial encounter (CMS/[facility])  Assessment & Plan  Mrs. Cobb is an 81-year-old female who presented to [facility] on 12/29/2021 with complaints of left hip pain after falling out of bed.  X-ray left hip revealed left femoral neck fracture.  CT left hip confirmed displaced fracture left femoral neck.  X-ray left knee was negative.  Head CT was negative for skull fracture or intracranial hemorrhage.  CT cervical spine was negative for fracture dislocation.  Dr. Pena from orthopedic surgery was consulted and performed left total hip replacement on 12/29/2021.  She is cleared for  weightbearing to left lower extremity with anterior hip precautions for 6 weeks.  She is anticoagulated with Pradaxa for atrial fibrillation.  Aspirin 325 mg daily for 6 weeks was added for DVT prophylaxis on top of Pradaxa.  Postoperative anemia stabilized at 8.6.  She was ultimately determined to be medically stable for [facility] for an acute inpatient rehabilitation program to address deficits related to left hip fracture status post total hip replacement.    She is weightbearing as tolerated.  She will be maintained on left hip precautions and aspirin for 6 weeks.  She will participate in 3 hours of physical and occupational therapy as well as receive 24-hour rehab nursing care.  She will follow-up with Dr. Pena from orthopedic surgery in 2 weeks.    The patient is tolerating comprehensive inpatient rehabilitation program.  Tolerating initial therapies.    Continue with pain management and adjust medications as needed.        Expected Discharge Date:    Individualized Plan of Care    Bee Cobb is admitted to [facility] for comprehensive inpatient rehabilitation for Ortho with functional deficits in mobility; motor dysfunction; safety; self-care. Patient is receiving the following services: physical therapy; occupational therapy; medical consultative services.    Frequency of Treatment (OT): 5-7 times per week,60-90 minutes per day    Frequency of Treatment (PT): 5-7 times per week,60-90 minutes per day           Active medical management is required for  Patient Active Problem List   Diagnosis   • Chronic diastolic CHF (congestive heart failure) (CMS/HCC)   • COPD (chronic obstructive pulmonary disease) (CMS/HCC)   • Glaucoma   • Hypertension   • Hyperlipidemia   • Hypothyroidism   • Malignant melanoma (CMS/HCC)   • Mycobacterium avium-intracellulare complex (CMS/HCC)   • Paroxysmal atrial fibrillation (CMS/HCC)   • Alpha trait thalassemia   •  Closed fracture of left hip (CMS/[facility])   • Postoperative anemia   • S/P AVR (aortic valve replacement)   • Hip fracture requiring operative repair, left, closed, initial encounter (CMS/[facility])   • Pain   • Risk for falls   • At high risk for pressure injury of skin   • Follow up       Risk for Complications  Falls: High      The patient's medical prognosis is good to achieve the stated goals below.    Expected Level of Function  Expected Functional Improvement: mobility; motor dysfunction; safety; self-care  Self-Care: Independent  Sphincter Control: Independent  Transfers: Independent  Locomotion: Independent  Communication: Independent  Social Cognition: Independent      Goals  IRF PT Goals      Most Recent Value   Bed Mobility Goal 1    Activity/Assistive Device scooting, sit to supine, supine to sit at 01/02/2022 1102   [place] minimum assist (75% or more patient effort) at 01/02/2022 1102   Time Frame short-term goal (STG), 1 week at 01/02/2022 1102   Bed Mobility Goal 2    Activity/Assistive Device scooting, sit to supine, supine to sit at 01/02/2022 1102   [place] modified independence at 01/02/2022 1102   Time Frame long-term goal (LTG), 14 days or less, by discharge at 01/02/2022 1102   Transfer Goal 1    Activity/Assistive Device sit-to-stand/stand-to-sit, stand pivot, walker, front-wheeled at 01/02/2022 1102   [place] supervision required at 01/02/2022 1102   Time Frame short-term goal (STG), 1 week at 01/02/2022 1102   Transfer Goal 2    Activity/Assistive Device sit-to-stand/stand-to-sit, stand pivot, walker, front-wheeled at 01/02/2022 1102   [place] modified independence at 01/02/2022 1102   Time Frame long-term goal (LTG), 14 days or less, by discharge at 01/02/2022 1102   Gait/Walking Locomotion Goal 1    Activity/Assistive Device gait (walking locomotion), decrease asymmetrical patterns, decrease fall risk, forward stepping, improve balance and speed at 01/02/2022 1102   Distance  200 feet at 01/02/2022 1102   [place] supervision required at 01/02/2022 1102   Time Frame short-term goal (STG), 1 week at 01/02/2022 1102   Gait/Walking Locomotion Goal 2    Activity/Assistive Device gait (walking locomotion), decrease asymmetrical patterns, decrease fall risk, forward stepping, improve balance and speed at 01/02/2022 1102   Distance 200 feet at 01/02/2022 1102   [place] modified independence at 01/02/2022 1102   Time Frame long-term goal (LTG), 14 days or less, by discharge at 01/02/2022 1102        IRF OT Goals      Most Recent Value   Transfer Goal 1    Activity/Assistive Device toilet at 01/02/2022 0805   [place] moderate assist (50-74% patient effort) at 01/02/2022 0805   Time Frame short-term goal (STG), 5 - 7 days at 01/02/2022 0805   Strategies/Barriers DME assessment at 01/02/2022 0805   Transfer Goal 2    Activity/Assistive Device toilet at 01/02/2022 0805   [place] supervision required at 01/02/2022 0805   Time Frame long-term goal (LTG), 4 weeks at 01/02/2022 0805   Strategies/Barriers DME at 01/02/2022 0805   Transfer Goal 3    Activity/Assistive Device shower at 01/02/2022 0805   [place] moderate assist (50-74% patient effort) at 01/02/2022 0805   Time Frame short-term goal (STG), 5 - 7 days at 01/02/2022 0805   Strategies/Barriers DME at 01/02/2022 0805   Transfer Goal 4    Activity/Assistive Device shower at 01/02/2022 0805   [place] tactile cues required at 01/02/2022 0805   Time Frame long-term goal (LTG), 4 weeks at 01/02/2022 0805   Strategies/Barriers DME at 01/02/2022 0805   Bathing Goal 1    Activity/Assistive Device bathing skills, all at 01/02/2022 0805   [place] minimum assist (75% or more patient effort) at 01/02/2022 0805   Time Frame short-term goal (STG), 5 - 7 days at 01/02/2022 0805   Strategies/Barriers DME at 01/02/2022 0805   Bathing Goal 2    Activity/Assistive Device bathing skills, all at 01/02/2022 0805   [place]  tactile cues required at 01/02/2022 0805   Time Frame long-term goal (LTG), 4 weeks at 01/02/2022 0805   Strategies/Barriers DME at 01/02/2022 0805   UB Dressing Goal 1    Activity/Assistive Device upper body dressing at 01/02/2022 0805   [place] minimum assist (75% or more patient effort) at 01/02/2022 0805   Time Frame short-term goal (STG), 5 - 7 days at 01/02/2022 0805   Strategies/Barriers incl s/u at 01/02/2022 0805   UB Dressing Goal 2    Activity/Assistive Device upper body dressing at 01/02/2022 0805   [place] modified independence at 01/02/2022 0805   Time Frame long-term goal (LTG), 4 weeks at 01/02/2022 0805   Strategies/Barriers incl s/u at 01/02/2022 0805   LB Dressing Goal 1    Activity/Assistive Device lower body dressing at 01/02/2022 0805   [place] moderate assist (50-74% patient effort) at 01/02/2022 0805   Time Frame short-term goal (STG), 5 - 7 days at 01/02/2022 0805   Strategies/Barriers AD at 01/02/2022 0805   LB Dressing Goal 2    Activity/Assistive Device lower body dressing at 01/02/2022 0805   [place] supervision required at 01/02/2022 0805   Time Frame long-term goal (LTG), 4 weeks at 01/02/2022 0805   Strategies/Barriers AD at 01/02/2022 0805   Grooming Goal 1    Activity/Assistive Device grooming skills, all at 01/02/2022 0805   [place] tactile cues required at 01/02/2022 0805   Time Frame short-term goal (STG), 5 - 7 days at 01/02/2022 0805   Strategies/Barriers std @ sink at 01/02/2022 0805   Grooming Goal 2    Activity/Assistive Device grooming skills, all at 01/02/2022 0805   [place] modified independence at 01/02/2022 0805   Time Frame long-term goal (LTG), 4 weeks at 01/02/2022 0805   Strategies/Barriers std @ sink at 01/02/2022 0805   Toileting Goal 1    Activity/Assistive Device toileting skills, all at 01/02/2022 0805   [place] minimum assist (75% or more patient effort) at 01/02/2022 0805   Time Frame short-term goal (STG), 5 - 7 days at  01/02/2022 0805   Strategies/Barriers DME at 01/02/2022 0805   Toileting Goal 2    Activity/Assistive Device toileting skills, all at 01/02/2022 0805   [place] supervision required at 01/02/2022 0805   Time Frame long-term goal (LTG), 4 weeks at 01/02/2022 0805   Strategies/Barriers DME at 01/02/2022 0805          Anticipated Discharge Plan  Anticipated Discharge Disposition: home with home health  Type of Home Care Services: home OT; home PT; nursing      Expected length of stay: 5 days

## 2022-01-03 NOTE — PROGRESS NOTES
01/03/22 1000   Contact Information   Permission Granted to Share Info With family/designee   Advance Directives (for Healthcare)   Does patient have advance directive? No   Living Environment   Current Living Arrangements home  (9 MADY, FF inside)   Home Accessibility stairs to enter home (Group);stairs within home (Group)   People in Home spouse   Name(s) of People in Home  heber  (1 dtr outside of home)   Primary Care Provided by self   Living Environment   Quality of Family Relationships supportive   Able to Return to Prior Arrangements yes   Employment/   Employment Status retired   Current or Previous Occupation desk job  ( HS)   Employment/ Comments none   Financial/Legal   Source of Income social security   Values/Beliefs   Spiritual, Cultural Beliefs, Yarsani Practices, Values that Affect Care no   Discharge Needs Assessment   Concerns to be Addressed care coordination/care conferences;discharge planning   Patient/Family Anticipates Transition to home with family   Patient/Family Anticipated Services at Transition home health care   Anticipated Discharge Disposition home with home health   Discharge Planning   Type of Home Care Services home OT;home PT;nursing   Discharge Transportation   Does the patient need discharge transport arranged? No   Team Conference   Next Team Conference Date 01/06/22   spoke with  to provide education and contact info.

## 2022-01-03 NOTE — PROGRESS NOTES
01/03/22 1006   Food Insecurity   Within the past 12 months, you worried that your food would run out before you got the money to buy more. Never true   Within the past 12 months, the food you bought just didn’t last and you didn’t have money to get more. Never true

## 2022-01-03 NOTE — SUBJECTIVE & OBJECTIVE
"   Patient was seen and examined.   Attestation Notes: Face to face encounter completed    Subjective    The patient was fair sleep overnight.  Mild left hip pain at rest and significant pain with mobility in therapies.  Tolerating initial therapies though with discomfort.  Objective     Visit Vitals  BP (!) 157/70 (BP Location: Left upper arm, Patient Position: Lying)   Pulse 92   Temp 36.7 °C (98.1 °F) (Oral)   Resp 18   Ht 1.676 m (5' 5.98\")   Wt 56.7 kg (125 lb)   SpO2 95%   BMI 20.19 kg/m²       Review of Systems:  Pertinent items are noted in HPI.  Denies chest pain and shortness of breath.  Review of systems otherwise negative.    Labs     Results from last 7 days   Lab Units 01/03/22  0433   WBC K/uL 11.29*   HEMOGLOBIN g/dL 8.1*   HEMATOCRIT % 26.2*   PLATELETS K/uL 351     Results from last 7 days   Lab Units 01/02/22  0604   SODIUM mEQ/L 136   POTASSIUM mEQ/L 3.9   CHLORIDE mEQ/L 104   CO2 mEQ/L 26   BUN mg/dL 12   CREATININE mg/dL 0.3*   CALCIUM mg/dL 8.0*   ALBUMIN g/dL 2.4*   BILIRUBIN TOTAL mg/dL 0.9   ALK PHOS IU/L 95   ALT IU/L 15   AST IU/L 26   GLUCOSE mg/dL 89     Full Code    Physical Exam  General      Alert, cooperative, no distress, appears stated age.   Head:    Normocephalic, without obvious abnormality, atraumatic.   Eyes:    PERRL, conjunctiva/corneas clear, EOM's intact.        Nose:   Nares normal, septum midline, mucosa normal, no drainage or            sinus tenderness.   Throat:   Lips, mucosa, and tongue normal.    Neck:   Supple, symmetrical, trachea midline.    Back:     Symmetric, no curvature.   Lungs:     Clear to auscultation bilaterally, respirations unlabored.   Chest wall:    No tenderness or deformity.   Heart:    Regular rate and rhythm, S1 and S2 normal.   Abdomen:     Soft, non-tender, bowel sounds active all four quadrants,     no masses, no organomegaly.   Extremities:  Musculoskeletal:  1+ lower extremity edema bilaterally.   Left hip replacement.    Pulses:   1+ and " symmetric all extremities.   Skin:   Postsurgical dressing in place.   Neurologic:          Behavior/  Emotional:  CNII-XII intact.  Alert and oriented ×3.  Motor exam with left hip weakness post surgery.Sensory exam intact.  Reflexes stable decreased reflexes.      Appropriate, cooperative           Plan of care was discussed with patient

## 2022-01-03 NOTE — PROGRESS NOTES
Patient: Bee Cobb  Location: [facility] [address]  MRN: [id number]  Today's date: 1/3/2022    History of Present Illness  Bee is a 81 y.o. female admitted on 1/1/2022 with Hip fracture requiring operative repair, left, closed, initial encounter (CMS/[facility]) [S72.002A]. Principal problem is Hip fracture requiring operative repair, left, closed, initial encounter (CMS/[facility]).    81 y.o. f who presents with left hip pain after falling out of bed. She had an immediate onset of left hip pain and inability to ambulate.  No LOC.  Radiographic evaluation showed a displaced transcervical femoral neck fracture by CT.  On 12-29-21 she underwent left hip hemiarthroplasty.  Pt is WBAT to LLE and anterior hip precautions x 6 weeks. Ortho recommends aspirin 325mg/day x 6 weeks in addition to pt's home pradaxa which was restarted postoperatively. Pt did well postoperatively, but was noted to have postop anemia with a hgb drop of 12-->9. Per [place], this is expected due to Pradaxa use.  Pain is being managed with multimodal analgesia; scheduled tylenol, lido patch, PRN oxycodone, ice therapy.  PT/OT/PM&R evaluated pt and recommend acute rehab at discharge.          Past Medical History  Bee has a past medical history of Atrial fibrillation (CMS/[facility]), Glaucoma, History of transfusion, Hyperthyroidism, Lipid disorder, Osteoporosis, PAN (polyarteritis nodosa) (CMS/[facility]), and Thalassemia. H/o recent clavicle fx 9/11/21 Completed course of therapy and has full ROM      PT Vitals    Date/Time Pulse HR Source SpO2 Pt Activity O2 Therapy BP BP Method Pt Position [facility]   01/03/22 0910 126 Monitor 93 % At rest None (Room air) 148/64 Automatic Lying SJM   01/03/22 0941 110 -- 92 % At rest -- 184/75 Automatic Sitting SJM      PT Pain    Date/Time Pain Type Side/Orientation Location Rating: Rest Rating: Activity Interventions [facility]   01/03/22 0910 Pain Assessment left;lateral hip 5 -- premedicated for activity SJM  prn restoril:

pt remains to be awake, agitated, awake the entire night yesterday/last night, remains to be 
restless, agitated, difficult to control behavior. per clinical assessment, prn restoril 
administered at this time.   01/03/22 0959 Pain Reassessment;Post Activity left;lateral hip -- 6 position adjusted SJM          Prior Living Environment      Most Recent Value   People in Home spouse   Current Living Arrangements home  [9 MADY, FF inside]   Home Accessibility stairs to enter home (Group), stairs within home (Group)   Living Environment Comment Row home 13 steps into house, 9 steps to BR on 2nd floor   Number of Stairs, Main Entrance 9   Surface of Stairs, Main Entrance concrete   Stair Railings, Main Entrance railings on both sides of stairs   Location, Bathroom second floor, must negotiate stairs to access   [place], Bathroom tile floor   Bathroom Access Comment ztub/shower combo, hand held shower, bench and suction grab bars ( checks stability prior to each shower)   Number of Stairs, Within Home, Primary --  [13]   Stair Railings, Within Home, Primary railing on right side (ascending)          Prior Level of Function      Most Recent Value   Dominant Hand left   Ambulation independent   Transferring independent   Toileting independent   Bathing independent   Dressing independent   Eating independent   Assistive Device Currently Used at Home shower chair, grab bar           IRF PT Evaluation and Treatment - 01/03/22 0941        PT Time Calculation    Start Time 0900     Stop Time 1000     Time Calculation (min) 60 min        Session Details    Document Type daily treatment/progress note     Mode of Treatment individual therapy;physical therapy        Bed Mobility    [place], Supine to Sit minimum assist (75% or more patient effort)     Assistive Device bed rails     Comment (Bed Mobility) with increased time, assist for LLE management OOB        Sit to Stand Transfer    [place], Sit to Stand Transfer minimum assist (75% or more patient effort)     Verbal Cues hand placement;proper use of assistive device;safety;technique     Assistive Device gait belt;walker, front-wheeled     Comment For steadying,  anterior weight shift provided via gait belt        Stand to Sit Transfer    [place], Stand to Sit Transfer minimum assist (75% or more patient effort)     Verbal Cues hand placement;safety;technique     Assistive Device gait belt;walker, front-wheeled     Comment for steadying and controlled lowering via gait belt        Stand Pivot Transfer    [place], Stand Pivot/Stand Step Transfer minimum assist (75% or more patient effort)     Verbal Cues hand placement;safety;technique     Assistive Device gait belt;walker, front-wheeled     Comment for safety, steadying and balance, completed bed>WC and via amb approach post-TUG        Gait Training    [place], Gait minimum assist (75% or more patient effort)     Assistive Device gait belt;walker, front-wheeled     Distance in Feet 20 feet   while completing one trial of TUG    Pattern (Gait) step-through;step-to     Deviations/Abnormal Patterns (Gait) antalgic;base of support, narrow;gait speed decreased     Comment (Gait/Stairs) for safety, steadying and balance, some assist for lateral weight shift at pelvis w/ increased fatigue        Balance    Dynamic Sitting Balance WFL;unsupported;sitting, edge of bed     Static Standing Balance mild impairment;supported;standing   at RW    Balance Test Results Five Times Sit to Stand (FTSTS);Timed Up and Go Test (TUG)     Five Times to Sit to Stand (FTSTS) 20.67   WC<>RW with Min A provided via gait belt    Comment, Balance Static sitting at EOB w/ PT assist to rosie underwear and pants; min A static standing at RW w/ assist for balance and to pull up pants        Timed Up and Go Test    Trial One: Timed Up and Go Test 272.78     Comment, Timed Up and Go Test with Min A using RW/gait belt     Results, Timed Up and Go Test (Balance) Significantly increased time to complete, greater than norm of 14 seconds, indicating balance/mobility impairment        Motor Skills    Motor Control/Coordination Interventions  "stepping/walking        Advanced Stepping/Walking Interventions    Stepping/Walking Interventions box stepping     Box Stepping (Stepping/Walking Interventions) RLE tap ups to 4\" block w/ Min A for balance at RW, 1 set x 10 reps        Daily Progress Summary (PT)    Daily Outcome Statement Outcome measures completed with significantly increased time required. Patient responds well to encouragement. Increased time required to complete self-care and mobility. Patient reports less pain overall today.     Recommendations (PT) Continue strengthening, balance, mobility training; assess steps as patient bibi/able.                      Education Documentation  Mobility Aids/Assistive Devices, taught by Karoline Aguilar, PT at 1/3/2022  9:30 AM.  Learner: Patient  Readiness: Acceptance  Method: Explanation  Response: Needs Reinforcement  Comment: balance/mobility deficits based on outcome measures completed          IRF PT Goals      Most Recent Value   Bed Mobility Goal 1    Activity/Assistive Device scooting, sit to supine, supine to sit at 01/02/2022 1102   Long minimum assist (75% or more patient effort) at 01/02/2022 1102   Time Frame short-term goal (STG), 1 week at 01/02/2022 1102   Bed Mobility Goal 2    Activity/Assistive Device scooting, sit to supine, supine to sit at 01/02/2022 1102   Long modified independence at 01/02/2022 1102   Time Frame long-term goal (LTG), 14 days or less, by discharge at 01/02/2022 1102   Transfer Goal 1    Activity/Assistive Device sit-to-stand/stand-to-sit, stand pivot, walker, front-wheeled at 01/02/2022 1102   Long supervision required at 01/02/2022 1102   Time Frame short-term goal (STG), 1 week at 01/02/2022 1102   Transfer Goal 2    Activity/Assistive Device sit-to-stand/stand-to-sit, stand pivot, walker, front-wheeled at 01/02/2022 1102   Long modified independence at 01/02/2022 1102   Time Frame long-term goal (LTG), 14 days or less, by discharge at " 01/02/2022 1102   Gait/Walking Locomotion Goal 1    Activity/Assistive Device gait (walking locomotion), decrease asymmetrical patterns, decrease fall risk, forward stepping, improve balance and speed at 01/02/2022 1102   Distance 200 feet at 01/02/2022 1102   [place] supervision required at 01/02/2022 1102   Time Frame short-term goal (STG), 1 week at 01/02/2022 1102   Gait/Walking Locomotion Goal 2    Activity/Assistive Device gait (walking locomotion), decrease asymmetrical patterns, decrease fall risk, forward stepping, improve balance and speed at 01/02/2022 1102   Distance 200 feet at 01/02/2022 1102   [place] modified independence at 01/02/2022 1102   Time Frame long-term goal (LTG), 14 days or less, by discharge at 01/02/2022 1102

## 2022-01-03 NOTE — PROGRESS NOTES
Inpatient Psychology Initial Intake    Duration:  1 hour    Bee Cobb, : 1940, a 81 y.o. female, for initial evaluation visit to discuss Adjustment to Disability and Pain Management.    HPI: L hip fx       Past Medical History:   Diagnosis Date   • Atrial fibrillation (CMS/HCC)    • Glaucoma    • History of transfusion    • Hyperthyroidism    • Lipid disorder    • Osteoporosis    • PAN (polyarteritis nodosa) (CMS/HCC)    • Thalassemia      Past Surgical History:   Procedure Laterality Date   • CARDIAC SURGERY     • TONSILLECTOMY       No family history on file.  Social History     Socioeconomic History   • Marital status:      Spouse name: Not on file   • Number of children: Not on file   • Years of education: Not on file   • Highest education level: Not on file   Occupational History   • Not on file   Tobacco Use   • Smoking status: Never Smoker   • Smokeless tobacco: Never Used   Vaping Use   • Vaping Use: Never used   Substance and Sexual Activity   • Alcohol use: Never   • Drug use: Never   • Sexual activity: Not on file   Other Topics Concern   • Not on file   Social History Narrative   • Not on file     Social Determinants of Health     Financial Resource Strain: Not on file   Food Insecurity: No Food Insecurity   • Worried About Running Out of Food in the Last Year: Never true   • Ran Out of Food in the Last Year: Never true   Transportation Needs: Not on file   Physical Activity: Not on file   Stress: Not on file   Social Connections: Not on file   Intimate Partner Violence: Not on file   Housing Stability: Not on file       Current Legal Status:       Number of Arrests:      Previous Mental Health History:   Previous Mental Health Treatment:  N/A  Previous Suicidal Behavior:  N/A  Previous Self-Injurious Behavior:  N/A  Previous Homicidal Behavior:  N/A  Previous Substance Abuse Treatment: N/A    Current Facility-Administered Medications   Medication Dose Route Frequency Provider Last  Rate Last Admin   • acetaminophen (TYLENOL) tablet 1,000 mg  1,000 mg oral q8h Karley Mar DO   1,000 mg at 01/03/22 0629   • albuterol HFA (VENTOLIN HFA) 90 mcg/actuation inhaler 2 puff  2 puff inhalation q4h PRN Karley Mar DO       • bisacodyL (DULCOLAX) 10 mg suppository 10 mg  10 mg rectal Daily PRN Karley Mar DO   10 mg at 01/02/22 1950   • budesonide (PULMICORT) 0.5 mg/2 mL nebulizer solution 0.5 mg  0.5 mg nebulization BID (6a, 6p) Karley Mar DO   0.5 mg at 01/03/22 0629   • cholecalciferol (vitamin D3) tablet 1,000 Units  1,000 Units oral Daily Karley Mar DO   1,000 Units at 01/03/22 0821   • dabigatran etexilate (PRADAXA) capsule 150 mg  150 mg oral BID Karley Mar DO   150 mg at 01/03/22 0821   • glucose chewable tablet 16-32 g of dextrose  16-32 g of dextrose oral PRN Karley Mar DO        Or   • dextrose 40 % oral gel 15-30 g of dextrose  15-30 g of dextrose oral PRN Karley Mar DO        Or   • glucagon (GLUCAGEN) injection 1 mg  1 mg intramuscular PRN Karley Mar DO        Or   • dextrose in water injection 12.5 g  25 mL intravenous PRN Karley Mar DO       • digoxin (LANOXIN) tablet 250 mcg  250 mcg oral Daily (6a) Karley Mar DO   250 mcg at 01/03/22 0630   • dilTIAZem CD (CARDIZEM CD) 24 hr ER capsule 240 mg  240 mg oral Daily Karley Mar DO   240 mg at 01/03/22 0822   • dorzolamide (TRUSOPT) 2 % ophthalmic solution 1 drop  1 drop Both Eyes BID (6a, 6p) Karley Mar DO   1 drop at 01/03/22 0632   • latanoprost (XALATAN) 0.005 % ophthalmic solution 1 drop  1 drop Both Eyes Nightly aKrley Mar DO   1 drop at 01/02/22 2143   • levothyroxine (SYNTHROID) tablet 75 mcg  75 mcg oral Daily (6a) Karley Mar DO   75 mcg at 01/03/22 0630   • multivitamin tablet 1 tablet  1 tablet oral Daily Karley Mar DO   1 tablet at 01/03/22 0821   • oxyCODONE (ROXICODONE) immediate release tablet 5 mg  5 mg oral q6h PRN Karley Mar DO   5 mg at 01/03/22 0828   •  sennosides-docusate sodium (SENOKOT-S) 8.6-50 mg per tablet 1 tablet  1 tablet oral BID Karley Mar DO   1 tablet at 01/03/22 0822   • sodium chloride 3 % nebulizer solution 3 mL  3 mL nebulization q6h PRN Karley Mar DO       • tiotropium bromide (SPIRIVA RESPIMAT) 2.5 mcg/actuation inhaler 2 puff  2 puff inhalation Daily Karley Mar DO   2 puff at 01/03/22 0827       Current Evaluation:   Mental Status Exam:  Arousal Level: Attentive  Appearance: Well Groomed  Speech: Regular  Psychomotor Functioning: WNL  Eye Contact: WNL  Est. Premorbid Intelligence: Average  Orientation: Fully oriented  Attention: WNL  Concentration: WNL  Recent Memory: WNL  Remote Memory: WNL  Thought Content: Appropriate  Thought Process: WNL,Worry  Insight: Intact  Perceptual Function: Pain  Delusions: None or age appropriate  Sleeping: No Change  Appetite: No Change  Libido: Non-Contributory  Affect: Full Range  Mood: Anxious    Assessments done this visit:     [place] Suicide Severity Rating Scale:  Not indicated       [place] Suicide Severity Rating Scale  1. Within the past month, have you wished you were dead or wished you could go to sleep and not wake up?: No  2. Within the past month, have you actually had any thoughts of killing yourself?: No  6. Have you ever done anything, started to do anything, or prepared to do anything to end your life?: No    Safe-T Assessment:  Not indicated        Comments:     Risk Assessment:   Suicidal Ideation: Based on [place] Suicide Screen and current clinical assessment, patient is determined Low Risk.  Self Injurious Behavior:  Not Present  Irritability:  Not Present  Homicidal Behavior: Not Present  Estimate of Risk:  None  If risk identified have suicide precautions been implemented? No    Goals Addressed                 This Visit's Progress    • Reduce anxiety/depression             Interventions  Acceptance & Adjustment  Anxiety Reduction Techniques  Cognitive Behavior  "Training  Monitoring of Symptoms  Pain Management  Psychoeducation    Psychoeducation provided on:  Depression Anxiety     Recommendations:      Individual Therapy  30 minutesweekly      Visit Diagnosis:     1. Adjustment disorder with anxiety        Diagnostic Impression:   Weekly Progress Summary: progressing toward goals as expected  Weekly Outcome Statement: 82 yo female c L hip fx due to fall while trying to sit on her bed.  Sat on the edge and fell off. . On 5 mg Oxy. H/o COPD, HTN, PAF, glaucoma, OP  and AVR. Fall this summer on steps c fx clavicle and still feeling traumatized. . Pt is alert and friendly.  sleeping and eating well. Falls asleep c the tv on.  Highly anxious about falling again and about pain.  Pt is very worried about \"failing rehab.\" Background is that 93 yo  mother had fall, hip fx and ended up in snf. worked on breathing/relaxation. Pt responsive to gentle but firm feedback.   Mild worry about becoming dependent on narcotics. Educated pt about their role in rehab. worked on changing cog (ie they are coming to torture me soon). Pt is fully o, appears mildly [place]. Psych to follow.        Kalie Fuentes, PhD @ 9:08 AM  "

## 2022-01-03 NOTE — CONSULTS
[facility] Internal Medicine  Consult Note    Subjective     Bee Cobb is a 81 y.o. female who was admitted for Hip fracture requiring operative repair, left, closed, initial encounter (CMS/[facility]) [S72.002A]. Patient was referred by Rolan Crooks MD for medical assessment and management     CC: Hip fracture requiring operative repair, left, closed, initial encounter (CMS/[facility]) [S72.002A]    HPI: Bee Cobb is a 81 y.o. female with HTN, HL, hypothyroid, COPD, alpha thalassemia trait, CARRINGTON s/p treatment, glaucoma, HFpEF, AFIB (on Pradaxa), admitted to [facility] 12/29/21 s/p fall from bed found with left hip pain. X-ray left hip revealed left femoral neck fracture.  CT left hip confirmed displaced fracture left femoral neck.  X-ray left knee was negative.  Head CT was negative for skull fracture or intracranial hemorrhage.  CT cervical spine was negative for fracture dislocation.  Dr. Fazal Pena from orthopedic surgery was consulted and performed left total hip replacement on 12/29/2021. Post op she had anemia but did not require transfusion. She was restarted on Pradaxa for AFIB which also provided DVT ppx.  Her pain was managed with Tylenol and Oxycodone.     Her BP and HR were managed with Cardizem CD. She was also on Digoxin for AFIB.  She was on Crestor for HL and Synthroid for hypothyroid. She was on Spiriva and Pulmicort for COPD.  She was on Trusopt and Xalatan for glaucoma.    The patient was seen by PM&R and found to have residual deficits in ambulation and ADLs due to Hip fracture requiring operative repair, left, closed, initial encounter (CMS/[facility]) [S72.002A] and came to [facility] on 1/1/2022 for acute inpatient rehab.     SUBJECTIVE:  Patient interviewed and examined    Pain stable, some constipation, no abdominal pain or nausea, no dysuria, no chest pain, palpitations, dyspnea or fevers      ROS: as above, otherwise noncontributory  Current meds and allergies reviewed    Outside  records reviewed. Pertinent radiology results reviewed. Pertinent lab results reviewed.    Medical History:   Past Medical History:   Diagnosis Date   • (HFpEF) heart failure with preserved ejection fraction (CMS/HCC)    • Alpha thalassemia trait    • Atrial fibrillation (CMS/HCC)    • Closed left hip fracture (CMS/HCC) 12/29/2021   • COPD (chronic obstructive pulmonary disease) (CMS/HCC)    • Glaucoma    • History of transfusion    • Hypertension    • Hypothyroidism    • Lipid disorder    • Malignant melanoma (CMS/HCC)    • Mycobacterium avium-intracellulare complex (CMS/HCC)    • Osteoporosis    • PAN (polyarteritis nodosa) (CMS/HCC)        Surgical History:   Past Surgical History:   Procedure Laterality Date   • AORTIC VALVE REPLACEMENT     • TONSILLECTOMY     • TOTAL HIP ARTHROPLASTY Left 12/29/2021       Allergies: Atorvastatin; Penicillins; Shellfish derived; Iodinated contrast media; Rosuvastatin; Covid-19 vaccine, mrna, [id number], lnp-s (moderna); and Covid-19 vaccine, [id number], lnp-s (moderna)      Current Facility-Administered Medications   Medication Dose Route Frequency   • acetaminophen  1,000 mg oral q8h   • albuterol HFA  2 puff inhalation q4h PRN   • alum-mag hydroxide-simeth  30 mL oral q6h PRN   • bisacodyL  10 mg rectal Daily PRN   • budesonide  0.5 mg nebulization BID (6a, 6p)   • cholecalciferol (vitamin D3)  1,000 Units oral Daily   • dabigatran etexilate  150 mg oral BID   • digoxin  250 mcg oral Daily (6a)   • dilTIAZem CD  240 mg oral Daily   • dorzolamide  1 drop Both Eyes BID (6a, 6p)   • latanoprost  1 drop Both Eyes Nightly   • levothyroxine  75 mcg oral Daily (6a)   • multivitamin  1 tablet oral Daily   • oxyCODONE  5 mg oral q6h PRN   • [START ON 1/4/2022] pantoprazole  40 mg oral Daily before breakfast   • sennosides-docusate sodium  1 tablet oral BID   • sodium chloride  3 mL nebulization q6h PRN   • tiotropium bromide  2 puff inhalation Daily         Social History:   Social  History     Socioeconomic History   • Marital status:      Spouse name: None   • Number of children: None   • Years of education: None   • Highest education level: None   Occupational History   • None   Tobacco Use   • Smoking status: Never Smoker   • Smokeless tobacco: Never Used   Vaping Use   • Vaping Use: Never used   Substance and Sexual Activity   • Alcohol use: Never   • Drug use: Never   • Sexual activity: None   Other Topics Concern   • None   Social History Narrative   • None     Social Determinants of Health     Financial Resource Strain: Not on file   Food Insecurity: No Food Insecurity   • Worried About Running Out of Food in the Last Year: Never true   • Ran Out of Food in the Last Year: Never true   Transportation Needs: Not on file   Physical Activity: Not on file   Stress: Not on file   Social Connections: Not on file   Intimate Partner Violence: Not on file   Housing Stability: Not on file       Family History: History reviewed. No pertinent family history.    Review of Systems    Constitutional: negative for fevers  Eyes: negative for visual disturbance  Ears, nose, mouth, throat, and face: negative for nasal congestion  Respiratory: negative for cough, dyspnea on exertion and wheezing  Cardiovascular: negative for chest pain and palpitations  Gastrointestinal: positive for constipation, negative for abdominal pain, nausea and vomiting  Genitourinary:negative for decreased stream and painful urination  Integument/breast: negative for rash and itching  Musculoskeletal:positive for left hip pain and swelling  Neurological: negative for numbness and tingling  Behavioral/Psych: negative for anxiety and depression    Vital signs in last 24 hours:  Temp:  [36.7 °C (98 °F)-36.8 °C (98.2 °F)] 36.7 °C (98 °F)  Heart Rate:  [] 105  Resp:  [18-20] 18  BP: (141-184)/(60-75) 141/65  Vital signs reviewed 01/03/22 12:02 PM    Objective     Physical Exam    General appearance: alert, appears stated  age and cooperative  Head: normocephalic, without obvious abnormality, atraumatic  Eyes: conjunctivae clear. PERRL, EOM's intact.  Ears: normal external ear  Nose: Nares normal. Septum midline. Mucosa normal.  Throat: normal oropharynx  Neck: no JVD, no adenopathy, no carotid bruit, supple, symmetrical, trachea midline and thyroid not enlarged, symmetric, no tenderness/mass/nodules  Lungs: clear to auscultation bilaterally  Heart: regular rate and rhythm, S1, S2 normal, no murmur, click, rub or gallop  Abdomen: soft, non-tender; bowel sounds normal; no masses, no organomegaly  Extremities: 2+ bilat LE edema; s/p left BARB  Pulses: 2+ and symmetric all four extremities  Skin: left hip C/D/I with Mepilex dressings  Lymph nodes: Cervical and supraclavicular nodes normal.  Neurologic: Alert and oriented X 3  decreased strength at left hip due to pain      Labs  I have reviewed the patient's pertinent labs.  Significant abnormals are anemia, leukocytosis.  Results from last 7 days   Lab Units 01/03/22  0433   WBC K/uL 11.29*   HEMOGLOBIN g/dL 8.1*   HEMATOCRIT % 26.2*   PLATELETS K/uL 351     Results from last 7 days   Lab Units 01/02/22  0604   SODIUM mEQ/L 136   POTASSIUM mEQ/L 3.9   CHLORIDE mEQ/L 104   CO2 mEQ/L 26   BUN mg/dL 12   CREATININE mg/dL 0.3*   CALCIUM mg/dL 8.0*   ALBUMIN g/dL 2.4*   BILIRUBIN TOTAL mg/dL 0.9   ALK PHOS IU/L 95   ALT IU/L 15   AST IU/L 26   GLUCOSE mg/dL 89       Imaging  Radiology reports reviewed.     12/30/21: XR right knee:  IMPRESSION:  1. No acute osseous abnormality. Degenerative change.  2. No significant suprapatellar joint effusion. There does appear to be medial  soft tissue swelling.    12/29/21: XR left hip:  Findings:  There is a total left hip arthroplasty with satisfactory AP alignment. There is  no evidence of periprosthetic fracture. Soft tissue air overlies the operative  site.   --  IMPRESSION:  Status post total left hip arthroplasty.    12/29/21: CT left  hip:  IMPRESSION:  Displaced fracture left femoral neck      ECG/Telemetry  Cardiology reports reviewed.     12/29/21: ECG:  Normal sinus rhythm with sinus arrhythmia   Normal ECG       Assessment/Plan   ASSESSMENT/PLAN  81 y.o. female with HTN, HL, hypothyroid, COPD, alpha thalassemia trait, CARRINGTON s/p treatment, glaucoma, HFpEF, AFIB (on Pradaxa), admitted to [facility] 12/29/21 s/p fall from bed found to have left hip fracture and underwent left BARB by Dr. Fazal Pena 12/29/21.    1. Ortho:  -s/p fall with left hip fx s/p left BARB  -Tylenol and Oxycodone for pain     2. Vasc:  -bilat LE edema  -SCDs and Pradaxa for DVT ppx     3. Heme:  -post op anemia due to chronic blood loss on iron/mvi  -alpha thalassemia trait with chronic anemia  -leukocytosis reactive  -follow CBC     4. Renal:  -increased risk of dehydration and electrolyte changes  -follow BMP, Mg     5. Gi:  -Senokot-S for bowels  -Protonix ulcer ppx     6. Gu:  -increased risk of UTI and retention  -check UA/C+S and check PVRs     7. Cardiac:  -AFIB on Cardizem CD, Digoxin and Pradaxa  -hx of AVR  -HFpEF  -HTN on Cardizem CD  -watch for orthostatic hypotension  -use cardiac precautions in therapy  -consulted Cardiology     8. Pulm:  -hx of CARRINGTON s/p treatmetn  -COPD on Pulmicort and Spiriva  -incentive spirometry for atelectasis     9. Derm:  -consulted [employer] for skin assessment     10. Nutrition:  -consulted Nutrition for assessment and education    11. Endo:  -HL on Crestor  -hypothyroid on Synthroid    12. Optho  -glaucoma on Xalatan and Trusopt    13. Psych:  -consulted Psychology for support    plan discussed with patient, nurse, case management and Rolan Crooks MD    Orders personally entered in CPOE     I have queried the state Prescription Drug Monitoring Program [PDMP] and found no suspicious PDMP activity     Tal Rojas MD  01/03/22  12:13 PM

## 2022-01-03 NOTE — PROGRESS NOTES
Patient: Bee Cobb  [facility] [address]  MRN: [id number]  Today's date: 1/3/2022    History of Present Illness  Bee is a 81 y.o. female admitted on 1/1/2022 with Hip fracture requiring operative repair, left, closed, initial encounter (CMS/[facility]) [S72.002A]. Principal problem is Hip fracture requiring operative repair, left, closed, initial encounter (CMS/[facility]).    81 y.o. f who presents with left hip pain after falling out of bed. She had an immediate onset of left hip pain and inability to ambulate.  No LOC.  Radiographic evaluation showed a displaced transcervical femoral neck fracture by CT.  On 12-29-21 she underwent left hip hemiarthroplasty.  Pt is WBAT to LLE and anterior hip precautions x 6 weeks. Ortho recommends aspirin 325mg/day x 6 weeks in addition to pt's home pradaxa which was restarted postoperatively. Pt did well postoperatively, but was noted to have postop anemia with a hgb drop of 12-->9. Per [place], this is expected due to Pradaxa use.  Pain is being managed with multimodal analgesia; scheduled tylenol, lido patch, PRN oxycodone, ice therapy.  PT/OT/PM&R evaluated pt and recommend acute rehab at discharge.          Past Medical History  Bee has a past medical history of Atrial fibrillation (CMS/[facility]), Glaucoma, History of transfusion, Hyperthyroidism, Lipid disorder, Osteoporosis, PAN (polyarteritis nodosa) (CMS/[facility]), and Thalassemia. H/o recent clavicle fx 9/11/21 Completed course of therapy and has full ROM      PT Vitals    Date/Time Pulse HR Source SpO2 Pt Activity O2 Therapy BP BP Location BP Method Pt Position [facility]   01/03/22 1030 105 Monitor 94 % At rest None (Room air) 141/65 Left upper arm Automatic Sitting LDR      PT Pain    Date/Time Pain Type Side/Orientation Location Rating: Rest Rating: Activity Interventions [facility]   01/03/22 1030 Pain Assessment left;lateral hip 7 -- position adjusted;premedicated for activity LDR             01/03/22 1058  Pain Reassessment;Post Activity left;lateral hip -- 7 position adjusted;premedicated for activity LDR          Prior Living Environment      Most Recent Value   People in Home spouse   Current Living Arrangements home  [9 MADY, FF inside]   Home Accessibility stairs to enter home (Group), stairs within home (Group)   Living Environment Comment Row home 13 steps into house, 9 steps to BR on 2nd floor   Number of Stairs, Main Entrance 9   Surface of Stairs, Main Entrance concrete   Stair Railings, Main Entrance railings on both sides of stairs   Location, Bathroom second floor, must negotiate stairs to access   [place], Bathroom tile floor   Bathroom Access Comment ztub/shower combo, hand held shower, bench and suction grab bars ( checks stability prior to each shower)   Number of Stairs, Within Home, Primary --  [13]   Stair Railings, Within Home, Primary railing on right side (ascending)          Prior Level of Function      Most Recent Value   Dominant Hand left   Ambulation independent   Transferring independent   Toileting independent   Bathing independent   Dressing independent   Eating independent   Assistive Device Currently Used at Home shower chair, grab bar           IRF PT Evaluation and Treatment - 01/03/22 1030        PT Time Calculation    Start Time 1030     Stop Time 1100     Time Calculation (min) 30 min        Session Details    Document Type daily treatment/progress note     Mode of Treatment physical therapy;individual therapy        General Information    Patient Profile Reviewed yes     General Observations of Patient Pleasant, cooperative; agreeable to participate with PT     Existing Precautions/Restrictions fall;hip;weight bearing;restraints   anterior THP's; restraint order: 4 side rails       Weight-bearing Status    Left LE Weight-Bearing Status weight-bearing as tolerated (WBAT)        Sit to Stand Transfer    [place], Sit to Stand Transfer minimum assist (75% or more patient  effort)   for steadying at pelvis    Verbal Cues hand placement;safety;technique     Assistive Device gait belt;walker, front-wheeled     Comment from w/c        Stand to Sit Transfer    [place], Stand to Sit Transfer minimum assist (75% or more patient effort)   for eccentric control with lowering    Verbal Cues hand placement;safety;technique     Assistive Device gait belt;walker, front-wheeled     Comment to w/c        Stand Pivot Transfer    [place], Stand Pivot/Stand Step Transfer minimum assist (75% or more patient effort);verbal cues   for steadying at pelvis    Verbal Cues hand placement;maintaining precautions;safety;technique     Assistive Device gait belt;walker, front-wheeled     Comment ambulatory approach to w/c        Gait Training    [place], Gait minimum assist (75% or more patient effort)   for steadying at pelvis    Assistive Device gait belt;walker, front-wheeled     Distance in Feet 15 feet     Pattern (Gait) step-through;step-to     Deviations/Abnormal Patterns (Gait) antalgic;base of support, narrow;digna decreased;gait speed decreased;step length decreased;bilateral deviations     Bilateral Gait Deviations heel strike decreased     Maintains Weight-bearing Status (Gait) able to maintain        Lower Extremity (Therapeutic Exercise)    Exercise Position/Type seated     General Exercise bilateral;ankle pumps;LAQ (long arc quad);gluteal sets     Reps and Sets 10 reps x 2 sets B        Daily Progress Summary (PT)    Symptoms Noted During/After Treatment fatigue     Progress Toward Functional Goals (PT) progressing toward functional goals as expected     Daily Outcome Statement Pt motivated to improve functional independence, but continuing to fatigue easily. Tolerating decreased distance with gait training this session due to fatigue. Need to continue with POC as outlined to include progression with LE therex, standing balance activities and assessment of elevations, as  appropriate.     Recommendations (PT) Continue with POC as outlined to include progression with LE therex, standing balance activities and assessment of elevations, as appropriate.                           IRF PT Goals      Most Recent Value   Bed Mobility Goal 1    Activity/Assistive Device scooting, sit to supine, supine to sit at 01/02/2022 1102   [place] minimum assist (75% or more patient effort) at 01/02/2022 1102   Time Frame short-term goal (STG), 1 week at 01/02/2022 1102   Bed Mobility Goal 2    Activity/Assistive Device scooting, sit to supine, supine to sit at 01/02/2022 1102   [place]ates modified independence at 01/02/2022 1102   Time Frame long-term goal (LTG), 14 days or less, by discharge at 01/02/2022 1102   Transfer Goal 1    Activity/Assistive Device sit-to-stand/stand-to-sit, stand pivot, walker, front-wheeled at 01/02/2022 1102   [place] supervision required at 01/02/2022 1102   Time Frame short-term goal (STG), 1 week at 01/02/2022 1102   Transfer Goal 2    Activity/Assistive Device sit-to-stand/stand-to-sit, stand pivot, walker, front-wheeled at 01/02/2022 1102   [place]ates modified independence at 01/02/2022 1102   Time Frame long-term goal (LTG), 14 days or less, by discharge at 01/02/2022 1102   Gait/Walking Locomotion Goal 1    Activity/Assistive Device gait (walking locomotion), decrease asymmetrical patterns, decrease fall risk, forward stepping, improve balance and speed at 01/02/2022 1102   Distance 200 feet at 01/02/2022 1102   [place]ates supervision required at 01/02/2022 1102   Time Frame short-term goal (STG), 1 week at 01/02/2022 1102   Gait/Walking Locomotion Goal 2    Activity/Assistive Device gait (walking locomotion), decrease asymmetrical patterns, decrease fall risk, forward stepping, improve balance and speed at 01/02/2022 1102   Distance 200 feet at 01/02/2022 1102   [place]ates modified independence at 01/02/2022 1102   Time Frame long-term goal (LTG),  14 days or less, by discharge at 01/02/2022 1102

## 2022-01-03 NOTE — PLAN OF CARE
"  Problem: Rehabilitation (IRF) Plan of Care  Goal: Plan of Care Review  Outcome: Progressing  Flowsheets (Taken 1/3/2022 4628)  Plan of Care Reviewed With: patient  Outcome Summary: Patient stated she slept \"ok but not great,\" stating she was feeling anxious about her current conditions such as urinal frequency, increased pain with activities. Noted infrequent productive coughs, O2 sat kept above 91%RA, pt denied SOB. Having frequent small amount of tea color urine at times, -400.      "

## 2022-01-03 NOTE — CONSULTS
Clinical Course: Patient is a 81 y.o. female who was admitted on 1/1/2022 with a diagnosis of Hip fracture requiring operative repair, left, closed, initial encounter (CMS/[facility]) [S72.002A].     Nutrition Interventions/Recommendations:   1. counseled nutrition needs for healing  2. Monitor PO, weight, labs, skin    Nutrition risk level 1    Past Medical History:   Diagnosis Date   • Atrial fibrillation (CMS/[facility])    • Closed left hip fracture (CMS/[facility]) 12/29/2021   • COPD (chronic obstructive pulmonary disease) (CMS/[facility])    • Glaucoma    • History of transfusion    • Hypertension    • Hypothyroidism    • Lipid disorder    • Osteoporosis    • PAN (polyarteritis nodosa) (CMS/[facility])    • Thalassemia      Past Surgical History:   Procedure Laterality Date   • CARDIAC SURGERY     • TONSILLECTOMY     • TOTAL HIP ARTHROPLASTY Left 12/29/2021        Adult Nutrition Assessment - 01/03/22 1100        Charting Type    Nutrition Charting Type Nutrition Brief Assessment     Nutrition Status Classification Mild nutritional compromise     Time Spent (Minutes) 30        Reason for Assessment    Reason For Assessment physician consult     Diagnosis --   hip fraccture       Nutrition/Diet History    Typical Food/Fluid Intake general diet     Intake (%) 75%     Food Preferences no fish (tuna ok)   has alternate list    Supplemental Drinks/Foods/Additives none wanted     Food Allergies --   shellfish    Factors Affecting Nutritional Intake --   none       Usual Body Weight (UBW)    Usual Body Weight 56.7 kg (125 lb)     Weight Loss no weight change        Body Mass Index (BMI)    BMI Assessment BMI 18.5-24.9: normal        Labs/Procedures/Meds    Lab Results Reviewed reviewed        Medications    Pertinent Medications Reviewed reviewed     Pertinent Medications Comments MVI, synthroid, D3        Physical Findings    Overall Physical Appearance --   normally nourished    Skin surgical incision   L-hip       Nutrition Order    Nutrition  Order meets nutritional requirements     Nutrition Order Comments regular and thin        PES Statement    Nutritional Needs Met? Yes                 CMP Results       01/02/22 12/31/21 12/30/21     0604 0909 0736     134 132    K 3.9 4.2 4.5    Cl 104 103 101    CO2 26 23 20    Glucose 89 125 149    BUN 12 13 8    Creatinine 0.3 0.6 0.6    Calcium 8.0 8.1 7.8    Anion Gap 6 8 11    AST 26 -- --    ALT 15 -- --    Albumin 2.4 -- --    EGFR >60.0 >60.0 >60.0         Comment for K at 0736 on 12/30/21: SLIGHT HEMOLYSIS, RESULT MAY BE INCREASED.        Lab Results   Component Value Date    ALT 15 01/02/2022    AST 26 01/02/2022    ALKPHOS 95 01/02/2022    BILITOT 0.9 01/02/2022     No results found for: [id number]  Lab Results   Component Value Date    WBC 11.29 (H) 01/03/2022    HGB 8.1 (L) 01/03/2022    HCT 26.2 (L) 01/03/2022    MCV 64.5 (L) 01/03/2022     01/03/2022     No results found for: CHOL  No results found for: HDL  No results found for: LDLCALC  No results found for: TRIG  No results found for: CHOLHDL  Lab Results   Component Value Date    CALCIUM 8.0 (L) 01/02/2022     Glucose Results    No lab values to display.           • acetaminophen  1,000 mg oral q8h   • budesonide  0.5 mg nebulization BID (6a, 6p)   • cholecalciferol (vitamin D3)  1,000 Units oral Daily   • dabigatran etexilate  150 mg oral BID   • digoxin  250 mcg oral Daily (6a)   • dilTIAZem CD  240 mg oral Daily   • dorzolamide  1 drop Both Eyes BID (6a, 6p)   • latanoprost  1 drop Both Eyes Nightly   • levothyroxine  75 mcg oral Daily (6a)   • multivitamin  1 tablet oral Daily   • [START ON 1/4/2022] pantoprazole  40 mg oral Daily before breakfast   • sennosides-docusate sodium  1 tablet oral BID   • tiotropium bromide  2 puff inhalation Daily              Dietary Orders   (From admission, onward)             Start     Ordered    01/01/22 1726  Diet message  Once        Comments: Pt prefers no fish.    01/01/22 1726    01/01/22  1659  Adult Diet Regular; Thin Liquids  Diet effective now        References:    IDDSI Diet reference   Question Answer Comment   Diet Texture Regular    Fluid Consistency: Thin Liquids        01/01/22 1700              Wt Readings from Last 3 Encounters:   01/01/22 56.7 kg (125 lb)   12/29/21 57.7 kg (127 lb 3.2 oz)   09/11/21 55.3 kg (122 lb)       Weights (last 7 days)     Date/Time Weight    01/01/22 1700 56.7 kg (125 lb)          Clinaical Comments:  Pt seen, eating well, although she does not like fish or lot of poultry/meat.  RD provided  Alternate meal list and counseled adequate protein foods for healing. Pt declines  Any oral supplements.     Date: 01/03/22  Signature: YOLANDA Caputo

## 2022-01-04 ENCOUNTER — APPOINTMENT (INPATIENT)
Dept: PHYSICAL THERAPY | Facility: REHABILITATION | Age: 82
DRG: 560 | End: 2022-01-04
Payer: MEDICARE

## 2022-01-04 ENCOUNTER — APPOINTMENT (INPATIENT)
Dept: OCCUPATIONAL THERAPY | Facility: REHABILITATION | Age: 82
DRG: 560 | End: 2022-01-04
Payer: MEDICARE

## 2022-01-04 PROCEDURE — 97110 THERAPEUTIC EXERCISES: CPT | Mod: GP

## 2022-01-04 PROCEDURE — 97112 NEUROMUSCULAR REEDUCATION: CPT | Mod: GP

## 2022-01-04 PROCEDURE — 63700000 HC SELF-ADMINISTRABLE DRUG: Performed by: HOSPITALIST

## 2022-01-04 PROCEDURE — 25000000 HC PHARMACY GENERAL: Performed by: PHYSICAL MEDICINE & REHABILITATION

## 2022-01-04 PROCEDURE — 12800000 HC ROOM AND CARE SEMIPRIVATE REHAB

## 2022-01-04 PROCEDURE — 97530 THERAPEUTIC ACTIVITIES: CPT | Mod: GO

## 2022-01-04 PROCEDURE — 97110 THERAPEUTIC EXERCISES: CPT | Mod: GO

## 2022-01-04 PROCEDURE — 63700000 HC SELF-ADMINISTRABLE DRUG: Performed by: PHYSICAL MEDICINE & REHABILITATION

## 2022-01-04 PROCEDURE — 97116 GAIT TRAINING THERAPY: CPT | Mod: GP

## 2022-01-04 PROCEDURE — 97535 SELF CARE MNGMENT TRAINING: CPT | Mod: GO

## 2022-01-04 RX ORDER — OXYCODONE HYDROCHLORIDE 5 MG/1
5 TABLET ORAL EVERY 6 HOURS PRN
Status: DISCONTINUED | OUTPATIENT
Start: 2022-01-04 | End: 2022-01-13

## 2022-01-04 RX ADMIN — BUDESONIDE 0.5 MG: 0.5 SUSPENSION RESPIRATORY (INHALATION) at 05:55

## 2022-01-04 RX ADMIN — SENNOSIDES AND DOCUSATE SODIUM 1 TABLET: 50; 8.6 TABLET ORAL at 20:08

## 2022-01-04 RX ADMIN — LATANOPROST 1 DROP: 50 SOLUTION/ DROPS OPHTHALMIC at 20:09

## 2022-01-04 RX ADMIN — DORZOLAMIDE HYDROCHLORIDE 1 DROP: 20 SOLUTION/ DROPS OPHTHALMIC at 06:10

## 2022-01-04 RX ADMIN — Medication 1000 UNITS: at 08:14

## 2022-01-04 RX ADMIN — DABIGATRAN ETEXILATE MESYLATE 150 MG: 150 CAPSULE ORAL at 20:08

## 2022-01-04 RX ADMIN — TIOTROPIUM BROMIDE INHALATION SPRAY 2 PUFF: 3.12 SPRAY, METERED RESPIRATORY (INHALATION) at 08:16

## 2022-01-04 RX ADMIN — OXYCODONE HYDROCHLORIDE 5 MG: 5 TABLET ORAL at 08:20

## 2022-01-04 RX ADMIN — DABIGATRAN ETEXILATE MESYLATE 150 MG: 150 CAPSULE ORAL at 08:14

## 2022-01-04 RX ADMIN — SENNOSIDES AND DOCUSATE SODIUM 1 TABLET: 50; 8.6 TABLET ORAL at 08:14

## 2022-01-04 RX ADMIN — ACETAMINOPHEN 1000 MG: 500 TABLET, FILM COATED ORAL at 05:54

## 2022-01-04 RX ADMIN — FERROUS SULFATE TAB 325 MG (65 MG ELEMENTAL FE) 325 MG: 325 (65 FE) TAB at 08:14

## 2022-01-04 RX ADMIN — OXYCODONE HYDROCHLORIDE 5 MG: 5 TABLET ORAL at 20:08

## 2022-01-04 RX ADMIN — DIGOXIN 250 MCG: 250 TABLET ORAL at 05:54

## 2022-01-04 RX ADMIN — ACETAMINOPHEN 1000 MG: 500 TABLET, FILM COATED ORAL at 13:25

## 2022-01-04 RX ADMIN — DILTIAZEM HYDROCHLORIDE 240 MG: 120 CAPSULE, COATED, EXTENDED RELEASE ORAL at 08:14

## 2022-01-04 RX ADMIN — PANTOPRAZOLE SODIUM 40 MG: 40 TABLET, DELAYED RELEASE ORAL at 08:14

## 2022-01-04 RX ADMIN — BUDESONIDE 0.5 MG: 0.5 SUSPENSION RESPIRATORY (INHALATION) at 18:42

## 2022-01-04 RX ADMIN — OXYCODONE HYDROCHLORIDE 5 MG: 5 TABLET ORAL at 13:25

## 2022-01-04 RX ADMIN — DORZOLAMIDE HYDROCHLORIDE 1 DROP: 20 SOLUTION/ DROPS OPHTHALMIC at 18:02

## 2022-01-04 RX ADMIN — LEVOTHYROXINE SODIUM 75 MCG: 75 TABLET ORAL at 05:54

## 2022-01-04 RX ADMIN — THERA TABS 1 TABLET: TAB at 08:14

## 2022-01-04 RX ADMIN — ACETAMINOPHEN 1000 MG: 500 TABLET, FILM COATED ORAL at 23:19

## 2022-01-04 RX ADMIN — ROSUVASTATIN CALCIUM 5 MG: 5 TABLET, FILM COATED ORAL at 18:01

## 2022-01-04 NOTE — PROGRESS NOTES
[facility] Progress Note          Patient was seen and examined.   Attestation Notes: Face to face encounter completed    Bee Cobb is a 81 y.o. female who was admitted for Hip fracture requiring operative repair, left, closed, initial encounter (CMS/[facility]) [S72.002A]. Patient was referred by Rolan Crooks MD for medical assessment and management      CC: Hip fracture requiring operative repair, left, closed, initial encounter (CMS/[facility]) [S72.002A]     HPI: Bee Cobb is a 81 y.o. female with HTN, HL, hypothyroid, COPD, alpha thalassemia trait, CARRINGTON s/p treatment, glaucoma, HFpEF, AFIB (on Pradaxa), admitted to [facility] 12/29/21 s/p fall from bed found with left hip pain. X-ray left hip revealed left femoral neck fracture.  CT left hip confirmed displaced fracture left femoral neck.  X-ray left knee was negative.  Head CT was negative for skull fracture or intracranial hemorrhage.  CT cervical spine was negative for fracture dislocation.  Dr. Fazal Pena from orthopedic surgery was consulted and performed left total hip replacement on 12/29/2021. Post op she had anemia but did not require transfusion. She was restarted on Pradaxa for AFIB which also provided DVT ppx.  Her pain was managed with Tylenol and Oxycodone.      Her BP and HR were managed with Cardizem CD. She was also on Digoxin for AFIB.  She was on Crestor for HL and Synthroid for hypothyroid. She was on Spiriva and Pulmicort for COPD.  She was on Trusopt and Xalatan for glaucoma.     The patient was seen by PM&R and found to have residual deficits in ambulation and ADLs due to Hip fracture requiring operative repair, left, closed, initial encounter (CMS/[facility]) [S72.002A] and came to [facility] on 1/1/2022 for acute inpatient rehab.      She participated in therapy.     SUBJECTIVE:  Patient interviewed and examined     Pain stable, improved constipation, no abdominal pain or nausea, no dysuria, no chest  pain, palpitations, dyspnea or fevers    Review of Systems:  All other systems reviewed and negative except as noted in the HPI.    Current meds and allergies reviewed    Past Medical History:   Diagnosis Date   • (HFpEF) heart failure with preserved ejection fraction (CMS/HCC)    • Alpha thalassemia trait    • Atrial fibrillation (CMS/HCC)    • Closed left hip fracture (CMS/HCC) 12/29/2021   • COPD (chronic obstructive pulmonary disease) (CMS/HCC)    • Glaucoma    • History of transfusion    • Hypertension    • Hypothyroidism    • Lipid disorder    • Malignant melanoma (CMS/HCC)    • Mycobacterium avium-intracellulare complex (CMS/HCC)    • Osteoporosis    • PAN (polyarteritis nodosa) (CMS/HCC)      Past Surgical History:   Procedure Laterality Date   • AORTIC VALVE REPLACEMENT     • TONSILLECTOMY     • TOTAL HIP ARTHROPLASTY Left 12/29/2021     Social History     Tobacco Use   • Smoking status: Never Smoker   • Smokeless tobacco: Never Used   Vaping Use   • Vaping Use: Never used   Substance Use Topics   • Alcohol use: Never   • Drug use: Never      History reviewed. No pertinent family history.    Vital signs in last 24 hours:  Temp:  [36.6 °C (97.9 °F)-36.9 °C (98.5 °F)] 36.6 °C (97.9 °F)  Heart Rate:  [] 120  Resp:  [16-18] 16  BP: (138-157)/(60-70) 143/62  Vital signs reviewed 01/04/22 2:33 PM    Physical Exam  Vitals and nursing note reviewed.   Constitutional:       Appearance: Normal appearance.   HENT:      Head: Normocephalic and atraumatic.      Right Ear: External ear normal.      Left Ear: External ear normal.      Nose: Nose normal.      Mouth/Throat:      Mouth: Mucous membranes are moist.      Pharynx: Oropharynx is clear.   Eyes:      Extraocular Movements: Extraocular movements intact.      Conjunctiva/sclera: Conjunctivae normal.      Pupils: Pupils are equal, round, and reactive to light.   Cardiovascular:      Rate and Rhythm: Tachycardia present. Rhythm irregular.      Pulses: Normal  pulses.      Heart sounds: Normal heart sounds.   Pulmonary:      Effort: Pulmonary effort is normal.      Breath sounds: Normal breath sounds.   Abdominal:      General: Abdomen is flat. Bowel sounds are normal.      Palpations: Abdomen is soft.   Musculoskeletal:         General: Signs of injury (s/p ORIF left hip fx) present.      Right lower leg: Edema present.      Left lower leg: Edema present.   Skin:     General: Skin is warm and dry.   Neurological:      Mental Status: She is alert and oriented to person, place, and time.   Psychiatric:         Mood and Affect: Mood normal.         Behavior: Behavior normal.                 Objective    Labs:  I have reviewed the patient's labs.  Significant abnormals are anemia, leukocytosis.  Results from last 7 days   Lab Units 01/03/22  0433   WBC K/uL 11.29*   HEMOGLOBIN g/dL 8.1*   HEMATOCRIT % 26.2*   PLATELETS K/uL 351     Results from last 7 days   Lab Units 01/02/22  0604   SODIUM mEQ/L 136   POTASSIUM mEQ/L 3.9   CHLORIDE mEQ/L 104   CO2 mEQ/L 26   BUN mg/dL 12   CREATININE mg/dL 0.3*   CALCIUM mg/dL 8.0*   ALBUMIN g/dL 2.4*   BILIRUBIN TOTAL mg/dL 0.9   ALK PHOS IU/L 95   ALT IU/L 15   AST IU/L 26   GLUCOSE mg/dL 89       Imaging:  Not applicable        ASSESSMENT/PLAN:    81 y.o. female with HTN, HL, hypothyroid, COPD, alpha thalassemia trait, CARRINGTON s/p treatment, glaucoma, HFpEF, AFIB (on Pradaxa), admitted to [facility] 12/29/21 s/p fall from bed found to have left hip fracture and underwent left BARB by Dr. Fazal Pena 12/29/21.     1. Ortho:  -s/p fall with left hip fx s/p left BARB  -Tylenol and Oxycodone for pain     2. Vasc:  -bilat LE edema  -SCDs and Pradaxa for DVT ppx     3. Heme:  -post op anemia due to chronic blood loss on iron/mvi  -alpha thalassemia trait with chronic anemia  -leukocytosis reactive  -follow CBC     4. Renal:  -increased risk of dehydration and electrolyte changes  -follow BMP, Mg     5. Gi:  -Senokot-S for  bowels  -Protonix ulcer ppx     6. Gu:  -1/2/22 UA shows hematuria, possibly trauma for cath and being on Pradaxa  -no UTI or retention     7. Cardiac:  -AFIB on Cardizem CD, Digoxin and Pradaxa  -remains tachycardic  -check Digoxin level  -hx of AVR  -HFpEF  -HTN on Cardizem CD  -watch for orthostatic hypotension  -use cardiac precautions in therapy  -consulted Cardiology     8. Pulm:  -hx of CARRINGTON s/p treatmetn  -COPD on Pulmicort and Spiriva  -incentive spirometry for atelectasis     9. Derm:  -consulted [employer] for skin assessment     10. Nutrition:  -seen by Nutrition for assessment and education     11. Endo:  -HL on Crestor  -hypothyroid on Synthroid     12. Optho  -glaucoma on Xalatan and Trusopt     13. Psych:  -seen by Psychology for support     plan discussed with patient, nurse, case management and Rolan Crooks MD Scott Sapperstein, MD  1/4/2022  2:33 PM

## 2022-01-04 NOTE — PROGRESS NOTES
Patient: Bee Cobb  [facility] [address]  MRN: [id number]  Today's date: 1/4/2022    History of Present Illness  Bee is a 81 y.o. female admitted on 1/1/2022 with Hip fracture requiring operative repair, left, closed, initial encounter (CMS/[facility]) [S72.002A]. Principal problem is Hip fracture requiring operative repair, left, closed, initial encounter (CMS/[facility]).    81 y.o. f who presents with left hip pain after falling out of bed. She had an immediate onset of left hip pain and inability to ambulate.  No LOC.  Radiographic evaluation showed a displaced transcervical femoral neck fracture by CT.  On 12-29-21 she underwent left hip hemiarthroplasty.  Pt is WBAT to LLE and anterior hip precautions x 6 weeks. Ortho recommends aspirin 325mg/day x 6 weeks in addition to pt's home pradaxa which was restarted postoperatively. Pt did well postoperatively, but was noted to have postop anemia with a hgb drop of 12-->9. Per [place], this is expected due to Pradaxa use.  Pain is being managed with multimodal analgesia; scheduled tylenol, lido patch, PRN oxycodone, ice therapy.  PT/OT/PM&R evaluated pt and recommend acute rehab at discharge.          Past Medical History  Bee has a past medical history of Atrial fibrillation (CMS/[facility]), Glaucoma, History of transfusion, Hyperthyroidism, Lipid disorder, Osteoporosis, PAN (polyarteritis nodosa) (CMS/[facility]), and Thalassemia. H/o recent clavicle fx 9/11/21 Completed course of therapy and has full ROM      PT Vitals    Date/Time Pulse HR Source BP BP Location BP Method Pt Position [facility]   01/04/22 0905 97 Monitor 138/62 Left upper arm Automatic Sitting RC      PT Pain    Date/Time Pain Type Side/Orientation Location Rating: Rest Rating: Activity Interventions [facility]   01/04/22 0905 Pain Assessment -- -- 0 -- -- RC   01/04/22 0927 Pain Reassessment left hip -- 6 position adjusted RC          Prior Living Environment      Most Recent Value   People in  Home spouse   Current Living Arrangements home  [9 MADY, FF inside]   Home Accessibility stairs to enter home (Group), stairs within home (Group)   Living Environment Comment Row home 13 steps into house, 9 steps to BR on 2nd floor   Number of Stairs, Main Entrance 9   Surface of Stairs, Main Entrance concrete   Stair Railings, Main Entrance railings on both sides of stairs   Location, Bathroom second floor, must negotiate stairs to access   [place], Bathroom tile floor   Bathroom Access Comment ztub/shower combo, hand held shower, bench and suction grab bars ( checks stability prior to each shower)   Number of Stairs, Within Home, Primary --  [13]   Stair Railings, Within Home, Primary railing on right side (ascending)          Prior Level of Function      Most Recent Value   Dominant Hand left   Ambulation independent   Transferring independent   Toileting independent   Bathing independent   Dressing independent   Eating independent   Assistive Device Currently Used at Home shower chair, grab bar           IRF PT Evaluation and Treatment - 01/04/22 0909        PT Time Calculation    Start Time 0900     Stop Time 0930     Time Calculation (min) 30 min        Session Details    Document Type daily treatment/progress note     Mode of Treatment physical therapy;individual therapy        General Information    Patient Profile Reviewed yes        Sit to Stand Transfer    [place], Sit to Stand Transfer minimum assist (75% or more patient effort)     Verbal Cues hand placement;safety;technique     Assistive Device gait belt     Comment multiple bouts from w/c; Janice at pelvis for rise and balance        Stand to Sit Transfer    [place], Stand to Sit Transfer minimum assist (75% or more patient effort)     Verbal Cues hand placement;safety;technique     Assistive Device walker, front-wheeled;gait belt     Comment multiple bouts to w/c; Janice at pelvis fro controlled lowering        Stand Pivot Transfer     [place], Stand Pivot/Stand Step Transfer minimum assist (75% or more patient effort)     Verbal Cues safety;technique     Assistive Device walker, front-wheeled;gait belt     Comment via ambulatory approach to w/c; Janice at pelvis for lateral WS and balance        Gait Training    [place], Gait minimum assist (75% or more patient effort);increased time to complete     Assistive Device walker, front-wheeled;gait belt     Distance in Feet 40 feet     Pattern (Gait) step-to     Deviations/Abnormal Patterns (Gait) antalgic;gait speed decreased;step length decreased;weight shifting decreased;digna decreased     Bilateral Gait Deviations heel strike decreased     Maintains Weight-bearing Status (Gait) able to maintain     Comment (Gait/Stairs) 40'x 1 with multiple standing rest breaks d/t anxiety and SOB. Janice at pelvis with gait belt for balance and safety. VC's for step length on the R.        Motor Skills    Motor Control/Coordination Interventions weight-bearing activities     Comment, Motor Skills lateral WS's onto LLE while taping blue dot with RLE x10 reps with Janice at pelvis for balance and blocking L knee from buckling. VC's for upright posture and increasing WB-ing onto LLE.        Lower Extremity (Therapeutic Exercise)    Exercise Position/Type seated     General Exercise bilateral     Reps and Sets 20 reps each     Comment seated in w/c performed warm-up TE's: ankle pumps, LAQ's, glute sets and marching on RLE.        Daily Progress Summary (PT)    Symptoms Noted During/After Treatment fatigue     Daily Outcome Statement Pt session focused on LE strengthening and activities to increase weight bearing tolerance through the LLE. WOuld continue to benefit from standing tolerance with increased WB-ing throught LLE.                      Education Documentation  Safety Techniques, taught by Kim Butcher, PT at 1/4/2022  9:15 AM.  Learner: Patient  Readiness: Acceptance  Method:  Explanation  Response: Needs Reinforcement  Comment: safety awareness when using an AD for transfers including appropriate hand placement to prevent falls .          IRF PT Goals      Most Recent Value   Bed Mobility Goal 1    Activity/Assistive Device scooting, sit to supine, supine to sit at 01/02/2022 1102   [place] minimum assist (75% or more patient effort) at 01/02/2022 1102   Time Frame short-term goal (STG), 1 week at 01/02/2022 1102   Bed Mobility Goal 2    Activity/Assistive Device scooting, sit to supine, supine to sit at 01/02/2022 1102   [place] modified independence at 01/02/2022 1102   Time Frame long-term goal (LTG), 14 days or less, by discharge at 01/02/2022 1102   Transfer Goal 1    Activity/Assistive Device sit-to-stand/stand-to-sit, stand pivot, walker, front-wheeled at 01/02/2022 1102   [place] supervision required at 01/02/2022 1102   Time Frame short-term goal (STG), 1 week at 01/02/2022 1102   Transfer Goal 2    Activity/Assistive Device sit-to-stand/stand-to-sit, stand pivot, walker, front-wheeled at 01/02/2022 1102   [place] modified independence at 01/02/2022 1102   Time Frame long-term goal (LTG), 14 days or less, by discharge at 01/02/2022 1102   Gait/Walking Locomotion Goal 1    Activity/Assistive Device gait (walking locomotion), decrease asymmetrical patterns, decrease fall risk, forward stepping, improve balance and speed at 01/02/2022 1102   Distance 200 feet at 01/02/2022 1102   [place] supervision required at 01/02/2022 1102   Time Frame short-term goal (STG), 1 week at 01/02/2022 1102   Gait/Walking Locomotion Goal 2    Activity/Assistive Device gait (walking locomotion), decrease asymmetrical patterns, decrease fall risk, forward stepping, improve balance and speed at 01/02/2022 1102   Distance 200 feet at 01/02/2022 1102   [place] modified independence at 01/02/2022 1102   Time Frame long-term goal (LTG), 14 days or less, by discharge at  01/02/2022 1102

## 2022-01-04 NOTE — PROGRESS NOTES
Patient: Bee Cobb  [facility]  MRN: [id number]  Today's date: 1/4/2022    History of Present Illness  Bee is a 81 y.o. female admitted on 1/1/2022 with Hip fracture requiring operative repair, left, closed, initial encounter (CMS/[facility]) [S72.002A]. Principal problem is Hip fracture requiring operative repair, left, closed, initial encounter (CMS/[facility]).    81 y.o. f who presents with left hip pain after falling out of bed. She had an immediate onset of left hip pain and inability to ambulate.  No LOC.  Radiographic evaluation showed a displaced transcervical femoral neck fracture by CT.  On 12-29-21 she underwent left hip hemiarthroplasty.  Pt is WBAT to LLE and anterior hip precautions x 6 weeks. Ortho recommends aspirin 325mg/day x 6 weeks in addition to pt's home pradaxa which was restarted postoperatively. Pt did well postoperatively, but was noted to have postop anemia with a hgb drop of 12-->9. Per [place], this is expected due to Pradaxa use.  Pain is being managed with multimodal analgesia; scheduled tylenol, lido patch, PRN oxycodone, ice therapy.  PT/OT/PM&R evaluated pt and recommend acute rehab at discharge.          Past Medical History  Bee has a past medical history of Atrial fibrillation (CMS/[facility]), Glaucoma, History of transfusion, Hyperthyroidism, Lipid disorder, Osteoporosis, PAN (polyarteritis nodosa) (CMS/[facility]), and Thalassemia. H/o recent clavicle fx 9/11/21 Completed course of therapy and has full ROM      PT Vitals    Date/Time Pulse HR Source SpO2 Pt Activity O2 Therapy BP BP Location BP Method Pt Position [facility]   01/04/22 1505 102 Monitor 96 % At rest None (Room air) 136/59 Left upper arm Automatic Sitting BDC      PT Pain    Date/Time Pain Type Side/Orientation Location Rating: Rest Interventions [facility]   01/04/22 1505 Pain Assessment left hip 5 premedicated for activity BD   01/04/22 1555 Pain Reassessment left hip 5 premedicated for  activity;position adjusted BDC          Prior Living Environment      Most Recent Value   People in Home spouse   Current Living Arrangements home  [9 MADY, FF inside]   Home Accessibility stairs to enter home (Group), stairs within home (Group)   Living Environment Comment Row home 13 steps into house, 9 steps to BR on 2nd floor   Number of Stairs, Main Entrance 9   Surface of Stairs, Main Entrance concrete   Stair Railings, Main Entrance railings on both sides of stairs   Location, Bathroom second floor, must negotiate stairs to access   [place], Bathroom tile floor   Bathroom Access Comment ztub/shower combo, hand held shower, bench and suction grab bars ( checks stability prior to each shower)   Number of Stairs, Within Home, Primary --  [13]   Stair Railings, Within Home, Primary railing on right side (ascending)          Prior Level of Function      Most Recent Value   Dominant Hand left   Ambulation independent   Transferring independent   Toileting independent   Bathing independent   Dressing independent   Eating independent   Assistive Device Currently Used at Home shower chair, grab bar           IRF PT Evaluation and Treatment - 01/04/22 1505        PT Time Calculation    Start Time 1505     Stop Time 1605     Time Calculation (min) 60 min        Session Details    Document Type daily treatment/progress note     Mode of Treatment individual therapy;physical therapy        General Information    Patient Profile Reviewed yes        Bed Mobility    [place], Sit to Supine minimum assist (75% or more patient effort)     Comment (Bed Mobility) Min A sit to supine to bed surface for LLE management. Pt repositioned for comfort post-tx with needs in reach.        Sit to Stand Transfer    [place], Sit to Stand Transfer minimum assist (75% or more patient effort)     Verbal Cues safety;technique     Assistive Device gait belt;walker, front-wheeled     Comment Min A via gait belt for steadying upon  standing.        Stand to Sit Transfer    [place], Stand to Sit Transfer minimum assist (75% or more patient effort)     Verbal Cues safety;technique     Assistive Device gait belt;walker, front-wheeled     Comment Min A via gait belt for controlled lowering.        Stand Pivot Transfer    [place], Stand Pivot/Stand Step Transfer minimum assist (75% or more patient effort)     Verbal Cues safety;technique     Assistive Device gait belt;walker, front-wheeled     Comment Min A at L lateral pelvis for balance/steadying and lateral weight shifting.        Gait Training    [place], Gait minimum assist (75% or more patient effort);increased time to complete     Assistive Device gait belt;walker, front-wheeled     Distance in Feet 45 feet     Pattern (Gait) step-to;step-through     Deviations/Abnormal Patterns (Gait) antalgic;base of support, narrow;gait speed decreased;step length decreased;weight shifting decreased     Comment (Gait/Stairs) Min A via gait belt for steadying/balance. Increased time needed due to slow digna. Min verbal cues to maintain upright posture as pt demonstrating gradual forward lean over the RW with progressing amb distance.        Stairs Training    [place], Stairs minimum assist (75% or more patient effort)     Assistive Device railing     Handrail Location (Stairs) both sides     Number of Stairs 1     Stair Height 6 inches     Ascending Stairs Technique step-to-step   leading with RLE via forward approach    Descending Stairs Technique step-to-step   leading with LLE via backward approach.    Comment Min A at posterior pelvis for balance/steadying and lateral weight shifting. Furhter stair bouts limited due to fatigue.        Motor Skills    Functional Endurance Pt required increased time between functional tasks due to decreased endurance and +LEMOS.        Lower Extremity (Therapeutic Exercise)    Exercise Position/Type seated;supine;AROM (active range of motion);static  isometric contraction     General Exercise bilateral;ankle pumps;LAQ (long arc quad);quad sets;gluteal sets;SAQ (short arc quad)     Reps and Sets 2 x 10 seated TE. 3 x 10 supine TE.     Comment 1) seated TE - ankle pumps, LAQs. 2) Supine TE - quad sets, glut sets, SAQs.        Daily Progress Summary (PT)    Daily Outcome Statement Pt tolerated increased ambulation distance progressing to 45-ft. Pt requires increased time for completion of amb trial due to slow digna and requires Min verbal cues to maintain upright posture as pt demonstrating gradual forward lean over the RW. Initiated stair negotiation and pt able to complete up/down one 6-inch step with Min A and subsequent stair bouts limited by fatigue/dec endurance. Pt returned to bed post-tx for rest break. Plan to continue to progress functional mobility, strength and endurance per pt's tolerance.                      Education Documentation  Mobility Aids/Assistive Devices, taught by Jonny Louis, PT at 1/4/2022  4:41 PM.  Learner: Patient  Readiness: Eager  Method: Explanation, Demonstration  Response: Verbalizes Understanding, Needs Reinforcement  Comment: Provided education regarding stair negotation technique.          IRF PT Goals      Most Recent Value   Bed Mobility Goal 1    Activity/Assistive Device scooting, sit to supine, supine to sit at 01/02/2022 1102   [place] minimum assist (75% or more patient effort) at 01/02/2022 1102   Time Frame short-term goal (STG), 1 week at 01/02/2022 1102   Bed Mobility Goal 2    Activity/Assistive Device scooting, sit to supine, supine to sit at 01/02/2022 1102   [place] modified independence at 01/02/2022 1102   Time Frame long-term goal (LTG), 14 days or less, by discharge at 01/02/2022 1102   Transfer Goal 1    Activity/Assistive Device sit-to-stand/stand-to-sit, stand pivot, walker, front-wheeled at 01/02/2022 1102   [place] supervision required at 01/02/2022 1102   Time Frame short-term  goal (STG), 1 week at 01/02/2022 1102   Transfer Goal 2    Activity/Assistive Device sit-to-stand/stand-to-sit, stand pivot, walker, front-wheeled at 01/02/2022 1102   [place] modified independence at 01/02/2022 1102   Time Frame long-term goal (LTG), 14 days or less, by discharge at 01/02/2022 1102   Gait/Walking Locomotion Goal 1    Activity/Assistive Device gait (walking locomotion), decrease asymmetrical patterns, decrease fall risk, forward stepping, improve balance and speed at 01/02/2022 1102   Distance 200 feet at 01/02/2022 1102   [place] supervision required at 01/02/2022 1102   Time Frame short-term goal (STG), 1 week at 01/02/2022 1102   Gait/Walking Locomotion Goal 2    Activity/Assistive Device gait (walking locomotion), decrease asymmetrical patterns, decrease fall risk, forward stepping, improve balance and speed at 01/02/2022 1102   Distance 200 feet at 01/02/2022 1102   [place] modified independence at 01/02/2022 1102   Time Frame long-term goal (LTG), 14 days or less, by discharge at 01/02/2022 1102   Stairs Goal 1    Activity/Assistive Device ascending stairs, descending stairs at 01/04/2022 1505   Number of Stairs 8 at 01/04/2022 1505   [place] minimum assist (75% or more patient effort) at 01/04/2022 1505   Time Frame short-term goal (STG), 1 week at 01/04/2022 1505   Stairs Goal 2    Activity/Assistive Device ascending stairs, descending stairs at 01/04/2022 1505   Number of Stairs 12 at 01/04/2022 1505   [place] supervision required at 01/04/2022 1505   Time Frame long-term goal (LTG), 14 days or less at 01/04/2022 1505

## 2022-01-04 NOTE — PROGRESS NOTES
Patient: Bee Cobb  [facility]  MRN: [id number]  Today's date: 1/4/2022    History of Present Illness  Bee is a 81 y.o. female admitted on 1/1/2022 with Hip fracture requiring operative repair, left, closed, initial encounter (CMS/[facility]) [S72.002A]. Principal problem is Hip fracture requiring operative repair, left, closed, initial encounter (CMS/[facility]).    81 y.o. f who presents with left hip pain after falling out of bed. She had an immediate onset of left hip pain and inability to ambulate.  No LOC.  Radiographic evaluation showed a displaced transcervical femoral neck fracture by CT.  On 12-29-21 she underwent left hip hemiarthroplasty.  Pt is WBAT to LLE and anterior hip precautions x 6 weeks. Ortho recommends aspirin 325mg/day x 6 weeks in addition to pt's home pradaxa which was restarted postoperatively. Pt did well postoperatively, but was noted to have postop anemia with a hgb drop of 12-->9. Per [place], this is expected due to Pradaxa use.  Pain is being managed with multimodal analgesia; scheduled tylenol, lido patch, PRN oxycodone, ice therapy.  PT/OT/PM&R evaluated pt and recommend acute rehab at discharge.          Past Medical History  Bee has a past medical history of Atrial fibrillation (CMS/[facility]), Glaucoma, History of transfusion, Hyperthyroidism, Lipid disorder, Osteoporosis, PAN (polyarteritis nodosa) (CMS/[facility]), and Thalassemia. H/o recent clavicle fx 9/11/21 Completed course of therapy and has full ROM       01/04/22 1408   Pain/Comfort/Sleep   Pain Charting Type Pain Assessment   Preferred Pain Scale number (Numeric Rating Pain Scale)   (0-10) Pain Rating: Rest 4   Pain Body Location hip   Pain Description intermittent;aching   Pain Management Interventions position adjusted   Vital Signs   BP (!) 138/59   BP Location Left upper arm   BP Method Automatic   Patient Position Sitting        01/04/22 1455   Pain/Comfort/Sleep   Pain Charting Type  "Pain Reassessment   Response to Pain Interventions nonverbal indicators absent/decreased       Prior Living Environment      Most Recent Value   People in Home spouse   Current Living Arrangements home  [9 MADY, FF inside]   Home Accessibility stairs to enter home (Group), stairs within home (Group)   Living Environment Comment Row home 13 steps into house, 9 steps to BR on 2nd floor   Number of Stairs, Main Entrance 9   Surface of Stairs, Main Entrance concrete   Stair Railings, Main Entrance railings on both sides of stairs   Location, Bathroom second floor, must negotiate stairs to access   [place], Bathroom tile floor   Bathroom Access Comment ztub/shower combo, hand held shower, bench and suction grab bars ( checks stability prior to each shower)   Number of Stairs, Within Home, Primary --  [13]   Stair Railings, Within Home, Primary railing on right side (ascending)          Prior Level of Function      Most Recent Value   Dominant Hand left   Ambulation independent   Transferring independent   Toileting independent   Bathing independent   Dressing independent   Eating independent   Assistive Device Currently Used at Home shower chair, grab bar          Occupational Profile      Most Recent Value   Reason for Services/Referral ADL deficit   Successful Occupations wife,- mother   Occupational History/Life Experiences retired [employer]    Performance Patterns independent in all areas   Patient Goals \"I want to be functional, walk and take care of myself\"           IRF OT Evaluation and Treatment - 01/04/22 1415        OT Time Calculation    Start Time 1400     Stop Time 1500     Time Calculation (min) 60 min        Session Details    Document Type daily treatment/progress note     Mode of Treatment occupational therapy        General Information    General Observations of Patient Pt received seated in w/c in clinic        Transfers    Transfers stand pivot transfer     Maintains Weight-Bearing " Status (Transfers) verbal cues to maintain;physical assist to maintain     Comment RW support        Stand Pivot Transfer    [place], Stand Pivot/Stand Step Transfer minimum assist (75% or more patient effort);1 person assist;safety considerations     Verbal Cues safety;technique;maintaining center of gravity over base of support;hand placement;preparatory posture     Assistive Device walker, front-wheeled     Comment elevtaed surfaces, mod cues to assume core extension/postural control w UE on RW, kyphotic but able to self correct with encourageme.nt and demonstration        Safety Issues, Functional Mobility    Comment, Safety Issues/Impairments (Mobility) OT: Pt able to amb short HHD w UE support on RW w min A using gait belt for safety. Pt presenting with increased trunk flexion and weight bearing through walker. Pt provided with instruction and education about benefits of stretching and strengthening core muscles.        Balance    Balance Assessment sitting static balance;sitting dynamic balance;sit to stand dynamic balance;standing static balance;standing dynamic balance     Static Sitting Balance WFL;unsupported     Dynamic Sitting Balance WFL;supported;mild impairment;unsupported     Sit to Stand Dynamic Balance mild impairment;supported     Static Standing Balance mild impairment;supported;standing     Dynamic Standing Balance moderate impairment;supported;standing     Comment, Balance Pt would benefit from ongoing instruction and education in positioning to improve overall posture and balance. Increased weight bearing through walker in static position but able to improve slightly w cues and encouragement. Unable to stand safely unsupported or reach out of base of support without the intervention of a walker.        Motor Skills    Motor Skills postural control     Functional Endurance (G-) for UE exercise, static standing w UE support on RW        Postural Control Assessment    Issues Impacting  Performance abnormal movement patterns;asymmetrical postures;compensatory motor behaviors;impaired motor problem solving;impaired muscular force;misalignment of body segments during activity;incorrect timing of movement components;limited movement components;pain     Comment, Issues Impacting Performance Kyphosis     Comment, Impact of Muscle Weakness Pt presenting with increased forward flexion and weight bearing through RW, head down during amb.     Therapeutic Techniques activities to develop specific motor strategies;alignment of body segments;avoid activity that increases abnormal muscle tone;avoid activity that strengthens abnormal movement patterns;develop compensatory/adaptive movement patterns;encourage normal movement patterns;ergonomic strategies;inhibit use of abnormal movement patterns;inhibit use of asymmetrical postures;re-train normal movement responses     Comment, Therapeutic Techniques Limited by moderate kyphosis but was able to improve posture post education, visual demonstration per benefits of improving posture.        Postural Deviations    Postural Deviations head and neck;shoulder;upper back;thorax;low back;pelvis     Head and Neck forward head     Shoulder left shoulder forward;right shoulder forward     Upper Back kyphosis     Thorax shortened     Low Back elongated     Pelvis posterior pelvic tilt        Upper Extremity (Therapeutic Exercise)    Exercise Position/Type seated;AROM (active range of motion);resistive exercises     General Exercise bilateral     Range of Motion Exercises shoulder flexion/extension;elbow flexion/extension;wrist flexion/extension     Reps and Sets UBE seated x10 minutes forward w low resistance backward 4 minutes     Comment good UE exercise tolerance        Daily Progress Summary (OT)    Symptoms Noted During/After Treatment none     Progress Toward Functional Goals (OT) progressing toward functional goals as expected     Daily Outcome Statement OT instructed  patient in strategies to improve forward flexed posture to improve overall balance. Pt presenting with limitations in core strength and presents with increased weightbearing through walker affecting balance and endurance.     Barriers to Overall Progress (OT) balance, THP, postural control     Recommendations (OT) Continue POC                           IRF OT Goals      Most Recent Value   Transfer Goal 1    Activity/Assistive Device toilet at 01/02/2022 0805   [place] moderate assist (50-74% patient effort) at 01/02/2022 0805   Time Frame short-term goal (STG), 5 - 7 days at 01/02/2022 0805   Strategies/Barriers DME assessment at 01/02/2022 0805   Transfer Goal 2    Activity/Assistive Device toilet at 01/02/2022 0805   [place] supervision required at 01/02/2022 0805   Time Frame long-term goal (LTG), 4 weeks at 01/02/2022 0805   Strategies/Barriers DME at 01/02/2022 0805   Transfer Goal 3    Activity/Assistive Device shower at 01/02/2022 0805   [place] moderate assist (50-74% patient effort) at 01/02/2022 0805   Time Frame short-term goal (STG), 5 - 7 days at 01/02/2022 0805   Strategies/Barriers DME at 01/02/2022 0805   Transfer Goal 4    Activity/Assistive Device shower at 01/02/2022 0805   [place] tactile cues required at 01/02/2022 0805   Time Frame long-term goal (LTG), 4 weeks at 01/02/2022 0805   Strategies/Barriers DME at 01/02/2022 0805   Bathing Goal 1    Activity/Assistive Device bathing skills, all at 01/02/2022 0805   [place] minimum assist (75% or more patient effort) at 01/02/2022 0805   Time Frame short-term goal (STG), 5 - 7 days at 01/02/2022 0805   Strategies/Barriers DME at 01/02/2022 0805   Bathing Goal 2    Activity/Assistive Device bathing skills, all at 01/02/2022 0805   [place] tactile cues required at 01/02/2022 0805   Time Frame long-term goal (LTG), 4 weeks at 01/02/2022 0805   Strategies/Barriers DME at 01/02/2022 0805   UB Dressing Goal 1     Activity/Assistive Device upper body dressing at 01/02/2022 0805   [place] minimum assist (75% or more patient effort) at 01/02/2022 0805   Time Frame short-term goal (STG), 5 - 7 days at 01/02/2022 0805   Strategies/Barriers incl s/u at 01/02/2022 0805   UB Dressing Goal 2    Activity/Assistive Device upper body dressing at 01/02/2022 0805   [place] modified independence at 01/02/2022 0805   Time Frame long-term goal (LTG), 4 weeks at 01/02/2022 0805   Strategies/Barriers incl s/u at 01/02/2022 0805   LB Dressing Goal 1    Activity/Assistive Device lower body dressing at 01/02/2022 0805   [place] moderate assist (50-74% patient effort) at 01/02/2022 0805   Time Frame short-term goal (STG), 5 - 7 days at 01/02/2022 0805   Strategies/Barriers AD at 01/02/2022 0805   LB Dressing Goal 2    Activity/Assistive Device lower body dressing at 01/02/2022 0805   [place] supervision required at 01/02/2022 0805   Time Frame long-term goal (LTG), 4 weeks at 01/02/2022 0805   Strategies/Barriers AD at 01/02/2022 0805   Grooming Goal 1    Activity/Assistive Device grooming skills, all at 01/02/2022 0805   [place] tactile cues required at 01/02/2022 0805   Time Frame short-term goal (STG), 5 - 7 days at 01/02/2022 0805   Strategies/Barriers std @ sink at 01/02/2022 0805   Grooming Goal 2    Activity/Assistive Device grooming skills, all at 01/02/2022 0805   [place] modified independence at 01/02/2022 0805   Time Frame long-term goal (LTG), 4 weeks at 01/02/2022 0805   Strategies/Barriers std @ sink at 01/02/2022 0805   Toileting Goal 1    Activity/Assistive Device toileting skills, all at 01/02/2022 0805   [place] minimum assist (75% or more patient effort) at 01/02/2022 0805   Time Frame short-term goal (STG), 5 - 7 days at 01/02/2022 0805   Strategies/Barriers DME at 01/02/2022 0805   Toileting Goal 2    Activity/Assistive Device toileting skills, all at 01/02/2022 0805   [place]  supervision required at 01/02/2022 0805   Time Frame long-term goal (LTG), 4 weeks at 01/02/2022 0805   Strategies/Barriers DME at 01/02/2022 0805

## 2022-01-04 NOTE — PLAN OF CARE
Problem: Rehabilitation (IRF) Plan of Care  Goal: Plan of Care Review  Outcome: Progressing  Flowsheets (Taken 1/4/2022 1136)  Progress: improving  Plan of Care Reviewed With: patient  Outcome Summary: Patient is AAO, anxious. She reports that she feels good this morning. Dry cough noted, she states this is chronic. Denies sore throat, katty SOB. Appetite good. Fluids encouraged. Urine is tea colored. +BM this AM. Left hip is approiximated wityh staples, scant amount of drainage noted on undergarments. No active draining, incision left JUMANA. Makes needs known.   Plan of Care Review  Plan of Care Reviewed With: patient  Progress: improving  Outcome Summary: Patient is AAO, anxious. She reports that she feels good this morning. Dry cough noted, she states this is chronic. Denies sore throat, katty SOB. Appetite good. Fluids encouraged. Urine is tea colored. +BM this AM. Left hip is approiximated wityh staples, scant amount of drainage noted on undergarments. No active draining, incision left JUMANA. Makes needs known.

## 2022-01-04 NOTE — PROGRESS NOTES
Patient: Bee Cobb  [facility] [address]  MRN: [id number]  Today's date: 1/4/2022    History of Present Illness  Bee is a 81 y.o. female admitted on 1/1/2022 with Hip fracture requiring operative repair, left, closed, initial encounter (CMS/[facility]) [S72.002A]. Principal problem is Hip fracture requiring operative repair, left, closed, initial encounter (CMS/[facility]).    81 y.o. f who presents with left hip pain after falling out of bed. She had an immediate onset of left hip pain and inability to ambulate.  No LOC.  Radiographic evaluation showed a displaced transcervical femoral neck fracture by CT.  On 12-29-21 she underwent left hip hemiarthroplasty.  Pt is WBAT to LLE and anterior hip precautions x 6 weeks. Ortho recommends aspirin 325mg/day x 6 weeks in addition to pt's home pradaxa which was restarted postoperatively. Pt did well postoperatively, but was noted to have postop anemia with a hgb drop of 12-->9. Per [place], this is expected due to Pradaxa use.  Pain is being managed with multimodal analgesia; scheduled tylenol, lido patch, PRN oxycodone, ice therapy.  PT/OT/PM&R evaluated pt and recommend acute rehab at discharge.          Past Medical History  Bee has a past medical history of Atrial fibrillation (CMS/[facility]), Glaucoma, History of transfusion, Hyperthyroidism, Lipid disorder, Osteoporosis, PAN (polyarteritis nodosa) (CMS/[facility]), and Thalassemia. H/o recent clavicle fx 9/11/21 Completed course of therapy and has full ROM      OT Vitals    Date/Time Pulse HR Source Pt Activity BP BP Location BP Method Pt Position [facility]   01/04/22 1101 110 Left Radial At rest 143/62 Left upper arm Automatic Sitting [facility]   01/04/22 1115 145 Monitor -- post toileting -- -- -- -- [facility]   01/04/22 1129 120 Monitor At rest -- -- -- -- J[facility]      OT Pain    Date/Time Pain Type Side/Orientation Location Rating: Rest Interventions [facility]   01/04/22 1101 Pain Assessment left hip 5 premedicated for  "activity [facility]   01/04/22 1129 Pain Reassessment left hip 5 premedicated for activity [facility]          Prior Living Environment      Most Recent Value   People in Home spouse   Current Living Arrangements home  [9 MADY, FF inside]   Home Accessibility stairs to enter home (Group), stairs within home (Group)   Living Environment Comment Row home 13 steps into house, 9 steps to BR on 2nd floor   Number of Stairs, Main Entrance 9   Surface of Stairs, Main Entrance concrete   Stair Railings, Main Entrance railings on both sides of stairs   Location, Bathroom second floor, must negotiate stairs to access   [place], Bathroom tile floor   Bathroom Access Comment ztub/shower combo, hand held shower, bench and suction grab bars ( checks stability prior to each shower)   Number of Stairs, Within Home, Primary --  [13]   Stair Railings, Within Home, Primary railing on right side (ascending)          Prior Level of Function      Most Recent Value   Dominant Hand left   Ambulation independent   Transferring independent   Toileting independent   Bathing independent   Dressing independent   Eating independent   Assistive Device Currently Used at Home shower chair, grab bar          Occupational Profile      Most Recent Value   Reason for Services/Referral ADL deficit   Successful Occupations wife,- mother   Occupational History/Life Experiences retired [employer]    Performance Patterns independent in all areas   Patient Goals \"I want to be functional, walk and take care of myself\"           IRF OT Evaluation and Treatment - 01/04/22 1101        OT Time Calculation    Start Time 1100     Stop Time 1130     Time Calculation (min) 30 min        Session Details    Document Type daily treatment/progress note     Mode of Treatment occupational therapy;individual therapy        Sit to Stand Transfer    [place], Sit to Stand Transfer minimum assist (75% or more patient effort)     Assistive Device gait belt;walker, " "front-wheeled        Stand to Sit Transfer    [place], Stand to Sit Transfer minimum assist (75% or more patient effort)     Assistive Device gait belt;walker, front-wheeled        Toilet Transfer    Transfer Technique stand pivot     [place], Toilet Transfer minimum assist (75% or more patient effort)     Verbal Cues hand placement;safety;technique     Assistive Device --   raised toilet seat c handles    Comment applied raised toilet seat for increased toilet height        Safety Issues, Functional Mobility    Comment, Safety Issues/Impairments (Mobility) short distance mobility c RW and gait belt to/from bathroom        Motor Skills    Functional Endurance cues for carryover pursed lip and diaphragmatic breathing. use of one hand on chest and one on abdomen for feedback. Use of breathing tech. to A c dyspnea and elevated HR. Noted decreased in HR post breathing tech.        Toileting    [place] minimum assist (75% or more patient effort)     Position supported sitting;supported standing     Adaptive Equipment raised toilet seat;grab bar/safety frame     Comment min A for balance. cues for carryover unilateral UE release from RW for clothing management        Daily Progress Summary (OT)    Daily Outcome Statement Tolerated session well. At start of session pt. reported feeling \"worked up\" about difficulty locating hearing aid batteries. Noted G carrryover and benefit use of pursed lip and diaphragmatic breathing to A. Issued toilet riser c handles to trial over toilet for increase ease of toilet transfer. May benefit from further relaxation techniques. Increased time for initiation and carryover of technique. Guarded c transfers. To continue OT services per POC.                           IRF OT Goals      Most Recent Value   Transfer Goal 1    Activity/Assistive Device toilet at 01/02/2022 0805   [place] moderate assist (50-74% patient effort) at 01/02/2022 0805   Time Frame short-term goal " (STG), 5 - 7 days at 01/02/2022 0805   Strategies/Barriers DME assessment at 01/02/2022 0805   Transfer Goal 2    Activity/Assistive Device toilet at 01/02/2022 0805   [place] supervision required at 01/02/2022 0805   Time Frame long-term goal (LTG), 4 weeks at 01/02/2022 0805   Strategies/Barriers DME at 01/02/2022 0805   Transfer Goal 3    Activity/Assistive Device shower at 01/02/2022 0805   [place] moderate assist (50-74% patient effort) at 01/02/2022 0805   Time Frame short-term goal (STG), 5 - 7 days at 01/02/2022 0805   Strategies/Barriers DME at 01/02/2022 0805   Transfer Goal 4    Activity/Assistive Device shower at 01/02/2022 0805   [place] tactile cues required at 01/02/2022 0805   Time Frame long-term goal (LTG), 4 weeks at 01/02/2022 0805   Strategies/Barriers DME at 01/02/2022 0805   Bathing Goal 1    Activity/Assistive Device bathing skills, all at 01/02/2022 0805   [place] minimum assist (75% or more patient effort) at 01/02/2022 0805   Time Frame short-term goal (STG), 5 - 7 days at 01/02/2022 0805   Strategies/Barriers DME at 01/02/2022 0805   Bathing Goal 2    Activity/Assistive Device bathing skills, all at 01/02/2022 0805   [place] tactile cues required at 01/02/2022 0805   Time Frame long-term goal (LTG), 4 weeks at 01/02/2022 0805   Strategies/Barriers DME at 01/02/2022 0805   UB Dressing Goal 1    Activity/Assistive Device upper body dressing at 01/02/2022 0805   [place] minimum assist (75% or more patient effort) at 01/02/2022 0805   Time Frame short-term goal (STG), 5 - 7 days at 01/02/2022 0805   Strategies/Barriers incl s/u at 01/02/2022 0805   UB Dressing Goal 2    Activity/Assistive Device upper body dressing at 01/02/2022 0805   [place] modified independence at 01/02/2022 0805   Time Frame long-term goal (LTG), 4 weeks at 01/02/2022 0805   Strategies/Barriers incl s/u at 01/02/2022 0805   LB Dressing Goal 1    Activity/Assistive Device lower body  dressing at 01/02/2022 0805   [place] moderate assist (50-74% patient effort) at 01/02/2022 0805   Time Frame short-term goal (STG), 5 - 7 days at 01/02/2022 0805   Strategies/Barriers AD at 01/02/2022 0805   LB Dressing Goal 2    Activity/Assistive Device lower body dressing at 01/02/2022 0805   [place] supervision required at 01/02/2022 0805   Time Frame long-term goal (LTG), 4 weeks at 01/02/2022 0805   Strategies/Barriers AD at 01/02/2022 0805   Grooming Goal 1    Activity/Assistive Device grooming skills, all at 01/02/2022 0805   [place] tactile cues required at 01/02/2022 0805   Time Frame short-term goal (STG), 5 - 7 days at 01/02/2022 0805   Strategies/Barriers std @ sink at 01/02/2022 0805   Grooming Goal 2    Activity/Assistive Device grooming skills, all at 01/02/2022 0805   [place] modified independence at 01/02/2022 0805   Time Frame long-term goal (LTG), 4 weeks at 01/02/2022 0805   Strategies/Barriers std @ sink at 01/02/2022 0805   Toileting Goal 1    Activity/Assistive Device toileting skills, all at 01/02/2022 0805   [place] minimum assist (75% or more patient effort) at 01/02/2022 0805   Time Frame short-term goal (STG), 5 - 7 days at 01/02/2022 0805   Strategies/Barriers DME at 01/02/2022 0805   Toileting Goal 2    Activity/Assistive Device toileting skills, all at 01/02/2022 0805   [place]cium supervision required at 01/02/2022 0805   Time Frame long-term goal (LTG), 4 weeks at 01/02/2022 0805   Strategies/Barriers DME at 01/02/2022 0805

## 2022-01-04 NOTE — PLAN OF CARE
Problem: Rehabilitation (IRF) Plan of Care  Goal: Plan of Care Review  Outcome: Progressing  Flowsheets (Taken 1/4/2022 5580)  Plan of Care Reviewed With: patient  Outcome Summary: Patient using bedside commode during the night, still noted small amount of tea colored urine at times, less frequency than past two nights. Continued c/o sore throat with infrequent non-productive cough, but pt stated feeling better, O2 sat kept above 94%RA. PRN oxy given at bedtime, did not reuqire additional dose during the night.

## 2022-01-05 ENCOUNTER — APPOINTMENT (INPATIENT)
Dept: OCCUPATIONAL THERAPY | Facility: REHABILITATION | Age: 82
DRG: 560 | End: 2022-01-05
Payer: MEDICARE

## 2022-01-05 ENCOUNTER — APPOINTMENT (INPATIENT)
Dept: PHYSICAL THERAPY | Facility: REHABILITATION | Age: 82
DRG: 560 | End: 2022-01-05
Payer: MEDICARE

## 2022-01-05 LAB — DIGOXIN SERPL-MCNC: 1 NG/ML (ref 0.5–2)

## 2022-01-05 PROCEDURE — 97110 THERAPEUTIC EXERCISES: CPT | Mod: GO

## 2022-01-05 PROCEDURE — 97116 GAIT TRAINING THERAPY: CPT | Mod: GP

## 2022-01-05 PROCEDURE — 12800000 HC ROOM AND CARE SEMIPRIVATE REHAB

## 2022-01-05 PROCEDURE — 97535 SELF CARE MNGMENT TRAINING: CPT | Mod: GO

## 2022-01-05 PROCEDURE — 36415 COLL VENOUS BLD VENIPUNCTURE: CPT | Performed by: HOSPITALIST

## 2022-01-05 PROCEDURE — 63700000 HC SELF-ADMINISTRABLE DRUG: Performed by: HOSPITALIST

## 2022-01-05 PROCEDURE — 97110 THERAPEUTIC EXERCISES: CPT | Mod: GP

## 2022-01-05 PROCEDURE — 80162 ASSAY OF DIGOXIN TOTAL: CPT | Performed by: HOSPITALIST

## 2022-01-05 PROCEDURE — 63700000 HC SELF-ADMINISTRABLE DRUG: Performed by: PHYSICAL MEDICINE & REHABILITATION

## 2022-01-05 PROCEDURE — 97530 THERAPEUTIC ACTIVITIES: CPT | Mod: GP

## 2022-01-05 PROCEDURE — 25000000 HC PHARMACY GENERAL: Performed by: PHYSICAL MEDICINE & REHABILITATION

## 2022-01-05 RX ADMIN — DIGOXIN 250 MCG: 250 TABLET ORAL at 05:55

## 2022-01-05 RX ADMIN — DORZOLAMIDE HYDROCHLORIDE 1 DROP: 20 SOLUTION/ DROPS OPHTHALMIC at 05:58

## 2022-01-05 RX ADMIN — BUDESONIDE 0.5 MG: 0.5 SUSPENSION RESPIRATORY (INHALATION) at 05:55

## 2022-01-05 RX ADMIN — FERROUS SULFATE TAB 325 MG (65 MG ELEMENTAL FE) 325 MG: 325 (65 FE) TAB at 07:41

## 2022-01-05 RX ADMIN — TIOTROPIUM BROMIDE INHALATION SPRAY 2 PUFF: 3.12 SPRAY, METERED RESPIRATORY (INHALATION) at 08:44

## 2022-01-05 RX ADMIN — LATANOPROST 1 DROP: 50 SOLUTION/ DROPS OPHTHALMIC at 20:58

## 2022-01-05 RX ADMIN — Medication 1000 UNITS: at 07:41

## 2022-01-05 RX ADMIN — ACETAMINOPHEN 1000 MG: 500 TABLET, FILM COATED ORAL at 13:44

## 2022-01-05 RX ADMIN — LEVOTHYROXINE SODIUM 75 MCG: 75 TABLET ORAL at 05:55

## 2022-01-05 RX ADMIN — DORZOLAMIDE HYDROCHLORIDE 1 DROP: 20 SOLUTION/ DROPS OPHTHALMIC at 18:12

## 2022-01-05 RX ADMIN — SENNOSIDES AND DOCUSATE SODIUM 1 TABLET: 50; 8.6 TABLET ORAL at 20:57

## 2022-01-05 RX ADMIN — DABIGATRAN ETEXILATE MESYLATE 150 MG: 150 CAPSULE ORAL at 07:41

## 2022-01-05 RX ADMIN — SENNOSIDES AND DOCUSATE SODIUM 1 TABLET: 50; 8.6 TABLET ORAL at 07:41

## 2022-01-05 RX ADMIN — OXYCODONE HYDROCHLORIDE 5 MG: 5 TABLET ORAL at 08:44

## 2022-01-05 RX ADMIN — ACETAMINOPHEN 1000 MG: 500 TABLET, FILM COATED ORAL at 05:55

## 2022-01-05 RX ADMIN — ROSUVASTATIN CALCIUM 5 MG: 5 TABLET, FILM COATED ORAL at 18:12

## 2022-01-05 RX ADMIN — BUDESONIDE 0.5 MG: 0.5 SUSPENSION RESPIRATORY (INHALATION) at 18:43

## 2022-01-05 RX ADMIN — ACETAMINOPHEN 1000 MG: 500 TABLET, FILM COATED ORAL at 20:57

## 2022-01-05 RX ADMIN — DILTIAZEM HYDROCHLORIDE 240 MG: 120 CAPSULE, COATED, EXTENDED RELEASE ORAL at 07:41

## 2022-01-05 RX ADMIN — PANTOPRAZOLE SODIUM 40 MG: 40 TABLET, DELAYED RELEASE ORAL at 07:41

## 2022-01-05 RX ADMIN — DABIGATRAN ETEXILATE MESYLATE 150 MG: 150 CAPSULE ORAL at 20:57

## 2022-01-05 RX ADMIN — THERA TABS 1 TABLET: TAB at 07:41

## 2022-01-05 NOTE — SUBJECTIVE & OBJECTIVE
"   Patient was seen and examined.   Attestation Notes: Face to face encounter completed    Subjective    Patient with improving pain control. Improving mobility. Sleeping fairly well at night.  Objective     Visit Vitals  BP (!) 189/75 (BP Location: Right upper arm, Patient Position: Lying)   Pulse 90   Temp 36.6 °C (97.8 °F) (Oral)   Resp 18   Ht 1.676 m (5' 5.98\")   Wt 58.3 kg (128 lb 8.5 oz)   SpO2 95%   BMI 20.76 kg/m²     Review of Systems:  Pertinent items are noted in HPI.  Denies chest pain and shortness of breath.  Review of systems otherwise negative.    Labs     Results from last 7 days   Lab Units 01/03/22  0433   WBC K/uL 11.29*   HEMOGLOBIN g/dL 8.1*   HEMATOCRIT % 26.2*   PLATELETS K/uL 351     Results from last 7 days   Lab Units 01/02/22  0604   SODIUM mEQ/L 136   POTASSIUM mEQ/L 3.9   CHLORIDE mEQ/L 104   CO2 mEQ/L 26   BUN mg/dL 12   CREATININE mg/dL 0.3*   CALCIUM mg/dL 8.0*   ALBUMIN g/dL 2.4*   BILIRUBIN TOTAL mg/dL 0.9   ALK PHOS IU/L 95   ALT IU/L 15   AST IU/L 26   GLUCOSE mg/dL 89       Full Code    Physical Exam  General      Alert, cooperative, no distress, appears stated age.   Head:    Normocephalic, without obvious abnormality, atraumatic.   Eyes:    PERRL, conjunctiva/corneas clear, EOM's intact.        Nose:   Nares normal, septum midline, mucosa normal, no drainage or            sinus tenderness.   Throat:   Lips, mucosa, and tongue normal.    Neck:   Supple, symmetrical, trachea midline.    Back:     Symmetric, no curvature.   Lungs:     Clear to auscultation bilaterally, respirations unlabored.   Chest wall:    No tenderness or deformity.   Heart:    Regular rate and rhythm, S1 and S2 normal.   Abdomen:     Soft, non-tender, bowel sounds active all four quadrants,     no masses, no organomegaly.   Extremities:  Musculoskeletal:  1+ left lower extremity edema.   Left hip replacement.    Pulses:   1+ and symmetric all extremities.   Skin:   Left hip incision intact. "   Neurologic:          Behavior/  Emotional:  CNII-XII intact.  Alert and oriented ×3.  Motor exam with stable left hip weakness.  Sensory exam intact.  Reflexes stable decreased reflexes.      Appropriate, cooperative           Plan of care was discussed with patient

## 2022-01-05 NOTE — PROGRESS NOTES
Patient: Bee Cobb  [facility] [address]  MRN: [id number]  Today's date: 1/5/2022    History of Present Illness  Bee is a 81 y.o. female admitted on 1/1/2022 with Hip fracture requiring operative repair, left, closed, initial encounter (CMS/[facility]) [S72.002A]. Principal problem is Hip fracture requiring operative repair, left, closed, initial encounter (CMS/[facility]).    81 y.o. f who presents with left hip pain after falling out of bed. She had an immediate onset of left hip pain and inability to ambulate.  No LOC.  Radiographic evaluation showed a displaced transcervical femoral neck fracture by CT.  On 12-29-21 she underwent left hip hemiarthroplasty.  Pt is WBAT to LLE and anterior hip precautions x 6 weeks. Ortho recommends aspirin 325mg/day x 6 weeks in addition to pt's home pradaxa which was restarted postoperatively. Pt did well postoperatively, but was noted to have postop anemia with a hgb drop of 12-->9. Per [place], this is expected due to Pradaxa use.  Pain is being managed with multimodal analgesia; scheduled tylenol, lido patch, PRN oxycodone, ice therapy.  PT/OT/PM&R evaluated pt and recommend acute rehab at discharge.          Past Medical History  Bee has a past medical history of Atrial fibrillation (CMS/[facility]), Glaucoma, History of transfusion, Hyperthyroidism, Lipid disorder, Osteoporosis, PAN (polyarteritis nodosa) (CMS/[facility]), and Thalassemia. H/o recent clavicle fx 9/11/21 Completed course of therapy and has full ROM      PT Vitals    Date/Time Pulse SpO2 Pt Activity O2 Therapy BP [facility]   01/05/22 1001 105 94 % -- -- 139/65 [facility]   01/05/22 1028 -- 91 % -- after walking None (Room air) -- [facility]   01/05/22 1029 107 92 % At rest None (Room air) -- [facility]   01/05/22 1054 116 94 % At rest None (Room air) -- [facility]   01/05/22 1129 94 95 % At rest None (Room air) -- [facility]      PT Pain    Date/Time Pain Type Side/Orientation Location Rating: Rest Rating: Activity Interventions Who    01/05/22 1001 Pain Assessment left hip 4 4 premedicated for activity MLN   01/05/22 1128 Pain Reassessment left hip 6 6 prescribed exercises encouraged MLN          Prior Living Environment      Most Recent Value   People in Home spouse   Current Living Arrangements home  [9 MADY, FF inside]   Home Accessibility stairs to enter home (Group), stairs within home (Group)   Living Environment Comment Row home 13 steps into house, 9 steps to BR on 2nd floor   Number of Stairs, Main Entrance 9   Surface of Stairs, Main Entrance concrete   Stair Railings, Main Entrance railings on both sides of stairs   Location, Bathroom second floor, must negotiate stairs to access   [place], Bathroom tile floor   Bathroom Access Comment ztub/shower combo, hand held shower, bench and suction grab bars ( checks stability prior to each shower)   Number of Stairs, Within Home, Primary --  [13]   Stair Railings, Within Home, Primary railing on right side (ascending)          Prior Level of Function      Most Recent Value   Dominant Hand left   Ambulation independent   Transferring independent   Toileting independent   Bathing independent   Dressing independent   Eating independent   Assistive Device Currently Used at Home shower chair, grab bar           01/05/22 1005   PT Time Calculation   Start Time 1000   Stop Time 1130   Time Calculation (min) 90 min   Session Details   Document Type daily treatment/progress note   Mode of Treatment physical therapy;individual therapy   Bed Mobility   [place], Supine to Sit close supervision;verbal cues   Verbal Cues (Supine to Sit) technique   [place], Sit to Supine minimum assist (75% or more patient effort)   Comment (Bed Mobility) MIN A for LLE management onto bed; cl S for balance/safety and verbal cues for LE sequencing for precaution management   Sit to Stand Transfer   [place], Sit to Stand Transfer minimum assist (75% or more patient effort);verbal cues   Verbal Cues  hand placement   Assistive Device gait belt;walker, front-wheeled   Comment for anterior weightshift & L hip ext   Stand to Sit Transfer   [place], Stand to Sit Transfer minimum assist (75% or more patient effort);verbal cues   Verbal Cues hand placement   Assistive Device gait belt;walker, front-wheeled   Comment for controlled descent at pelvis   Stand Pivot Transfer   [place], Stand Pivot/Stand Step Transfer minimum assist (75% or more patient effort);verbal cues   Verbal Cues technique   Assistive Device gait belt;walker, front-wheeled   Comment min A for controlled lateral weightshift at pelvis/gait belt   Gait Training   [place], Gait minimum assist (75% or more patient effort);verbal cues   Assistive Device gait belt;walker, front-wheeled   Distance in Feet 60 feet   Pattern (Gait) step-to;step-through   Deviations/Abnormal Patterns (Gait) antalgic;base of support, narrow;gait speed decreased;step length decreased;weight shifting decreased;bilateral deviations   Bilateral Gait Deviations heel strike decreased   Comment (Gait/Stairs) 60' x 1 with increased standing rest breaks x 2 reps with min a for controlled lateral L weightshift & L hip ext with verbal cues for step through pattern; 20' x 1 with RW with min A for controlled lateral weightshift & verbal cues for increased R step length   Stairs Training   [place], Stairs minimum assist (75% or more patient effort);verbal cues   Assistive Device railing   Handrail Location (Stairs) both sides   Number of Stairs 4   Stair Height 6 inches   Ascending Stairs Technique step-to-step  (leading with RLE)   Descending Stairs Technique step-to-step  (leading with LLE)   Comment min A for b hip ext and verbal cues for step to pattern   Balance   Dynamic Sitting Balance unsupported;sitting, edge of bed;WFL   Comment, Balance scooting along edge of bed with Cl S/S for balance/safety   Motor Skills   Functional Endurance LEMOS present with activty -  "pursed lip breathing education provided & encouraged throughout session   Motor Control/Coordination Interventions neuro-muscular re-education   Neuromuscular Re-education   Equipment Used 4\" block & RW   Positioning supported;standing   Comment With RW: min A for L hip ext and tactile cues for L weightshift & R anterior tap at 4\" block anterior for RLE box taps for 2 sets of 10   Interventions closed chain;weight bearing;weight shifting   Lower Extremity (Therapeutic Exercise)   Exercise Position/Type seated;static isometric contraction;AROM (active range of motion)   General Exercise bilateral;gluteal sets;LAQ (long arc quad);ankle pumps   Reps and Sets 3 sets of 10   Comment increased time to complete & cues for pacing   Daily Progress Summary (PT)   Daily Outcome Statement Increased time required to complete all activities 2* pain and LEMOS. Pt's SpO2 monitored throughout session with 1 drop to 91% and recovery within 60 sec after recommending paced breathing. Initiated pregait activity to improve LLE stance stability. Progress functional training as able with pacing for endurance.                Education Documentation  Mobility Aids/Assistive Devices, taught by Leonor Lucas, PT at 1/5/2022 10:55 AM.  Learner: Patient  Readiness: Eager  Method: Demonstration, Explanation  Response: Needs Reinforcement  Comment: step through pattern, pre gait activity to strengthen L weightbearing          IRF PT Goals      Most Recent Value   Bed Mobility Goal 1    Activity/Assistive Device scooting, sit to supine, supine to sit at 01/02/2022 1102   [place] minimum assist (75% or more patient effort) at 01/02/2022 1102   Time Frame short-term goal (STG), 1 week at 01/02/2022 1102   Bed Mobility Goal 2    Activity/Assistive Device scooting, sit to supine, supine to sit at 01/02/2022 1102   [place] modified independence at 01/02/2022 1102   Time Frame long-term goal (LTG), 14 days or less, by discharge at " 01/02/2022 1102   Transfer Goal 1    Activity/Assistive Device sit-to-stand/stand-to-sit, stand pivot, walker, front-wheeled at 01/02/2022 1102   Monitor supervision required at 01/02/2022 1102   Time Frame short-term goal (STG), 1 week at 01/02/2022 1102   Transfer Goal 2    Activity/Assistive Device sit-to-stand/stand-to-sit, stand pivot, walker, front-wheeled at 01/02/2022 1102   Monitor modified independence at 01/02/2022 1102   Time Frame long-term goal (LTG), 14 days or less, by discharge at 01/02/2022 1102   Gait/Walking Locomotion Goal 1    Activity/Assistive Device gait (walking locomotion), decrease asymmetrical patterns, decrease fall risk, forward stepping, improve balance and speed at 01/02/2022 1102   Distance 200 feet at 01/02/2022 1102   Monitor supervision required at 01/02/2022 1102   Time Frame short-term goal (STG), 1 week at 01/02/2022 1102   Gait/Walking Locomotion Goal 2    Activity/Assistive Device gait (walking locomotion), decrease asymmetrical patterns, decrease fall risk, forward stepping, improve balance and speed at 01/02/2022 1102   Distance 200 feet at 01/02/2022 1102   Monitor modified independence at 01/02/2022 1102   Time Frame long-term goal (LTG), 14 days or less, by discharge at 01/02/2022 1102   Stairs Goal 1    Activity/Assistive Device ascending stairs, descending stairs at 01/04/2022 1505   Number of Stairs 8 at 01/04/2022 1505   Monitor minimum assist (75% or more patient effort) at 01/04/2022 1505   Time Frame short-term goal (STG), 1 week at 01/04/2022 1505   Stairs Goal 2    Activity/Assistive Device ascending stairs, descending stairs at 01/04/2022 1505   Number of Stairs 12 at 01/04/2022 1505   Monitor supervision required at 01/04/2022 1505   Time Frame long-term goal (LTG), 14 days or less at 01/04/2022 1505

## 2022-01-05 NOTE — PROGRESS NOTES
Patient: Bee Cobb  Location: [facility] [address]  MRN: [id number]  Today's date: 1/5/2022    History of Present Illness  Bee is a 81 y.o. female admitted on 1/1/2022 with Hip fracture requiring operative repair, left, closed, initial encounter (CMS/[facility]) [S72.002A]. Principal problem is Hip fracture requiring operative repair, left, closed, initial encounter (CMS/[facility]).    81 y.o. f who presents with left hip pain after falling out of bed. She had an immediate onset of left hip pain and inability to ambulate.  No LOC.  Radiographic evaluation showed a displaced transcervical femoral neck fracture by CT.  On 12-29-21 she underwent left hip hemiarthroplasty.  Pt is WBAT to LLE and anterior hip precautions x 6 weeks. Ortho recommends aspirin 325mg/day x 6 weeks in addition to pt's home pradaxa which was restarted postoperatively. Pt did well postoperatively, but was noted to have postop anemia with a hgb drop of 12-->9. Per [place], this is expected due to Pradaxa use.  Pain is being managed with multimodal analgesia; scheduled tylenol, lido patch, PRN oxycodone, ice therapy.  PT/OT/PM&R evaluated pt and recommend acute rehab at discharge.          Past Medical History  Bee has a past medical history of Atrial fibrillation (CMS/[facility]), Glaucoma, History of transfusion, Hyperthyroidism, Lipid disorder, Osteoporosis, PAN (polyarteritis nodosa) (CMS/[facility]), and Thalassemia. H/o recent clavicle fx 9/11/21 Completed course of therapy and has full ROM      OT Vitals    Date/Time Pulse SpO2 Pt Activity O2 Therapy BP BP Location BP Method Pt Position [facility]   01/05/22 1408 95 95 % At rest None (Room air) 142/60 Right upper arm Manual Sitting AEB      OT Pain    Date/Time Pain Type Rating: Rest Radiation to Description Nonverbal Indicators Interventions [facility]   01/05/22 1408 Pain Assessment 5 leg, left intermittent;aching anxious premedicated for activity AEB   01/05/22 1458 Pain Reassessment -- -- --  "-- -- AEB          Prior Living Environment      Most Recent Value   People in Home spouse   Current Living Arrangements home  [9 MADY, FF inside]   Home Accessibility stairs to enter home (Group), stairs within home (Group)   Living Environment Comment Row home 13 steps into house, 9 steps to BR on 2nd floor   Number of Stairs, Main Entrance 9   Surface of Stairs, Main Entrance concrete   Stair Railings, Main Entrance railings on both sides of stairs   Location, Bathroom second floor, must negotiate stairs to access   [place], Bathroom tile floor   Bathroom Access Comment ztub/shower combo, hand held shower, bench and suction grab bars ( checks stability prior to each shower)   Number of Stairs, Within Home, Primary --  [13]   Stair Railings, Within Home, Primary railing on right side (ascending)          Prior Level of Function      Most Recent Value   Dominant Hand left   Ambulation independent   Transferring independent   Toileting independent   Bathing independent   Dressing independent   Eating independent   Assistive Device Currently Used at Home shower chair, grab bar          Occupational Profile      Most Recent Value   Reason for Services/Referral ADL deficit   Successful Occupations wife,- mother   Occupational History/Life Experiences retired bank    Performance Patterns independent in all areas   Patient Goals \"I want to be functional, walk and take care of myself\"           IRF OT Evaluation and Treatment - 01/05/22 1408        OT Time Calculation    Start Time 1400     Stop Time 1500     Time Calculation (min) 60 min        Session Details    Document Type daily treatment/progress note     Mode of Treatment occupational therapy;individual therapy        General Information    General Observations of Patient Pt received lying in bed, resting        Coping/Community Reintegration    Observed Emotional State anxious;cooperative     Verbalized Emotional State acceptance        " Coping Strategies    Trust Relationship/Rapport care explained;choices provided;emotional support provided;empathic listening provided;questions answered;questions encouraged;reassurance provided;thoughts/feelings acknowledged        Cognition/Psychosocial    Comment, Cognition Alert, oriented, good safety awareness, improving confidence        Transfers    Transfers shower transfer;toilet transfer     Maintains Weight-Bearing Status (Transfers) nonverbal cues (demo/gesture) to maintain;physical assist to maintain;verbal cues to maintain     Comment DME        Toilet Transfer    Comment denied need to use toilet        Shower Transfer    Transfer Technique stand pivot     [place], Shower Transfer moderate assist (50-74% patient effort);1 person assist;safety considerations     Verbal Cues safety;technique     Assistive Device grab bars/tub rail;shower chair     Comment Barrier free stall shower        Basic Activities of Daily Living (BADLs)    Energy Conservation Techniques activity adapted to sitting;activity pacing encouraged;asking for necessary assistance promoted;breathing techniques encouraged;correct body mechanics utilized;correct posture facilitated;equipment and device use facilitated        Bathing    Self-Performance chest;left arm;right arm;abdomen;front perineal area;buttocks;left upper leg;right upper leg     [place] Assistance buttocks;left lower leg, including foot     [place] moderate assist (50-74% patient effort);1 person assist;safety considerations     Position supported sitting;supported standing     Setup Assistance obtain supplies     Adaptive Equipment grab bar/tub rail;hand-held shower spray hose;shower chair     Comment seated in wet shower using grab bars for support        Upper Body Dressing    Self-Performance threads left arm, bra/undershirt;threads right arm, bra/undershirt;pulls bra/undershirt over head/around back;pulls bra/undershirt down/adjusts;threads left arm,  shirt;threads right arm, shirt;pulls shirt over head/around back;pulls shirt down/adjusts     [place] Assistance obtains clothes     [place] touching/steadying assist;1 person assist;safety considerations     Position supported sitting     Adaptive Equipment none     Comment seated for safety        Lower Body Dressing    Self-Performance pulls underpants up or down;pulls pants/shorts up or down;dons/doffs left sock;dons/doffs right sock     [place] Assistance obtains clothes;threads left leg, underpants;threads right leg, underpants;threads left leg, pants/shorts;threads right leg, pants/shorts     [place] minimum assist (75% or more patient effort);1 person assist     Position supported sitting;supported standing     Adaptive Equipment dressing stick;sock aid;reacher     [place], Footwear minimum assist (75% or more patient effort);1 person assist        Grooming    Self-Performance washes, rinses and dries face;washes, rinses and dries hands;brushes/baker hair     [place] close supervision     Position supported sitting;supported standing     Setup Assistance obtain supplies     Adaptive Equipment none     Comment oral care not assessed        Toileting    Comment pt declined need to void        Daily Progress Summary (OT)    Symptoms Noted During/After Treatment none     Progress Toward Functional Goals (OT) progressing toward functional goals as expected     Daily Outcome Statement Pt participated in complete ADL assessment to include wet shower using ext bench and grab bars for safety. Pt is limited by balance, mild pain in L hip, general weakness but was able to use AD to actively engage in dressing, grooming, bathing. Toileting not assessed 2* did not need to void.     Barriers to Overall Progress (OT) balance, general weakness, mild pain in L hip     Recommendations (OT) continue POC                      Education Documentation  Safe ADL Techniques, taught by Chantelle Murillo OT at 1/5/2022   3:34 PM.  Learner: Patient  Readiness: Acceptance  Method: Explanation, Demonstration  Response: Verbalizes Understanding, Demonstrated Understanding, Needs Reinforcement  Comment: Ongoing instruction in use of AD for distal dressing. Pt demonstrating good understanding but would benefit from repetition          IRF OT Goals      Most Recent Value   Transfer Goal 1    Activity/Assistive Device toilet at 01/05/2022 1408   [place] minimum assist (75% or more patient effort) at 01/05/2022 1408   Time Frame short-term goal (STG), 5 - 7 days at 01/05/2022 1408   Strategies/Barriers DME at 01/05/2022 1408   Progress/Outcome good progress toward goal, goal partially met, goal revised this date at 01/05/2022 1408   Transfer Goal 2    Activity/Assistive Device toilet at 01/02/2022 0805   [place] supervision required at 01/02/2022 0805   Time Frame long-term goal (LTG), 4 weeks at 01/02/2022 0805   Strategies/Barriers DME at 01/02/2022 0805   Transfer Goal 3    Activity/Assistive Device shower at 01/05/2022 1408   [place] minimum assist (75% or more patient effort) at 01/05/2022 1408   Time Frame short-term goal (STG), 5 - 7 days at 01/05/2022 1408   Strategies/Barriers DME at 01/02/2022 0805   Progress/Outcome good progress toward goal, goal partially met, goal revised this date at 01/05/2022 1408   Transfer Goal 4    Activity/Assistive Device shower at 01/02/2022 0805   [place] tactile cues required at 01/02/2022 0805   Time Frame long-term goal (LTG), 4 weeks at 01/02/2022 0805   Strategies/Barriers DME at 01/02/2022 0805   Bathing Goal 1    Activity/Assistive Device bathing skills, all at 01/05/2022 1408   [place] tactile cues required at 01/05/2022 1408   Time Frame short-term goal (STG), 5 - 7 days at 01/05/2022 1408   Strategies/Barriers DME at 01/02/2022 0805   Progress/Outcome good progress toward goal, goal partially met, goal revised this date at 01/05/2022 1408   Bathing Goal 2     Activity/Assistive Device bathing skills, all at 01/02/2022 0805   [place] tactile cues required at 01/02/2022 0805   Time Frame long-term goal (LTG), 4 weeks at 01/02/2022 0805   Strategies/Barriers DME at 01/02/2022 0805   UB Dressing Goal 1    Activity/Assistive Device upper body dressing at 01/05/2022 1408   [place] tactile cues required at 01/05/2022 1408   Time Frame short-term goal (STG), 5 - 7 days at 01/05/2022 1408   Strategies/Barriers incl set up at 01/05/2022 1408   Progress/Outcome good progress toward goal, goal partially met, goal revised this date at 01/05/2022 1408   UB Dressing Goal 2    Activity/Assistive Device upper body dressing at 01/02/2022 0805   [place] modified independence at 01/02/2022 0805   Time Frame long-term goal (LTG), 4 weeks at 01/02/2022 0805   Strategies/Barriers incl s/u at 01/02/2022 0805   LB Dressing Goal 1    Activity/Assistive Device lower body dressing at 01/05/2022 1408   [place] minimum assist (75% or more patient effort) at 01/05/2022 1408   Time Frame short-term goal (STG), 5 - 7 days at 01/05/2022 1408   Strategies/Barriers AD at 01/02/2022 0805   Progress/Outcome good progress toward goal, goal partially met, goal revised this date at 01/05/2022 1408   LB Dressing Goal 2    Activity/Assistive Device lower body dressing at 01/02/2022 0805   [place] supervision required at 01/02/2022 0805   Time Frame long-term goal (LTG), 4 weeks at 01/02/2022 0805   Strategies/Barriers AD at 01/02/2022 0805   Grooming Goal 1    Activity/Assistive Device grooming skills, all at 01/05/2022 1408   [place] supervision required at 01/05/2022 1408   Time Frame 5 - 7 days at 01/05/2022 1408   Strategies/Barriers std @ sink at 01/05/2022 1408   Progress/Outcome good progress toward goal, goal partially met, goal revised this date at 01/05/2022 1408   Grooming Goal 2    Activity/Assistive Device grooming skills, all at 01/02/2022 0805   [place] modified  independence at 01/02/2022 0805   Time Frame long-term goal (LTG), 4 weeks at 01/02/2022 0805   Strategies/Barriers std @ sink at 01/02/2022 0805   Toileting Goal 1    Activity/Assistive Device toileting skills, all at 01/05/2022 1408   [place] tactile cues required at 01/05/2022 1408   Time Frame short-term goal (STG), 5 - 7 days at 01/05/2022 1408   Strategies/Barriers DME at 01/02/2022 0805   Progress/Outcome good progress toward goal, goal partially met, goal revised this date at 01/05/2022 1408   Toileting Goal 2    Activity/Assistive Device toileting skills, all at 01/02/2022 0805   [place] supervision required at 01/02/2022 0805   Time Frame long-term goal (LTG), 4 weeks at 01/02/2022 0805   Strategies/Barriers DME at 01/02/2022 0805

## 2022-01-05 NOTE — PROGRESS NOTES
Patient: Bee Cobb  [facility] [address]  MRN: [id number]  Today's date: 1/5/2022    History of Present Illness  Bee is a 81 y.o. female admitted on 1/1/2022 with Hip fracture requiring operative repair, left, closed, initial encounter (CMS/[facility]) [S72.002A]. Principal problem is Hip fracture requiring operative repair, left, closed, initial encounter (CMS/[facility]).    81 y.o. f who presents with left hip pain after falling out of bed. She had an immediate onset of left hip pain and inability to ambulate.  No LOC.  Radiographic evaluation showed a displaced transcervical femoral neck fracture by CT.  On 12-29-21 she underwent left hip hemiarthroplasty.  Pt is WBAT to LLE and anterior hip precautions x 6 weeks. Ortho recommends aspirin 325mg/day x 6 weeks in addition to pt's home pradaxa which was restarted postoperatively. Pt did well postoperatively, but was noted to have postop anemia with a hgb drop of 12-->9. Per [place], this is expected due to Pradaxa use.  Pain is being managed with multimodal analgesia; scheduled tylenol, lido patch, PRN oxycodone, ice therapy.  PT/OT/PM&R evaluated pt and recommend acute rehab at discharge.          Past Medical History  Bee has a past medical history of Atrial fibrillation (CMS/[facility]), Glaucoma, History of transfusion, Hyperthyroidism, Lipid disorder, Osteoporosis, PAN (polyarteritis nodosa) (CMS/[facility]), and Thalassemia. H/o recent clavicle fx 9/11/21 Completed course of therapy and has full ROM      OT Vitals    Date/Time Pulse HR Source SpO2 Pt Activity O2 Therapy [facility]   01/05/22 1159 95 Monitor 95 % At rest None (Room air) KW      OT Pain    Date/Time Pain Type Side/Orientation Location Rating: Rest Rating: Activity Interventions Who   01/05/22 1131 Pain Assessment left hip 7 7 premedicated for activity KW   01/05/22 1159 Pain Reassessment left -- 6 6 premedicated for activity KW          Prior Living Environment      Most Recent Value  "  People in Home spouse   Current Living Arrangements home  [9 MADY, FF inside]   Home Accessibility stairs to enter home (Group), stairs within home (Group)   Living Environment Comment Row home 13 steps into house, 9 steps to BR on 2nd floor   Number of Stairs, Main Entrance 9   Surface of Stairs, Main Entrance concrete   Stair Railings, Main Entrance railings on both sides of stairs   Location, Bathroom second floor, must negotiate stairs to access   [place], Bathroom tile floor   Bathroom Access Comment ztub/shower combo, hand held shower, bench and suction grab bars ( checks stability prior to each shower)   Number of Stairs, Within Home, Primary --  [13]   Stair Railings, Within Home, Primary railing on right side (ascending)          Prior Level of Function      Most Recent Value   Dominant Hand left   Ambulation independent   Transferring independent   Toileting independent   Bathing independent   Dressing independent   Eating independent   Assistive Device Currently Used at Home shower chair, grab bar          Occupational Profile      Most Recent Value   Reason for Services/Referral ADL deficit   Successful Occupations wife,- mother   Occupational History/Life Experiences retired bank    Performance Patterns independent in all areas   Patient Goals \"I want to be functional, walk and take care of myself\"           IRF OT Evaluation and Treatment - 01/05/22 1131        OT Time Calculation    Start Time 1130     Stop Time 1200     Time Calculation (min) 30 min        Session Details    Document Type daily treatment/progress note     Mode of Treatment occupational therapy;individual therapy        General Information    General Observations of Patient Pt. received as direct hand-off from prior PT session.        Therapeutic Interventions    Comment, Therapeutic Intervention Education provided re: breathing during exeervises/ functional tasks via pursed lip breathing for optimal " oxygenation during exertion.        Upper Extremity (Therapeutic Exercise)    Exercise Position/Type seated;resistive exercises     General Exercise bilateral     Range of Motion Exercises elbow flexion/extension;shoulder flexion/extension;other (see comments);shoulder external/internal rotation   chest presses    Weight/Resistance 2 pounds     Reps and Sets x15 reps     Comment UB strengthening completed while seated in w/c with good tolerance noted        Daily Progress Summary (OT)    Daily Outcome Statement OT session focused on UB strengthening and pursed lip breathing during exercises and functional tasks for optimal oxygenation during exertion. Recommend continued focus on standing balance and ability to weightshift between R and L LE during static stance in prep for IADL engagement.                      Education Documentation  Rehabilitation Therapy, taught by Falguni Pino OT at 1/5/2022 11:54 AM.  Learner: Patient  Readiness: Acceptance  Method: Explanation, Demonstration  Response: Verbalizes Understanding, Demonstrated Understanding, Needs Reinforcement  Comment: Pursed lip breathing during exercises and functional tasks for optimal oxygenation during exertion.    Activity, taught by Falguni Pino OT at 1/5/2022 11:54 AM.  Learner: Patient  Readiness: Acceptance  Method: Explanation, Demonstration  Response: Verbalizes Understanding, Demonstrated Understanding, Needs Reinforcement  Comment: Pursed lip breathing during exercises and functional tasks for optimal oxygenation during exertion.          IRF OT Goals      Most Recent Value   Transfer Goal 1    Activity/Assistive Device toilet at 01/02/2022 0805   [place] moderate assist (50-74% patient effort) at 01/02/2022 0805   Time Frame short-term goal (STG), 5 - 7 days at 01/02/2022 0805   Strategies/Barriers DME assessment at 01/02/2022 0805   Transfer Goal 2    Activity/Assistive Device toilet at 01/02/2022 0805   [place]  supervision required at 01/02/2022 0805   Time Frame long-term goal (LTG), 4 weeks at 01/02/2022 0805   Strategies/Barriers DME at 01/02/2022 0805   Transfer Goal 3    Activity/Assistive Device shower at 01/02/2022 0805   [place] moderate assist (50-74% patient effort) at 01/02/2022 0805   Time Frame short-term goal (STG), 5 - 7 days at 01/02/2022 0805   Strategies/Barriers DME at 01/02/2022 0805   Transfer Goal 4    Activity/Assistive Device shower at 01/02/2022 0805   [place] tactile cues required at 01/02/2022 0805   Time Frame long-term goal (LTG), 4 weeks at 01/02/2022 0805   Strategies/Barriers DME at 01/02/2022 0805   Bathing Goal 1    Activity/Assistive Device bathing skills, all at 01/02/2022 0805   [place] minimum assist (75% or more patient effort) at 01/02/2022 0805   Time Frame short-term goal (STG), 5 - 7 days at 01/02/2022 0805   Strategies/Barriers DME at 01/02/2022 0805   Bathing Goal 2    Activity/Assistive Device bathing skills, all at 01/02/2022 0805   [place] tactile cues required at 01/02/2022 0805   Time Frame long-term goal (LTG), 4 weeks at 01/02/2022 0805   Strategies/Barriers DME at 01/02/2022 0805   UB Dressing Goal 1    Activity/Assistive Device upper body dressing at 01/02/2022 0805   [place] minimum assist (75% or more patient effort) at 01/02/2022 0805   Time Frame short-term goal (STG), 5 - 7 days at 01/02/2022 0805   Strategies/Barriers incl s/u at 01/02/2022 0805   UB Dressing Goal 2    Activity/Assistive Device upper body dressing at 01/02/2022 0805   [place] modified independence at 01/02/2022 0805   Time Frame long-term goal (LTG), 4 weeks at 01/02/2022 0805   Strategies/Barriers incl s/u at 01/02/2022 0805   LB Dressing Goal 1    Activity/Assistive Device lower body dressing at 01/02/2022 0805   [place] moderate assist (50-74% patient effort) at 01/02/2022 0805   Time Frame short-term goal (STG), 5 - 7 days at 01/02/2022 0805    Strategies/Barriers AD at 01/02/2022 0805   LB Dressing Goal 2    Activity/Assistive Device lower body dressing at 01/02/2022 0805   [place] supervision required at 01/02/2022 0805   Time Frame long-term goal (LTG), 4 weeks at 01/02/2022 0805   Strategies/Barriers AD at 01/02/2022 0805   Grooming Goal 1    Activity/Assistive Device grooming skills, all at 01/02/2022 0805   [place] tactile cues required at 01/02/2022 0805   Time Frame short-term goal (STG), 5 - 7 days at 01/02/2022 0805   Strategies/Barriers std @ sink at 01/02/2022 0805   Grooming Goal 2    Activity/Assistive Device grooming skills, all at 01/02/2022 0805   [place] modified independence at 01/02/2022 0805   Time Frame long-term goal (LTG), 4 weeks at 01/02/2022 0805   Strategies/Barriers std @ sink at 01/02/2022 0805   Toileting Goal 1    Activity/Assistive Device toileting skills, all at 01/02/2022 0805   [place] minimum assist (75% or more patient effort) at 01/02/2022 0805   Time Frame short-term goal (STG), 5 - 7 days at 01/02/2022 0805   Strategies/Barriers DME at 01/02/2022 0805   Toileting Goal 2    Activity/Assistive Device toileting skills, all at 01/02/2022 0805   [place] supervision required at 01/02/2022 0805   Time Frame long-term goal (LTG), 4 weeks at 01/02/2022 0805   Strategies/Barriers DME at 01/02/2022 0805

## 2022-01-05 NOTE — ASSESSMENT & PLAN NOTE
Mrs. Cobb is an 81-year-old female who [facility] with complaints of left hip pain after falling out of bed.  X-ray left hip revealed left femoral neck fracture.  CT left hip confirmed displaced fracture left femoral neck.  X-ray left knee was negative.  Head CT was negative for skull fracture or intracranial hemorrhage.  CT cervical spine was negative for fracture dislocation.  Dr. Pena from orthopedic surgery was consulted and performed left total hip replacement on 12/29/2021.  She is cleared for weightbearing to left lower extremity with anterior hip precautions for 6 weeks.  She is anticoagulated with Pradaxa for atrial fibrillation.  Aspirin 325 mg daily for 6 weeks was added for DVT prophylaxis on top of Pradaxa.  Postoperative anemia stabilized at 8.6.  She was ultimately determined to be medically stable for transfer to [facility] on 1/1/2022 for an acute inpatient rehabilitation program to address deficits related to left hip fracture status post total hip replacement.    She is weightbearing as tolerated.  She will be maintained on left hip precautions and aspirin for 6 weeks.  She will participate in 3 hours of physical and occupational therapy as well as receive 24-hour rehab nursing care.  She will follow-up with Dr. Pena from orthopedic surgery in 2 weeks.    The patient is tolerating comprehensive inpatient rehabilitation program.  Tolerating initial therapies.    The patient's incision is healing well.    Pain management improving.    Continues to progress in therapies.

## 2022-01-05 NOTE — ASSESSMENT & PLAN NOTE
PCP Dr. Kanchan Zamarripa after discharge  [phone]     Cardiology after discharge  Ortho Dr. Pena in 2 weeks

## 2022-01-05 NOTE — SUBJECTIVE & OBJECTIVE
"   Patient was seen and examined.   Attestation Notes: Face to face encounter completed    Subjective    The patient slept well overnight.  Pain control improving.  Tolerating initial therapies.  Objective     Visit Vitals  BP (!) 147/64 (BP Location: Left upper arm, Patient Position: Lying)   Pulse 86   Temp 36.7 °C (98 °F) (Oral)   Resp 16   Ht 1.676 m (5' 5.98\")   Wt 56.7 kg (125 lb)   SpO2 96%   BMI 20.19 kg/m²     Review of Systems:  Pertinent items are noted in HPI.  Denies chest pain and shortness of breath.  Review of systems otherwise negative.    Labs     Results from last 7 days   Lab Units 01/03/22  0433   WBC K/uL 11.29*   HEMOGLOBIN g/dL 8.1*   HEMATOCRIT % 26.2*   PLATELETS K/uL 351     Results from last 7 days   Lab Units 01/02/22  0604   SODIUM mEQ/L 136   POTASSIUM mEQ/L 3.9   CHLORIDE mEQ/L 104   CO2 mEQ/L 26   BUN mg/dL 12   CREATININE mg/dL 0.3*   CALCIUM mg/dL 8.0*   ALBUMIN g/dL 2.4*   BILIRUBIN TOTAL mg/dL 0.9   ALK PHOS IU/L 95   ALT IU/L 15   AST IU/L 26   GLUCOSE mg/dL 89     Full Code    Physical Exam  General      Alert, cooperative, no distress, appears stated age.   Head:    Normocephalic, without obvious abnormality, atraumatic.   Eyes:    PERRL, conjunctiva/corneas clear, EOM's intact.        Nose:   Nares normal, septum midline, mucosa normal, no drainage or            sinus tenderness.   Throat:   Lips, mucosa, and tongue normal.    Neck:   Supple, symmetrical, trachea midline.    Back:     Symmetric, no curvature.   Lungs:     Clear to auscultation bilaterally, respirations unlabored.   Chest wall:    No tenderness or deformity.   Heart:    Regular rate and rhythm, S1 and S2 normal.   Abdomen:     Soft, non-tender, bowel sounds active all four quadrants,     no masses, no organomegaly.   Extremities:  Musculoskeletal:  1+ lower extremity edema bilaterally.   Left hip replacement.      Pulses:   1+ and symmetric all extremities.   Skin:   Staples intact with minimal serous " drainage.   Neurologic:          Behavior/  Emotional:  CNII-XII intact.  Alert and oriented ×3.  Left hip weakness post fracture.  Sensory exam intact.  Reflexes stable decreased reflexes.      Appropriate, cooperative           Plan of care was discussed with patient

## 2022-01-05 NOTE — PROGRESS NOTES
[facility] Progress Note          Patient was seen and examined.   Attestation Notes: Face to face encounter completed    Bee Cobb is a 81 y.o. female who was admitted for Hip fracture requiring operative repair, left, closed, initial encounter (CMS/[facility]) [S72.002A]. Patient was referred by Rolan Crooks MD for medical assessment and management      CC: Hip fracture requiring operative repair, left, closed, initial encounter (CMS/[facility]) [S72.002A]     HPI: Bee Cobb is a 81 y.o. female with HTN, HL, hypothyroid, COPD, alpha thalassemia trait, CARRINGTON s/p treatment, glaucoma, HFpEF, AFIB (on Pradaxa), admitted to [facility] 12/29/21 s/p fall from bed found with left hip pain. X-ray left hip revealed left femoral neck fracture.  CT left hip confirmed displaced fracture left femoral neck.  X-ray left knee was negative.  Head CT was negative for skull fracture or intracranial hemorrhage.  CT cervical spine was negative for fracture dislocation.  Dr. Fazal Pena from orthopedic surgery was consulted and performed left total hip replacement on 12/29/2021. Post op she had anemia but did not require transfusion. She was restarted on Pradaxa for AFIB which also provided DVT ppx.  Her pain was managed with Tylenol and Oxycodone.      Her BP and HR were managed with Cardizem CD. She was also on Digoxin for AFIB.  She was on Crestor for HL and Synthroid for hypothyroid. She was on Spiriva and Pulmicort for COPD.  She was on Trusopt and Xalatan for glaucoma.     The patient was seen by PM&R and found to have residual deficits in ambulation and ADLs due to Hip fracture requiring operative repair, left, closed, initial encounter (CMS/[facility]) [S72.002A] and came to [facility] on 1/1/2022 for acute inpatient rehab.      She participated in therapy.     SUBJECTIVE:  Patient interviewed and examined     HR improved today, Digoxin level therapeutic at 1.0. Seen by Cardiology.     Pain  stable, improved constipation, no abdominal pain or nausea, no dysuria, no chest pain, palpitations, dyspnea or fevers    Review of Systems:  All other systems reviewed and negative except as noted in the HPI.    Current meds and allergies reviewed    Past Medical History:   Diagnosis Date   • (HFpEF) heart failure with preserved ejection fraction (CMS/HCC)    • Alpha thalassemia trait    • Atrial fibrillation (CMS/HCC)    • Closed left hip fracture (CMS/HCC) 12/29/2021   • COPD (chronic obstructive pulmonary disease) (CMS/HCC)    • Glaucoma    • History of transfusion    • Hypertension    • Hypothyroidism    • Lipid disorder    • Malignant melanoma (CMS/HCC)    • Mycobacterium avium-intracellulare complex (CMS/HCC)    • Osteoporosis    • PAN (polyarteritis nodosa) (CMS/HCC)      Past Surgical History:   Procedure Laterality Date   • AORTIC VALVE REPLACEMENT     • TONSILLECTOMY     • TOTAL HIP ARTHROPLASTY Left 12/29/2021     Social History     Tobacco Use   • Smoking status: Never Smoker   • Smokeless tobacco: Never Used   Vaping Use   • Vaping Use: Never used   Substance Use Topics   • Alcohol use: Never   • Drug use: Never      History reviewed. No pertinent family history.    Vital signs in last 24 hours:  Temp:  [36.6 °C (97.8 °F)-36.9 °C (98.5 °F)] 36.6 °C (97.8 °F)  Heart Rate:  [] 95  Resp:  [16-18] 18  BP: (136-152)/(59-67) 139/65  Vital signs reviewed 01/05/22 2:16 PM    Physical Exam  Vitals and nursing note reviewed.   Constitutional:       Appearance: Normal appearance.   HENT:      Head: Normocephalic and atraumatic.      Right Ear: External ear normal.      Left Ear: External ear normal.      Nose: Nose normal.      Mouth/Throat:      Mouth: Mucous membranes are moist.      Pharynx: Oropharynx is clear.   Eyes:      Extraocular Movements: Extraocular movements intact.      Conjunctiva/sclera: Conjunctivae normal.      Pupils: Pupils are equal, round, and reactive to light.   Cardiovascular:       Rate and Rhythm: Normal rate. Rhythm irregular.      Pulses: Normal pulses.      Heart sounds: Normal heart sounds.   Pulmonary:      Effort: Pulmonary effort is normal.      Breath sounds: Normal breath sounds.   Abdominal:      General: Abdomen is flat. Bowel sounds are normal.      Palpations: Abdomen is soft.   Musculoskeletal:         General: Signs of injury (s/p ORIF left hip fx) present.      Right lower leg: Edema present.      Left lower leg: Edema present.   Skin:     General: Skin is warm and dry.   Neurological:      Mental Status: She is alert and oriented to person, place, and time.   Psychiatric:         Mood and Affect: Mood normal.         Behavior: Behavior normal.                 Objective    Labs:  I have reviewed the patient's labs.  Significant abnormals are anemia, leukocytosis.  Results from last 7 days   Lab Units 01/03/22  0433   WBC K/uL 11.29*   HEMOGLOBIN g/dL 8.1*   HEMATOCRIT % 26.2*   PLATELETS K/uL 351     Results from last 7 days   Lab Units 01/02/22  0604   SODIUM mEQ/L 136   POTASSIUM mEQ/L 3.9   CHLORIDE mEQ/L 104   CO2 mEQ/L 26   BUN mg/dL 12   CREATININE mg/dL 0.3*   CALCIUM mg/dL 8.0*   ALBUMIN g/dL 2.4*   BILIRUBIN TOTAL mg/dL 0.9   ALK PHOS IU/L 95   ALT IU/L 15   AST IU/L 26   GLUCOSE mg/dL 89     Lab Results   Component Value Date    DIGOXIN 1.0 01/05/2022       Imaging:  Not applicable        ASSESSMENT/PLAN:    81 y.o. female with HTN, HL, hypothyroid, COPD, alpha thalassemia trait, CARRINGTON s/p treatment, glaucoma, HFpEF, AFIB (on Pradaxa), admitted to [facility] 12/29/21 s/p fall from bed found to have left hip fracture and underwent left BARB by Dr. Fazal Pena 12/29/21.     1. Ortho:  -s/p fall with left hip fx s/p left BARB  -Tylenol and Oxycodone for pain     2. Vasc:  -bilat LE edema  -SCDs and Pradaxa for DVT ppx     3. Heme:  -post op anemia due to chronic blood loss on iron/mvi  -alpha thalassemia trait with chronic anemia  -leukocytosis  reactive  -follow CBC     4. Renal:  -increased risk of dehydration and electrolyte changes  -follow BMP, Mg     5. Gi:  -Senokot-S for bowels  -Protonix ulcer ppx     6. Gu:  -1/2/22 UA shows hematuria, possibly trauma for cath and being on Pradaxa  -no UTI or retention     7. Cardiac:  -AFIB on Cardizem CD, Digoxin and Pradaxa  -remains tachycardic  -1/5/22 Digoxin level therapeutic at 1.0  -hx of AVR  -HFpEF  -HTN on Cardizem CD  -watch for orthostatic hypotension  -use cardiac precautions in therapy  -seen by Cardiology     8. Pulm:  -hx of CARRINGTON s/p treatmetn  -COPD on Pulmicort and Spiriva  -incentive spirometry for atelectasis     9. Derm:  -consulted [employer] for skin assessment     10. Nutrition:  -seen by Nutrition for assessment and education     11. Endo:  -HL on Crestor  -hypothyroid on Synthroid     12. Optho  -glaucoma on Xalatan and Trusopt     13. Psych:  -seen by Psychology for support     plan discussed with patient, nurse, case management and Rolan Crooks MD Scott Sapperstein, MD  1/5/2022  2:16 PM

## 2022-01-05 NOTE — CONSULTS
Cardiology Consult Note    Subjective     HPI: Bee Cobb is a 81 y.o. female with h/o severe AS s/p TAVR, PAFib (on Pradaxa), HTN CARRINGTON who presented to [facility] ER c/o L hip pain s/p fall out of bed. No LOC. Seen to have left femoral neck fracture. She underwent L total hip replacement on 12/29/21. She was seen by Cardiology gilbert-operatively.    Medical History:   Past Medical History:   Diagnosis Date   • (HFpEF) heart failure with preserved ejection fraction (CMS/HCC)    • Alpha thalassemia trait    • Atrial fibrillation (CMS/HCC)    • Closed left hip fracture (CMS/HCC) 12/29/2021   • COPD (chronic obstructive pulmonary disease) (CMS/HCC)    • Glaucoma    • History of transfusion    • Hypertension    • Hypothyroidism    • Lipid disorder    • Malignant melanoma (CMS/HCC)    • Mycobacterium avium-intracellulare complex (CMS/HCC)    • Osteoporosis    • PAN (polyarteritis nodosa) (CMS/HCC)        Surgical History:   Past Surgical History:   Procedure Laterality Date   • AORTIC VALVE REPLACEMENT     • TONSILLECTOMY     • TOTAL HIP ARTHROPLASTY Left 12/29/2021     Allergies: Atorvastatin; Penicillins; Shellfish derived; Iodinated contrast media; Rosuvastatin; Covid-19 vaccine, mrna, [id number], lnp-s (moderna); and Covid-19 vaccine, [id number], lnp-s (moderna)    Social History:   Social History     Socioeconomic History   • Marital status:      Spouse name: None   • Number of children: None   • Years of education: None   • Highest education level: None   Occupational History   • None   Tobacco Use   • Smoking status: Never Smoker   • Smokeless tobacco: Never Used   Vaping Use   • Vaping Use: Never used   Substance and Sexual Activity   • Alcohol use: Never   • Drug use: Never   • Sexual activity: None   Other Topics Concern   • None   Social History Narrative   • None     Social Determinants of Health     Financial Resource Strain: Not on file   Food Insecurity: No Food Insecurity   • Worried About Running  Out of Food in the Last Year: Never true   • Ran Out of Food in the Last Year: Never true   Transportation Needs: Not on file   Physical Activity: Not on file   Stress: Not on file   Social Connections: Not on file   Intimate Partner Violence: Not on file   Housing Stability: Not on file       Family History: History reviewed. No pertinent family history.    Review of Systems    Constitutional: negative for fevers  Eyes: negative for visual disturbance  Ears, nose, mouth, throat, and face: negative for nasal congestion  Respiratory: negative for cough, dyspnea on exertion and wheezing  Cardiovascular: negative for chest pain and palpitations  Gastrointestinal: positive for constipation, negative for abdominal pain, nausea and vomiting  Genitourinary:negative for decreased stream and painful urination  Integument/breast: negative for rash and itching  Musculoskeletal:positive for left hip pain and swelling  Neurological: negative for numbness and tingling  Behavioral/Psych: negative for anxiety and depression    Vital signs  Patient Vitals for the past 72 hrs:   BP Temp Temp src Pulse Resp SpO2   01/05/22 1054 -- -- -- (!) 116 -- 94 %   01/05/22 1029 -- -- -- (!) 107 -- 92 %   01/05/22 1028 -- -- -- -- -- (!) 91 %   01/05/22 1001 139/65 -- -- (!) 105 -- 94 %   01/05/22 0725 (!) 152/67 36.6 °C (97.8 °F) Oral 84 18 94 %   01/04/22 2025 (!) 148/67 36.9 °C (98.5 °F) Oral 93 18 94 %   01/04/22 1603 (!) 147/64 36.7 °C (98 °F) Oral 86 16 96 %   01/04/22 1505 (!) 136/59 -- -- (!) 102 -- 96 %   01/04/22 1500 (!) 138/59 -- -- -- -- --   01/04/22 1408 (!) 138/59 -- -- -- -- --   01/04/22 1129 -- -- -- (!) 120 -- --   01/04/22 1115 -- -- -- (!) 145 -- --   01/04/22 1101 (!) 143/62 -- -- (!) 110 -- --   01/04/22 0905 138/62 -- -- 97 -- --   01/04/22 0611 -- 36.6 °C (97.9 °F) Oral -- 16 95 %   01/04/22 0554 140/60 -- -- 90 -- --   01/03/22 2208 -- -- -- -- -- 94 %   01/03/22 1959 (!) 153/67 36.9 °C (98.5 °F) Oral 97 18 95 %    01/03/22 1541 (!) 157/70 36.7 °C (98.1 °F) Oral 92 18 95 %   01/03/22 1428 137/62 -- -- 87 -- --   01/03/22 1259 (!) 151/65 -- -- (!) 101 -- --   01/03/22 1030 (!) 141/65 -- -- (!) 105 -- 94 %   01/03/22 0941 (!) 184/75 -- -- (!) 110 -- 92 %   01/03/22 0910 (!) 148/64 -- -- (!) 126 -- 93 %   01/03/22 0828 -- -- -- -- -- 95 %   01/03/22 0629 (!) 152/60 36.7 °C (98 °F) Oral 90 18 93 %   01/02/22 2142 -- -- -- -- -- 94 %   01/02/22 2005 -- -- -- 100 -- 93 %   01/02/22 1900 (!) 150/60 36.8 °C (98.2 °F) Oral (!) 110 -- 93 %   01/02/22 1601 (!) 150/69 36.7 °C (98.1 °F) Oral (!) 101 20 93 %   01/02/22 1152 (!) 165/72 -- -- -- -- --   01/02/22 1145 (!) 187/73 -- -- (!) 107 -- 93 %     Objective     Physical Exam    General appearance: alert, appears stated age and cooperative  Head: normocephalic, without obvious abnormality, atraumatic  Eyes: conjunctivae clear. PERRL, EOM's intact.  Ears: normal external ear  Nose: Nares normal. Septum midline. Mucosa normal.  Throat: normal oropharynx  Neck: no JVD, no adenopathy, no carotid bruit, supple, symmetrical, trachea midline and thyroid not enlarged, symmetric, no tenderness/mass/nodules  Lungs: clear to auscultation bilaterally  Heart: regular rate and rhythm, S1, S2 normal, no murmur, click, rub or gallop  Abdomen: soft, non-tender; bowel sounds normal; no masses, no organomegaly  Extremities: 2+ bilat LE edema; s/p left BARB  Pulses: 2+ and symmetric all four extremities  Skin: left hip C/D/I with Mepilex dressings  Lymph nodes: Cervical and supraclavicular nodes normal.  Neurologic: Alert and oriented X 3  decreased strength at left hip due to pain      Labs    Results from last 7 days   Lab Units 01/03/22  0433   WBC K/uL 11.29*   HEMOGLOBIN g/dL 8.1*   HEMATOCRIT % 26.2*   PLATELETS K/uL 351     Results from last 7 days   Lab Units 01/02/22  0604   SODIUM mEQ/L 136   POTASSIUM mEQ/L 3.9   CHLORIDE mEQ/L 104   CO2 mEQ/L 26   BUN mg/dL 12   CREATININE mg/dL 0.3*   CALCIUM  "mg/dL 8.0*   ALBUMIN g/dL 2.4*   BILIRUBIN TOTAL mg/dL 0.9   ALK PHOS IU/L 95   ALT IU/L 15   AST IU/L 26   GLUCOSE mg/dL 89       12/29/21: ECG:  Normal sinus rhythm with sinus arrhythmia   Normal ECG       TTE:3/5/21   1. Normal functioning bioprosthetic aortic valve replacement    2. Left ventricular hypertrophy with normal systolic function and moderate diastolic dysfunction      Catheterization: [facility] [place] 1/25/2019 \"Coronary angiography revealed no obstructive coronary artery disease in a codominant distribution.\"      Current CV Medications:   •  dabigatran etexilate (PRADAXA) capsule 150 mg, 150 mg, oral, BID, Karley Mar DO, 150 mg at   •  digoxin (LANOXIN) tablet 250 mcg, 250 mcg, oral, Daily (6a), Karley Mar, DO, 250 mcg at 01/05/22   •  dilTIAZem CD (CARDIZEM CD) 24 hr ER capsule 240 mg, 240 mg, oral, Daily, Tal Rojas,   •  levothyroxine (SYNTHROID) tablet 75 mcg, 75 mcg, oral, Daily (6a), Karley Mar, DO, 75 mcg at   •  rosuvastatin (CRESTOR) tablet 5 mg, 5 mg, oral, Daily (6p), Tal Rojas MD, 5 mg at       ASSESSMENT/PLAN   Bee Cobb is a 81 y.o. female with h/o severe AS s/p TAVR, PAFib (on Pradaxa), HTN CARRINGTON who presented to [facility] ER c/o L hip pain s/p fall out of bed.     1. Mechanical fall with L hip Fx s/p  -per Ortho    2. PAfib  -last EKG showed NSR  -[id number]= 4, on pradaxa  -on Dig 0.250 mg po daily and Cardizem  daily    3. h/o severe AS s/p TAVR  -Echo 3/2021:  Normal functioning bioprosthetic aortic valve replacement     4. anemia  -post op anemia due to chronic blood loss on iron/mvi  -alpha thalassemia trait with chronic anemia    5. H/o CARRINGTON and COPD  -per IM      Thank you  Deedee Brown PA-C            "

## 2022-01-05 NOTE — ASSESSMENT & PLAN NOTE
PCP Dr. Kanchan Zamarripa after discharge  [phone]     Cardiology after discharge  Ortho Dr. Pena in 2 weeks

## 2022-01-05 NOTE — PROGRESS NOTES
"Daily Progress Note       Patient was seen and examined.   Attestation Notes: Face to face encounter completed    Subjective    Patient with improving pain control. Improving mobility. Sleeping fairly well at night.  Objective     Visit Vitals  BP (!) 189/75 (BP Location: Right upper arm, Patient Position: Lying)   Pulse 90   Temp 36.6 °C (97.8 °F) (Oral)   Resp 18   Ht 1.676 m (5' 5.98\")   Wt 58.3 kg (128 lb 8.5 oz)   SpO2 95%   BMI 20.76 kg/m²     Review of Systems:  Pertinent items are noted in HPI.  Denies chest pain and shortness of breath.  Review of systems otherwise negative.    Labs     Results from last 7 days   Lab Units 01/03/22  0433   WBC K/uL 11.29*   HEMOGLOBIN g/dL 8.1*   HEMATOCRIT % 26.2*   PLATELETS K/uL 351     Results from last 7 days   Lab Units 01/02/22  0604   SODIUM mEQ/L 136   POTASSIUM mEQ/L 3.9   CHLORIDE mEQ/L 104   CO2 mEQ/L 26   BUN mg/dL 12   CREATININE mg/dL 0.3*   CALCIUM mg/dL 8.0*   ALBUMIN g/dL 2.4*   BILIRUBIN TOTAL mg/dL 0.9   ALK PHOS IU/L 95   ALT IU/L 15   AST IU/L 26   GLUCOSE mg/dL 89       Full Code    Physical Exam  General      Alert, cooperative, no distress, appears stated age.   Head:    Normocephalic, without obvious abnormality, atraumatic.   Eyes:    PERRL, conjunctiva/corneas clear, EOM's intact.        Nose:   Nares normal, septum midline, mucosa normal, no drainage or            sinus tenderness.   Throat:   Lips, mucosa, and tongue normal.    Neck:   Supple, symmetrical, trachea midline.    Back:     Symmetric, no curvature.   Lungs:     Clear to auscultation bilaterally, respirations unlabored.   Chest wall:    No tenderness or deformity.   Heart:    Regular rate and rhythm, S1 and S2 normal.   Abdomen:     Soft, non-tender, bowel sounds active all four quadrants,     no masses, no organomegaly.   Extremities:  Musculoskeletal:  1+ left lower extremity edema.   Left hip replacement.    Pulses:   1+ and symmetric all extremities.   Skin:   Left hip incision " intact.   Neurologic:          Behavior/  Emotional:  CNII-XII intact.  Alert and oriented ×3.  Motor exam with stable left hip weakness.  Sensory exam intact.  Reflexes stable decreased reflexes.      Appropriate, cooperative           Plan of care was discussed with patient    Assessment & Plan  * Hip fracture requiring operative repair, left, closed, initial encounter (CMS/[facility])  Assessment & Plan  Mrs. Cobb is an 81-year-old female who presented to [facility] on 12/29/2021 with complaints of left hip pain after falling out of bed.  X-ray left hip revealed left femoral neck fracture.  CT left hip confirmed displaced fracture left femoral neck.  X-ray left knee was negative.  Head CT was negative for skull fracture or intracranial hemorrhage.  CT cervical spine was negative for fracture dislocation.  Dr. Pena from orthopedic surgery was consulted and performed left total hip replacement on 12/29/2021.  She is cleared for weightbearing to left lower extremity with anterior hip precautions for 6 weeks.  She is anticoagulated with Pradaxa for atrial fibrillation.  Aspirin 325 mg daily for 6 weeks was added for DVT prophylaxis on top of Pradaxa.  Postoperative anemia stabilized at 8.6.  She was ultimately determined to be medically stable for transfer to [facility] on 1/1/2022 for an acute inpatient rehabilitation program to address deficits related to left hip fracture status post total hip replacement.    She is weightbearing as tolerated.  She will be maintained on left hip precautions and aspirin for 6 weeks.  She will participate in 3 hours of physical and occupational therapy as well as receive 24-hour rehab nursing care.  She will follow-up with Dr. Pena from orthopedic surgery in 2 weeks.    The patient is tolerating comprehensive inpatient rehabilitation program.  Tolerating initial therapies.    The patient's incision is healing well.    Pain management improving.    Continues to  progress in therapies.        Follow up  Assessment & Plan  PCP Dr. Kanchan Zamarripa after discharge  [phone]     Cardiology after discharge  Ortho Dr. Pena in 2 weeks    At high risk for pressure injury of skin  Assessment & Plan  Turns q2hr    Risk for falls  Assessment & Plan  wean off restraints as able    Pain  Assessment & Plan  Tylenol 1000 mg every 8 hours.  Oxycodone as needed.  Adjust medications as needed.    Postoperative anemia  Assessment & Plan  Hemoglobin 8.1.  Continue to monitor.  Iron replacement.    Paroxysmal atrial fibrillation (CMS/[facility])  Assessment & Plan  Rate controlled on dig and cardizem, anti-coag on pradaxa    Hypothyroidism  Assessment & Plan  Continue synthroid    Hypertension  Assessment & Plan  Cont cardizem    Glaucoma  Assessment & Plan  Trusopt, xalatan gtt    COPD (chronic obstructive pulmonary disease) (CMS/HCC)  Assessment & Plan  Cont pulmicort, spiriva        Expected Discharge Date:

## 2022-01-05 NOTE — PLAN OF CARE
Problem: Rehabilitation ([facility]) Plan of Care  Goal: Plan of Care Review  Outcome: Progressing  Flowsheets (Taken 1/5/2022 1130)  Progress: improving  Plan of Care Reviewed With: patient  Outcome Summary: Patient is pleasant and copperative. Left hip incision draining serous fluid this AM. Cleansed incision with normal saline and applied gauze and tegaderm. Medciated with PRn oxycodone for pain with effective results. Good appetite. Makes needs known.

## 2022-01-05 NOTE — PROGRESS NOTES
"Daily Progress Note       Patient was seen and examined.   Attestation Notes: Face to face encounter completed    Subjective    The patient slept well overnight.  Pain control improving.  Tolerating initial therapies.  Objective     Visit Vitals  BP (!) 147/64 (BP Location: Left upper arm, Patient Position: Lying)   Pulse 86   Temp 36.7 °C (98 °F) (Oral)   Resp 16   Ht 1.676 m (5' 5.98\")   Wt 56.7 kg (125 lb)   SpO2 96%   BMI 20.19 kg/m²     Review of Systems:  Pertinent items are noted in HPI.  Denies chest pain and shortness of breath.  Review of systems otherwise negative.    Labs     Results from last 7 days   Lab Units 01/03/22  0433   WBC K/uL 11.29*   HEMOGLOBIN g/dL 8.1*   HEMATOCRIT % 26.2*   PLATELETS K/uL 351     Results from last 7 days   Lab Units 01/02/22  0604   SODIUM mEQ/L 136   POTASSIUM mEQ/L 3.9   CHLORIDE mEQ/L 104   CO2 mEQ/L 26   BUN mg/dL 12   CREATININE mg/dL 0.3*   CALCIUM mg/dL 8.0*   ALBUMIN g/dL 2.4*   BILIRUBIN TOTAL mg/dL 0.9   ALK PHOS IU/L 95   ALT IU/L 15   AST IU/L 26   GLUCOSE mg/dL 89     Full Code    Physical Exam  General      Alert, cooperative, no distress, appears stated age.   Head:    Normocephalic, without obvious abnormality, atraumatic.   Eyes:    PERRL, conjunctiva/corneas clear, EOM's intact.        Nose:   Nares normal, septum midline, mucosa normal, no drainage or            sinus tenderness.   Throat:   Lips, mucosa, and tongue normal.    Neck:   Supple, symmetrical, trachea midline.    Back:     Symmetric, no curvature.   Lungs:     Clear to auscultation bilaterally, respirations unlabored.   Chest wall:    No tenderness or deformity.   Heart:    Regular rate and rhythm, S1 and S2 normal.   Abdomen:     Soft, non-tender, bowel sounds active all four quadrants,     no masses, no organomegaly.   Extremities:  Musculoskeletal:  1+ lower extremity edema bilaterally.   Left hip replacement.      Pulses:   1+ and symmetric all extremities.   Skin:   Staples intact with " minimal serous drainage.   Neurologic:          Behavior/  Emotional:  CNII-XII intact.  Alert and oriented ×3.  Left hip weakness post fracture.  Sensory exam intact.  Reflexes stable decreased reflexes.      Appropriate, cooperative           Plan of care was discussed with patient    Assessment & Plan  * Hip fracture requiring operative repair, left, closed, initial encounter (CMS/[facility])  Assessment & Plan  Mrs. Cobb is an 81-year-old female who presented to [facility] on 12/29/2021 with complaints of left hip pain after falling out of bed.  X-ray left hip revealed left femoral neck fracture.  CT left hip confirmed displaced fracture left femoral neck.  X-ray left knee was negative.  Head CT was negative for skull fracture or intracranial hemorrhage.  CT cervical spine was negative for fracture dislocation.  Dr. Pena from orthopedic surgery was consulted and performed left total hip replacement on 12/29/2021.  She is cleared for weightbearing to left lower extremity with anterior hip precautions for 6 weeks.  She is anticoagulated with Pradaxa for atrial fibrillation.  Aspirin 325 mg daily for 6 weeks was added for DVT prophylaxis on top of Pradaxa.  Postoperative anemia stabilized at 8.6.  She was ultimately determined to be medically stable for transfer to [place] [facility] on 1/1/2022 for an acute inpatient rehabilitation program to address deficits related to left hip fracture status post total hip replacement.    She is weightbearing as tolerated.  She will be maintained on left hip precautions and aspirin for 6 weeks.  She will participate in 3 hours of physical and occupational therapy as well as receive 24-hour rehab nursing care.  She will follow-up with Dr. Pena from orthopedic surgery in 2 weeks.    The patient is tolerating comprehensive inpatient rehabilitation program.  Tolerating initial therapies.    Continue with pain management and adjust medications as needed.The patient's pain  control is improving.  Tolerating therapies.  Sleeping well at night.    Incision line intact.        Follow up  Assessment & Plan  PCP Dr. Kanchan Zamarripa after discharge  [phone]     Cardiology after discharge  Ortho Dr. Pena in 2 weeks    At high risk for pressure injury of skin  Assessment & Plan  Turns q2hr    Risk for falls  Assessment & Plan  wean off restraints as able    Pain  Assessment & Plan  Tylenol 1000 mg every 8 hours.  Oxycodone as needed.  Adjust medications as needed.    Postoperative anemia  Assessment & Plan  Hemoglobin 8.1.  Continue to monitor.  Iron replacement.    Paroxysmal atrial fibrillation (CMS/[facility])  Assessment & Plan  Rate controlled on dig and cardizem, anti-coag on pradaxa    Hypothyroidism  Assessment & Plan  Continue synthroid    Hypertension  Assessment & Plan  Cont cardizem    Glaucoma  Assessment & Plan  Trusopt, xalatan gtt    COPD (chronic obstructive pulmonary disease) (CMS/HCC)  Assessment & Plan  Cont pulmicort, spiriva        Expected Discharge Date:

## 2022-01-05 NOTE — ASSESSMENT & PLAN NOTE
Mrs. Cobb is an 81-year-old female who presented to [facility] on 12/29/2021 with complaints of left hip pain after falling out of bed.  X-ray left hip revealed left femoral neck fracture.  CT left hip confirmed displaced fracture left femoral neck.  X-ray left knee was negative.  Head CT was negative for skull fracture or intracranial hemorrhage.  CT cervical spine was negative for fracture dislocation.  Dr. Pena from orthopedic surgery was consulted and performed left total hip replacement on 12/29/2021.  She is cleared for weightbearing to left lower extremity with anterior hip precautions for 6 weeks.  She is anticoagulated with Pradaxa for atrial fibrillation.  Aspirin 325 mg daily for 6 weeks was added for DVT prophylaxis on top of Pradaxa.  Postoperative anemia stabilized at 8.6.  She was ultimately determined to be medically stable for transfer to [place] [facility] on 1/1/2022 for an acute inpatient rehabilitation program to address deficits related to left hip fracture status post total hip replacement.    She is weightbearing as tolerated.  She will be maintained on left hip precautions and aspirin for 6 weeks.  She will participate in 3 hours of physical and occupational therapy as well as receive 24-hour rehab nursing care.  She will follow-up with Dr. Pena from orthopedic surgery in 2 weeks.    The patient is tolerating comprehensive inpatient rehabilitation program.  Tolerating initial therapies.    Continue with pain management and adjust medications as needed.The patient's pain control is improving.  Tolerating therapies.  Sleeping well at night.    Incision line intact.

## 2022-01-06 ENCOUNTER — APPOINTMENT (INPATIENT)
Dept: OCCUPATIONAL THERAPY | Facility: REHABILITATION | Age: 82
DRG: 560 | End: 2022-01-06
Payer: MEDICARE

## 2022-01-06 ENCOUNTER — APPOINTMENT (INPATIENT)
Dept: PHYSICAL THERAPY | Facility: REHABILITATION | Age: 82
DRG: 560 | End: 2022-01-06
Payer: MEDICARE

## 2022-01-06 PROCEDURE — 97110 THERAPEUTIC EXERCISES: CPT | Mod: GO

## 2022-01-06 PROCEDURE — 97535 SELF CARE MNGMENT TRAINING: CPT | Mod: GO

## 2022-01-06 PROCEDURE — 93005 ELECTROCARDIOGRAM TRACING: CPT | Performed by: INTERNAL MEDICINE

## 2022-01-06 PROCEDURE — 97530 THERAPEUTIC ACTIVITIES: CPT | Mod: GO

## 2022-01-06 PROCEDURE — 63700000 HC SELF-ADMINISTRABLE DRUG: Performed by: HOSPITALIST

## 2022-01-06 PROCEDURE — 97110 THERAPEUTIC EXERCISES: CPT | Mod: GP

## 2022-01-06 PROCEDURE — 97530 THERAPEUTIC ACTIVITIES: CPT | Mod: GP,CQ

## 2022-01-06 PROCEDURE — 63700000 HC SELF-ADMINISTRABLE DRUG: Performed by: PHYSICAL MEDICINE & REHABILITATION

## 2022-01-06 PROCEDURE — 12800000 HC ROOM AND CARE SEMIPRIVATE REHAB

## 2022-01-06 PROCEDURE — 97116 GAIT TRAINING THERAPY: CPT | Mod: GP,CQ

## 2022-01-06 PROCEDURE — 63700000 HC SELF-ADMINISTRABLE DRUG: Performed by: INTERNAL MEDICINE

## 2022-01-06 PROCEDURE — 25000000 HC PHARMACY GENERAL: Performed by: PHYSICAL MEDICINE & REHABILITATION

## 2022-01-06 RX ORDER — CLONIDINE HYDROCHLORIDE 0.1 MG/1
0.1 TABLET ORAL 2 TIMES DAILY
Status: DISCONTINUED | OUTPATIENT
Start: 2022-01-06 | End: 2022-01-16 | Stop reason: HOSPADM

## 2022-01-06 RX ADMIN — DABIGATRAN ETEXILATE MESYLATE 150 MG: 150 CAPSULE ORAL at 19:58

## 2022-01-06 RX ADMIN — THERA TABS 1 TABLET: TAB at 08:59

## 2022-01-06 RX ADMIN — LATANOPROST 1 DROP: 50 SOLUTION/ DROPS OPHTHALMIC at 19:59

## 2022-01-06 RX ADMIN — BUDESONIDE 0.5 MG: 0.5 SUSPENSION RESPIRATORY (INHALATION) at 05:54

## 2022-01-06 RX ADMIN — ROSUVASTATIN CALCIUM 5 MG: 5 TABLET, FILM COATED ORAL at 17:55

## 2022-01-06 RX ADMIN — ACETAMINOPHEN 1000 MG: 500 TABLET, FILM COATED ORAL at 14:09

## 2022-01-06 RX ADMIN — SENNOSIDES AND DOCUSATE SODIUM 1 TABLET: 50; 8.6 TABLET ORAL at 19:58

## 2022-01-06 RX ADMIN — OXYCODONE HYDROCHLORIDE 5 MG: 5 TABLET ORAL at 09:10

## 2022-01-06 RX ADMIN — PANTOPRAZOLE SODIUM 40 MG: 40 TABLET, DELAYED RELEASE ORAL at 08:59

## 2022-01-06 RX ADMIN — DORZOLAMIDE HYDROCHLORIDE 1 DROP: 20 SOLUTION/ DROPS OPHTHALMIC at 05:55

## 2022-01-06 RX ADMIN — SENNOSIDES AND DOCUSATE SODIUM 1 TABLET: 50; 8.6 TABLET ORAL at 08:59

## 2022-01-06 RX ADMIN — DIGOXIN 250 MCG: 250 TABLET ORAL at 05:54

## 2022-01-06 RX ADMIN — LEVOTHYROXINE SODIUM 75 MCG: 75 TABLET ORAL at 05:54

## 2022-01-06 RX ADMIN — TIOTROPIUM BROMIDE INHALATION SPRAY 2 PUFF: 3.12 SPRAY, METERED RESPIRATORY (INHALATION) at 09:03

## 2022-01-06 RX ADMIN — DABIGATRAN ETEXILATE MESYLATE 150 MG: 150 CAPSULE ORAL at 08:58

## 2022-01-06 RX ADMIN — BUDESONIDE 0.5 MG: 0.5 SUSPENSION RESPIRATORY (INHALATION) at 19:10

## 2022-01-06 RX ADMIN — FERROUS SULFATE TAB 325 MG (65 MG ELEMENTAL FE) 325 MG: 325 (65 FE) TAB at 08:59

## 2022-01-06 RX ADMIN — DORZOLAMIDE HYDROCHLORIDE 1 DROP: 20 SOLUTION/ DROPS OPHTHALMIC at 17:56

## 2022-01-06 RX ADMIN — DILTIAZEM HYDROCHLORIDE 240 MG: 120 CAPSULE, COATED, EXTENDED RELEASE ORAL at 08:58

## 2022-01-06 RX ADMIN — ACETAMINOPHEN 1000 MG: 500 TABLET, FILM COATED ORAL at 05:53

## 2022-01-06 RX ADMIN — CLONIDINE HYDROCHLORIDE 0.1 MG: 0.1 TABLET ORAL at 14:09

## 2022-01-06 RX ADMIN — Medication 1000 UNITS: at 08:59

## 2022-01-06 NOTE — ASSESSMENT & PLAN NOTE
Mrs. Cobb is an 81-year-old female who presented to [facility] on 12/29/2021 with complaints of left hip pain after falling out of bed.  X-ray left hip revealed left femoral neck fracture.  CT left hip confirmed displaced fracture left femoral neck.  X-ray left knee was negative.  Head CT was negative for skull fracture or intracranial hemorrhage.  CT cervical spine was negative for fracture dislocation.  Dr. Pena from orthopedic surgery was consulted and performed left total hip replacement on 12/29/2021.  She is cleared for weightbearing to left lower extremity with anterior hip precautions for 6 weeks.  She is anticoagulated with Pradaxa for atrial fibrillation.  Aspirin 325 mg daily for 6 weeks was added for DVT prophylaxis on top of Pradaxa.  Postoperative anemia stabilized at 8.6.  She was ultimately determined to be medically stable for transfer to [place] [facility] on 1/1/2022 for an acute inpatient rehabilitation program to address deficits related to left hip fracture status post total hip replacement.    She is weightbearing as tolerated.  She will be maintained on left hip precautions and aspirin for 6 weeks.  She will participate in 3 hours of physical and occupational therapy as well as receive 24-hour rehab nursing care.  She will follow-up with Dr. Pena from orthopedic surgery in 2 weeks.    The patient is tolerating comprehensive inpatient rehabilitation program.  Tolerating initial therapies.    The patient's incision is healing well.    Pain with better control.    Improving with therapies min assist with transfers and ambulation self-care activities.

## 2022-01-06 NOTE — PROGRESS NOTES
Patient: Bee Cobb  [facility] [address]  MRN: [id number]  Today's date: 1/6/2022    History of Present Illness  Bee is a 81 y.o. female admitted on 1/1/2022 with Hip fracture requiring operative repair, left, closed, initial encounter (CMS/[facility]) [S72.002A]. Principal problem is Hip fracture requiring operative repair, left, closed, initial encounter (CMS/[facility]).    81 y.o. f who presents with left hip pain after falling out of bed. She had an immediate onset of left hip pain and inability to ambulate.  No LOC.  Radiographic evaluation showed a displaced transcervical femoral neck fracture by CT.  On 12-29-21 she underwent left hip hemiarthroplasty.  Pt is WBAT to LLE and anterior hip precautions x 6 weeks. Ortho recommends aspirin 325mg/day x 6 weeks in addition to pt's home pradaxa which was restarted postoperatively. Pt did well postoperatively, but was noted to have postop anemia with a hgb drop of 12-->9. Per [place], this is expected due to Pradaxa use.  Pain is being managed with multimodal analgesia; scheduled tylenol, lido patch, PRN oxycodone, ice therapy.  PT/OT/PM&R evaluated pt and recommend acute rehab at discharge.          Past Medical History  Bee has a past medical history of Atrial fibrillation (CMS/[facility]), Glaucoma, History of transfusion, Hyperthyroidism, Lipid disorder, Osteoporosis, PAN (polyarteritis nodosa) (CMS/[facility]), and Thalassemia. H/o recent clavicle fx 9/11/21 Completed course of therapy and has full ROM      PT Vitals    Date/Time Pulse HR Source BP BP Location BP Method Pt Position Observations [facility]   01/06/22 1401 92 Right Radial 164/2 Right upper arm Manual Sitting diastolic BP audible all the way down to 0 w/ no distinct change in consistency or volume. [facility]   01/06/22 1428 120 Right Radial 180/2 Right upper arm Manual Sitting after amb 25' x 2 [facility]      PT Pain    Date/Time Pain Type Side/Orientation Location Rating: Rest Rating: Activity  Description Interventions [facility]   01/06/22 1401 Pain Assessment left hip 3 3 sore premedicated for activity [facility]   01/06/22 1409 Pain Assessment -- -- 3 -- -- -- [facility]   01/06/22 1428 Pain Reassessment -- -- 3 3 -- -- [facility]          Prior Living Environment      Most Recent Value   People in Home spouse   Current Living Arrangements home  [9 MADY, FF inside]   Home Accessibility stairs to enter home (Group), stairs within home (Group)   Living Environment Comment Row home 13 steps into house, 9 steps to BR on 2nd floor   Number of Stairs, Main Entrance 9   Surface of Stairs, Main Entrance concrete   Stair Railings, Main Entrance railings on both sides of stairs   Location, Bathroom second floor, must negotiate stairs to access   [place], Bathroom tile floor   Bathroom Access Comment ztub/shower combo, hand held shower, bench and suction grab bars ( checks stability prior to each shower)   Number of Stairs, Within Home, Primary --  [13]   Stair Railings, Within Home, Primary railing on right side (ascending)          Prior Level of Function      Most Recent Value   Dominant Hand left   Ambulation independent   Transferring independent   Toileting independent   Bathing independent   Dressing independent   Eating independent   Assistive Device Currently Used at Home shower chair, grab bar           IRF PT Evaluation and Treatment - 01/06/22 1400        PT Time Calculation    Start Time 1400     Stop Time 1430     Time Calculation (min) 30 min        Session Details    Document Type daily treatment/progress note     Mode of Treatment individual therapy;physical therapy        General Information    Patient Profile Reviewed yes     Patient/Family/Caregiver Comments/Observations no c/o pain at rest. premedicated for session. pt encountered in bed, assisted OOB to chair. Nursing arrived w/ new medication for pt, about which pt had questions for nurse.     General Observations of Patient pain increased to 3/10 w/ activity.         Bed Mobility    [place], Supine to Sit close supervision     Verbal Cues (Supine to Sit) technique     Comment (Bed Mobility) HOB elevated, in hospital bed.        Sit to Stand Transfer    [place], Sit to Stand Transfer close supervision     Verbal Cues safety;hand placement     Assistive Device gait belt;walker, front-wheeled        Stand to Sit Transfer    [place], Stand to Sit Transfer close supervision     Verbal Cues safety;hand placement     Assistive Device gait belt;walker, front-wheeled        Gait Training    [place], Gait close supervision     Assistive Device gait belt;walker, front-wheeled     Distance in Feet 25 feet     Pattern (Gait) step-to     Deviations/Abnormal Patterns (Gait) antalgic;base of support, narrow;digna decreased;gait speed decreased;step length decreased;stride length decreased;weight shifting decreased     Bilateral Gait Deviations heel strike decreased     Comment (Gait/Stairs) 25' x 2 w/ RW; distances kept shorter than usual due to increased BP        Daily Progress Summary (PT)    Daily Outcome Statement notified nursing of manual BP findings, that diastolic BP is audible down to zero w/ no clear change in volume during manual monitoring. pt had EKG earlier in the day, and nursing was providing meds based on findings. cont POC                           IRF PT Goals      Most Recent Value   Bed Mobility Goal 1    Activity/Assistive Device scooting, sit to supine, supine to sit at 01/02/2022 1102   [place] minimum assist (75% or more patient effort) at 01/02/2022 1102   Time Frame short-term goal (STG), 1 week at 01/02/2022 1102   Progress/Outcome goal met at 01/06/2022 0815   Bed Mobility Goal 2    Activity/Assistive Device scooting, sit to supine, supine to sit at 01/02/2022 1102   [place] modified independence at 01/02/2022 1102   Time Frame 1 week, long-term goal (LTG) at 01/06/2022 0815   Progress/Outcome continuing progress toward goal,  goal ongoing at 01/06/2022 0815   Transfer Goal 1    Activity/Assistive Device sit-to-stand/stand-to-sit, stand pivot, walker, front-wheeled at 01/02/2022 1102   [place] supervision required at 01/02/2022 1102   Time Frame short-term goal (STG), 3 - 5 days at 01/06/2022 0815   Progress/Outcome good progress toward goal, continuing progress toward goal, goal ongoing at 01/06/2022 0815   Transfer Goal 2    Activity/Assistive Device sit-to-stand/stand-to-sit, stand pivot, walker, front-wheeled at 01/02/2022 1102   [place] modified independence at 01/02/2022 1102   Time Frame long-term goal (LTG), 1 week at 01/06/2022 0815   Progress/Outcome good progress toward goal, continuing progress toward goal, goal ongoing at 01/06/2022 0815   Gait/Walking Locomotion Goal 1    Activity/Assistive Device gait (walking locomotion), decrease asymmetrical patterns, decrease fall risk, forward stepping, improve balance and speed at 01/02/2022 1102   Distance 100 feet at 01/06/2022 0815   [place] supervision required at 01/02/2022 1102   Time Frame short-term goal (STG), 3 - 5 days at 01/06/2022 0815   Progress/Outcome progress slower than expected, goal revised this date at 01/06/2022 0815   Gait/Walking Locomotion Goal 2    Activity/Assistive Device gait (walking locomotion), decrease asymmetrical patterns, decrease fall risk, forward stepping, improve balance and speed at 01/02/2022 1102   Distance 100 feet at 01/06/2022 0815   [place] modified independence at 01/02/2022 1102   Time Frame long-term goal (LTG), 1 week at 01/06/2022 0815   Progress/Outcome progress slower than expected, goal revised this date at 01/06/2022 0815   Stairs Goal 1    Activity/Assistive Device ascending stairs, descending stairs at 01/04/2022 1505   Number of Stairs 8 at 01/04/2022 1505   [place] minimum assist (75% or more patient effort) at 01/04/2022 1505   Time Frame short-term goal (STG), 1 week at 01/06/2022 0815    Progress/Outcome progress slower than expected, goal ongoing at 01/06/2022 0815   Stairs Goal 2    Activity/Assistive Device ascending stairs, descending stairs at 01/04/2022 1505   Number of Stairs 12 at 01/04/2022 1505   [place] supervision required at 01/04/2022 1505   Time Frame long-term goal (LTG), 14 days or less at 01/06/2022 0815   Progress/Outcome progress slower than expected, goal revised this date, goal ongoing at 01/06/2022 0815

## 2022-01-06 NOTE — ASSESSMENT & PLAN NOTE
PCP Dr. Kanchan Zamarripa after discharge  [phone]     Cardiology after discharge  Ortho Dr. Pena in 2 weeks

## 2022-01-06 NOTE — PROGRESS NOTES
Patient: Bee Cobb  Location: [facility] [address]  MRN: [id number]  Today's date: 1/6/2022    History of Present Illness  Bee is a 81 y.o. female admitted on 1/1/2022 with Hip fracture requiring operative repair, left, closed, initial encounter (CMS/[facility]) [S72.002A]. Principal problem is Hip fracture requiring operative repair, left, closed, initial encounter (CMS/[facility]).    81 y.o. f who presents with left hip pain after falling out of bed. She had an immediate onset of left hip pain and inability to ambulate.  No LOC.  Radiographic evaluation showed a displaced transcervical femoral neck fracture by CT.  On 12-29-21 she underwent left hip hemiarthroplasty.  Pt is WBAT to LLE and anterior hip precautions x 6 weeks. Ortho recommends aspirin 325mg/day x 6 weeks in addition to pt's home pradaxa which was restarted postoperatively. Pt did well postoperatively, but was noted to have postop anemia with a hgb drop of 12-->9. Per [place], this is expected due to Pradaxa use.  Pain is being managed with multimodal analgesia; scheduled tylenol, lido patch, PRN oxycodone, ice therapy.  PT/OT/PM&R evaluated pt and recommend acute rehab at discharge.          Past Medical History  Bee has a past medical history of Atrial fibrillation (CMS/[facility]), Glaucoma, History of transfusion, Hyperthyroidism, Lipid disorder, Osteoporosis, PAN (polyarteritis nodosa) (CMS/[facility]), and Thalassemia. H/o recent clavicle fx 9/11/21 Completed course of therapy and has full ROM      PT Vitals    Date/Time Pulse HR Source SpO2 Pt Activity O2 Therapy BP BP Method Pt Position Observations [facility]   01/06/22 1008 103 Monitor -- -- -- 176/77 Automatic Sitting -- SJM   01/06/22 1033 143 Monitor 94 % Walking None (Room air) -- -- -- post-amb SJM      PT Pain    Date/Time Pain Type Side/Orientation Location Rating: Rest Rating: Activity Interventions [facility]   01/06/22 1008 Pain Assessment left;lateral hip 3 -- premedicated for activity  SJM   01/06/22 1054 Pain Reassessment;Post Activity left;generalized;lateral hip -- 4 position adjusted SJM          Prior Living Environment      Most Recent Value   People in Home spouse   Current Living Arrangements home  [9 MADY, FF inside]   Home Accessibility stairs to enter home (Group), stairs within home (Group)   Living Environment Comment Row home 13 steps into house, 9 steps to BR on 2nd floor   Number of Stairs, Main Entrance 9   Surface of Stairs, Main Entrance concrete   Stair Railings, Main Entrance railings on both sides of stairs   Location, Bathroom second floor, must negotiate stairs to access   [place], Bathroom tile floor   Bathroom Access Comment ztub/shower combo, hand held shower, bench and suction grab bars ( checks stability prior to each shower)   Number of Stairs, Within Home, Primary --  [13]   Stair Railings, Within Home, Primary railing on right side (ascending)          Prior Level of Function      Most Recent Value   Dominant Hand left   Ambulation independent   Transferring independent   Toileting independent   Bathing independent   Dressing independent   Eating independent   Assistive Device Currently Used at Home shower chair, grab bar           IRF PT Evaluation and Treatment - 01/06/22 1011        PT Time Calculation    Start Time 1000     Stop Time 1100     Time Calculation (min) 60 min        Session Details    Document Type daily treatment/progress note     Mode of Treatment individual therapy;physical therapy        Sit to Stand Transfer    [place], Sit to Stand Transfer close supervision     Verbal Cues hand placement;safety;technique     Assistive Device gait belt;walker, front-wheeled     Comment VCs for hand placement, S provided for safety        Stand to Sit Transfer    [place], Stand to Sit Transfer close supervision     Verbal Cues hand placement;safety;technique     Assistive Device gait belt;walker, front-wheeled     Comment VCs for hand  placement, S provided for safety        Stand Pivot Transfer    [place], Stand Pivot/Stand Step Transfer close supervision     Verbal Cues hand placement;safety;technique     Assistive Device gait belt;walker, front-wheeled     Comment via amb approach w/ S provided for safety        Gait Training    [place], Gait close supervision     Assistive Device gait belt;walker, front-wheeled     Distance in Feet 80 feet     Pattern (Gait) step-through     Deviations/Abnormal Patterns (Gait) antalgic;base of support, narrow;gait speed decreased        Stairs Training    [place], Stairs minimum assist (75% or more patient effort)     Assistive Device railing     Handrail Location (Stairs) both sides     Number of Stairs 4     Stair Height 6 inches     Ascending Stairs Technique step-to-step     Descending Stairs Technique step-to-step     Comment steadying assist at lateral pelvis, VCs for correct LE steppage and to maintain step -to pattern        Timed Up and Go Test    Trial One: Timed Up and Go Test 80.56     Comment, Timed Up and Go Test with Close S using RW /gait belt donned for safety     Results, Timed Up and Go Test (Balance) patient w/ good improvement from last assessment on 1/3/22        Lower Extremity (Therapeutic Exercise)    Exercise Position/Type seated     General Exercise bilateral;ankle pumps;gluteal sets;LAQ (long arc quad)     Reps and Sets 3 sets x 10 reps each        Daily Progress Summary (PT)    Daily Outcome Statement Patient demo improving activity bibi as noted by increased amb distance bibi. Patient made a good improvement w/ TUG assessment as compared to initial trial. Mobility level is close S level w/ RW.                           IRF PT Goals      Most Recent Value   Bed Mobility Goal 1    Activity/Assistive Device scooting, sit to supine, supine to sit at 01/02/2022 1102   [place] minimum assist (75% or more patient effort) at 01/02/2022 1102   Time Frame short-term goal  (STG), 1 week at 01/02/2022 1102   Progress/Outcome goal met at 01/06/2022 0815   Bed Mobility Goal 2    Activity/Assistive Device scooting, sit to supine, supine to sit at 01/02/2022 1102   [place] modified independence at 01/02/2022 1102   Time Frame 1 week, long-term goal (LTG) at 01/06/2022 0815   Progress/Outcome continuing progress toward goal, goal ongoing at 01/06/2022 0815   Transfer Goal 1    Activity/Assistive Device sit-to-stand/stand-to-sit, stand pivot, walker, front-wheeled at 01/02/2022 1102   [place] supervision required at 01/02/2022 1102   Time Frame short-term goal (STG), 3 - 5 days at 01/06/2022 0815   Progress/Outcome good progress toward goal, continuing progress toward goal, goal ongoing at 01/06/2022 0815   Transfer Goal 2    Activity/Assistive Device sit-to-stand/stand-to-sit, stand pivot, walker, front-wheeled at 01/02/2022 1102   [place] modified independence at 01/02/2022 1102   Time Frame long-term goal (LTG), 1 week at 01/06/2022 0815   Progress/Outcome good progress toward goal, continuing progress toward goal, goal ongoing at 01/06/2022 0815   Gait/Walking Locomotion Goal 1    Activity/Assistive Device gait (walking locomotion), decrease asymmetrical patterns, decrease fall risk, forward stepping, improve balance and speed at 01/02/2022 1102   Distance 100 feet at 01/06/2022 0815   [place] supervision required at 01/02/2022 1102   Time Frame short-term goal (STG), 3 - 5 days at 01/06/2022 0815   Progress/Outcome progress slower than expected, goal revised this date at 01/06/2022 0815   Gait/Walking Locomotion Goal 2    Activity/Assistive Device gait (walking locomotion), decrease asymmetrical patterns, decrease fall risk, forward stepping, improve balance and speed at 01/02/2022 1102   Distance 100 feet at 01/06/2022 0815   [place] modified independence at 01/02/2022 1102   Time Frame long-term goal (LTG), 1 week at 01/06/2022 0815   Progress/Outcome progress  slower than expected, goal revised this date at 01/06/2022 0815   Stairs Goal 1    Activity/Assistive Device ascending stairs, descending stairs at 01/04/2022 1505   Number of Stairs 8 at 01/04/2022 1505   [place] minimum assist (75% or more patient effort) at 01/04/2022 1505   Time Frame short-term goal (STG), 1 week at 01/06/2022 0815   Progress/Outcome progress slower than expected, goal ongoing at 01/06/2022 0815   Stairs Goal 2    Activity/Assistive Device ascending stairs, descending stairs at 01/04/2022 1505   Number of Stairs 12 at 01/04/2022 1505   [place] supervision required at 01/04/2022 1505   Time Frame long-term goal (LTG), 14 days or less at 01/06/2022 0815   Progress/Outcome progress slower than expected, goal revised this date, goal ongoing at 01/06/2022 0815

## 2022-01-06 NOTE — SUBJECTIVE & OBJECTIVE
"   Patient was seen and examined.   Attestation Notes: Face to face encounter completed    Subjective    Patient has better pain control.  Min assist for transfers and ambulation and self-care activities.  Improving.  Objective     Visit Vitals  BP (!) 152/48 (BP Location: Right upper arm, Patient Position: Lying)   Pulse 81   Temp 36.7 °C (98.1 °F) (Oral)   Resp 15   Ht 1.676 m (5' 5.98\")   Wt 58.3 kg (128 lb 8.5 oz)   SpO2 95%   BMI 20.76 kg/m²       Review of Systems:  Pertinent items are noted in HPI.  Denies chest pain and shortness of breath.  Review of systems otherwise negative.    Labs     Results from last 7 days   Lab Units 01/03/22  0433   WBC K/uL 11.29*   HEMOGLOBIN g/dL 8.1*   HEMATOCRIT % 26.2*   PLATELETS K/uL 351     Results from last 7 days   Lab Units 01/02/22  0604   SODIUM mEQ/L 136   POTASSIUM mEQ/L 3.9   CHLORIDE mEQ/L 104   CO2 mEQ/L 26   BUN mg/dL 12   CREATININE mg/dL 0.3*   CALCIUM mg/dL 8.0*   ALBUMIN g/dL 2.4*   BILIRUBIN TOTAL mg/dL 0.9   ALK PHOS IU/L 95   ALT IU/L 15   AST IU/L 26   GLUCOSE mg/dL 89     Full Code    Physical Exam  General      Alert, cooperative, no distress, appears stated age.   Head:    Normocephalic, without obvious abnormality, atraumatic.   Eyes:    PERRL, conjunctiva/corneas clear, EOM's intact.        Nose:   Nares normal, septum midline, mucosa normal, no drainage or            sinus tenderness.   Throat:   Lips, mucosa, and tongue normal.    Neck:   Supple, symmetrical, trachea midline.    Back:     Symmetric, no curvature.   Lungs:     Clear to auscultation bilaterally, respirations unlabored.   Chest wall:    No tenderness or deformity.   Heart:    Regular rate and rhythm, S1 and S2 normal.   Abdomen:     Soft, non-tender, bowel sounds active all four quadrants,     no masses, no organomegaly.   Extremities:  Musculoskeletal:  1+ lower extremity edema bilaterally.  Left hip replacement.      Pulses:   1+ and symmetric all extremities.   Skin:  Incision " intact with mild drainage.   Neurologic:          Behavior/  Emotional:  CNII-XII intact.  Alert and oriented ×3.  Motor exam stable left hip weakness.  Sensory exam intact.  Reflexes stable decreased reflexes.      Appropriate, cooperative           Plan of care was discussed with patient

## 2022-01-06 NOTE — PATIENT CARE CONFERENCE
Inpatient Rehabilitation Team Conference Note  Date: 1/6/2022  Time: 8:58 AM       Patient Name: Bee Cobb     Medical Record Number: [id number]   YOB: 1940  Sex: Female      Room/Bed: 152/152W  Payor Info: Payor: MEDICARE / Plan: MEDICARE PART A & B / Product Type: Medicare /     Admitting Diagnosis: Hip fracture requiring operative repair, left, closed, initial encounter (CMS/HCC) [S72.002A]   Admit Date/Time: 1/1/2022  4:54 PM  Admission Comments: No comment available     Primary Diagnosis: Hip fracture requiring operative repair, left, closed, initial encounter (CMS/HCC)  Principal Problem: Hip fracture requiring operative repair, left, closed, initial encounter (CMS/HCC)    Patient Active Problem List    Diagnosis Date Noted   • Pain 01/02/2022   • Risk for falls 01/02/2022   • At high risk for pressure injury of skin 01/02/2022   • Follow up 01/02/2022   • Hip fracture requiring operative repair, left, closed, initial encounter (CMS/HCC) 01/01/2022   • Postoperative anemia 12/30/2021   • S/P AVR (aortic valve replacement) 12/30/2021   • Closed fracture of left hip (CMS/HCC) 12/29/2021   • Hyperlipidemia 03/08/2021   • Mycobacterium avium-intracellulare complex (CMS/HCC) 01/12/2021   • Glaucoma 11/13/2019   • Chronic diastolic CHF (congestive heart failure) (CMS/HCC) 02/14/2019   • Hypertension 01/24/2019   • Hypothyroidism 01/24/2019   • Malignant melanoma (CMS/HCC) 09/01/2016   • Alpha trait thalassemia 07/18/2013   • COPD (chronic obstructive pulmonary disease) (CMS/HCC) 01/01/2010   • Paroxysmal atrial fibrillation (CMS/HCC) 01/01/2000       Premorbid Status:  Dominant Hand: left  Ambulation: independent  Transferring: independent  Toileting: independent  Bathing: independent  Dressing: independent  Eating: independent  Communication: understands/communicates without difficulty  Swallowing: swallows foods/liquids without difficulty  Baseline Diet/Method of Nutritional Intake: no diet  restrictions  Past History of Dysphagia: No hx of dysphagia  Assistive Device/Animal Currently Used at Home: shower chair; grab bar  Prior Level of Function Comment: independent for ADL and functional mobility no AD      Current Functional Status:  Mobility  Gait  [place], Gait: minimum assist (75% or more patient effort); verbal cues  Assistive Device: gait belt; walker, front-wheeled  Comment (Gait/Stairs): 60' x 1 with increased standing rest breaks x 2 reps with min a for controlled lateral L weightshift & L hip ext with verbal cues for step through pattern; 20' x 1 with RW with min A for controlled lateral weightshift & verbal cues for increased R step length    Stairs  [place], Stairs: minimum assist (75% or more patient effort); verbal cues  Assistive Device: railing  Handrail Location (Stairs): both sides  Number of Stairs: 4  Stair Height: 6 inches  Comment: min A for b hip ext and verbal cues for step to pattern    Wheelchair  Comment, Wheelchair Mobility: Not an anticipated need for d/c, patient expected to be amb level    Transfers  [place], Roll Left: safety considerations; unable to assess; not tested  [place], Roll Right: safety considerations; unable to assess; not tested  [place], Supine to Sit: close supervision; verbal cues  Verbal Cues (Supine to Sit): technique  [place], Sit to Supine: minimum assist (75% or more patient effort)  Assistive Device: head of bed elevated; bed rails  Comment (Bed Mobility): MIN A for LLE management onto bed; cl S for balance/safety and verbal cues for LE sequencing for precaution management  Maintains Weight-Bearing Status (Transfers): nonverbal cues (demo/gesture) to maintain; physical assist to maintain; verbal cues to maintain  Comment: DME  [place], Bed to Chair: minimum assist (75% or more patient effort); 2 person assist  Verbal Cues: hand placement; proper use of assistive device; safety; technique  Assistive Device: walker,  front-wheeled  [place], Chair to Bed: not tested  [place], Sit to Stand Transfer: minimum assist (75% or more patient effort)  Verbal Cues: safety; technique; hand placement  Assistive Device: walker, front-wheeled; gait belt  Comment: for anterior weightshift & L hip ext  [place], Stand to Sit Transfer: minimum assist (75% or more patient effort)  Verbal Cues: safety; technique; hand placement  Assistive Device: gait belt; walker, front-wheeled  Comment: for controlled descent at pelvis  [place], Stand Pivot/Stand Step Transfer: minimum assist (75% or more patient effort)  Verbal Cues: safety; technique; hand placement  Assistive Device: gait belt; walker, front-wheeled  Comment: min A for controlled lateral weightshift at pelvis/gait belt    Transfer Technique: stand pivot  [place], Toilet Transfer: minimum assist (75% or more patient effort)  Verbal Cues: hand placement; safety; technique  Assistive Device: commode, 3-in-1; gait belt; walker, front-wheeled  Comment: denied need to use toilet  Transfer Technique: stand pivot  [place], Shower Transfer: moderate assist (50-74% patient effort); 1 person assist; safety considerations  Verbal Cues: safety; technique  Assistive Device: grab bars/tub rail; shower chair  Comment: Barrier free stall shower      Self Care  Self-Performance: threads left arm, bra/undershirt; threads right arm, bra/undershirt; pulls bra/undershirt over head/around back; pulls bra/undershirt down/adjusts; threads left arm, shirt; threads right arm, shirt; pulls shirt over head/around back; pulls shirt down/adjusts  [place] Assistance: obtains clothes  Adaptive Equipment: none  Comment: cues for clothing orientation, seated EOB  Tasks: socks  Self-Performance: threads left leg, underpants; threads right leg, underpants; pulls underpants up or down; threads left leg, pants/shorts; threads right leg, pants/shorts; pulls pants/shorts up or down  [place] Assistance:  obtains clothes  Adaptive Equipment: reacher  [place]: minimum assist (75% or more patient effort); 1 person assist  Comment: Pt required cues for technique for LHAE of reacher for donning pants, cues for donning LLE first,  Self-Performance: chest; left arm; right arm; abdomen; front perineal area; buttocks; left upper leg; right upper leg  Adaptive Equipment: grab bar/tub rail; hand-held shower spray hose; shower chair  Setup Assistance: obtain supplies  Comment: seated in wet shower using grab bars for support  [place]: minimum assist (75% or more patient effort)  Adaptive Equipment: raised toilet seat; grab bar/safety frame  Setup Assistance: obtain supplies  Comment: pt declined need to void  Self-Performance: washes, rinses and dries face; washes, rinses and dries hands; brushes/baker hair  [place] Assistance: washes, rinses and dries face; washes, rinses and dries hands; brushes/baker hair; oral care (brushing teeth, cleaning dentures)  Adaptive Equipment: none  Setup Assistance: obtain supplies  [place]: close supervision  Comment: seated at sink  [place]: independent  Comment: sitting up in recliner chair    Cognition  Affect/Mental Status (Cognition): WFL  Orientation Status (Cognition): oriented x 4  Follows Commands (Cognition): WFL; follows one-step commands; follows two-step commands  Cognitive Function: executive function deficit  Executive Function Deficit (Cognition): information processing  Comment, Cognition: Alert, oriented, good safety awareness, improving confidence    Communication       Frequency of Treatment (OT): 5-7 times per week,60-90 minutes per day  Frequency of Treatment (PT): 5-7 times per week,60-90 minutes per day    Weekly Outcome Summaries:  Weekly Progress Summary (PT)  Progress Toward Functional Goals (PT): progressing toward functional goals slower than expected  Weekly Outcome Statement: Ongoing impairments include: Increased L hip pain, decreased strength  LLE, decreased endurance w/ occasional use of supplemental O2, impaired balance w/ need for RW. Current functional status: Min A supine<>sit, sit<>stand, SPT with RW, Min A to amb up to 60 feet at best, min A up/down 4 steps with 2 HR and TUG completed in >4 minutes.  PTA, patient indep without use of AD.  Patient making slow but appropriate progress towards LTGs of Mod I level. Patient will benefit from ongoing comprehensive inpatient rehab to continue to address the above impairments to maximize safety and indep following d/c.  Impairments Continuing to Limit Function: decreased strength,impaired balance,impaired motor control,impaired safety awareness,pain  Recommendations: Ongoing inpatient rehab, PT 1.5 hours daily, 5+ days/week  Weekly Progress Summary (OT)  Progress Toward Functional Goals (OT): progressing toward functional goals as expected  Weekly Outcome Statement: Pt is making good progress in areas of ADL and functional transfers with intervention but continues to be limited with fatigue/pain/weakness affecting all areas of function. Ongoing instruction, training/intervention during basic ADL/functional mobility. Currently, pt requires Mod A seated in barrier free stall shower w grab bar support, Touch assist for UB dsg including clothing retrieval w cues for safety w RW; Min A LB dsg with instruction in AD use for distal LEs; Cl Sup grooming seated for safety and energy conservation; Min A while standing w RW for hygiene and clothing mgmt. Pt completes all tasks seated for energy conservation but can tolerate standing w UE support. Pt requires Min A toilet tx to commodeover toilet and Mod A wet shower w DME. Pt continues to work on improving postural control during all standing tasks and would benefit from ongoing safety instruction in all areas to minimize risk of falls while in hosp and p d/c to least restrictive environment.  Barriers to Overall Progress (OT): balance, pain, THP,  "weakness  Recommendations: continued POC  Weekly Outcome Summary (Interdisciplinary)  Weekly Progress Summary: progressing toward goals as expected  Weekly Outcome Statement: 82 yo female c L hip fx due to fall while trying to sit on her bed.  Sat on the edge and fell off. . On 5 mg Oxy. H/o COPD, HTN, PAF, glaucoma, OP  and AVR. Fall this summer on steps c fx clavicle and still feeling traumatized. . Pt is alert and friendly.  sleeping and eating well. Falls asleep c the tv on.  Highly anxious about falling again and about pain.  Pt is very worried about \"failing rehab.\" Background is that 93 yo  mother had fall, hip fx and ended up in snf. worked on breathing/relaxation. Pt responsive to gentle but firm feedback.   Mild worry about becoming dependent on narcotics. Educated pt about their role in rehab. worked on changing cog (ie they are coming to torture me soon). Pt is fully o, appears mildly [place]. Psych to follow.    Problem Resolution:    Self-Care Promotion: independence encouraged,safe use of adaptive equipment encouraged  Identified Problems & Impairments: (not recorded)  Comment, Identified Problems & Impairments: (not recorded)  Resolved Problems and Impairments: (not recorded)  Comment, Resolved Problems & Impairments: (not recorded) Team Weekly Outcome Statement: HIP WBAT, drainage from hip, pain controlling, min pain complaints, pradaxia, cont nicole good, min transfers, RW amb 60 min, min 4 steps bhr, min for OT transfers, mod for shower, min clothing retriv, min LB and min fo toileting,, needs rest breaks (01/06/22 0853)        Goals/Support System:  Patient/Family Goals  Patient's Goals For Discharge: take care of myself at home,return to all previous roles/activities    IRF PT Goals      Most Recent Value   Bed Mobility Goal 1    Activity/Assistive Device scooting, sit to supine, supine to sit at 01/02/2022 1102   [place] minimum assist (75% or more patient effort) at 01/02/2022 1102   Time " Frame short-term goal (STG), 1 week at 01/02/2022 1102   Progress/Outcome goal met at 01/06/2022 0815   Bed Mobility Goal 2    Activity/Assistive Device scooting, sit to supine, supine to sit at 01/02/2022 1102   [place] modified independence at 01/02/2022 1102   Time Frame 1 week, long-term goal (LTG) at 01/06/2022 0815   Progress/Outcome continuing progress toward goal, goal ongoing at 01/06/2022 0815   Transfer Goal 1    Activity/Assistive Device sit-to-stand/stand-to-sit, stand pivot, walker, front-wheeled at 01/02/2022 1102   [place] supervision required at 01/02/2022 1102   Time Frame short-term goal (STG), 3 - 5 days at 01/06/2022 0815   Progress/Outcome good progress toward goal, continuing progress toward goal, goal ongoing at 01/06/2022 0815   Transfer Goal 2    Activity/Assistive Device sit-to-stand/stand-to-sit, stand pivot, walker, front-wheeled at 01/02/2022 1102   [place] modified independence at 01/02/2022 1102   Time Frame long-term goal (LTG), 1 week at 01/06/2022 0815   Progress/Outcome good progress toward goal, continuing progress toward goal, goal ongoing at 01/06/2022 0815   Gait/Walking Locomotion Goal 1    Activity/Assistive Device gait (walking locomotion), decrease asymmetrical patterns, decrease fall risk, forward stepping, improve balance and speed at 01/02/2022 1102   Distance 100 feet at 01/06/2022 0815   [place] supervision required at 01/02/2022 1102   Time Frame short-term goal (STG), 3 - 5 days at 01/06/2022 0815   Progress/Outcome progress slower than expected, goal revised this date at 01/06/2022 0815   Gait/Walking Locomotion Goal 2    Activity/Assistive Device gait (walking locomotion), decrease asymmetrical patterns, decrease fall risk, forward stepping, improve balance and speed at 01/02/2022 1102   Distance 100 feet at 01/06/2022 0815   [place] modified independence at 01/02/2022 1102   Time Frame long-term goal (LTG), 1 week at 01/06/2022 0815    Progress/Outcome progress slower than expected, goal revised this date at 01/06/2022 0815   Stairs Goal 1    Activity/Assistive Device ascending stairs, descending stairs at 01/04/2022 1505   Number of Stairs 8 at 01/04/2022 1505   [place] minimum assist (75% or more patient effort) at 01/04/2022 1505   Time Frame short-term goal (STG), 1 week at 01/06/2022 0815   Progress/Outcome progress slower than expected, goal ongoing at 01/06/2022 0815   Stairs Goal 2    Activity/Assistive Device ascending stairs, descending stairs at 01/04/2022 1505   Number of Stairs 12 at 01/04/2022 1505   [place] supervision required at 01/04/2022 1505   Time Frame long-term goal (LTG), 14 days or less at 01/06/2022 0815   Progress/Outcome progress slower than expected, goal revised this date, goal ongoing at 01/06/2022 0815        IRF OT Goals      Most Recent Value   Transfer Goal 1    Activity/Assistive Device toilet at 01/05/2022 1408   [place] minimum assist (75% or more patient effort) at 01/05/2022 1408   Time Frame short-term goal (STG), 5 - 7 days at 01/05/2022 1408   Strategies/Barriers DME at 01/05/2022 1408   Progress/Outcome good progress toward goal, goal partially met, goal revised this date at 01/05/2022 1408   Transfer Goal 2    Activity/Assistive Device toilet at 01/02/2022 0805   [place] supervision required at 01/02/2022 0805   Time Frame long-term goal (LTG), 4 weeks at 01/02/2022 0805   Strategies/Barriers DME at 01/02/2022 0805   Transfer Goal 3    Activity/Assistive Device shower at 01/05/2022 1408   [place] minimum assist (75% or more patient effort) at 01/05/2022 1408   Time Frame short-term goal (STG), 5 - 7 days at 01/05/2022 1408   Strategies/Barriers DME at 01/02/2022 0805   Progress/Outcome good progress toward goal, goal partially met, goal revised this date at 01/05/2022 1408   Transfer Goal 4    Activity/Assistive Device shower at 01/02/2022 0805   [place] tactile cues  required at 01/02/2022 0805   Time Frame long-term goal (LTG), 4 weeks at 01/02/2022 0805   Strategies/Barriers DME at 01/02/2022 0805   Bathing Goal 1    Activity/Assistive Device bathing skills, all at 01/05/2022 1408   [place] tactile cues required at 01/05/2022 1408   Time Frame short-term goal (STG), 5 - 7 days at 01/05/2022 1408   Strategies/Barriers DME at 01/02/2022 0805   Progress/Outcome good progress toward goal, goal partially met, goal revised this date at 01/05/2022 1408   Bathing Goal 2    Activity/Assistive Device bathing skills, all at 01/02/2022 0805   [place] tactile cues required at 01/02/2022 0805   Time Frame long-term goal (LTG), 4 weeks at 01/02/2022 0805   Strategies/Barriers DME at 01/02/2022 0805   UB Dressing Goal 1    Activity/Assistive Device upper body dressing at 01/05/2022 1408   [place] tactile cues required at 01/05/2022 1408   Time Frame short-term goal (STG), 5 - 7 days at 01/05/2022 1408   Strategies/Barriers incl set up at 01/05/2022 1408   Progress/Outcome good progress toward goal, goal partially met, goal revised this date at 01/05/2022 1408   UB Dressing Goal 2    Activity/Assistive Device upper body dressing at 01/02/2022 0805   [place] modified independence at 01/02/2022 0805   Time Frame long-term goal (LTG), 4 weeks at 01/02/2022 0805   Strategies/Barriers incl s/u at 01/02/2022 0805   LB Dressing Goal 1    Activity/Assistive Device lower body dressing at 01/05/2022 1408   [place] minimum assist (75% or more patient effort) at 01/05/2022 1408   Time Frame short-term goal (STG), 5 - 7 days at 01/05/2022 1408   Strategies/Barriers AD at 01/02/2022 0805   Progress/Outcome good progress toward goal, goal partially met, goal revised this date at 01/05/2022 1408   LB Dressing Goal 2    Activity/Assistive Device lower body dressing at 01/02/2022 0805   [place] supervision required at 01/02/2022 0805   Time Frame long-term goal (LTG), 4 weeks at  01/02/2022 0805   Strategies/Barriers AD at 01/02/2022 0805   Grooming Goal 1    Activity/Assistive Device grooming skills, all at 01/05/2022 1408   [place] supervision required at 01/05/2022 1408   Time Frame 5 - 7 days at 01/05/2022 1408   Strategies/Barriers std @ sink at 01/05/2022 1408   Progress/Outcome good progress toward goal, goal partially met, goal revised this date at 01/05/2022 1408   Grooming Goal 2    Activity/Assistive Device grooming skills, all at 01/02/2022 0805   [place] modified independence at 01/02/2022 0805   Time Frame long-term goal (LTG), 4 weeks at 01/02/2022 0805   Strategies/Barriers std @ sink at 01/02/2022 0805   Toileting Goal 1    Activity/Assistive Device toileting skills, all at 01/05/2022 1408   [place] tactile cues required at 01/05/2022 1408   Time Frame short-term goal (STG), 5 - 7 days at 01/05/2022 1408   Strategies/Barriers DME at 01/02/2022 0805   Progress/Outcome good progress toward goal, goal partially met, goal revised this date at 01/05/2022 1408   Toileting Goal 2    Activity/Assistive Device toileting skills, all at 01/02/2022 0805   [place] supervision required at 01/02/2022 0805   Time Frame long-term goal (LTG), 4 weeks at 01/02/2022 0805   Strategies/Barriers DME at 01/02/2022 0805          Risk for Complications  Falls: Low      The patient's medical prognosis is good to achieve the stated goals below.    Expected Level of Function  Expected Functional Improvement: mobility; motor dysfunction; safety; self-care  Self-Care: Supervision or touching assistance  [place] Control: Independent  Transfers: Independent  Locomotion: Independent  Communication: Independent  Social Cognition: Independent       Discharge Planning:  Anticipated Discharge Disposition: home with home health  Type of Home Care Services: home PT; home OT    Equipment/Device Needs at Discharge  Assistive Device/Animal Currently Used at Home: shower chair,grab bar  Discharge  Planning  Does the patient need discharge transport arranged?: No    Anticipated Discharge Date: 1/16/2022    Needs Identified:       Team Members Present:     Rehab Attending Present:  Rolan Crooks MD    Care Coordinator Present:  Vivien Bell LSW    Nurse Present:  Kim Tuttle RN    OT Present:  Falguni Pino OT    PT Present:  Karoline Aguilar PT        Next Team Conference Date: 01/13/22

## 2022-01-06 NOTE — PROGRESS NOTES
Patient: Bee Cobb  [facility] [address]  MRN: [id number]  Today's date: 1/6/2022    History of Present Illness  Bee is a 81 y.o. female admitted on 1/1/2022 with Hip fracture requiring operative repair, left, closed, initial encounter (CMS/[facility]) [S72.002A]. Principal problem is Hip fracture requiring operative repair, left, closed, initial encounter (CMS/[facility]).    81 y.o. f who presents with left hip pain after falling out of bed. She had an immediate onset of left hip pain and inability to ambulate.  No LOC.  Radiographic evaluation showed a displaced transcervical femoral neck fracture by CT.  On 12-29-21 she underwent left hip hemiarthroplasty.  Pt is WBAT to LLE and anterior hip precautions x 6 weeks. Ortho recommends aspirin 325mg/day x 6 weeks in addition to pt's home pradaxa which was restarted postoperatively. Pt did well postoperatively, but was noted to have postop anemia with a hgb drop of 12-->9. Per [place], this is expected due to Pradaxa use.  Pain is being managed with multimodal analgesia; scheduled tylenol, lido patch, PRN oxycodone, ice therapy.  PT/OT/PM&R evaluated pt and recommend acute rehab at discharge.          Past Medical History  Bee has a past medical history of Atrial fibrillation (CMS/[facility]), Glaucoma, History of transfusion, Hyperthyroidism, Lipid disorder, Osteoporosis, PAN (polyarteritis nodosa) (CMS/[facility]), and Thalassemia. H/o recent clavicle fx 9/11/21 Completed course of therapy and has full ROM      OT Vitals    Date/Time Pulse HR Source BP BP Location BP Method Pt Position [facility]   01/06/22 1438 110 Right Radial 140/42 Right upper arm Manual Sitting AEB      OT Pain    Date/Time Pain Type Side/Orientation Location Rating: Rest Rating: Activity Description Nonverbal Indicators Interventions [facility]   01/06/22 1438 Pain Assessment generalized;left hip 3 3 intermittent;aching anxious;guarding premedicated for activity AEB   01/06/22 1527 Pain  "Reassessment left hip 2 2 aching;intermittent -- position adjusted AEB          Prior Living Environment      Most Recent Value   People in Home spouse   Current Living Arrangements home  [9 MADY, FF inside]   Home Accessibility stairs to enter home (Group), stairs within home (Group)   Living Environment Comment Row home 13 steps into house, 9 steps to BR on 2nd floor   Number of Stairs, Main Entrance 9   Surface of Stairs, Main Entrance concrete   Stair Railings, Main Entrance railings on both sides of stairs   Location, Bathroom second floor, must negotiate stairs to access   [place], Bathroom tile floor   Bathroom Access Comment ztub/shower combo, hand held shower, bench and suction grab bars ( checks stability prior to each shower)   Number of Stairs, Within Home, Primary --  [13]   Stair Railings, Within Home, Primary railing on right side (ascending)          Prior Level of Function      Most Recent Value   Dominant Hand left   Ambulation independent   Transferring independent   Toileting independent   Bathing independent   Dressing independent   Eating independent   Assistive Device Currently Used at Home shower chair, grab bar          Occupational Profile      Most Recent Value   Reason for Services/Referral ADL deficit   Successful Occupations wife,- mother   Occupational History/Life Experiences retired [employer]    Performance Patterns independent in all areas   Patient Goals \"I want to be functional, walk and take care of myself\"           IRF OT Evaluation and Treatment - 01/06/22 1438        OT Time Calculation    Start Time 1430     Stop Time 1530     Time Calculation (min) 60 min        Session Details    Document Type daily treatment/progress note     Mode of Treatment occupational therapy;individual therapy        General Information    General Observations of Patient Pt received sitting in w/c in clinic        Coping/Community Reintegration    Observed Emotional State " "calm;cooperative        Cognition/Psychosocial    Comment, Cognition Good problems solving skills but decreased application of postural strategies to improve balance when amb w RW. Able to adhere to THP.        Bed Mobility    Bed Mobility bed mobility (all) activities     [place], All Bed Mobility Activities touching/steadying assist;1 person assist;safety considerations     Comment (Bed Mobility) On/off hosp bed using rails, following THP, able to raise BLEs onto bed surface        Transfers    Transfers toilet transfer;stand pivot transfer     Maintains Weight-Bearing Status (Transfers) nonverbal cues (demo/gesture) to maintain;physical assist to maintain     Comment DME        Stand Pivot Transfer    [place], Stand Pivot/Stand Step Transfer touching/steadying assist;1 person assist;safety considerations     Verbal Cues hand placement;safety;technique     Assistive Device gait belt;walker, front-wheeled     Comment reminders for hand placement and postural control when coming to stand using RW        Toilet Transfer    Transfer Technique stand pivot     [place], Toilet Transfer minimum assist (75% or more patient effort);1 person assist     Verbal Cues safety;technique;hand placement     Assistive Device commode, 3-in-1;grab bars/safety frame     Comment commode over toielt        Safety Issues, Functional Mobility    Comment, Safety Issues/Impairments (Mobility) OT: Pt required min A w cues to amb short household distance in hallway and in room w cues for postural control due to forward flexed position with increasing fatigue. Reminders to \"stop and re-charge\" every 8-10'        Upper Extremity (Therapeutic Exercise)    Exercise Position/Type seated;AROM (active range of motion)     General Exercise bilateral     Range of Motion Exercises shoulder flexion/extension;elbow flexion/extension;wrist flexion/extension     Comment UBE Arm bike seated forward 5 min x 2 backward 3 min  rest breaks between " sets        Basic Activities of Daily Living (BADLs)    Energy Conservation Techniques activity pacing encouraged;asking for necessary assistance promoted;correct posture facilitated;equipment and device use facilitated        Grooming    Self-Performance washes, rinses and dries hands     [place] close supervision;1 person assist;safety considerations;set up     Position supported standing     Setup Assistance obtain supplies     Adaptive Equipment none     Comment Standing @ sink, cues for walker position 2* pt reaching out of center rather than moving walker up to sink.        Toileting    [place] touching/steadying assist;1 person assist     Position supported sitting;supported standing     Setup Assistance obtain supplies     Adaptive Equipment commode, 3-in-1;grab bar/safety frame     Comment [place] over toilet, cues for safest technique, hand placement, posture when letting go of walker for hygiene and clothing mgmt        Daily Progress Summary (OT)    Symptoms Noted During/After Treatment fatigue     Progress Toward Functional Goals (OT) progressing toward functional goals as expected     Daily Outcome Statement Pt participated in general strengthening exercise through amb using RW and arm bike. Pt reporting fatigue but presenting with good endurance for arm ex. However, pt is limited in postural control resulting in increased weight bearing and leaning into walker increasing falls risk.     Barriers to Overall Progress (OT) balance, general weakness and fatigue     Recommendations (OT) continue POC                           IRF OT Goals      Most Recent Value   Transfer Goal 1    Activity/Assistive Device toilet at 01/05/2022 1408   [place] minimum assist (75% or more patient effort) at 01/05/2022 1408   Time Frame short-term goal (STG), 5 - 7 days at 01/05/2022 1408   Strategies/Barriers DME at 01/05/2022 1408   Progress/Outcome good progress toward goal, goal partially met, goal revised  this date at 01/05/2022 1408   Transfer Goal 2    Activity/Assistive Device toilet at 01/02/2022 0805   [place] supervision required at 01/02/2022 0805   Time Frame long-term goal (LTG), 4 weeks at 01/02/2022 0805   Strategies/Barriers DME at 01/02/2022 0805   Transfer Goal 3    Activity/Assistive Device shower at 01/05/2022 1408   [place] minimum assist (75% or more patient effort) at 01/05/2022 1408   Time Frame short-term goal (STG), 5 - 7 days at 01/05/2022 1408   Strategies/Barriers DME at 01/02/2022 0805   Progress/Outcome good progress toward goal, goal partially met, goal revised this date at 01/05/2022 1408   Transfer Goal 4    Activity/Assistive Device shower at 01/02/2022 0805   [place] tactile cues required at 01/02/2022 0805   Time Frame long-term goal (LTG), 4 weeks at 01/02/2022 0805   Strategies/Barriers DME at 01/02/2022 0805   Bathing Goal 1    Activity/Assistive Device bathing skills, all at 01/05/2022 1408   [place] tactile cues required at 01/05/2022 1408   Time Frame short-term goal (STG), 5 - 7 days at 01/05/2022 1408   Strategies/Barriers DME at 01/02/2022 0805   Progress/Outcome good progress toward goal, goal partially met, goal revised this date at 01/05/2022 1408   Bathing Goal 2    Activity/Assistive Device bathing skills, all at 01/02/2022 0805   [place] tactile cues required at 01/02/2022 0805   Time Frame long-term goal (LTG), 4 weeks at 01/02/2022 0805   Strategies/Barriers DME at 01/02/2022 0805   UB Dressing Goal 1    Activity/Assistive Device upper body dressing at 01/05/2022 1408   [place] tactile cues required at 01/05/2022 1408   Time Frame short-term goal (STG), 5 - 7 days at 01/05/2022 1408   Strategies/Barriers incl set up at 01/05/2022 1408   Progress/Outcome good progress toward goal, goal partially met, goal revised this date at 01/05/2022 1408   UB Dressing Goal 2    Activity/Assistive Device upper body dressing at 01/02/2022 0858    [place] modified independence at 01/02/2022 0805   Time Frame long-term goal (LTG), 4 weeks at 01/02/2022 0805   Strategies/Barriers incl s/u at 01/02/2022 0805   LB Dressing Goal 1    Activity/Assistive Device lower body dressing at 01/05/2022 1408   [place] minimum assist (75% or more patient effort) at 01/05/2022 1408   Time Frame short-term goal (STG), 5 - 7 days at 01/05/2022 1408   Strategies/Barriers AD at 01/02/2022 0805   Progress/Outcome good progress toward goal, goal partially met, goal revised this date at 01/05/2022 1408   LB Dressing Goal 2    Activity/Assistive Device lower body dressing at 01/02/2022 0805   [place] supervision required at 01/02/2022 0805   Time Frame long-term goal (LTG), 4 weeks at 01/02/2022 0805   Strategies/Barriers AD at 01/02/2022 0805   Grooming Goal 1    Activity/Assistive Device grooming skills, all at 01/05/2022 1408   [place] supervision required at 01/05/2022 1408   Time Frame 5 - 7 days at 01/05/2022 1408   Strategies/Barriers std @ sink at 01/05/2022 1408   Progress/Outcome good progress toward goal, goal partially met, goal revised this date at 01/05/2022 1408   Grooming Goal 2    Activity/Assistive Device grooming skills, all at 01/02/2022 0805   [place] modified independence at 01/02/2022 0805   Time Frame long-term goal (LTG), 4 weeks at 01/02/2022 0805   Strategies/Barriers std @ sink at 01/02/2022 0805   Toileting Goal 1    Activity/Assistive Device toileting skills, all at 01/05/2022 1408   [place] tactile cues required at 01/05/2022 1408   Time Frame short-term goal (STG), 5 - 7 days at 01/05/2022 1408   Strategies/Barriers DME at 01/02/2022 0805   Progress/Outcome good progress toward goal, goal partially met, goal revised this date at 01/05/2022 1408   Toileting Goal 2    Activity/Assistive Device toileting skills, all at 01/02/2022 0805   [place] supervision required at 01/02/2022 0805   Time Frame long-term goal (LTG), 4  weeks at 01/02/2022 08   Strategies/Barriers DME at 01/02/2022 08

## 2022-01-06 NOTE — PLAN OF CARE
Problem: Rehabilitation (IRF) Plan of Care  Goal: Plan of Care Review  Outcome: Progressing  Flowsheets (Taken 1/6/2022 1224)  Progress: improving  Plan of Care Reviewed With: patient  Outcome Summary: Patient presents with left hip incision approximated with staples, small amount of serous drainage noted. Incision cleansed with normal saline, covered with gauze and tegaderm. Medcated with PRN oxycodone for pain and scheduled tylenol. Moving her bowels without difficulty. Makes needs known.

## 2022-01-06 NOTE — PROGRESS NOTES
"Daily Progress Note       Patient was seen and examined.   Attestation Notes: Face to face encounter completed    Subjective    Patient has better pain control.  Min assist for transfers and ambulation and self-care activities.  Improving.  Objective     Visit Vitals  BP (!) 152/48 (BP Location: Right upper arm, Patient Position: Lying)   Pulse 81   Temp 36.7 °C (98.1 °F) (Oral)   Resp 15   Ht 1.676 m (5' 5.98\")   Wt 58.3 kg (128 lb 8.5 oz)   SpO2 95%   BMI 20.76 kg/m²       Review of Systems:  Pertinent items are noted in HPI.  Denies chest pain and shortness of breath.  Review of systems otherwise negative.    Labs     Results from last 7 days   Lab Units 01/03/22  0433   WBC K/uL 11.29*   HEMOGLOBIN g/dL 8.1*   HEMATOCRIT % 26.2*   PLATELETS K/uL 351     Results from last 7 days   Lab Units 01/02/22  0604   SODIUM mEQ/L 136   POTASSIUM mEQ/L 3.9   CHLORIDE mEQ/L 104   CO2 mEQ/L 26   BUN mg/dL 12   CREATININE mg/dL 0.3*   CALCIUM mg/dL 8.0*   ALBUMIN g/dL 2.4*   BILIRUBIN TOTAL mg/dL 0.9   ALK PHOS IU/L 95   ALT IU/L 15   AST IU/L 26   GLUCOSE mg/dL 89     Full Code    Physical Exam  General      Alert, cooperative, no distress, appears stated age.   Head:    Normocephalic, without obvious abnormality, atraumatic.   Eyes:    PERRL, conjunctiva/corneas clear, EOM's intact.        Nose:   Nares normal, septum midline, mucosa normal, no drainage or            sinus tenderness.   Throat:   Lips, mucosa, and tongue normal.    Neck:   Supple, symmetrical, trachea midline.    Back:     Symmetric, no curvature.   Lungs:     Clear to auscultation bilaterally, respirations unlabored.   Chest wall:    No tenderness or deformity.   Heart:    Regular rate and rhythm, S1 and S2 normal.   Abdomen:     Soft, non-tender, bowel sounds active all four quadrants,     no masses, no organomegaly.   Extremities:  Musculoskeletal:  1+ lower extremity edema bilaterally.  Left hip replacement.      Pulses:   1+ and symmetric all " extremities.   Skin:  Incision intact with mild drainage.   Neurologic:          Behavior/  Emotional:  CNII-XII intact.  Alert and oriented ×3.  Motor exam stable left hip weakness.  Sensory exam intact.  Reflexes stable decreased reflexes.      Appropriate, cooperative           Plan of care was discussed with patient    Assessment & Plan  * Hip fracture requiring operative repair, left, closed, initial encounter (CMS/[facility])  Assessment & Plan  Mrs. Cobb is an 81-year-old female who [facility] with complaints of left hip pain after falling out of bed.  X-ray left hip revealed left femoral neck fracture.  CT left hip confirmed displaced fracture left femoral neck.  X-ray left knee was negative.  Head CT was negative for skull fracture or intracranial hemorrhage.  CT cervical spine was negative for fracture dislocation.  Dr. Pena from orthopedic surgery was consulted and performed left total hip replacement on 12/29/2021.  She is cleared for weightbearing to left lower extremity with anterior hip precautions for 6 weeks.  She is anticoagulated with Pradaxa for atrial fibrillation.  Aspirin 325 mg daily for 6 weeks was added for DVT prophylaxis on top of Pradaxa.  Postoperative anemia stabilized at 8.6.  She was ultimately determined to be medically stable for transfer to [place] re[facility] on 1/1/2022 for an acute inpatient rehabilitation program to address deficits related to left hip fracture status post total hip replacement.    She is weightbearing as tolerated.  She will be maintained on left hip precautions and aspirin for 6 weeks.  She will participate in 3 hours of physical and occupational therapy as well as receive 24-hour rehab nursing care.  She will follow-up with Dr. Pena from orthopedic surgery in 2 weeks.    The patient is tolerating comprehensive inpatient rehabilitation program.  Tolerating initial therapies.    The patient's incision is healing  well.    Pain with better control.    Improving with therapies min assist with transfers and ambulation self-care activities.        Follow up  Assessment & Plan  PCP Dr. Kanchan Zamarripa after discharge  [phone]     Cardiology after discharge  Ortho Dr. Pena in 2 weeks    At high risk for pressure injury of skin  Assessment & Plan  Turns q2hr    Risk for falls  Assessment & Plan  wean off restraints as able    Pain  Assessment & Plan  Tylenol 1000 mg every 8 hours.  Oxycodone as needed.  Adjust medications as needed.    Postoperative anemia  Assessment & Plan  Hemoglobin 8.1.  Continue to monitor.  Iron replacement.    Paroxysmal atrial fibrillation (CMS/[facility])  Assessment & Plan  Rate controlled on dig and cardizem, anti-coag on pradaxa    Hypothyroidism  Assessment & Plan  Continue synthroid    Hypertension  Assessment & Plan  Cont cardizem    Glaucoma  Assessment & Plan  Trusopt, xalatan gtt    COPD (chronic obstructive pulmonary disease) (CMS/HCC)  Assessment & Plan  Cont pulmicort, spiriva        Expected Discharge Date:

## 2022-01-06 NOTE — PLAN OF CARE
Plan of Care Review  Plan of Care Reviewed With: patient  Progress: improving  Outcome Summary: Pain manage with straight order tylenol. Continent of bladder. Bed alarm on for safety. Sleeping comfortably at this  time.

## 2022-01-06 NOTE — PROGRESS NOTES
[facility] Progress Note          Patient was seen and examined.   Attestation Notes: Face to face encounter completed    Bee Cobb is a 81 y.o. female who was admitted for Hip fracture requiring operative repair, left, closed, initial encounter (CMS/[facility]) [S72.002A]. Patient was referred by Rolan Crooks MD for medical assessment and management      CC: Hip fracture requiring operative repair, left, closed, initial encounter (CMS/[facility]) [S72.002A]     HPI: Bee Cobb is a 81 y.o. female with HTN, HL, hypothyroid, COPD, alpha thalassemia trait, CARRINGTON s/p treatment, glaucoma, HFpEF, AFIB (on Pradaxa), admitted to [facility] 12/29/21 s/p fall from bed found with left hip pain. X-ray left hip revealed left femoral neck fracture.  CT left hip confirmed displaced fracture left femoral neck.  X-ray left knee was negative.  Head CT was negative for skull fracture or intracranial hemorrhage.  CT cervical spine was negative for fracture dislocation.  Dr. Fazal Pena from orthopedic surgery was consulted and performed left total hip replacement on 12/29/2021. Post op she had anemia but did not require transfusion. She was restarted on Pradaxa for AFIB which also provided DVT ppx.  Her pain was managed with Tylenol and Oxycodone.      Her BP and HR were managed with Cardizem CD. She was also on Digoxin for AFIB.  She was on Crestor for HL and Synthroid for hypothyroid. She was on Spiriva and Pulmicort for COPD.  She was on Trusopt and Xalatan for glaucoma.     The patient was seen by PM&R and found to have residual deficits in ambulation and ADLs due to Hip fracture requiring operative repair, left, closed, initial encounter (CMS/[facility]) [S72.002A] and came to [facility] on 1/1/2022 for acute inpatient rehab.      She participated in therapy.     SUBJECTIVE:  Patient interviewed and examined     Continues to have elevated BP and HR, seen in follow up by Cardiology, Clonidine  added..     Pain stable, improved constipation, no abdominal pain or nausea, no dysuria, no chest pain, palpitations, dyspnea or fevers    Review of Systems:  All other systems reviewed and negative except as noted in the HPI.    Current meds and allergies reviewed    Past Medical History:   Diagnosis Date   • (HFpEF) heart failure with preserved ejection fraction (CMS/HCC)    • Alpha thalassemia trait    • Atrial fibrillation (CMS/HCC)    • Closed left hip fracture (CMS/HCC) 12/29/2021   • COPD (chronic obstructive pulmonary disease) (CMS/HCC)    • Glaucoma    • History of transfusion    • Hypertension    • Hypothyroidism    • Lipid disorder    • Malignant melanoma (CMS/HCC)    • Mycobacterium avium-intracellulare complex (CMS/HCC)    • Osteoporosis    • PAN (polyarteritis nodosa) (CMS/HCC)      Past Surgical History:   Procedure Laterality Date   • AORTIC VALVE REPLACEMENT     • TONSILLECTOMY     • TOTAL HIP ARTHROPLASTY Left 12/29/2021     Social History     Tobacco Use   • Smoking status: Never Smoker   • Smokeless tobacco: Never Used   Vaping Use   • Vaping Use: Never used   Substance Use Topics   • Alcohol use: Never   • Drug use: Never      History reviewed. No pertinent family history.    Vital signs in last 24 hours:  Temp:  [36.7 °C (98.1 °F)] 36.7 °C (98.1 °F)  Heart Rate:  [] 143  Resp:  [15-18] 15  BP: (123-189)/(48-77) 176/77  Vital signs reviewed 01/06/22 1:56 PM    Physical Exam  Vitals and nursing note reviewed.   Constitutional:       Appearance: Normal appearance.   HENT:      Head: Normocephalic and atraumatic.      Right Ear: External ear normal.      Left Ear: External ear normal.      Nose: Nose normal.      Mouth/Throat:      Mouth: Mucous membranes are moist.      Pharynx: Oropharynx is clear.   Eyes:      Extraocular Movements: Extraocular movements intact.      Conjunctiva/sclera: Conjunctivae normal.      Pupils: Pupils are equal, round, and reactive to light.   Cardiovascular:       Rate and Rhythm: Normal rate. Rhythm irregular.      Pulses: Normal pulses.      Heart sounds: Normal heart sounds.   Pulmonary:      Effort: Pulmonary effort is normal.      Breath sounds: Normal breath sounds.   Abdominal:      General: Abdomen is flat. Bowel sounds are normal.      Palpations: Abdomen is soft.   Musculoskeletal:         General: Signs of injury (s/p ORIF left hip fx) present.      Right lower leg: Edema present.      Left lower leg: Edema present.   Skin:     General: Skin is warm and dry.   Neurological:      Mental Status: She is alert and oriented to person, place, and time.   Psychiatric:         Mood and Affect: Mood normal.         Behavior: Behavior normal.                 Objective    Labs:  I have reviewed the patient's labs.  Significant abnormals are anemia, leukocytosis.  Results from last 7 days   Lab Units 01/03/22  0433   WBC K/uL 11.29*   HEMOGLOBIN g/dL 8.1*   HEMATOCRIT % 26.2*   PLATELETS K/uL 351     Results from last 7 days   Lab Units 01/02/22  0604   SODIUM mEQ/L 136   POTASSIUM mEQ/L 3.9   CHLORIDE mEQ/L 104   CO2 mEQ/L 26   BUN mg/dL 12   CREATININE mg/dL 0.3*   CALCIUM mg/dL 8.0*   ALBUMIN g/dL 2.4*   BILIRUBIN TOTAL mg/dL 0.9   ALK PHOS IU/L 95   ALT IU/L 15   AST IU/L 26   GLUCOSE mg/dL 89     Lab Results   Component Value Date    DIGOXIN 1.0 01/05/2022       Imaging:  Not applicable        ASSESSMENT/PLAN:    81 y.o. female with HTN, HL, hypothyroid, COPD, alpha thalassemia trait, CARRINGTON s/p treatment, glaucoma, HFpEF, AFIB (on Pradaxa), admitted to [facility] 12/29/21 s/p fall from bed found to have left hip fracture and underwent left BARB by Dr. Fazal Pena 12/29/21.     1. Ortho:  -s/p fall with left hip fx s/p left BARB  -Tylenol and Oxycodone for pain     2. Vasc:  -bilat LE edema  -SCDs and Pradaxa for DVT ppx     3. Heme:  -post op anemia due to chronic blood loss on iron/mvi  -alpha thalassemia trait with chronic anemia  -leukocytosis  reactive  -follow CBC     4. Renal:  -increased risk of dehydration and electrolyte changes  -follow BMP, Mg     5. Gi:  -Senokot-S for bowels  -Protonix ulcer ppx     6. Gu:  -1/2/22 UA shows hematuria, possibly trauma for cath and being on Pradaxa  -no UTI or retention     7. Cardiac:  -AFIB on Cardizem CD, Digoxin and Pradaxa  -remains tachycardic  -1/5/22 Digoxin level therapeutic at 1.0  -hx of AVR  -HFpEF  -HTN on Cardizem CD  -Clonidine added by Cardiology for BP and HR  -watch for orthostatic hypotension  -use cardiac precautions in therapy  -seen by Cardiology     8. Pulm:  -hx of CARRINGTON s/p treatmetn  -COPD on Pulmicort and Spiriva  -incentive spirometry for atelectasis     9. Derm:  -consulted [employer] for skin assessment     10. Nutrition:  -seen by Nutrition for assessment and education     11. Endo:  -HL on Crestor  -hypothyroid on Synthroid     12. Optho  -glaucoma on Xalatan and Trusopt     13. Psych:  -seen by Psychology for support    14. Dispo:  -anticipated discharge 1/16/22     plan discussed with patient, nurse, case management and Rolan Crooks MD Scott Sapperstein, MD  1/6/2022  1:56 PM

## 2022-01-06 NOTE — PROGRESS NOTES
Cardiology Progress Note    Subjective:  - No CV complaints    Medical History:   Past Medical History:   Diagnosis Date    (HFpEF) heart failure with preserved ejection fraction (CMS/HCC)     Alpha thalassemia trait     Atrial fibrillation (CMS/HCC)     Closed left hip fracture (CMS/HCC) 12/29/2021    COPD (chronic obstructive pulmonary disease) (CMS/HCC)     Glaucoma     History of transfusion     Hypertension     Hypothyroidism     Lipid disorder     Malignant melanoma (CMS/HCC)     Mycobacterium avium-intracellulare complex (CMS/HCC)     Osteoporosis     PAN (polyarteritis nodosa) (CMS/HCC)      Surgical History:   Past Surgical History:   Procedure Laterality Date    AORTIC VALVE REPLACEMENT      TONSILLECTOMY      TOTAL HIP ARTHROPLASTY Left 12/29/2021     Social History:   Social History     Social History Narrative    Not on file     Family History:   History reviewed. No pertinent family history.  Allergies: Atorvastatin; Penicillins; Shellfish derived; Iodinated contrast media; Rosuvastatin; Covid-19 vaccine, mrna, [id number], lnp-s (moderna); and Covid-19 vaccine, [id number], lnp-s (moderna)    ROS:  Negative except those listed in HPI and A&P     Current Facility-Administered Medications   Medication Dose Route Frequency    acetaminophen  1,000 mg oral q8h    albuterol HFA  2 puff inhalation q4h PRN    alum-mag hydroxide-simeth  30 mL oral q6h PRN    bisacodyL  10 mg rectal Daily PRN    budesonide  0.5 mg nebulization BID (6a, 6p)    cholecalciferol (vitamin D3)  1,000 Units oral Daily    dabigatran etexilate  150 mg oral BID    digoxin  250 mcg oral Daily (6a)    dilTIAZem CD  240 mg oral Daily    dorzolamide  1 drop Both Eyes BID (6a, 6p)    ferrous sulfate  325 mg oral Daily with breakfast    latanoprost  1 drop Both Eyes Nightly    levothyroxine  75 mcg oral Daily (6a)    multivitamin  1 tablet oral Daily    oxyCODONE  5 mg oral q6h PRN    pantoprazole  40 mg oral Daily before breakfast    rosuvastatin   5 mg oral Daily (6p)    sennosides-docusate sodium  1 tablet oral BID    sodium chloride  3 mL nebulization q6h PRN    tiotropium bromide  2 puff inhalation Daily      Physical Exam:  Constitutional: Appears well-developed and well-nourished. No distress.    Cardiovascular: RRR, no murmur noted.  Pulmonary/Chest: CTA, poor repiratory effort.  Abdominal: Soft. Bowel sounds are normal.  Musculoskeletal: LE edema.  Neurological: AAOx3.    VS:  Patient Vitals for the past 72 hrs:   BP Temp Temp src Pulse Resp SpO2 Weight   01/06/22 1033 -- -- -- (!) 143 -- 94 % --   01/06/22 1008 (!) 176/77 -- -- (!) 103 -- -- --   01/06/22 0729 (!) 152/48 -- -- -- -- -- --   01/06/22 0709 (!) 169/72 36.7 °C (98.1 °F) Oral 81 15 95 % --   01/06/22 0552 123/60 -- -- (!) 104 -- -- --   01/05/22 1940 (!) 168/72 36.7 °C (98.1 °F) Oral 98 18 97 % --   01/05/22 1627 (!) 189/75 -- -- 90 18 95 % --   01/05/22 1531 -- -- -- -- -- -- 58.3 kg (128 lb 8.5 oz)   01/05/22 1408 (!) 142/60 -- -- 95 -- 95 % --   01/05/22 1159 -- -- -- 95 -- 95 % --   01/05/22 1129 -- -- -- 94 -- 95 % --   01/05/22 1054 -- -- -- (!) 116 -- 94 % --   01/05/22 1029 -- -- -- (!) 107 -- 92 % --   01/05/22 1028 -- -- -- -- -- (!) 91 % --   01/05/22 1001 139/65 -- -- (!) 105 -- 94 % --   01/05/22 0725 (!) 152/67 36.6 °C (97.8 °F) Oral 84 18 94 % --   01/04/22 2025 (!) 148/67 36.9 °C (98.5 °F) Oral 93 18 94 % --   01/04/22 1603 (!) 147/64 36.7 °C (98 °F) Oral 86 16 96 % --   01/04/22 1505 (!) 136/59 -- -- (!) 102 -- 96 % --   01/04/22 1500 (!) 138/59 -- -- -- -- -- --   01/04/22 1408 (!) 138/59 -- -- -- -- -- --   01/04/22 1129 -- -- -- (!) 120 -- -- --   01/04/22 1115 -- -- -- (!) 145 -- -- --   01/04/22 1101 (!) 143/62 -- -- (!) 110 -- -- --   01/04/22 0905 138/62 -- -- 97 -- -- --   01/04/22 0611 -- 36.6 °C (97.9 °F) Oral -- 16 95 % --   01/04/22 0554 140/60 -- -- 90 -- -- --   01/03/22 2208 -- -- -- -- -- 94 % --   01/03/22 1959 (!) 153/67 36.9 °C (98.5 °F) Oral 97 18 95 %  "--   01/03/22 1541 (!) 157/70 36.7 °C (98.1 °F) Oral 92 18 95 % --   01/03/22 1428 137/62 -- -- 87 -- -- --   01/03/22 1259 (!) 151/65 -- -- (!) 101 -- -- --     Labs:  Recent labs reviewed  Results from last 7 days   Lab Units 01/03/22  0433 01/02/22  0604 12/31/21  0909 12/30/21  1456   WBC K/uL 11.29* 12.02* 17.77* 23.35*   HEMOGLOBIN g/dL 8.1* 8.1* 8.6* 8.8*   HEMATOCRIT % 26.2* 26.1* 27.3* 27.5*   PLATELETS K/uL 351 309 322 307   SODIUM mEQ/L  --  136 134*  --    POTASSIUM mEQ/L  --  3.9 4.2  --    CHLORIDE mEQ/L  --  104 103  --    CO2 mEQ/L  --  26 23  --    GLUCOSE mg/dL  --  89 125*  --    BUN mg/dL  --  12 13  --    CREATININE mg/dL  --  0.3* 0.6  --    CALCIUM mg/dL  --  8.0* 8.1*  --    ANION GAP mEQ/L  --  6 8  --    AST IU/L  --  26  --   --    ALT IU/L  --  15  --   --    ALBUMIN g/dL  --  2.4*  --   --    EGFR mL/min/1.73m*2  --  >60.0 >60.0  --      No intake or output data in the 24 hours ending 01/06/22 1237     Tests:  EKG 12/29/21:  Normal sinus rhythm with sinus arrhythmia   Normal ECG     EKG 01/06/22:  pending     TTE 3/5/21:   1. Normal functioning bioprosthetic aortic valve replacement    2. Left ventricular hypertrophy with normal systolic function and moderate diastolic dysfunction      Catheterization:  [facility] 1/25/2019 \"Coronary angiography revealed no obstructive coronary artery disease in a codominant distribution.\"     Current CV Medications:    dabigatran etexilate (PRADAXA) capsule 150 mg, 150 mg, oral, BID    digoxin (LANOXIN) tablet 250 mcg, 250 mcg, oral, Daily    dilTIAZem CD (CARDIZEM CD) 24 hr ER capsule 240 mg, 240 mg, oral, Daily    levothyroxine (SYNTHROID) tablet 75 mcg, 75 mcg, oral, Daily    rosuvastatin (CRESTOR) tablet 5 mg, 5 mg, oral, Daily    Assessment and Plan:  Bee Cobb is a 81 y.o. female with h/o severe AS s/p TAVR, PAFib (on Pradaxa), HTN CARRINGTON who presented to [facility] ER c/o L hip pain s/p fall out of bed.      1. Mechanical fall with L hip Fx " s/p  -also contributing to elevated HR, need for good pain control  -per Ortho     2. PAfib  -last EKG showed NSR  -[id number]= 4, on pradaxa  -on Dig 0.250 mg po daily and Cardizem  daily  - On 1/6 pts HR elevated, will order EKG     3. h/o severe AS s/p TAVR  -Echo 3/2021:  Normal functioning bioprosthetic aortic valve replacement      4. anemia  -post op anemia due to chronic blood loss on iron/mvi  -alpha thalassemia trait with chronic anemia  -also contributing to elevated HR     5. H/o CARRINGTON and COPD  -per IM    6. HTN  - BP elevated on current regimen  - Given comorbidities, will avoid ACEi/ARB for now due to recent hyponatremia, will avoid BB due to h/o COPD, will hold off for now dihydropyridines CCB given on Diltiazem (non-dihydropyridines) for rate control.  - On 1/6 will start a trial of low dose Clonidine 0.1mg BID for now (also for HR control)   - Monitor for results      [facility]  REYNALDO Garcia  Patient seen and examined, chart reviewed and case discussed, plan in place.  antoni carranza

## 2022-01-06 NOTE — ASSESSMENT & PLAN NOTE
Pt being uncooperative, yelling at staff and wandering around the room. Security called. Given Report to Rickey Sevilla. Pt medicated per order.  Pt to be transferred. Trusopt, xalatan gtt

## 2022-01-06 NOTE — PROGRESS NOTES
Patient: Bee Cobb  [facility] [address]  MRN: [id number]  Today's date: 1/6/2022    History of Present Illness  Bee is a 81 y.o. female admitted on 1/1/2022 with Hip fracture requiring operative repair, left, closed, initial encounter (CMS/[facility]) [S72.002A]. Principal problem is Hip fracture requiring operative repair, left, closed, initial encounter (CMS/[facility]).    81 y.o. f who presents with left hip pain after falling out of bed. She had an immediate onset of left hip pain and inability to ambulate.  No LOC.  Radiographic evaluation showed a displaced transcervical femoral neck fracture by CT.  On 12-29-21 she underwent left hip hemiarthroplasty.  Pt is WBAT to LLE and anterior hip precautions x 6 weeks. Ortho recommends aspirin 325mg/day x 6 weeks in addition to pt's home pradaxa which was restarted postoperatively. Pt did well postoperatively, but was noted to have postop anemia with a hgb drop of 12-->9. Per [place], this is expected due to Pradaxa use.  Pain is being managed with multimodal analgesia; scheduled tylenol, lido patch, PRN oxycodone, ice therapy.  PT/OT/PM&R evaluated pt and recommend acute rehab at discharge.          Past Medical History  Bee has a past medical history of Atrial fibrillation (CMS/[facility]), Glaucoma, History of transfusion, Hyperthyroidism, Lipid disorder, Osteoporosis, PAN (polyarteritis nodosa) (CMS/[facility]), and Thalassemia. H/o recent clavicle fx 9/11/21 Completed course of therapy and has full ROM      OT Vitals    Date/Time BP BP Location BP Method Pt Position [facility]   01/06/22 0729 152/48 Right upper arm Manual Lying VMS      OT Pain    Date/Time Pain Type Rating: Rest Interventions [facility]   01/06/22 0729 Pain Assessment 0 premedicated for activity VMS        No increased pain end of session   Prior Living Environment      Most Recent Value   People in Home spouse   Current Living Arrangements home  [9 MADY, FF inside]   Home Accessibility  "stairs to enter home (Group), stairs within home (Group)   Living Environment Comment Row home 13 steps into house, 9 steps to BR on 2nd floor   Number of Stairs, Main Entrance 9   Surface of Stairs, Main Entrance concrete   Stair Railings, Main Entrance railings on both sides of stairs   Location, Bathroom second floor, must negotiate stairs to access   [place], Bathroom tile floor   Bathroom Access Comment ztub/shower combo, hand held shower, bench and suction grab bars ( checks stability prior to each shower)   Number of Stairs, Within Home, Primary --  [13]   Stair Railings, Within Home, Primary railing on right side (ascending)          Prior Level of Function      Most Recent Value   Dominant Hand left   Ambulation independent   Transferring independent   Toileting independent   Bathing independent   Dressing independent   Eating independent   Assistive Device Currently Used at Home shower chair, grab bar          Occupational Profile      Most Recent Value   Reason for Services/Referral ADL deficit   Successful Occupations wife,- mother   Occupational History/Life Experiences retired bank    Performance Patterns independent in all areas   Patient Goals \"I want to be functional, walk and take care of myself\"           01/06/22 0731   OT Time Calculation   Start Time 0725   Stop Time 0755   Time Calculation (min) 30 min   Session Details   Document Type daily treatment/progress note   Mode of Treatment occupational therapy;individual therapy   Sit to Stand Transfer   [place], Sit to Stand Transfer minimum assist (75% or more patient effort)   Verbal Cues safety;technique;hand placement   Assistive Device walker, front-wheeled;gait belt   Stand to Sit Transfer   [place], Stand to Sit Transfer minimum assist (75% or more patient effort)   Verbal Cues safety;technique;hand placement   Assistive Device gait belt;walker, front-wheeled   Stand Pivot Transfer   [place], Stand " Pivot/Stand Step Transfer minimum assist (75% or more patient effort)   Verbal Cues safety;technique;hand placement   Assistive Device gait belt;walker, front-wheeled   Upper Body Dressing   [place] close supervision   Comment cues for clothing orientation, seated EOB   Lower Body Dressing   Self-Performance threads left leg, underpants;threads right leg, underpants;pulls underpants up or down;threads left leg, pants/shorts;threads right leg, pants/shorts;pulls pants/shorts up or down   [place] Assistance obtains clothes   Adaptive Equipment reacher   Comment Pt required cues for technique for LHAE of reacher for donning pants, cues for donning LLE first,   Grooming   [place], Oral Hygiene close supervision   Comment seated at sink   Daily Progress Summary (OT)   Daily Outcome Statement Pt with good participation during session. Cues for use of AE for LE dressing and technique, can cont to review. Cues for safety with tx. Pt would continueto benefit from skilled OT services. Continue with POC                 IRF OT Goals      Most Recent Value   Transfer Goal 1    Activity/Assistive Device toilet at 01/05/2022 1408   [place] minimum assist (75% or more patient effort) at 01/05/2022 1408   Time Frame short-term goal (STG), 5 - 7 days at 01/05/2022 1408   Strategies/Barriers DME at 01/05/2022 1408   Progress/Outcome good progress toward goal, goal partially met, goal revised this date at 01/05/2022 1408   Transfer Goal 2    Activity/Assistive Device toilet at 01/02/2022 0805   [place] supervision required at 01/02/2022 0805   Time Frame long-term goal (LTG), 4 weeks at 01/02/2022 0805   Strategies/Barriers DME at 01/02/2022 0805   Transfer Goal 3    Activity/Assistive Device shower at 01/05/2022 1408   [place] minimum assist (75% or more patient effort) at 01/05/2022 1408   Time Frame short-term goal (STG), 5 - 7 days at 01/05/2022 1408   Strategies/Barriers DME at 01/02/2022 0805    Progress/Outcome good progress toward goal, goal partially met, goal revised this date at 01/05/2022 1408   Transfer Goal 4    Activity/Assistive Device shower at 01/02/2022 0805   [place] tactile cues required at 01/02/2022 0805   Time Frame long-term goal (LTG), 4 weeks at 01/02/2022 0805   Strategies/Barriers DME at 01/02/2022 0805   Bathing Goal 1    Activity/Assistive Device bathing skills, all at 01/05/2022 1408   [place] tactile cues required at 01/05/2022 1408   Time Frame short-term goal (STG), 5 - 7 days at 01/05/2022 1408   Strategies/Barriers DME at 01/02/2022 0805   Progress/Outcome good progress toward goal, goal partially met, goal revised this date at 01/05/2022 1408   Bathing Goal 2    Activity/Assistive Device bathing skills, all at 01/02/2022 0805   [place] tactile cues required at 01/02/2022 0805   Time Frame long-term goal (LTG), 4 weeks at 01/02/2022 0805   Strategies/Barriers DME at 01/02/2022 0805   UB Dressing Goal 1    Activity/Assistive Device upper body dressing at 01/05/2022 1408   [place] tactile cues required at 01/05/2022 1408   Time Frame short-term goal (STG), 5 - 7 days at 01/05/2022 1408   Strategies/Barriers incl set up at 01/05/2022 1408   Progress/Outcome good progress toward goal, goal partially met, goal revised this date at 01/05/2022 1408   UB Dressing Goal 2    Activity/Assistive Device upper body dressing at 01/02/2022 0805   [place] modified independence at 01/02/2022 0805   Time Frame long-term goal (LTG), 4 weeks at 01/02/2022 0805   Strategies/Barriers incl s/u at 01/02/2022 0805   LB Dressing Goal 1    Activity/Assistive Device lower body dressing at 01/05/2022 1408   [place] minimum assist (75% or more patient effort) at 01/05/2022 1408   Time Frame short-term goal (STG), 5 - 7 days at 01/05/2022 1408   Strategies/Barriers AD at 01/02/2022 0805   Progress/Outcome good progress toward goal, goal partially met, goal revised this date at  01/05/2022 1408   LB Dressing Goal 2    Activity/Assistive Device lower body dressing at 01/02/2022 0805   [place] supervision required at 01/02/2022 0805   Time Frame long-term goal (LTG), 4 weeks at 01/02/2022 0805   Strategies/Barriers AD at 01/02/2022 0805   Grooming Goal 1    Activity/Assistive Device grooming skills, all at 01/05/2022 1408   [place] supervision required at 01/05/2022 1408   Time Frame 5 - 7 days at 01/05/2022 1408   Strategies/Barriers std @ sink at 01/05/2022 1408   Progress/Outcome good progress toward goal, goal partially met, goal revised this date at 01/05/2022 1408   Grooming Goal 2    Activity/Assistive Device grooming skills, all at 01/02/2022 0805   [place] modified independence at 01/02/2022 0805   Time Frame long-term goal (LTG), 4 weeks at 01/02/2022 0805   Strategies/Barriers std @ sink at 01/02/2022 0805   Toileting Goal 1    Activity/Assistive Device toileting skills, all at 01/05/2022 1408   [place] tactile cues required at 01/05/2022 1408   Time Frame short-term goal (STG), 5 - 7 days at 01/05/2022 1408   Strategies/Barriers DME at 01/02/2022 0805   Progress/Outcome good progress toward goal, goal partially met, goal revised this date at 01/05/2022 1408   Toileting Goal 2    Activity/Assistive Device toileting skills, all at 01/02/2022 0805   [place] supervision required at 01/02/2022 0805   Time Frame long-term goal (LTG), 4 weeks at 01/02/2022 0805   Strategies/Barriers DME at 01/02/2022 0805

## 2022-01-07 ENCOUNTER — APPOINTMENT (INPATIENT)
Dept: OCCUPATIONAL THERAPY | Facility: REHABILITATION | Age: 82
DRG: 560 | End: 2022-01-07
Payer: MEDICARE

## 2022-01-07 ENCOUNTER — APPOINTMENT (INPATIENT)
Dept: PHYSICAL THERAPY | Facility: REHABILITATION | Age: 82
DRG: 560 | End: 2022-01-07
Payer: MEDICARE

## 2022-01-07 PROCEDURE — 63700000 HC SELF-ADMINISTRABLE DRUG: Performed by: HOSPITALIST

## 2022-01-07 PROCEDURE — 97110 THERAPEUTIC EXERCISES: CPT | Mod: GO

## 2022-01-07 PROCEDURE — 97530 THERAPEUTIC ACTIVITIES: CPT | Mod: GP

## 2022-01-07 PROCEDURE — 97110 THERAPEUTIC EXERCISES: CPT | Mod: GP

## 2022-01-07 PROCEDURE — 12800000 HC ROOM AND CARE SEMIPRIVATE REHAB

## 2022-01-07 PROCEDURE — 97535 SELF CARE MNGMENT TRAINING: CPT | Mod: GO

## 2022-01-07 PROCEDURE — 25000000 HC PHARMACY GENERAL: Performed by: PHYSICAL MEDICINE & REHABILITATION

## 2022-01-07 PROCEDURE — 97116 GAIT TRAINING THERAPY: CPT | Mod: GP

## 2022-01-07 PROCEDURE — 63700000 HC SELF-ADMINISTRABLE DRUG: Performed by: PHYSICAL MEDICINE & REHABILITATION

## 2022-01-07 PROCEDURE — 63700000 HC SELF-ADMINISTRABLE DRUG: Performed by: INTERNAL MEDICINE

## 2022-01-07 RX ADMIN — ACETAMINOPHEN 1000 MG: 500 TABLET, FILM COATED ORAL at 23:08

## 2022-01-07 RX ADMIN — CLONIDINE HYDROCHLORIDE 0.1 MG: 0.1 TABLET ORAL at 08:00

## 2022-01-07 RX ADMIN — ROSUVASTATIN CALCIUM 5 MG: 5 TABLET, FILM COATED ORAL at 18:01

## 2022-01-07 RX ADMIN — TIOTROPIUM BROMIDE INHALATION SPRAY 2 PUFF: 3.12 SPRAY, METERED RESPIRATORY (INHALATION) at 08:03

## 2022-01-07 RX ADMIN — FERROUS SULFATE TAB 325 MG (65 MG ELEMENTAL FE) 325 MG: 325 (65 FE) TAB at 07:59

## 2022-01-07 RX ADMIN — DILTIAZEM HYDROCHLORIDE 240 MG: 120 CAPSULE, COATED, EXTENDED RELEASE ORAL at 07:59

## 2022-01-07 RX ADMIN — OXYCODONE HYDROCHLORIDE 5 MG: 5 TABLET ORAL at 08:00

## 2022-01-07 RX ADMIN — DABIGATRAN ETEXILATE MESYLATE 150 MG: 150 CAPSULE ORAL at 19:42

## 2022-01-07 RX ADMIN — LEVOTHYROXINE SODIUM 75 MCG: 75 TABLET ORAL at 05:36

## 2022-01-07 RX ADMIN — DORZOLAMIDE HYDROCHLORIDE 1 DROP: 20 SOLUTION/ DROPS OPHTHALMIC at 06:04

## 2022-01-07 RX ADMIN — DORZOLAMIDE HYDROCHLORIDE 1 DROP: 20 SOLUTION/ DROPS OPHTHALMIC at 18:05

## 2022-01-07 RX ADMIN — OXYCODONE HYDROCHLORIDE 5 MG: 5 TABLET ORAL at 12:53

## 2022-01-07 RX ADMIN — OXYCODONE HYDROCHLORIDE 5 MG: 5 TABLET ORAL at 23:08

## 2022-01-07 RX ADMIN — ACETAMINOPHEN 1000 MG: 500 TABLET, FILM COATED ORAL at 14:21

## 2022-01-07 RX ADMIN — BUDESONIDE 0.5 MG: 0.5 SUSPENSION RESPIRATORY (INHALATION) at 18:02

## 2022-01-07 RX ADMIN — DABIGATRAN ETEXILATE MESYLATE 150 MG: 150 CAPSULE ORAL at 07:59

## 2022-01-07 RX ADMIN — THERA TABS 1 TABLET: TAB at 07:59

## 2022-01-07 RX ADMIN — DIGOXIN 250 MCG: 250 TABLET ORAL at 05:36

## 2022-01-07 RX ADMIN — Medication 1000 UNITS: at 08:00

## 2022-01-07 RX ADMIN — BUDESONIDE 0.5 MG: 0.5 SUSPENSION RESPIRATORY (INHALATION) at 05:35

## 2022-01-07 RX ADMIN — CLONIDINE HYDROCHLORIDE 0.1 MG: 0.1 TABLET ORAL at 19:42

## 2022-01-07 RX ADMIN — PANTOPRAZOLE SODIUM 40 MG: 40 TABLET, DELAYED RELEASE ORAL at 07:59

## 2022-01-07 RX ADMIN — ACETAMINOPHEN 1000 MG: 500 TABLET, FILM COATED ORAL at 05:36

## 2022-01-07 RX ADMIN — LATANOPROST 1 DROP: 50 SOLUTION/ DROPS OPHTHALMIC at 19:43

## 2022-01-07 NOTE — PROGRESS NOTES
Patient: Bee Cobb  [facility] [address] [facility]  MRN: [id number]  Today's date: 1/7/2022    History of Present Illness  Bee is a 81 y.o. female admitted on 1/1/2022 with Hip fracture requiring operative repair, left, closed, initial encounter (CMS/[facility]) [S72.002A]. Principal problem is Hip fracture requiring operative repair, left, closed, initial encounter (CMS/[facility]).    81 y.o. f who presents with left hip pain after falling out of bed. She had an immediate onset of left hip pain and inability to ambulate.  No LOC.  Radiographic evaluation showed a displaced transcervical femoral neck fracture by CT.  On 12-29-21 she underwent left hip hemiarthroplasty.  Pt is WBAT to LLE and anterior hip precautions x 6 weeks. Ortho recommends aspirin 325mg/day x 6 weeks in addition to pt's home pradaxa which was restarted postoperatively. Pt did well postoperatively, but was noted to have postop anemia with a hgb drop of 12-->9. Per [place], this is expected due to Pradaxa use.  Pain is being managed with multimodal analgesia; scheduled tylenol, lido patch, PRN oxycodone, ice therapy.  PT/OT/PM&R evaluated pt and recommend acute rehab at discharge.          Past Medical History  Bee has a past medical history of Atrial fibrillation (CMS/[facility]), Glaucoma, History of transfusion, Hyperthyroidism, Lipid disorder, Osteoporosis, PAN (polyarteritis nodosa) (CMS/[facility]), and Thalassemia. H/o recent clavicle fx 9/11/21 Completed course of therapy and has full ROM      OT Vitals    Date/Time Pulse BP BP Location BP Method Pt Position [facility] [place]   01/07/22 1438 88 124/42 Left upper arm Manual Sitting AEB      OT Pain    Date/Time Pain Type Side/Orientation Location Rating: Rest Rating: Activity Description Interventions [facility] [place]   01/07/22 1438 Pain Assessment -- hip 5 5 intermittent;aching premedicated for activity AEB   01/07/22 1455 Pain Reassessment left hip 5 5 intermittent;aching position adjusted AEB     "      Prior Living Environment      Most Recent Value   People in Home spouse   Current Living Arrangements home  [9 MADY, FF inside]   Home Accessibility stairs to enter home (Group), stairs within home (Group)   Living Environment Comment Row home 13 steps into house, 9 steps to BR on 2nd floor   Number of Stairs, Main Entrance 9   Surface of Stairs, Main Entrance concrete   Stair Railings, Main Entrance railings on both sides of stairs   Location, Bathroom second floor, must negotiate stairs to access   [place], Bathroom tile floor   Bathroom Access Comment ztub/shower combo, hand held shower, bench and suction grab bars ( checks stability prior to each shower)   Number of Stairs, Within Home, Primary --  [13]   Stair Railings, Within Home, Primary railing on right side (ascending)          Prior Level of Function      Most Recent Value   Dominant Hand left   Ambulation independent   Transferring independent   Toileting independent   Bathing independent   Dressing independent   Eating independent   Assistive Device Currently Used at Home shower chair, grab bar          Occupational Profile      Most Recent Value   Reason for Services/Referral ADL deficit   Successful Occupations wife,- mother   Occupational History/Life Experiences retired [employer]    Performance Patterns independent in all areas   Patient Goals \"I want to be functional, walk and take care of myself\"           IRF OT Evaluation and Treatment - 01/07/22 1438        OT Time Calculation    Start Time 1433     Stop Time 1505     Time Calculation (min) 32 min        Session Details    Document Type daily treatment/progress note     Mode of Treatment occupational therapy;individual therapy        General Information    General Observations of Patient Pt received sitting in w/c in room        Bed Mobility    Bed Mobility bed mobility (all) activities     [place], All Bed Mobility Activities touching/steadying assist;1 person " assist     Comment (Bed Mobility) Hosp bed, able to raise B/L LEs onto bed surface using BUEs.        Transfers    Transfers toilet transfer     Maintains Weight-Bearing Status (Transfers) able to maintain;physical assist to maintain;verbal cues to maintain     Comment DME Cues for posture, RW        Toilet Transfer    Transfer Technique stand pivot     [place], Toilet Transfer touching/steadying assist;1 person assist;safety considerations     Verbal Cues safety;technique     Assistive Device grab bars/safety frame;raised toilet seat        Safety Issues, Functional Mobility    Safety Issues Affecting Function (Mobility) sequencing abilities;positioning of assistive device;safety precautions follow-through/compliance     Impairments Affecting Function (Mobility) balance;endurance/activity tolerance;pain;postural/trunk control;shortness of breath     Comment, Safety Issues/Impairments (Mobility) OT: Amb short distance to/from bathroom with touch assist using RW and cues for postural control        Upper Extremity (Therapeutic Exercise)    Exercise Position/Type seated;AROM (active range of motion);resistive exercises     Reps and Sets B/L UE ther ex issued w instruction to use when not in therapy as able. Pt demonstrating good form and understanding of correct use.     Comment Good tolerance for light HEP seated in w/c        Grooming    Self-Performance washes, rinses and dries hands     [place] close supervision;1 person assist;safety considerations     Position supported standing     Setup Assistance obtain supplies     Adaptive Equipment none     Comment RW support standing @ sink        Toileting    [place] touching/steadying assist;1 person assist     Position supported sitting;supported standing     Setup Assistance obtain supplies     Adaptive Equipment accessible height toilet;grab bar/safety frame;raised toilet seat     Comment cues for posture once standing, guarding when letting go of  walker w BUEs for clothing mgmt        Daily Progress Summary (OT)    Symptoms Noted During/After Treatment fatigue     Progress Toward Functional Goals (OT) progressing toward functional goals as expected     Daily Outcome Statement Pt participated in ADL and mobility w focus on safety, management of walker and postural control/balance. Pt continues to be limited by general weakness with ongoing instruction in strategies to maximize safety. OT also provided instruction in proper use of orange theraband to be use as able when not in therapy.     Recommendations (OT) continue POC                      Education Documentation  Activity, taught by Chantelle Murillo OT at 1/7/2022  3:27 PM.  Learner: Patient  Readiness: Acceptance  Method: Explanation, Demonstration, Handout  Response: Verbalizes Understanding, Demonstrated Understanding, Needs Reinforcement  Comment: Issued orange theraband with handout. Pt demonstrated good understanding of correct form and recommended frequency of use as able when not in therapy. Pt may benefit from ongoing monitoring and instruction due to h/o L shld pain.          IRF OT Goals      Most Recent Value   Transfer Goal 1    Activity/Assistive Device toilet at 01/05/2022 1408   [place] minimum assist (75% or more patient effort) at 01/05/2022 1408   Time Frame short-term goal (STG), 5 - 7 days at 01/05/2022 1408   Strategies/Barriers DME at 01/05/2022 1408   Progress/Outcome good progress toward goal, goal partially met, goal revised this date at 01/05/2022 1408   Transfer Goal 2    Activity/Assistive Device toilet at 01/02/2022 0805   [place] supervision required at 01/02/2022 0805   Time Frame long-term goal (LTG), 4 weeks at 01/02/2022 0805   Strategies/Barriers DME at 01/02/2022 0805   Transfer Goal 3    Activity/Assistive Device shower at 01/05/2022 1408   [place] minimum assist (75% or more patient effort) at 01/05/2022 1408   Time Frame short-term goal (STG), 5 - 7 days  at 01/05/2022 1408   Strategies/Barriers DME at 01/02/2022 0805   Progress/Outcome good progress toward goal, goal partially met, goal revised this date at 01/05/2022 1408   Transfer Goal 4    Activity/Assistive Device shower at 01/02/2022 0805   [place] tactile cues required at 01/02/2022 0805   Time Frame long-term goal (LTG), 4 weeks at 01/02/2022 0805   Strategies/Barriers DME at 01/02/2022 0805   Bathing Goal 1    Activity/Assistive Device bathing skills, all at 01/05/2022 1408   [place] tactile cues required at 01/05/2022 1408   Time Frame short-term goal (STG), 5 - 7 days at 01/05/2022 1408   Strategies/Barriers DME at 01/02/2022 0805   Progress/Outcome good progress toward goal, goal partially met, goal revised this date at 01/05/2022 1408   Bathing Goal 2    Activity/Assistive Device bathing skills, all at 01/02/2022 0805   [place] tactile cues required at 01/02/2022 0805   Time Frame long-term goal (LTG), 4 weeks at 01/02/2022 0805   Strategies/Barriers DME at 01/02/2022 0805   UB Dressing Goal 1    Activity/Assistive Device upper body dressing at 01/05/2022 1408   [place] tactile cues required at 01/05/2022 1408   Time Frame short-term goal (STG), 5 - 7 days at 01/05/2022 1408   Strategies/Barriers incl set up at 01/05/2022 1408   Progress/Outcome good progress toward goal, goal partially met, goal revised this date at 01/05/2022 1408   UB Dressing Goal 2    Activity/Assistive Device upper body dressing at 01/02/2022 0805   [place] modified independence at 01/02/2022 0805   Time Frame long-term goal (LTG), 4 weeks at 01/02/2022 0805   Strategies/Barriers incl s/u at 01/02/2022 0805   LB Dressing Goal 1    Activity/Assistive Device lower body dressing at 01/05/2022 1408   [place] minimum assist (75% or more patient effort) at 01/05/2022 1408   Time Frame short-term goal (STG), 5 - 7 days at 01/05/2022 1408   Strategies/Barriers AD at 01/02/2022 0805   Progress/Outcome good  progress toward goal, goal partially met, goal revised this date at 01/05/2022 1408   LB Dressing Goal 2    Activity/Assistive Device lower body dressing at 01/02/2022 0805   [place] supervision required at 01/02/2022 0805   Time Frame long-term goal (LTG), 4 weeks at 01/02/2022 0805   Strategies/Barriers AD at 01/02/2022 0805   Grooming Goal 1    Activity/Assistive Device grooming skills, all at 01/05/2022 1408   [place] supervision required at 01/05/2022 1408   Time Frame 5 - 7 days at 01/05/2022 1408   Strategies/Barriers std @ sink at 01/05/2022 1408   Progress/Outcome good progress toward goal, goal partially met, goal revised this date at 01/05/2022 1408   Grooming Goal 2    Activity/Assistive Device grooming skills, all at 01/02/2022 0805   [place] modified independence at 01/02/2022 0805   Time Frame long-term goal (LTG), 4 weeks at 01/02/2022 0805   Strategies/Barriers std @ sink at 01/02/2022 0805   Toileting Goal 1    Activity/Assistive Device toileting skills, all at 01/05/2022 1408   [place] tactile cues required at 01/05/2022 1408   Time Frame short-term goal (STG), 5 - 7 days at 01/05/2022 1408   Strategies/Barriers DME at 01/02/2022 0805   Progress/Outcome good progress toward goal, goal partially met, goal revised this date at 01/05/2022 1408   Toileting Goal 2    Activity/Assistive Device toileting skills, all at 01/02/2022 0805   [place] supervision required at 01/02/2022 0805   Time Frame long-term goal (LTG), 4 weeks at 01/02/2022 0805   Strategies/Barriers DME at 01/02/2022 0805

## 2022-01-07 NOTE — PROGRESS NOTES
Cardiology Progress Note    Subjective:  -catapres held last night    Allergies: Atorvastatin; Penicillins; Shellfish derived; Iodinated contrast media; Rosuvastatin; Covid-19 vaccine, mrna, [id number], lnp-s (moderna); and Covid-19 vaccine, [id number], lnp-s (moderna)    ROS:  Negative except those listed in HPI and A&P      Physical Exam:  Constitutional: Appears well-developed and well-nourished. No distress.    Cardiovascular: RRR, no murmur noted.  Pulmonary/Chest: CTA, poor repiratory effort.  Abdominal: Soft. Bowel sounds are normal.  Musculoskeletal: LE edema.  Neurological: AAOx3.    VS:  Patient Vitals for the past 72 hrs:   BP Temp Temp src Pulse Resp SpO2 Weight   01/07/22 1308 135/65 -- -- 91 -- -- --   01/07/22 1006 137/61 -- -- 89 -- -- --   01/07/22 0808 (!) 120/45 -- -- 84 -- 95 % --   01/07/22 0806 -- -- -- -- -- 96 % --   01/07/22 0759 (!) 155/67 -- -- 89 -- -- --   01/06/22 2033 139/65 36.9 °C (98.4 °F) Oral -- 18 95 % --   01/06/22 1543 132/60 36.9 °C (98.5 °F) Oral 85 18 94 % --   01/06/22 1438 (!) 140/42 -- -- (!) 110 -- -- --   01/06/22 1428 (!) 180/2 -- -- (!) 120 -- -- --   01/06/22 1401 (!) 164/2 -- -- 92 -- -- --   01/06/22 1033 -- -- -- (!) 143 -- 94 % --   01/06/22 1008 (!) 176/77 -- -- (!) 103 -- -- --   01/06/22 0729 (!) 152/48 -- -- -- -- -- --   01/06/22 0709 (!) 169/72 36.7 °C (98.1 °F) Oral 81 15 95 % --   01/06/22 0552 123/60 -- -- (!) 104 -- -- --   01/05/22 1940 (!) 168/72 36.7 °C (98.1 °F) Oral 98 18 97 % --   01/05/22 1627 (!) 189/75 -- -- 90 18 95 % --   01/05/22 1531 -- -- -- -- -- -- 58.3 kg (128 lb 8.5 oz)   01/05/22 1408 (!) 142/60 -- -- 95 -- 95 % --   01/05/22 1159 -- -- -- 95 -- 95 % --   01/05/22 1129 -- -- -- 94 -- 95 % --   01/05/22 1054 -- -- -- (!) 116 -- 94 % --   01/05/22 1029 -- -- -- (!) 107 -- 92 % --   01/05/22 1028 -- -- -- -- -- (!) 91 % --   01/05/22 1001 139/65 -- -- (!) 105 -- 94 % --   01/05/22 0725 (!) 152/67 36.6 °C (97.8 °F) Oral 84 18 94 % --  "  01/04/22 2025 (!) 148/67 36.9 °C (98.5 °F) Oral 93 18 94 % --   01/04/22 1603 (!) 147/64 36.7 °C (98 °F) Oral 86 16 96 % --   01/04/22 1505 (!) 136/59 -- -- (!) 102 -- 96 % --   01/04/22 1500 (!) 138/59 -- -- -- -- -- --   01/04/22 1408 (!) 138/59 -- -- -- -- -- --     Labs:  Recent labs reviewed  Results from last 7 days   Lab Units 01/03/22  0433 01/02/22  0604   WBC K/uL 11.29* 12.02*   HEMOGLOBIN g/dL 8.1* 8.1*   HEMATOCRIT % 26.2* 26.1*   PLATELETS K/uL 351 309   SODIUM mEQ/L  --  136   POTASSIUM mEQ/L  --  3.9   CHLORIDE mEQ/L  --  104   CO2 mEQ/L  --  26   GLUCOSE mg/dL  --  89   BUN mg/dL  --  12   CREATININE mg/dL  --  0.3*   CALCIUM mg/dL  --  8.0*   ANION GAP mEQ/L  --  6   AST IU/L  --  26   ALT IU/L  --  15   ALBUMIN g/dL  --  2.4*   EGFR mL/min/1.73m*2  --  >60.0     No intake or output data in the 24 hours ending 01/07/22 1325     Tests:  EKG 12/29/21:  Normal sinus rhythm with sinus arrhythmia   Normal ECG     EKG 01/06/22:  pending     TTE 3/5/21:   1. Normal functioning bioprosthetic aortic valve replacement    2. Left ventricular hypertrophy with normal systolic function and moderate diastolic dysfunction      Catheterization:  [facility] 1/25/2019 \"Coronary angiography revealed no obstructive coronary artery disease in a codominant distribution.\"     Current CV Medications:  •  dabigatran etexilate (PRADAXA) capsule 150 mg, 150 mg, oral, BID  •  digoxin (LANOXIN) tablet 250 mcg, 250 mcg, oral, Daily  •  dilTIAZem CD (CARDIZEM CD) 24 hr ER capsule 240 mg, 240 mg, oral, Daily  •  levothyroxine (SYNTHROID) tablet 75 mcg, 75 mcg, oral, Daily  •  rosuvastatin (CRESTOR) tablet 5 mg, 5 mg, oral, Daily    Assessment and Plan:  Bee Cobb is a 81 y.o. female with h/o severe AS s/p TAVR, PAFib (on Pradaxa), HTN [place] who presented to [facility] ER c/o L hip pain s/p fall out of bed.      1. Mechanical fall with L hip Fx s/p  -also contributing to elevated HR, need for good pain control  -per " Ortho     2. PAfib  -last EKG showed NSR  -[id number]= 4, on pradaxa  -on Dig 0.250 mg po daily and Cardizem  daily  - On 1/6 pts HR elevated, will order EKG     3. h/o severe AS s/p TAVR  -Echo 3/2021:  Normal functioning bioprosthetic aortic valve replacement      4. anemia  -post op anemia due to chronic blood loss on iron/mvi  -alpha thalassemia trait with chronic anemia  -also contributing to elevated HR     5. H/o [place] and COPD  -per IM    6. HTN  - BP elevated on current regimen  - Given comorbidities, will avoid ACEi/ARB for now due to recent hyponatremia, will avoid BB due to h/o COPD, will hold off for now dihydropyridines CCB given on Diltiazem (non-dihydropyridines) for rate control.  - On 1/6 will start a trial of low dose Clonidine 0.1mg BID for now (also for HR control)   - Monitor for results      [facility]  Deedee Brown PA-C

## 2022-01-07 NOTE — ASSESSMENT & PLAN NOTE
PCP Dr. Kanchan Zamarripa after discharge  [phone]     Cardiology after discharge  Ortho Dr. Pena in 2 weeks

## 2022-01-07 NOTE — PROGRESS NOTES
"Daily Progress Note       Patient was seen and examined.   Attestation Notes: Face to face encounter completed    Subjective    Patient feels pain control improved.  Tolerating and improving with therapies.  Objective     Visit Vitals  BP (!) 136/59 (BP Location: Left upper arm, Patient Position: Lying)   Pulse 86   Temp 36.8 °C (98.2 °F) (Oral)   Resp 18   Ht 1.676 m (5' 5.98\")   Wt 58.3 kg (128 lb 8.5 oz)   SpO2 93%   BMI 20.76 kg/m²       Review of Systems:  Pertinent items are noted in HPI.  Denies chest pain and shortness of breath.  Review of systems otherwise negative.    Labs     Results from last 7 days   Lab Units 01/03/22  0433   WBC K/uL 11.29*   HEMOGLOBIN g/dL 8.1*   HEMATOCRIT % 26.2*   PLATELETS K/uL 351     Results from last 7 days   Lab Units 01/02/22  0604   SODIUM mEQ/L 136   POTASSIUM mEQ/L 3.9   CHLORIDE mEQ/L 104   CO2 mEQ/L 26   BUN mg/dL 12   CREATININE mg/dL 0.3*   CALCIUM mg/dL 8.0*   ALBUMIN g/dL 2.4*   BILIRUBIN TOTAL mg/dL 0.9   ALK PHOS IU/L 95   ALT IU/L 15   AST IU/L 26   GLUCOSE mg/dL 89       Full Code    Physical Exam  General      Alert, cooperative, no distress, appears stated age.   Head:    Normocephalic, without obvious abnormality, atraumatic.   Eyes:    PERRL, conjunctiva/corneas clear, EOM's intact.        Nose:   Nares normal, septum midline, mucosa normal, no drainage or            sinus tenderness.   Throat:   Lips, mucosa, and tongue normal.    Neck:   Supple, symmetrical, trachea midline.    Back:     Symmetric, no curvature.   Lungs:     Clear to auscultation bilaterally, respirations unlabored.   Chest wall:    No tenderness or deformity.   Heart:    Regular rate and rhythm, S1 and S2 normal.   Abdomen:     Soft, non-tender, bowel sounds active all four quadrants,     no masses, no organomegaly.   Extremities:  Musculoskeletal:  1+ lower extremity edema bilaterally.   Left hip replacement.     Pulses:   1+ and symmetric all extremities.   Skin:   Incision intact. "   Neurologic:          Behavior/  Emotional:  CNII-XII intact.  Alert and oriented ×3.  Motor exam with stable left hip weakness.  Sensory exam intact.  Reflexes stable decreased reflexes.      Appropriate, cooperative           Plan of care was discussed with patient    Assessment & Plan  * Hip fracture requiring operative repair, left, closed, initial encounter (CMS/[facility])  Assessment & Plan  Mrs. Cobb is an 81-year-old female who presented to [facility] on 12/29/2021 with complaints of left hip pain after falling out of bed.  X-ray left hip revealed left femoral neck fracture.  CT left hip confirmed displaced fracture left femoral neck.  X-ray left knee was negative.  Head CT was negative for skull fracture or intracranial hemorrhage.  CT cervical spine was negative for fracture dislocation.  Dr. Pena from orthopedic surgery was consulted and performed left total hip replacement on 12/29/2021.  She is cleared for weightbearing to left lower extremity with anterior hip precautions for 6 weeks.  She is anticoagulated with Pradaxa for atrial fibrillation.  Aspirin 325 mg daily for 6 weeks was added for DVT prophylaxis on top of Pradaxa.  Postoperative anemia stabilized at 8.6.  She was ultimately determined to be medically stable for transfer to [place] rehab on 1/1/2022 for an acute inpatient rehabilitation program to address deficits related to left hip fracture status post total hip replacement.    She is weightbearing as tolerated.  She will be maintained on left hip precautions and aspirin for 6 weeks.  She will participate in 3 hours of physical and occupational therapy as well as receive 24-hour rehab nursing care.  She will follow-up with Dr. Pena from orthopedic surgery in 2 weeks.    Incision intact.    Pain control improving over time.    Improving with therapies and progressing to min assist with transfers and ambulation self-care activities.  Continue current care.    Follow  up  Assessment & Plan  PCP Dr. Kanchan Zamarripa after discharge  [phone]     Cardiology after discharge  Ortho Dr. Pena in 2 weeks    At high risk for pressure injury of skin  Assessment & Plan  Turns q2hr    Risk for falls  Assessment & Plan  wean off restraints as able    Pain  Assessment & Plan  Tylenol 1000 mg every 8 hours.  Oxycodone as needed.  Adjust medications as needed.    Postoperative anemia  Assessment & Plan  Hemoglobin 8.1.  Continue to monitor.  Iron replacement.    Paroxysmal atrial fibrillation (CMS/[facility])  Assessment & Plan  Rate controlled on dig and cardizem, anti-coag on pradaxa    Hypothyroidism  Assessment & Plan  Continue synthroid    Hypertension  Assessment & Plan  Cont cardizem    Glaucoma  Assessment & Plan  Trusopt, xalatan gtt    COPD (chronic obstructive pulmonary disease) (CMS/[facility])  Assessment & Plan  Cont pulmicort, spiriva        Expected Discharge Date:  1/16/2022

## 2022-01-07 NOTE — PLAN OF CARE
Problem: Rehabilitation (IRF) Plan of Care  Goal: Plan of Care Review  Outcome: Progressing  Flowsheets (Taken 1/6/2022 2032)  Progress: improving  Plan of Care Reviewed With: patient  Outcome Summary: Offers no c/o, L hip dressing CDI. Assisted to BR-+BM. Meds reviewed. Refusing Catapres tonight, would like to speak to her cardiologist.   Plan of Care Review  Plan of Care Reviewed With: patient  Progress: improving  Outcome Summary: Offers no c/o, L hip dressing CDI. Assisted to BR-+BM. Meds reviewed. Refusing Catapres tonight, would like to speak to her cardiologist.

## 2022-01-07 NOTE — PROGRESS NOTES
[facility] Progress Note          Patient was seen and examined.   Attestation Notes: Face to face encounter completed    Bee Cobb is a 81 y.o. female who was admitted for Hip fracture requiring operative repair, left, closed, initial encounter (CMS/formerly [facility]) [S72.002A]. Patient was referred by Rolan Crooks MD for medical assessment and management      CC: Hip fracture requiring operative repair, left, closed, initial encounter (CMS/formerly [facility]) [S72.002A]     HPI: Bee Cobb is a 81 y.o. female with HTN, HL, hypothyroid, COPD, alpha thalassemia trait, CARRINGTON s/p treatment, glaucoma, HFpEF, AFIB (on Pradaxa), admitted to [facility] 12/29/21 s/p fall from bed found with left hip pain. X-ray left hip revealed left femoral neck fracture.  CT left hip confirmed displaced fracture left femoral neck.  X-ray left knee was negative.  Head CT was negative for skull fracture or intracranial hemorrhage.  CT cervical spine was negative for fracture dislocation.  Dr. Fazal Pena from orthopedic surgery was consulted and performed left total hip replacement on 12/29/2021. Post op she had anemia but did not require transfusion. She was restarted on Pradaxa for AFIB which also provided DVT ppx.  Her pain was managed with Tylenol and Oxycodone.      Her BP and HR were managed with Cardizem CD. She was also on Digoxin for AFIB.  She was on Crestor for HL and Synthroid for hypothyroid. She was on Spiriva and Pulmicort for COPD.  She was on Trusopt and Xalatan for glaucoma.     The patient was seen by PM&R and found to have residual deficits in ambulation and ADLs due to Hip fracture requiring operative repair, left, closed, initial encounter (CMS/formerly [facility]) [S72.002A] and came to [facility] on 1/1/2022 for acute inpatient rehab.      She participated in therapy.     SUBJECTIVE:  Patient interviewed and examined     BP and HR better today.    Pain stable, improved constipation, no abdominal pain or  nausea, no dysuria, no chest pain, palpitations, dyspnea or fevers    Review of Systems:  All other systems reviewed and negative except as noted in the HPI.    Current meds and allergies reviewed    Past Medical History:   Diagnosis Date   • (HFpEF) heart failure with preserved ejection fraction (CMS/HCC)    • Alpha thalassemia trait    • Atrial fibrillation (CMS/HCC)    • Closed left hip fracture (CMS/HCC) 12/29/2021   • COPD (chronic obstructive pulmonary disease) (CMS/HCC)    • Glaucoma    • History of transfusion    • Hypertension    • Hypothyroidism    • Lipid disorder    • Malignant melanoma (CMS/HCC)    • Mycobacterium avium-intracellulare complex (CMS/HCC)    • Osteoporosis    • PAN (polyarteritis nodosa) (CMS/HCC)      Past Surgical History:   Procedure Laterality Date   • AORTIC VALVE REPLACEMENT     • TONSILLECTOMY     • TOTAL HIP ARTHROPLASTY Left 12/29/2021     Social History     Tobacco Use   • Smoking status: Never Smoker   • Smokeless tobacco: Never Used   Vaping Use   • Vaping Use: Never used   Substance Use Topics   • Alcohol use: Never   • Drug use: Never      History reviewed. No pertinent family history.    Vital signs in last 24 hours:  Temp:  [36.9 °C (98.4 °F)-36.9 °C (98.5 °F)] 36.9 °C (98.4 °F)  Heart Rate:  [84-91] 89  Resp:  [18] 18  BP: (120-155)/(42-67) 124/42  Vital signs reviewed 01/07/22 2:53 PM    Physical Exam  Vitals and nursing note reviewed.   Constitutional:       Appearance: Normal appearance.   HENT:      Head: Normocephalic and atraumatic.      Right Ear: External ear normal.      Left Ear: External ear normal.      Nose: Nose normal.      Mouth/Throat:      Mouth: Mucous membranes are moist.      Pharynx: Oropharynx is clear.   Eyes:      Extraocular Movements: Extraocular movements intact.      Conjunctiva/sclera: Conjunctivae normal.      Pupils: Pupils are equal, round, and reactive to light.   Cardiovascular:      Rate and Rhythm: Normal rate. Rhythm irregular.       Pulses: Normal pulses.      Heart sounds: Normal heart sounds.   Pulmonary:      Effort: Pulmonary effort is normal.      Breath sounds: Normal breath sounds.   Abdominal:      General: Abdomen is flat. Bowel sounds are normal.      Palpations: Abdomen is soft.   Musculoskeletal:         General: Signs of injury (s/p ORIF left hip fx) present.      Right lower leg: Edema present.      Left lower leg: Edema present.   Skin:     General: Skin is warm and dry.   Neurological:      Mental Status: She is alert and oriented to person, place, and time.   Psychiatric:         Mood and Affect: Mood normal.         Behavior: Behavior normal.                 Objective    Labs:  I have reviewed the patient's labs.  Significant abnormals are anemia, leukocytosis.  Results from last 7 days   Lab Units 01/03/22  0433   WBC K/uL 11.29*   HEMOGLOBIN g/dL 8.1*   HEMATOCRIT % 26.2*   PLATELETS K/uL 351     Results from last 7 days   Lab Units 01/02/22  0604   SODIUM mEQ/L 136   POTASSIUM mEQ/L 3.9   CHLORIDE mEQ/L 104   CO2 mEQ/L 26   BUN mg/dL 12   CREATININE mg/dL 0.3*   CALCIUM mg/dL 8.0*   ALBUMIN g/dL 2.4*   BILIRUBIN TOTAL mg/dL 0.9   ALK PHOS IU/L 95   ALT IU/L 15   AST IU/L 26   GLUCOSE mg/dL 89     Lab Results   Component Value Date    DIGOXIN 1.0 01/05/2022       Imaging:  Not applicable        ASSESSMENT/PLAN:    81 y.o. female with HTN, HL, hypothyroid, COPD, alpha thalassemia trait, CARRINGTON s/p treatment, glaucoma, HFpEF, AFIB (on Pradaxa), admitted to [facility] 12/29/21 s/p fall from bed found to have left hip fracture and underwent left BRAB by Dr. Fazal Pena 12/29/21.     1. Ortho:  -s/p fall with left hip fx s/p left BARB  -Tylenol and Oxycodone for pain     2. Vasc:  -bilat LE edema  -SCDs and Pradaxa for DVT ppx     3. Heme:  -post op anemia due to chronic blood loss on iron/mvi  -alpha thalassemia trait with chronic anemia  -leukocytosis reactive  -follow CBC     4. Renal:  -increased risk of  dehydration and electrolyte changes  -follow BMP, Mg     5. Gi:  -Senokot-S for bowels  -Protonix ulcer ppx     6. Gu:  -1/2/22 UA shows hematuria, possibly trauma for cath and being on Pradaxa  -no UTI or retention     7. Cardiac:  -AFIB on Cardizem CD, Digoxin and Pradaxa  -remains tachycardic  -1/5/22 Digoxin level therapeutic at 1.0  -hx of AVR  -HFpEF  -HTN on Cardizem CD  -Clonidine added by Cardiology for BP and HR  -watch for orthostatic hypotension  -use cardiac precautions in therapy  -seen by Cardiology     8. Pulm:  -hx of CARRINGTON s/p treatmetn  -COPD on Pulmicort and Spiriva  -incentive spirometry for atelectasis     9. Derm:  -consulted [employer] for skin assessment     10. Nutrition:  -seen by Nutrition for assessment and education     11. Endo:  -HL on Crestor  -hypothyroid on Synthroid     12. Optho  -glaucoma on Xalatan and Trusopt     13. Psych:  -seen by Psychology for support    14. Dispo:  -anticipated discharge 1/16/22     plan discussed with patient, nurse, case management and Rolan Crooks MD Scott Sapperstein, MD  1/7/2022  2:53 PM

## 2022-01-07 NOTE — PROGRESS NOTES
Patient: Bee Cobb  Location: [facility] [address]  MRN: [id number]  Today's date: 1/7/2022    History of Present Illness  Bee is a 81 y.o. female admitted on 1/1/2022 with Hip fracture requiring operative repair, left, closed, initial encounter (CMS/[facility]) [S72.002A]. Principal problem is Hip fracture requiring operative repair, left, closed, initial encounter (CMS/[facility]).    81 y.o. f who presents with left hip pain after falling out of bed. She had an immediate onset of left hip pain and inability to ambulate.  No LOC.  Radiographic evaluation showed a displaced transcervical femoral neck fracture by CT.  On 12-29-21 she underwent left hip hemiarthroplasty.  Pt is WBAT to LLE and anterior hip precautions x 6 weeks. Ortho recommends aspirin 325mg/day x 6 weeks in addition to pt's home pradaxa which was restarted postoperatively. Pt did well postoperatively, but was noted to have postop anemia with a hgb drop of 12-->9. Per [place], this is expected due to Pradaxa use.  Pain is being managed with multimodal analgesia; scheduled tylenol, lido patch, PRN oxycodone, ice therapy.  PT/OT/PM&R evaluated pt and recommend acute rehab at discharge.          Past Medical History  Bee has a past medical history of Atrial fibrillation (CMS/[facility]), Glaucoma, History of transfusion, Hyperthyroidism, Lipid disorder, Osteoporosis, PAN (polyarteritis nodosa) (CMS/[facility]), and Thalassemia. H/o recent clavicle fx 9/11/21 Completed course of therapy and has full ROM      PT Vitals    Date/Time Pulse HR Source Pt Activity BP MAP BP Location BP Method Pt Position [facility]   01/07/22 1006 89 Monitor At rest 137/61 88 mmHg Left upper arm Automatic Sitting [facility]      PT Pain    Date/Time Pain Type Side/Orientation Location Rating: Rest Rating: Activity Description Interventions [facility]   01/07/22 1006 Pain Assessment left;lateral hip 2 -- aching premedicated for activity [facility]   01/07/22 1029 Pain Reassessment;Post Activity  left;lateral hip -- 6 aching position adjusted [facility]          Prior Living Environment      Most Recent Value   People in Home spouse   Current Living Arrangements home  [9 MADY, FF inside]   Home Accessibility stairs to enter home (Group), stairs within home (Group)   Living Environment Comment Row home 13 steps into house, 9 steps to BR on 2nd floor   Number of Stairs, Main Entrance 9   Surface of Stairs, Main Entrance concrete   Stair Railings, Main Entrance railings on both sides of stairs   Location, Bathroom second floor, must negotiate stairs to access   [place], Bathroom tile floor   Bathroom Access Comment ztub/shower combo, hand held shower, bench and suction grab bars ( checks stability prior to each shower)   Number of Stairs, Within Home, Primary --  [13]   Stair Railings, Within Home, Primary railing on right side (ascending)          Prior Level of Function      Most Recent Value   Dominant Hand left   Ambulation independent   Transferring independent   Toileting independent   Bathing independent   Dressing independent   Eating independent   Assistive Device Currently Used at Home shower chair, grab bar           IRF PT Evaluation and Treatment - 01/07/22 1029        PT Time Calculation    Start Time 1000     Stop Time 1030     Time Calculation (min) 30 min        Session Details    Document Type daily treatment/progress note     Mode of Treatment individual therapy;physical therapy        Bed Mobility    [place], Sit to Supine moderate assist (50-74% patient effort)     Assistive Device bed rails     Comment (Bed Mobility) for BLE management into bed, would benefit from new introduction of leg         Sit to Stand Transfer    [place], Sit to Stand Transfer close supervision     Verbal Cues safety;technique     Assistive Device walker, front-wheeled     Comment close S required for safety        Stand to Sit Transfer    [place], Stand to Sit Transfer close supervision      Verbal Cues safety;technique     Assistive Device walker, front-wheeled     Comment close S required for safety        Stand Pivot Transfer    [place], Stand Pivot/Stand Step Transfer close supervision     Verbal Cues safety;technique     Assistive Device walker, front-wheeled     Comment via amb approach w/ close S required for safety        Gait Training    [place], Gait close supervision     Assistive Device walker, front-wheeled     Distance in Feet 111 feet   x2 bouts    Pattern (Gait) step-through     Deviations/Abnormal Patterns (Gait) antalgic;base of support, narrow;gait speed decreased     Comment (Gait/Stairs) with close S required for safety        Daily Progress Summary (PT)    Daily Outcome Statement Patient presents w/ + LEMOS, fatigue but reports overall less L hip pain, demo less antalgic gait pattern this date. Improving activity bibi as noted by increased amb distance, ability to bibi two bouts of amb within session. Patient resting comfortably in bed post-PT session.                           IRF PT Goals      Most Recent Value   Bed Mobility Goal 1    Activity/Assistive Device scooting, sit to supine, supine to sit at 01/02/2022 1102   [place] minimum assist (75% or more patient effort) at 01/02/2022 1102   Time Frame short-term goal (STG), 1 week at 01/02/2022 1102   Progress/Outcome goal met at 01/06/2022 0815   Bed Mobility Goal 2    Activity/Assistive Device scooting, sit to supine, supine to sit at 01/02/2022 1102   [place] modified independence at 01/02/2022 1102   Time Frame 1 week, long-term goal (LTG) at 01/06/2022 0815   Progress/Outcome continuing progress toward goal, goal ongoing at 01/06/2022 0815   Transfer Goal 1    Activity/Assistive Device sit-to-stand/stand-to-sit, stand pivot, walker, front-wheeled at 01/02/2022 1102   [place] supervision required at 01/02/2022 1102   Time Frame short-term goal (STG), 3 - 5 days at 01/06/2022 0815   Progress/Outcome good  progress toward goal, continuing progress toward goal, goal ongoing at 01/06/2022 0815   Transfer Goal 2    Activity/Assistive Device sit-to-stand/stand-to-sit, stand pivot, walker, front-wheeled at 01/02/2022 1102   [place] modified independence at 01/02/2022 1102   Time Frame long-term goal (LTG), 1 week at 01/06/2022 0815   Progress/Outcome good progress toward goal, continuing progress toward goal, goal ongoing at 01/06/2022 0815   Gait/Walking Locomotion Goal 1    Activity/Assistive Device gait (walking locomotion), decrease asymmetrical patterns, decrease fall risk, forward stepping, improve balance and speed at 01/02/2022 1102   Distance 100 feet at 01/06/2022 0815   [place] supervision required at 01/02/2022 1102   Time Frame short-term goal (STG), 3 - 5 days at 01/06/2022 0815   Progress/Outcome progress slower than expected, goal revised this date at 01/06/2022 0815   Gait/Walking Locomotion Goal 2    Activity/Assistive Device gait (walking locomotion), decrease asymmetrical patterns, decrease fall risk, forward stepping, improve balance and speed at 01/02/2022 1102   Distance 100 feet at 01/06/2022 0815   [place] modified independence at 01/02/2022 1102   Time Frame long-term goal (LTG), 1 week at 01/06/2022 0815   Progress/Outcome progress slower than expected, goal revised this date at 01/06/2022 0815   Stairs Goal 1    Activity/Assistive Device ascending stairs, descending stairs at 01/04/2022 1505   Number of Stairs 8 at 01/04/2022 1505   [place] minimum assist (75% or more patient effort) at 01/04/2022 1505   Time Frame short-term goal (STG), 1 week at 01/06/2022 0815   Progress/Outcome progress slower than expected, goal ongoing at 01/06/2022 0815   Stairs Goal 2    Activity/Assistive Device ascending stairs, descending stairs at 01/04/2022 1505   Number of Stairs 12 at 01/04/2022 1505   [place] supervision required at 01/04/2022 1505   Time Frame long-term goal (LTG), 14  days or less at 01/06/2022 0815   Progress/Outcome progress slower than expected, goal revised this date, goal ongoing at 01/06/2022 0815

## 2022-01-07 NOTE — ASSESSMENT & PLAN NOTE
Mrs. Cobb is an 81-year-old female who presented to [facility] on 12/29/2021 with complaints of left hip pain after falling out of bed.  X-ray left hip revealed left femoral neck fracture.  CT left hip confirmed displaced fracture left femoral neck.  X-ray left knee was negative.  Head CT was negative for skull fracture or intracranial hemorrhage.  CT cervical spine was negative for fracture dislocation.  Dr. Pena from orthopedic surgery was consulted and performed left total hip replacement on 12/29/2021.  She is cleared for weightbearing to left lower extremity with anterior hip precautions for 6 weeks.  She is anticoagulated with Pradaxa for atrial fibrillation.  Aspirin 325 mg daily for 6 weeks was added for DVT prophylaxis on top of Pradaxa.  Postoperative anemia stabilized at 8.6.  She was ultimately determined to be medically stable for transfer to [facility] on 1/1/2022 for an acute inpatient rehabilitation program to address deficits related to left hip fracture status post total hip replacement.    She is weightbearing as tolerated.  She will be maintained on left hip precautions and aspirin for 6 weeks.  She will participate in 3 hours of physical and occupational therapy as well as receive 24-hour rehab nursing care.  She will follow-up with Dr. Pena from orthopedic surgery in 2 weeks.    Incision intact.    Pain control improving over time.    Improving with therapies and progressing to min assist with transfers and ambulation self-care activities.  Continue current care.

## 2022-01-07 NOTE — SUBJECTIVE & OBJECTIVE
"   Patient was seen and examined.   Attestation Notes: Face to face encounter completed    Subjective    Patient feels pain control improved.  Tolerating and improving with therapies.  Objective     Visit Vitals  BP (!) 136/59 (BP Location: Left upper arm, Patient Position: Lying)   Pulse 86   Temp 36.8 °C (98.2 °F) (Oral)   Resp 18   Ht 1.676 m (5' 5.98\")   Wt 58.3 kg (128 lb 8.5 oz)   SpO2 93%   BMI 20.76 kg/m²       Review of Systems:  Pertinent items are noted in HPI.  Denies chest pain and shortness of breath.  Review of systems otherwise negative.    Labs     Results from last 7 days   Lab Units 01/03/22  0433   WBC K/uL 11.29*   HEMOGLOBIN g/dL 8.1*   HEMATOCRIT % 26.2*   PLATELETS K/uL 351     Results from last 7 days   Lab Units 01/02/22  0604   SODIUM mEQ/L 136   POTASSIUM mEQ/L 3.9   CHLORIDE mEQ/L 104   CO2 mEQ/L 26   BUN mg/dL 12   CREATININE mg/dL 0.3*   CALCIUM mg/dL 8.0*   ALBUMIN g/dL 2.4*   BILIRUBIN TOTAL mg/dL 0.9   ALK PHOS IU/L 95   ALT IU/L 15   AST IU/L 26   GLUCOSE mg/dL 89       Full Code    Physical Exam  General      Alert, cooperative, no distress, appears stated age.   Head:    Normocephalic, without obvious abnormality, atraumatic.   Eyes:    PERRL, conjunctiva/corneas clear, EOM's intact.        Nose:   Nares normal, septum midline, mucosa normal, no drainage or            sinus tenderness.   Throat:   Lips, mucosa, and tongue normal.    Neck:   Supple, symmetrical, trachea midline.    Back:     Symmetric, no curvature.   Lungs:     Clear to auscultation bilaterally, respirations unlabored.   Chest wall:    No tenderness or deformity.   Heart:    Regular rate and rhythm, S1 and S2 normal.   Abdomen:     Soft, non-tender, bowel sounds active all four quadrants,     no masses, no organomegaly.   Extremities:  Musculoskeletal:  1+ lower extremity edema bilaterally.   Left hip replacement.     Pulses:   1+ and symmetric all extremities.   Skin:   Incision intact. "   Neurologic:          Behavior/  Emotional:  CNII-XII intact.  Alert and oriented ×3.  Motor exam with stable left hip weakness.  Sensory exam intact.  Reflexes stable decreased reflexes.      Appropriate, cooperative           Plan of care was discussed with patient

## 2022-01-07 NOTE — PROGRESS NOTES
Patient: Bee Cobb  Location: [facility] [address]  MRN: [id number]  Today's date: 1/7/2022    History of Present Illness  Bee is a 81 y.o. female admitted on 1/1/2022 with Hip fracture requiring operative repair, left, closed, initial encounter (CMS/Formerly [facility]) [S72.002A]. Principal problem is Hip fracture requiring operative repair, left, closed, initial encounter (CMS/Formerly [facility]).    81 y.o. f who presents with left hip pain after falling out of bed. She had an immediate onset of left hip pain and inability to ambulate.  No LOC.  Radiographic evaluation showed a displaced transcervical femoral neck fracture by CT.  On 12-29-21 she underwent left hip hemiarthroplasty.  Pt is WBAT to LLE and anterior hip precautions x 6 weeks. Ortho recommends aspirin 325mg/day x 6 weeks in addition to pt's home pradaxa which was restarted postoperatively. Pt did well postoperatively, but was noted to have postop anemia with a hgb drop of 12-->9. Per [place], this is expected due to Pradaxa use.  Pain is being managed with multimodal analgesia; scheduled tylenol, lido patch, PRN oxycodone, ice therapy.  PT/OT/PM&R evaluated pt and recommend acute rehab at discharge.          Past Medical History  Bee has a past medical history of Atrial fibrillation (CMS/Formerly [facility]), Glaucoma, History of transfusion, Hyperthyroidism, Lipid disorder, Osteoporosis, PAN (polyarteritis nodosa) (CMS/Formerly [facility]), and Thalassemia. H/o recent clavicle fx 9/11/21 Completed course of therapy and has full ROM      PT Vitals    Date/Time Pulse BP BP Location BP Method Pt Position [facility]   01/07/22 1308 91 135/65 Left upper arm Automatic Sitting LBR   01/07/22 1358 89 126/60 Left upper arm Automatic Sitting LBR      PT Pain    Date/Time Pain Type Side/Orientation Location Rating: Rest Rating: Activity Interventions [facility]   01/07/22 1308 Pain Assessment left hip 4 6 premedicated for activity LBR   01/07/22 1358 Post Activity left hip 4 6 premedicated for  activity LBR          Prior Living Environment      Most Recent Value   People in Home spouse   Current Living Arrangements home  [9 MADY, FF inside]   Home Accessibility stairs to enter home (Group), stairs within home (Group)   Living Environment Comment Row home 13 steps into house, 9 steps to BR on 2nd floor   Number of Stairs, Main Entrance 9   Surface of Stairs, Main Entrance concrete   Stair Railings, Main Entrance railings on both sides of stairs   Location, Bathroom second floor, must negotiate stairs to access   [place], Bathroom tile floor   Bathroom Access Comment ztub/shower combo, hand held shower, bench and suction grab bars ( checks stability prior to each shower)   Number of Stairs, Within Home, Primary --  [13]   Stair Railings, Within Home, Primary railing on right side (ascending)          Prior Level of Function      Most Recent Value   Dominant Hand left   Ambulation independent   Transferring independent   Toileting independent   Bathing independent   Dressing independent   Eating independent   Assistive Device Currently Used at Home shower chair, grab bar           IRF PT Evaluation and Treatment - 01/07/22 1309        PT Time Calculation    Start Time 1300     Stop Time 1400     Time Calculation (min) 60 min        Session Details    Document Type daily treatment/progress note     Mode of Treatment physical therapy;individual therapy        Bed Mobility    [place], Sit to Supine minimum assist (75% or more patient effort)   LLE    Comment (Bed Mobility) on mat        Sit to Stand Transfer    [place], Sit to Stand Transfer close supervision     Verbal Cues safety;technique     Assistive Device walker, front-wheeled;gait belt     Comment from w/c        Stand to Sit Transfer    [place], Stand to Sit Transfer close supervision     Verbal Cues safety;technique     Assistive Device walker, front-wheeled;gait belt     Comment to w/c        Stand Pivot Transfer     [place], Stand Pivot/Stand Step Transfer close supervision     Verbal Cues safety;technique     Assistive Device walker, front-wheeled;gait belt     Comment amb approach        Gait Training    [place], Gait close supervision     Assistive Device walker, front-wheeled;gait belt     Distance in Feet 110 feet     Pattern (Gait) step-through     Deviations/Abnormal Patterns (Gait) antalgic;base of support, narrow;gait speed decreased     Bilateral Gait Deviations heel strike decreased     Comment (Gait/Stairs) 1 gait trial as noted above with cl S for safety.  1 standing rest break due to fatigue        Lower Extremity (Therapeutic Exercise)    Exercise Position/Type supine     General Exercise bilateral;ankle pumps;SAQ (short arc quad);heel slides;hip aBduction;gluteal sets;quad sets;LAQ (long arc quad)     Reps and Sets x 20 reps     Comment supine on mat        Daily Progress Summary (PT)    Daily Outcome Statement Pt with variable pain in the L hip 4/10 to 6/10 with activity.  Pt with +LEMOS but SpO2 stable t/o.  Pt with antalgic gait pattern requiring frequent cueing for improved hip and trunk extension.  Pt tolerated supine [place] without issue.  Pt pleasant and cooperative t/o.                           IRF PT Goals      Most Recent Value   Bed Mobility Goal 1    Activity/Assistive Device scooting, sit to supine, supine to sit at 01/02/2022 1102   [place] minimum assist (75% or more patient effort) at 01/02/2022 1102   Time Frame short-term goal (STG), 1 week at 01/02/2022 1102   Progress/Outcome goal met at 01/06/2022 0815   Bed Mobility Goal 2    Activity/Assistive Device scooting, sit to supine, supine to sit at 01/02/2022 1102   [place] modified independence at 01/02/2022 1102   Time Frame 1 week, long-term goal (LTG) at 01/06/2022 0815   Progress/Outcome continuing progress toward goal, goal ongoing at 01/06/2022 0815   Transfer Goal 1    Activity/Assistive Device sit-to-stand/stand-to-sit,  stand pivot, walker, front-wheeled at 01/02/2022 1102   [place] supervision required at 01/02/2022 1102   Time Frame short-term goal (STG), 3 - 5 days at 01/06/2022 0815   Progress/Outcome good progress toward goal, continuing progress toward goal, goal ongoing at 01/06/2022 0815   Transfer Goal 2    Activity/Assistive Device sit-to-stand/stand-to-sit, stand pivot, walker, front-wheeled at 01/02/2022 1102   [place] modified independence at 01/02/2022 1102   Time Frame long-term goal (LTG), 1 week at 01/06/2022 0815   Progress/Outcome good progress toward goal, continuing progress toward goal, goal ongoing at 01/06/2022 0815   Gait/Walking Locomotion Goal 1    Activity/Assistive Device gait (walking locomotion), decrease asymmetrical patterns, decrease fall risk, forward stepping, improve balance and speed at 01/02/2022 1102   Distance 100 feet at 01/06/2022 0815   [place] supervision required at 01/02/2022 1102   Time Frame short-term goal (STG), 3 - 5 days at 01/06/2022 0815   Progress/Outcome progress slower than expected, goal revised this date at 01/06/2022 0815   Gait/Walking Locomotion Goal 2    Activity/Assistive Device gait (walking locomotion), decrease asymmetrical patterns, decrease fall risk, forward stepping, improve balance and speed at 01/02/2022 1102   Distance 100 feet at 01/06/2022 0815   [place] modified independence at 01/02/2022 1102   Time Frame long-term goal (LTG), 1 week at 01/06/2022 0815   Progress/Outcome progress slower than expected, goal revised this date at 01/06/2022 0815   Stairs Goal 1    Activity/Assistive Device ascending stairs, descending stairs at 01/04/2022 1505   Number of Stairs 8 at 01/04/2022 1505   [place] minimum assist (75% or more patient effort) at 01/04/2022 1505   Time Frame short-term goal (STG), 1 week at 01/06/2022 0815   Progress/Outcome progress slower than expected, goal ongoing at 01/06/2022 0815   Stairs Goal 2    Activity/Assistive  Device ascending stairs, descending stairs at 01/04/2022 1505   Number of Stairs 12 at 01/04/2022 1505   [place] supervision required at 01/04/2022 1505   Time Frame long-term goal (LTG), 14 days or less at 01/06/2022 0815   Progress/Outcome progress slower than expected, goal revised this date, goal ongoing at 01/06/2022 0815

## 2022-01-07 NOTE — PROGRESS NOTES
Patient: Bee Cobb  Location: [facility] [address]  MRN: [id number]  Today's date: 1/7/2022    History of Present Illness  Bee is a 81 y.o. female admitted on 1/1/2022 with Hip fracture requiring operative repair, left, closed, initial encounter (CMS/Formerly [facility]) [S72.002A]. Principal problem is Hip fracture requiring operative repair, left, closed, initial encounter (CMS/Formerly [facility]).    81 y.o. f who presents with left hip pain after falling out of bed. She had an immediate onset of left hip pain and inability to ambulate.  No LOC.  Radiographic evaluation showed a displaced transcervical femoral neck fracture by CT.  On 12-29-21 she underwent left hip hemiarthroplasty.  Pt is WBAT to LLE and anterior hip precautions x 6 weeks. Ortho recommends aspirin 325mg/day x 6 weeks in addition to pt's home pradaxa which was restarted postoperatively. Pt did well postoperatively, but was noted to have postop anemia with a hgb drop of 12-->9. Per [place], this is expected due to Pradaxa use.  Pain is being managed with multimodal analgesia; scheduled tylenol, lido patch, PRN oxycodone, ice therapy.  PT/OT/PM&R evaluated pt and recommend acute rehab at discharge.          Past Medical History  eBe has a past medical history of Atrial fibrillation (CMS/[facility]ly [facility]), Glaucoma, History of transfusion, Hyperthyroidism, Lipid disorder, Osteoporosis, PAN (polyarteritis nodosa) (CMS/[facility]ly [facility]), and Thalassemia. H/o recent clavicle fx 9/11/21 Completed course of therapy and has full ROM      OT Vitals    Date/Time Pulse HR Source Resp SpO2 Pt Activity O2 Therapy BP BP Location BP Method Pt Position [facility]   01/07/22 1438 88 -- -- -- -- -- 124/42 Left upper arm Manual Sitting AEB   01/07/22 1500 86 Monitor 18 93 % At rest None (Room air) 136/59 Left upper arm Automatic Lying KAK      OT Pain    Date/Time Pain Type Side/Orientation Location Rating: Rest Rating: Activity Description Interventions [facility]   01/07/22 1438 Pain Assessment --  "hip 5 5 intermittent;aching premedicated for activity AEB   01/07/22 6195 Pain Reassessment left hip 5 5 intermittent;aching position adjusted AEB          Prior Living Environment      Most Recent Value   People in Home spouse   Current Living Arrangements home  [9 MADY, FF inside]   Home Accessibility stairs to enter home (Group), stairs within home (Group)   Living Environment Comment Row home 13 steps into house, 9 steps to BR on 2nd floor   Number of Stairs, Main Entrance 9   Surface of Stairs, Main Entrance concrete   Stair Railings, Main Entrance railings on both sides of stairs   Location, Bathroom second floor, must negotiate stairs to access   [place], Bathroom tile floor   Bathroom Access Comment ztub/shower combo, hand held shower, bench and suction grab bars ( checks stability prior to each shower)   Number of Stairs, Within Home, Primary --  [13]   Stair Railings, Within Home, Primary railing on right side (ascending)          Prior Level of Function      Most Recent Value   Dominant Hand left   Ambulation independent   Transferring independent   Toileting independent   Bathing independent   Dressing independent   Eating independent   Assistive Device Currently Used at Home shower chair, grab bar          Occupational Profile      Most Recent Value   Reason for Services/Referral ADL deficit   Successful Occupations wife,- mother   Occupational History/Life Experiences retired bank    Performance Patterns independent in all areas   Patient Goals \"I want to be functional, walk and take care of myself\"             01/07/22 0817   OT Time Calculation   Start Time 0800   Stop Time 0900   Time Calculation (min) 60 min   Session Details   Document Type daily treatment/progress note   Mode of Treatment occupational therapy;individual therapy   General Information   General Observations of Patient Pt received sitting up in bed, awake   Coping/Community Reintegration   Observed Emotional " State cooperative   Verbalized Emotional State acceptance   Cognition/Psychosocial   Comment, Cognition A+Ox3, fair recall and application of safety strategies   Transfers   Transfers toilet transfer   Maintains Weight-Bearing Status (Transfers) able to maintain;verbal cues to maintain;nonverbal cues (demo/gesture) to maintain   Comment DME   Toilet Transfer   Transfer Technique stand pivot   [place], Toilet Transfer touching/steadying assist   Verbal Cues safety;technique;hand placement   Assistive Device grab bars/safety frame;raised toilet seat   Comment RTS on toilet   Safety Issues, Functional Mobility   Comment, Safety Issues/Impairments (Mobility) OT: (F-) postural control, forward flexed core when ambulating w increased UE weight bearing through walker.   Basic Activities of Daily Living (BADLs)   Basic Activities of Daily Living upper body dressing;lower body dressing;grooming;toileting   Bathing   Self-Performance chest;left arm;right arm;front perineal area;buttocks   [place] minimum assist (75% or more patient effort)   Position supported standing   Setup Assistance obtain supplies   Comment Sponge bathing standing @ sink w RW support   Upper Body Dressing   Self-Performance threads left arm, shirt;threads right arm, shirt;pulls shirt over head/around back;pulls shirt down/adjusts   [place] Assistance obtains clothes   [place] close supervision;set up;1 person assist;safety considerations   Position supported sitting   Adaptive Equipment none   Lower Body Dressing   Self-Performance threads left leg, underpants;threads right leg, underpants;pulls underpants up or down;threads left leg, pants/shorts;threads right leg, pants/shorts;pulls pants/shorts up or down;dons/doffs left sock;dons/doffs right sock   [place] Assistance obtains clothes   [place] minimum assist (75% or more patient effort);safety considerations;1 person assist   Position supported sitting;unsupported sitting   Adaptive  Equipment dressing stick;reacher;sock aid   Grooming   Self-Performance washes, rinses and dries face;washes, rinses and dries hands;brushes/baker hair;oral care (brushing teeth, cleaning dentures)   [place] touching/steadying assist;1 person assist;safety considerations   Position supported sitting;supported standing   Setup Assistance obtain supplies   Adaptive Equipment none   [place], Oral Hygiene touching/steadying assist;1 person assist;safety considerations   Comment standing @ sink w RW, cues for posture and position of walker   Toileting   [place] touching/steadying assist;1 person assist;safety considerations   Position supported sitting;supported standing   Setup Assistance obtain supplies   Adaptive Equipment grab bar/safety frame;accessible height toilet;raised toilet seat   Comment RTS w handles, cues for hand placement and safe technique when standing for hygiene and clothing mgmt   Daily Progress Summary (OT)   Symptoms Noted During/After Treatment fatigue   Progress Toward Functional Goals (OT) progressing toward functional goals as expected   Daily Outcome Statement Pt participated in ADL activities w focus on safety during sp bathing, toileting, dressing and grooming standing @ sink. Pt is improving activity tolerance but continues to need rest breaks between tasks. When fatigues, pt become short of breath and places more weight through walker. Cues needed to listen to cue from her own body to determine frequency of rest breaks.   Barriers to Overall Progress (OT) balance, posture, endurance   Recommendations (OT) continue POC          01/07/22 0808   Pain/Comfort/Sleep   Pain Charting Type Pain Assessment   Preferred Pain Scale number (Numeric Rating Pain Scale)   (0-10) Pain Rating: Rest 4   (0-10) Pain Rating: Activity 4   Pain Side/Orientation left;lateral;generalized   Pain Body Location hip   Pain Description intermittent;aching   Pain Management Interventions premedicated for  activity   Response to Pain Interventions nonverbal indicators absent/decreased   Vital Signs   Heart Rate 84   Heart Rate Source Monitor   SpO2 95 %   BP (!) 120/45   BP Location Left upper arm   BP Method Manual   Patient Position Sitting      01/07/22 0855   Pain/Comfort/Sleep   Pain Charting Type Pain Reassessment   Preferred Pain Scale number (Numeric Rating Pain Scale)   (0-10) Pain Rating: Rest 2   (0-10) Pain Rating: Activity 2   Pain Side/Orientation generalized;left;lateral   Pain Body Location hip   Pain Management Interventions position adjusted   Response to Pain Interventions nonverbal indicators present            IRF OT Goals      Most Recent Value   Transfer Goal 1    Activity/Assistive Device toilet at 01/05/2022 1408   [place] minimum assist (75% or more patient effort) at 01/05/2022 1408   Time Frame short-term goal (STG), 5 - 7 days at 01/05/2022 1408   Strategies/Barriers DME at 01/05/2022 1408   Progress/Outcome good progress toward goal, goal partially met, goal revised this date at 01/05/2022 1408   Transfer Goal 2    Activity/Assistive Device toilet at 01/02/2022 0805   [place] supervision required at 01/02/2022 0805   Time Frame long-term goal (LTG), 4 weeks at 01/02/2022 0805   Strategies/Barriers DME at 01/02/2022 0805   Transfer Goal 3    Activity/Assistive Device shower at 01/05/2022 1408   [place] minimum assist (75% or more patient effort) at 01/05/2022 1408   Time Frame short-term goal (STG), 5 - 7 days at 01/05/2022 1408   Strategies/Barriers DME at 01/02/2022 0805   Progress/Outcome good progress toward goal, goal partially met, goal revised this date at 01/05/2022 1408   Transfer Goal 4    Activity/Assistive Device shower at 01/02/2022 0805   [place] tactile cues required at 01/02/2022 0805   Time Frame long-term goal (LTG), 4 weeks at 01/02/2022 0805   Strategies/Barriers DME at 01/02/2022 0805   Bathing Goal 1    Activity/Assistive Device bathing skills, all  at 01/05/2022 1408   [place] tactile cues required at 01/05/2022 1408   Time Frame short-term goal (STG), 5 - 7 days at 01/05/2022 1408   Strategies/Barriers DME at 01/02/2022 0805   Progress/Outcome good progress toward goal, goal partially met, goal revised this date at 01/05/2022 1408   Bathing Goal 2    Activity/Assistive Device bathing skills, all at 01/02/2022 0805   [place] tactile cues required at 01/02/2022 0805   Time Frame long-term goal (LTG), 4 weeks at 01/02/2022 0805   Strategies/Barriers DME at 01/02/2022 0805   UB Dressing Goal 1    Activity/Assistive Device upper body dressing at 01/05/2022 1408   [place] tactile cues required at 01/05/2022 1408   Time Frame short-term goal (STG), 5 - 7 days at 01/05/2022 1408   Strategies/Barriers incl set up at 01/05/2022 1408   Progress/Outcome good progress toward goal, goal partially met, goal revised this date at 01/05/2022 1408   UB Dressing Goal 2    Activity/Assistive Device upper body dressing at 01/02/2022 0805   [place] modified independence at 01/02/2022 0805   Time Frame long-term goal (LTG), 4 weeks at 01/02/2022 0805   Strategies/Barriers incl s/u at 01/02/2022 0805   LB Dressing Goal 1    Activity/Assistive Device lower body dressing at 01/05/2022 1408   [place] minimum assist (75% or more patient effort) at 01/05/2022 1408   Time Frame short-term goal (STG), 5 - 7 days at 01/05/2022 1408   Strategies/Barriers AD at 01/02/2022 0805   Progress/Outcome good progress toward goal, goal partially met, goal revised this date at 01/05/2022 1408   LB Dressing Goal 2    Activity/Assistive Device lower body dressing at 01/02/2022 0805   [place] supervision required at 01/02/2022 0805   Time Frame long-term goal (LTG), 4 weeks at 01/02/2022 0805   Strategies/Barriers AD at 01/02/2022 0805   Grooming Goal 1    Activity/Assistive Device grooming skills, all at 01/05/2022 1407   [place] supervision required at 01/05/2022 0216    Time Frame 5 - 7 days at 01/05/2022 1408   Strategies/Barriers std @ sink at 01/05/2022 1408   Progress/Outcome good progress toward goal, goal partially met, goal revised this date at 01/05/2022 1408   Grooming Goal 2    Activity/Assistive Device grooming skills, all at 01/02/2022 0805   [place] modified independence at 01/02/2022 0805   Time Frame long-term goal (LTG), 4 weeks at 01/02/2022 0805   Strategies/Barriers std @ sink at 01/02/2022 0805   Toileting Goal 1    Activity/Assistive Device toileting skills, all at 01/05/2022 1408   [place] tactile cues required at 01/05/2022 1408   Time Frame short-term goal (STG), 5 - 7 days at 01/05/2022 1408   Strategies/Barriers DME at 01/02/2022 0805   Progress/Outcome good progress toward goal, goal partially met, goal revised this date at 01/05/2022 1408   Toileting Goal 2    Activity/Assistive Device toileting skills, all at 01/02/2022 0805   [place] supervision required at 01/02/2022 0805   Time Frame long-term goal (LTG), 4 weeks at 01/02/2022 0805   Strategies/Barriers DME at 01/02/2022 0805

## 2022-01-08 ENCOUNTER — APPOINTMENT (INPATIENT)
Dept: PHYSICAL THERAPY | Facility: REHABILITATION | Age: 82
DRG: 560 | End: 2022-01-08
Payer: MEDICARE

## 2022-01-08 PROCEDURE — 25000000 HC PHARMACY GENERAL: Performed by: PHYSICAL MEDICINE & REHABILITATION

## 2022-01-08 PROCEDURE — 97116 GAIT TRAINING THERAPY: CPT | Mod: GP

## 2022-01-08 PROCEDURE — 97530 THERAPEUTIC ACTIVITIES: CPT | Mod: GP

## 2022-01-08 PROCEDURE — 63700000 HC SELF-ADMINISTRABLE DRUG: Performed by: HOSPITALIST

## 2022-01-08 PROCEDURE — 63700000 HC SELF-ADMINISTRABLE DRUG: Performed by: PHYSICAL MEDICINE & REHABILITATION

## 2022-01-08 PROCEDURE — 63700000 HC SELF-ADMINISTRABLE DRUG: Performed by: INTERNAL MEDICINE

## 2022-01-08 PROCEDURE — 12800000 HC ROOM AND CARE SEMIPRIVATE REHAB

## 2022-01-08 RX ADMIN — LATANOPROST 1 DROP: 50 SOLUTION/ DROPS OPHTHALMIC at 22:17

## 2022-01-08 RX ADMIN — ACETAMINOPHEN 1000 MG: 500 TABLET, FILM COATED ORAL at 22:16

## 2022-01-08 RX ADMIN — Medication 1000 UNITS: at 09:04

## 2022-01-08 RX ADMIN — BUDESONIDE 0.5 MG: 0.5 SUSPENSION RESPIRATORY (INHALATION) at 05:36

## 2022-01-08 RX ADMIN — SENNOSIDES AND DOCUSATE SODIUM 1 TABLET: 50; 8.6 TABLET ORAL at 09:06

## 2022-01-08 RX ADMIN — THERA TABS 1 TABLET: TAB at 09:03

## 2022-01-08 RX ADMIN — PANTOPRAZOLE SODIUM 40 MG: 40 TABLET, DELAYED RELEASE ORAL at 09:03

## 2022-01-08 RX ADMIN — CLONIDINE HYDROCHLORIDE 0.1 MG: 0.1 TABLET ORAL at 09:03

## 2022-01-08 RX ADMIN — FERROUS SULFATE TAB 325 MG (65 MG ELEMENTAL FE) 325 MG: 325 (65 FE) TAB at 09:04

## 2022-01-08 RX ADMIN — CLONIDINE HYDROCHLORIDE 0.1 MG: 0.1 TABLET ORAL at 22:16

## 2022-01-08 RX ADMIN — ROSUVASTATIN CALCIUM 5 MG: 5 TABLET, FILM COATED ORAL at 17:14

## 2022-01-08 RX ADMIN — DILTIAZEM HYDROCHLORIDE 240 MG: 120 CAPSULE, COATED, EXTENDED RELEASE ORAL at 09:05

## 2022-01-08 RX ADMIN — OXYCODONE HYDROCHLORIDE 5 MG: 5 TABLET ORAL at 22:31

## 2022-01-08 RX ADMIN — TIOTROPIUM BROMIDE INHALATION SPRAY 2 PUFF: 3.12 SPRAY, METERED RESPIRATORY (INHALATION) at 09:18

## 2022-01-08 RX ADMIN — ACETAMINOPHEN 1000 MG: 500 TABLET, FILM COATED ORAL at 05:36

## 2022-01-08 RX ADMIN — DABIGATRAN ETEXILATE MESYLATE 150 MG: 150 CAPSULE ORAL at 09:05

## 2022-01-08 RX ADMIN — OXYCODONE HYDROCHLORIDE 5 MG: 5 TABLET ORAL at 12:35

## 2022-01-08 RX ADMIN — DABIGATRAN ETEXILATE MESYLATE 150 MG: 150 CAPSULE ORAL at 22:16

## 2022-01-08 RX ADMIN — LEVOTHYROXINE SODIUM 75 MCG: 75 TABLET ORAL at 05:36

## 2022-01-08 RX ADMIN — DIGOXIN 250 MCG: 250 TABLET ORAL at 05:36

## 2022-01-08 RX ADMIN — DORZOLAMIDE HYDROCHLORIDE 1 DROP: 20 SOLUTION/ DROPS OPHTHALMIC at 05:35

## 2022-01-08 RX ADMIN — ACETAMINOPHEN 1000 MG: 500 TABLET, FILM COATED ORAL at 15:14

## 2022-01-08 NOTE — ASSESSMENT & PLAN NOTE
PCP Dr. Kanchan Zamarripa after discharge  [phone]     Cardiology after discharge  Ortho Dr. Pena in 2 weeks

## 2022-01-08 NOTE — PROGRESS NOTES
Patient: Bee Cobb  [facility] [address]  MRN: [id number]  Today's date: 1/8/2022    History of Present Illness  Bee is a 81 y.o. female admitted on 1/1/2022 with Hip fracture requiring operative repair, left, closed, initial encounter (CMS/[facility]) [S72.002A]. Principal problem is Hip fracture requiring operative repair, left, closed, initial encounter (CMS/[facility]).    81 y.o. f who presents with left hip pain after falling out of bed. She had an immediate onset of left hip pain and inability to ambulate.  No LOC.  Radiographic evaluation showed a displaced transcervical femoral neck fracture by CT.  On 12-29-21 she underwent left hip hemiarthroplasty.  Pt is WBAT to LLE and anterior hip precautions x 6 weeks. Ortho recommends aspirin 325mg/day x 6 weeks in addition to pt's home pradaxa which was restarted postoperatively. Pt did well postoperatively, but was noted to have postop anemia with a hgb drop of 12-->9. Per [place], this is expected due to Pradaxa use.  Pain is being managed with multimodal analgesia; scheduled tylenol, lido patch, PRN oxycodone, ice therapy.  PT/OT/PM&R evaluated pt and recommend acute rehab at discharge.          Past Medical History  Bee has a past medical history of Atrial fibrillation (CMS/[facility]), Glaucoma, History of transfusion, Hyperthyroidism, Lipid disorder, Osteoporosis, PAN (polyarteritis nodosa) (CMS/[facility]), and Thalassemia. H/o recent clavicle fx 9/11/21 Completed course of therapy and has full ROM      PT Vitals    Date/Time Pulse HR Source SpO2 Pt Activity O2 Therapy BP BP Location BP Method Pt Position [facility]   01/08/22 1300 81 Monitor 97 % At rest None (Room air) 143/65 Right upper arm Automatic Sitting MKW      PT Pain    Date/Time Pain Type Side/Orientation Location Rating: Rest Rating: Activity Interventions [facility]   01/08/22 1300 Pain Assessment left hip 2 5 position adjusted MKW   01/08/22 1325 Pain Reassessment left hip 2 -- -- MKW           Prior Living Environment      Most Recent Value   People in Home spouse   Current Living Arrangements home  [9 MADY, FF inside]   Home Accessibility stairs to enter home (Group), stairs within home (Group)   Living Environment Comment Row home 13 steps into house, 9 steps to BR on 2nd floor   Number of Stairs, Main Entrance 9   Surface of Stairs, Main Entrance concrete   Stair Railings, Main Entrance railings on both sides of stairs   Location, Bathroom second floor, must negotiate stairs to access   [place], Bathroom tile floor   Bathroom Access Comment ztub/shower combo, hand held shower, bench and suction grab bars ( checks stability prior to each shower)   Number of Stairs, Within Home, Primary --  [13]   Stair Railings, Within Home, Primary railing on right side (ascending)          Prior Level of Function      Most Recent Value   Dominant Hand left   Ambulation independent   Transferring independent   Toileting independent   Bathing independent   Dressing independent   Eating independent   Assistive Device Currently Used at Home shower chair, grab bar           IRF PT Evaluation and Treatment - 01/08/22 1300        PT Time Calculation    Start Time 1300     Stop Time 1330     Time Calculation (min) 30 min        Session Details    Document Type daily treatment/progress note     Mode of Treatment individual therapy;physical therapy        General Information    General Observations of Patient received patient in bed; agreeable to PT        Bed Mobility    [place], Supine to Sit moderate assist (50-74% patient effort)   patient requested assist with both LE management    [place], Sit to Supine moderate assist (50-74% patient effort)   requested assist for LLE    Comment (Bed Mobility) hospital bed        Sit to Stand Transfer    [place], Sit to Stand Transfer close supervision;verbal cues     Verbal Cues safety;technique     Assistive Device walker, front-wheeled     Comment from bed,  "        Stand to Sit Transfer    [place], Stand to Sit Transfer close supervision;verbal cues     Verbal Cues safety;technique     Assistive Device walker, front-wheeled     Comment to wc, bed        Stand Pivot Transfer    [place], Stand Pivot/Stand Step Transfer close supervision;verbal cues     Verbal Cues safety;technique     Assistive Device walker, front-wheeled     Comment ambulatory approach to wc, bed        Gait Training    [place], Gait close supervision;verbal cues     Assistive Device walker, front-wheeled     Distance in Feet 110 feet     Pattern (Gait) step-through     Deviations/Abnormal Patterns (Gait) antalgic;base of support, narrow;gait speed decreased     Bilateral Gait Deviations heel strike decreased     Comment (Gait/Stairs) CS for safety ambulating 110' with RW before reporting fatigue        Stairs Training    [place], Stairs minimum assist (75% or more patient effort);verbal cues     Assistive Device railing     Handrail Location (Stairs) both sides     Number of Stairs 4     Stair Height 6 inches     Ascending Stairs Technique step-to-step     Descending Stairs Technique step-to-step     Comment Steadying assist at lateral pelvis up/down 4, 6\" steps leading up with RLE and down with LLE; v. cues for sequencing        Lower Extremity (Therapeutic Exercise)    Exercise Position/Type seated     General Exercise bilateral;ankle pumps;LAQ (long arc quad)     Reps and Sets 30     Comment Seated in wc: AP's x 30; LAQ 10 x 3        Daily Progress Summary (PT)    Symptoms Noted During/After Treatment fatigue     Daily Outcome Statement Pleasant and coopertive; tolerated treatment activities well with brief rest breaks                           IRF PT Goals      Most Recent Value   Bed Mobility Goal 1    Activity/Assistive Device scooting, sit to supine, supine to sit at 01/02/2022 1102   [place] minimum assist (75% or more patient effort) at 01/02/2022 1102   Time Frame " short-term goal (STG), 1 week at 01/02/2022 1102   Progress/Outcome goal met at 01/06/2022 0815   Bed Mobility Goal 2    Activity/Assistive Device scooting, sit to supine, supine to sit at 01/02/2022 1102   [address] modified independence at 01/02/2022 1102   Time Frame 1 week, long-term goal (LTG) at 01/06/2022 0815   Progress/Outcome continuing progress toward goal, goal ongoing at 01/06/2022 0815   Transfer Goal 1    Activity/Assistive Device sit-to-stand/stand-to-sit, stand pivot, walker, front-wheeled at 01/02/2022 1102   [address] supervision required at 01/02/2022 1102   Time Frame short-term goal (STG), 3 - 5 days at 01/06/2022 0815   Progress/Outcome good progress toward goal, continuing progress toward goal, goal ongoing at 01/06/2022 0815   Transfer Goal 2    Activity/Assistive Device sit-to-stand/stand-to-sit, stand pivot, walker, front-wheeled at 01/02/2022 1102   [address] modified independence at 01/02/2022 1102   Time Frame long-term goal (LTG), 1 week at 01/06/2022 0815   Progress/Outcome good progress toward goal, continuing progress toward goal, goal ongoing at 01/06/2022 0815   Gait/Walking Locomotion Goal 1    Activity/Assistive Device gait (walking locomotion), decrease asymmetrical patterns, decrease fall risk, forward stepping, improve balance and speed at 01/02/2022 1102   Distance 100 feet at 01/06/2022 0815   [address] supervision required at 01/02/2022 1102   Time Frame short-term goal (STG), 3 - 5 days at 01/06/2022 0815   Progress/Outcome progress slower than expected, goal revised this date at 01/06/2022 0815   Gait/Walking Locomotion Goal 2    Activity/Assistive Device gait (walking locomotion), decrease asymmetrical patterns, decrease fall risk, forward stepping, improve balance and speed at 01/02/2022 1102   Distance 100 feet at 01/06/2022 0815   Long [address] modified independence at 01/02/2022 1102   Time Frame long-term goal (LTG), 1 week at 01/06/2022 0815    Progress/Outcome progress slower than expected, goal revised this date at 01/06/2022 0815   Stairs Goal 1    Activity/Assistive Device ascending stairs, descending stairs at 01/04/2022 1505   Number of Stairs 8 at 01/04/2022 1505   [place] minimum assist (75% or more patient effort) at 01/04/2022 1505   Time Frame short-term goal (STG), 1 week at 01/06/2022 0815   Progress/Outcome progress slower than expected, goal ongoing at 01/06/2022 0815   Stairs Goal 2    Activity/Assistive Device ascending stairs, descending stairs at 01/04/2022 1505   Number of Stairs 12 at 01/04/2022 1505   [place] supervision required at 01/04/2022 1505   Time Frame long-term goal (LTG), 14 days or less at 01/06/2022 0815   Progress/Outcome progress slower than expected, goal revised this date, goal ongoing at 01/06/2022 0815

## 2022-01-08 NOTE — PLAN OF CARE
Problem: Rehabilitation (IRF) Plan of Care  Goal: Plan of Care Review  Outcome: Progressing  Flowsheets (Taken 1/7/2022 1945)  Progress: improving  Plan of Care Reviewed With: patient  Outcome Summary: Offers no c/o at this time. Pleasant and cooperative. Refusing [place] tonight. Will monitor.   Plan of Care Review  Plan of Care Reviewed With: patient  Progress: improving  Outcome Summary: Offers no c/o at this time. Pleasant and cooperative. Refusing [place] tonight. Will monitor.

## 2022-01-08 NOTE — PLAN OF CARE
Problem: Rehabilitation (IRF) Plan of Care  Goal: Plan of Care Review  Flowsheets (Taken 1/7/2022 2054)  Progress: improving  Plan of Care Reviewed With: patient  Outcome Summary: Pt teamed yesterday and elos of 1/16to home with .  Met with the pt to review the team and elos and in agreement.  States  is in good health and can assist as needed.  Will see if e need family training prior to dc.  Will reteam and follow.  Pt stated she would update .

## 2022-01-08 NOTE — ASSESSMENT & PLAN NOTE
Mrs. Cobb is an 81-year-old female who presented to [facility] on 12/29/2021 with complaints of left hip pain after falling out of bed.  X-ray left hip revealed left femoral neck fracture.  CT left hip confirmed displaced fracture left femoral neck.  X-ray left knee was negative.  Head CT was negative for skull fracture or intracranial hemorrhage.  CT cervical spine was negative for fracture dislocation.  Dr. Pena from orthopedic surgery was consulted and performed left total hip replacement on 12/29/2021.  She is cleared for weightbearing to left lower extremity with anterior hip precautions for 6 weeks.  She is anticoagulated with Pradaxa for atrial fibrillation.  Aspirin 325 mg daily for 6 weeks was added for DVT prophylaxis on top of Pradaxa.  Postoperative anemia stabilized at 8.6.  She was ultimately determined to be medically stable for transfer to [facility] on 1/1/2022 for an acute inpatient rehabilitation program to address deficits related to left hip fracture status post total hip replacement.    She is weightbearing as tolerated.  She will be maintained on left hip precautions and aspirin for 6 weeks.  She will participate in 3 hours of physical and occupational therapy as well as receive 24-hour rehab nursing care.  She will follow-up with Dr. Pena from orthopedic surgery in 2 weeks.    Incision intact.    Pain control improving over time.    Patient progressing with therapies.  Min assist with transfers and ambulation and bed mobility.

## 2022-01-08 NOTE — SUBJECTIVE & OBJECTIVE
"   Patient was seen and examined.   Attestation Notes: Face to face encounter completed    Subjective    The patient feels improved overall.  Less hip pain.  Progressing in therapies.  Objective     Visit Vitals  /63 (BP Location: Left upper arm, Patient Position: Lying)   Pulse 73   Temp 36.6 °C (97.8 °F) (Oral)   Resp 16   Ht 1.676 m (5' 5.98\")   Wt 58.3 kg (128 lb 8.5 oz)   SpO2 96%   BMI 20.76 kg/m²       Review of Systems:  Pertinent items are noted in HPI.  Denies chest pain and shortness of breath.  Review of systems otherwise negative.    Labs     Results from last 7 days   Lab Units 01/03/22  0433   WBC K/uL 11.29*   HEMOGLOBIN g/dL 8.1*   HEMATOCRIT % 26.2*   PLATELETS K/uL 351     Results from last 7 days   Lab Units 01/02/22  0604   SODIUM mEQ/L 136   POTASSIUM mEQ/L 3.9   CHLORIDE mEQ/L 104   CO2 mEQ/L 26   BUN mg/dL 12   CREATININE mg/dL 0.3*   CALCIUM mg/dL 8.0*   ALBUMIN g/dL 2.4*   BILIRUBIN TOTAL mg/dL 0.9   ALK PHOS IU/L 95   ALT IU/L 15   AST IU/L 26   GLUCOSE mg/dL 89       Full Code    Physical Exam  General      Alert, cooperative, no distress, appears stated age.   Head:    Normocephalic, without obvious abnormality, atraumatic.   Eyes:    PERRL, conjunctiva/corneas clear, EOM's intact.        Nose:   Nares normal, septum midline, mucosa normal, no drainage or            sinus tenderness.   Throat:   Lips, mucosa, and tongue normal.    Neck:   Supple, symmetrical, trachea midline.    Back:     Symmetric, no curvature.   Lungs:     Clear to auscultation bilaterally, respirations unlabored.   Chest wall:    No tenderness or deformity.   Heart:    Regular rate and rhythm, S1 and S2 normal.   Abdomen:     Soft, non-tender, bowel sounds active all four quadrants,     no masses, no organomegaly.   Extremities:  Musculoskeletal:  1-2+ left lower extremity edema.  Trace right.  Left hip ORIF.   Pulses:   1+ and symmetric all extremities.   Skin:  Left hip incision intact. "   Neurologic:          Behavior/  Emotional:  CNII-XII intact.  Alert and oriented ×3.  Motor exam stable left hip weakness post fracture.  Sensory exam intact.  Reflexes stable decreased reflexes.      Appropriate, cooperative           Plan of care was discussed with patient

## 2022-01-08 NOTE — PROGRESS NOTES
"Daily Progress Note       Patient was seen and examined.   Attestation Notes: Face to face encounter completed    Subjective    The patient feels improved overall.  Less hip pain.  Progressing in therapies.  Objective     Visit Vitals  /63 (BP Location: Left upper arm, Patient Position: Lying)   Pulse 73   Temp 36.6 °C (97.8 °F) (Oral)   Resp 16   Ht 1.676 m (5' 5.98\")   Wt 58.3 kg (128 lb 8.5 oz)   SpO2 96%   BMI 20.76 kg/m²       Review of Systems:  Pertinent items are noted in HPI.  Denies chest pain and shortness of breath.  Review of systems otherwise negative.    Labs     Results from last 7 days   Lab Units 01/03/22  0433   WBC K/uL 11.29*   HEMOGLOBIN g/dL 8.1*   HEMATOCRIT % 26.2*   PLATELETS K/uL 351     Results from last 7 days   Lab Units 01/02/22  0604   SODIUM mEQ/L 136   POTASSIUM mEQ/L 3.9   CHLORIDE mEQ/L 104   CO2 mEQ/L 26   BUN mg/dL 12   CREATININE mg/dL 0.3*   CALCIUM mg/dL 8.0*   ALBUMIN g/dL 2.4*   BILIRUBIN TOTAL mg/dL 0.9   ALK PHOS IU/L 95   ALT IU/L 15   AST IU/L 26   GLUCOSE mg/dL 89       Full Code    Physical Exam  General      Alert, cooperative, no distress, appears stated age.   Head:    Normocephalic, without obvious abnormality, atraumatic.   Eyes:    PERRL, conjunctiva/corneas clear, EOM's intact.        Nose:   Nares normal, septum midline, mucosa normal, no drainage or            sinus tenderness.   Throat:   Lips, mucosa, and tongue normal.    Neck:   Supple, symmetrical, trachea midline.    Back:     Symmetric, no curvature.   Lungs:     Clear to auscultation bilaterally, respirations unlabored.   Chest wall:    No tenderness or deformity.   Heart:    Regular rate and rhythm, S1 and S2 normal.   Abdomen:     Soft, non-tender, bowel sounds active all four quadrants,     no masses, no organomegaly.   Extremities:  Musculoskeletal:  1-2+ left lower extremity edema.  Trace right.  Left hip ORIF.   Pulses:   1+ and symmetric all extremities.   Skin:  Left hip incision intact. "   Neurologic:          Behavior/  Emotional:  CNII-XII intact.  Alert and oriented ×3.  Motor exam stable left hip weakness post fracture.  Sensory exam intact.  Reflexes stable decreased reflexes.      Appropriate, cooperative           Plan of care was discussed with patient    Assessment & Plan  * Hip fracture requiring operative repair, left, closed, initial encounter (CMS/[facility])  Assessment & Plan  Mrs. Cobb is an 81-year-old female who presented to [facility] on 12/29/2021 with complaints of left hip pain after falling out of bed.  X-ray left hip revealed left femoral neck fracture.  CT left hip confirmed displaced fracture left femoral neck.  X-ray left knee was negative.  Head CT was negative for skull fracture or intracranial hemorrhage.  CT cervical spine was negative for fracture dislocation.  Dr. Pena from orthopedic surgery was consulted and performed left total hip replacement on 12/29/2021.  She is cleared for weightbearing to left lower extremity with anterior hip precautions for 6 weeks.  She is anticoagulated with Pradaxa for atrial fibrillation.  Aspirin 325 mg daily for 6 weeks was added for DVT prophylaxis on top of Pradaxa.  Postoperative anemia stabilized at 8.6.  She was ultimately determined to be medically stable for transfer to [facility] on 1/1/2022 for an acute inpatient rehabilitation program to address deficits related to left hip fracture status post total hip replacement.    She is weightbearing as tolerated.  She will be maintained on left hip precautions and aspirin for 6 weeks.  She will participate in 3 hours of physical and occupational therapy as well as receive 24-hour rehab nursing care.  She will follow-up with Dr. Pena from orthopedic surgery in 2 weeks.    Incision intact.    Pain control improving over time.    Patient progressing with therapies.  Min assist with transfers and ambulation and bed mobility.    Follow up  Assessment & Plan  PCP   Kanchan Zamarripa after discharge  [phone]     Cardiology after discharge  Ortho Dr. Pena in 2 weeks    At high risk for pressure injury of skin  Assessment & Plan  Turns q2hr    Risk for falls  Assessment & Plan  wean off restraints as able    Pain  Assessment & Plan  Tylenol 1000 mg every 8 hours.  Oxycodone as needed.  Adjust medications as needed.    Postoperative anemia  Assessment & Plan  Hemoglobin 8.1.  Continue to monitor.  Iron replacement.  The patient has a history of thalassemia trait and discussed the reasoning behind iron replacement post hip replacement.    Paroxysmal atrial fibrillation (CMS/[facility])  Assessment & Plan  Rate controlled on dig and cardizem, anti-coag on pradaxa    Hypothyroidism  Assessment & Plan  Continue synthroid    Hypertension  Assessment & Plan  Cont cardizem    Glaucoma  Assessment & Plan  Trusopt, xalatan gtt    COPD (chronic obstructive pulmonary disease) (CMS/H[facility])  Assessment & Plan  Cont pulmicort, spiriva        Expected Discharge Date:  1/16/2022

## 2022-01-08 NOTE — PLAN OF CARE
Plan of Care Review  Plan of Care Reviewed With: patient  Progress: improving  Outcome Summary: Pt c/o Cruz while toileting this AM associated with dizziness and nausea. Nausea has continued throughout the day, appetite poor. Denies SOB or pain. Dr. Chaudhari aware. Pt premedicated for therapy.

## 2022-01-08 NOTE — ASSESSMENT & PLAN NOTE
Hemoglobin 8.1.  Continue to monitor.  Iron replacement.  The patient has a history of thalassemia trait and discussed the reasoning behind iron replacement post hip replacement.

## 2022-01-09 ENCOUNTER — APPOINTMENT (INPATIENT)
Dept: OCCUPATIONAL THERAPY | Facility: REHABILITATION | Age: 82
DRG: 560 | End: 2022-01-09
Payer: MEDICARE

## 2022-01-09 PROCEDURE — 97530 THERAPEUTIC ACTIVITIES: CPT | Mod: GO

## 2022-01-09 PROCEDURE — 25000000 HC PHARMACY GENERAL: Performed by: PHYSICAL MEDICINE & REHABILITATION

## 2022-01-09 PROCEDURE — 12800000 HC ROOM AND CARE SEMIPRIVATE REHAB

## 2022-01-09 PROCEDURE — 63700000 HC SELF-ADMINISTRABLE DRUG: Performed by: HOSPITALIST

## 2022-01-09 PROCEDURE — 63700000 HC SELF-ADMINISTRABLE DRUG: Performed by: INTERNAL MEDICINE

## 2022-01-09 PROCEDURE — 63700000 HC SELF-ADMINISTRABLE DRUG: Performed by: PHYSICAL MEDICINE & REHABILITATION

## 2022-01-09 RX ADMIN — DABIGATRAN ETEXILATE MESYLATE 150 MG: 150 CAPSULE ORAL at 21:09

## 2022-01-09 RX ADMIN — CLONIDINE HYDROCHLORIDE 0.1 MG: 0.1 TABLET ORAL at 09:59

## 2022-01-09 RX ADMIN — THERA TABS 1 TABLET: TAB at 10:00

## 2022-01-09 RX ADMIN — ACETAMINOPHEN 1000 MG: 500 TABLET, FILM COATED ORAL at 21:09

## 2022-01-09 RX ADMIN — BUDESONIDE 0.5 MG: 0.5 SUSPENSION RESPIRATORY (INHALATION) at 16:37

## 2022-01-09 RX ADMIN — FERROUS SULFATE TAB 325 MG (65 MG ELEMENTAL FE) 325 MG: 325 (65 FE) TAB at 10:00

## 2022-01-09 RX ADMIN — SENNOSIDES AND DOCUSATE SODIUM 1 TABLET: 50; 8.6 TABLET ORAL at 09:59

## 2022-01-09 RX ADMIN — OXYCODONE HYDROCHLORIDE 5 MG: 5 TABLET ORAL at 21:26

## 2022-01-09 RX ADMIN — LATANOPROST 1 DROP: 50 SOLUTION/ DROPS OPHTHALMIC at 21:10

## 2022-01-09 RX ADMIN — BUDESONIDE 0.5 MG: 0.5 SUSPENSION RESPIRATORY (INHALATION) at 05:48

## 2022-01-09 RX ADMIN — CLONIDINE HYDROCHLORIDE 0.1 MG: 0.1 TABLET ORAL at 21:09

## 2022-01-09 RX ADMIN — ROSUVASTATIN CALCIUM 5 MG: 5 TABLET, FILM COATED ORAL at 16:38

## 2022-01-09 RX ADMIN — DORZOLAMIDE HYDROCHLORIDE 1 DROP: 20 SOLUTION/ DROPS OPHTHALMIC at 05:56

## 2022-01-09 RX ADMIN — DABIGATRAN ETEXILATE MESYLATE 150 MG: 150 CAPSULE ORAL at 10:00

## 2022-01-09 RX ADMIN — TIOTROPIUM BROMIDE INHALATION SPRAY 2 PUFF: 3.12 SPRAY, METERED RESPIRATORY (INHALATION) at 10:01

## 2022-01-09 RX ADMIN — PANTOPRAZOLE SODIUM 40 MG: 40 TABLET, DELAYED RELEASE ORAL at 10:00

## 2022-01-09 RX ADMIN — DILTIAZEM HYDROCHLORIDE 240 MG: 120 CAPSULE, COATED, EXTENDED RELEASE ORAL at 09:59

## 2022-01-09 RX ADMIN — DIGOXIN 250 MCG: 250 TABLET ORAL at 05:50

## 2022-01-09 RX ADMIN — Medication 1000 UNITS: at 10:00

## 2022-01-09 RX ADMIN — DORZOLAMIDE HYDROCHLORIDE 1 DROP: 20 SOLUTION/ DROPS OPHTHALMIC at 16:37

## 2022-01-09 RX ADMIN — ACETAMINOPHEN 1000 MG: 500 TABLET, FILM COATED ORAL at 14:57

## 2022-01-09 RX ADMIN — ACETAMINOPHEN 1000 MG: 500 TABLET, FILM COATED ORAL at 05:50

## 2022-01-09 RX ADMIN — LEVOTHYROXINE SODIUM 75 MCG: 75 TABLET ORAL at 05:50

## 2022-01-09 NOTE — PROGRESS NOTES
Patient: Bee Cobb  Location: [facility] [address]  MRN: [id number]  Today's date: 1/9/2022    History of Present Illness  Bee is a 81 y.o. female admitted on 1/1/2022 with Hip fracture requiring operative repair, left, closed, initial encounter (CMS/[facility]) [S72.002A]. Principal problem is Hip fracture requiring operative repair, left, closed, initial encounter (CMS/[facility]).    81 y.o. f who presents with left hip pain after falling out of bed. She had an immediate onset of left hip pain and inability to ambulate.  No LOC.  Radiographic evaluation showed a displaced transcervical femoral neck fracture by CT.  On 12-29-21 she underwent left hip hemiarthroplasty.  Pt is WBAT to LLE and anterior hip precautions x 6 weeks. Ortho recommends aspirin 325mg/day x 6 weeks in addition to pt's home pradaxa which was restarted postoperatively. Pt did well postoperatively, but was noted to have postop anemia with a hgb drop of 12-->9. Per Zapata, this is expected due to Pradaxa use.  Pain is being managed with multimodal analgesia; scheduled tylenol, lido patch, PRN oxycodone, ice therapy.  PT/OT/PM&R evaluated pt and recommend acute rehab at discharge.          Past Medical History  Bee has a past medical history of Atrial fibrillation (CMS/[facility]), Glaucoma, History of transfusion, Hyperthyroidism, Lipid disorder, Osteoporosis, PAN (polyarteritis nodosa) (CMS/[facility]), and Thalassemia. H/o recent clavicle fx 9/11/21 Completed course of therapy and has full ROM      OT Vitals    Date/Time Pulse SpO2 BP BP Location BP Method Pt Position [facility]   01/09/22 1411 77 95 % 127/59 Left upper arm Automatic Lying CK      OT Pain    Date/Time Pain Type Side/Orientation Location Rating: Rest Rating: Activity Interventions Who   01/09/22 1411 Pain Assessment left hip 3 -- premedicated for activity CK   01/09/22 1429 Pain Reassessment left hip -- 2 -- CK          Prior Living Environment      Most Recent Value   People  "in Home spouse   Current Living Arrangements home  [9 MADY, FF inside]   Home Accessibility stairs to enter home (Group), stairs within home (Group)   Living Environment Comment Row home 13 steps into house, 9 steps to BR on 2nd floor   Number of Stairs, Main Entrance 9   Surface of Stairs, Main Entrance concrete   Stair Railings, Main Entrance railings on both sides of stairs   Location, Bathroom second floor, must negotiate stairs to access   [place], Bathroom tile floor   Bathroom Access Comment ztub/shower combo, hand held shower, bench and suction grab bars ( checks stability prior to each shower)   Number of Stairs, Within Home, Primary --  [13]   Stair Railings, Within Home, Primary railing on right side (ascending)          Prior Level of Function      Most Recent Value   Dominant Hand left   Ambulation independent   Transferring independent   Toileting independent   Bathing independent   Dressing independent   Eating independent   Assistive Device Currently Used at Home shower chair, grab bar          Occupational Profile      Most Recent Value   Reason for Services/Referral ADL deficit   Successful Occupations wife,- mother   Occupational History/Life Experiences retired [employer]    Performance Patterns independent in all areas   Patient Goals \"I want to be functional, walk and take care of myself\"           IRF OT Evaluation and Treatment - 01/09/22 1407        OT Time Calculation    Start Time 1400     Stop Time 1430     Time Calculation (min) 30 min        Session Details    Document Type daily treatment/progress note     Mode of Treatment occupational therapy;individual therapy        General Information    Patient Profile Reviewed yes     General Observations of Patient Pt. observed to be lying in bed at time of OT session and sleeping. Therapist woke pt. for OT session, and pt. agreeable to session.     Limitations/Impairments hearing        Cognition/Psychosocial    Comment, " Cognition Pt. participated in therapeutic activity (to complete partially completed puzzle). Pt. demonstrated difficulty with correctly locating puzzle pieces and placing into puzzle board, but activity had to be discontinued due to pt. leaning posteriorly into heels and increased risk for fall. Recommend addressing pt. posture/balance when not dividing pt. attention to increase pt. safety at this time.        Bed to Chair Transfer    [place], Bed to Chair verbal cues;minimum assist (75% or more patient effort);increased time to complete;safety considerations;1 person assist     Verbal Cues hand placement;preparatory posture;proper use of assistive device;safety;technique     Assistive Device walker, front-wheeled;wheelchair     Comment Min A x 1 SPT from EOB to w/c with gait belt donned and walker.        Sit to Stand Transfer    [place], Sit to Stand Transfer verbal cues;minimum assist (75% or more patient effort);safety considerations;1 person assist     Verbal Cues hand placement;preparatory posture;proper use of assistive device;safety;technique     Assistive Device gait belt;walker, front-wheeled;wheelchair     Comment Min A x 1 sit to stand with walker with verbal cues. Gait belt donned.        Stand to Sit Transfer    [place], Stand to Sit Transfer verbal cues;minimum assist (75% or more patient effort);increased time to complete;safety considerations;1 person assist     Verbal Cues hand placement;proper use of assistive device;safety;technique;preparatory posture     Assistive Device gait belt;walker, front-wheeled;wheelchair     Comment Min A x 1 stand to sit with walker and gait belt donned with cues.        Balance    Balance Assessment sitting static balance;standing static balance     Static Sitting Balance WFL;supported;sitting in chair     Static Standing Balance mild impairment;supported;standing     Comment, Balance Pt. participated in static balance activity during OT session and  pt. required Min A x 1 while standing on blue foam at table top(performing therapeutic activity). Pt. noted to lean posteriorly into heels, and posteriorly tilt pelvis. Pt. required verbal/tactile cues to correct posture. Pt. demonstrated difficulty attending to therapeutic activity (completing puzzle activity) and focusing on posture. Recommend continuing to address pt. posture and balance.        Daily Progress Summary (OT)    Daily Outcome Statement Pt. participated in static balance activity during OT session and pt. required Min A x 1 while standing on blue foam at table top(performing therapeutic activity). Pt. noted to lean posteriorly into heels, and posteriorly tilt pelvis. Pt. required verbal/tactile cues to correct posture. Pt. demonstrated difficulty attending to therapeutic activity (completing puzzle activity) and focusing on posture. Recommend continuing to address pt. posture and balance without dividing pt. attention to increase pt. safety and technique. Cont. with POC.                      Education Documentation  Admission, Transition of Care, taught by Crystal Thomson OT at 1/9/2022  3:44 PM.  Learner: Patient  Readiness: Acceptance  Method: Explanation, Demonstration  Response: Demonstrated Understanding, Needs Reinforcement, Verbalizes Understanding  Comment: Pt. educated on posture during standing to address posterior lean into heels, and posterior tilt at hips. Ongoing education recommended to increase pt. safety and independence.          IRF OT Goals      Most Recent Value   Transfer Goal 1    Activity/Assistive Device toilet at 01/05/2022 1408   [place] minimum assist (75% or more patient effort) at 01/05/2022 1408   Time Frame short-term goal (STG), 5 - 7 days at 01/05/2022 1408   Strategies/Barriers DME at 01/05/2022 1408   Progress/Outcome good progress toward goal, goal partially met, goal revised this date at 01/05/2022 1408   Transfer Goal 2    Activity/Assistive Device  toilet at 01/02/2022 0805   [place] supervision required at 01/02/2022 0805   Time Frame long-term goal (LTG), 4 weeks at 01/02/2022 0805   Strategies/Barriers DME at 01/02/2022 0805   Transfer Goal 3    Activity/Assistive Device shower at 01/05/2022 1408   [place] minimum assist (75% or more patient effort) at 01/05/2022 1408   Time Frame short-term goal (STG), 5 - 7 days at 01/05/2022 1408   Strategies/Barriers DME at 01/02/2022 0805   Progress/Outcome good progress toward goal, goal partially met, goal revised this date at 01/05/2022 1408   Transfer Goal 4    Activity/Assistive Device shower at 01/02/2022 0805   [place] tactile cues required at 01/02/2022 0805   Time Frame long-term goal (LTG), 4 weeks at 01/02/2022 0805   Strategies/Barriers DME at 01/02/2022 0805   Bathing Goal 1    Activity/Assistive Device bathing skills, all at 01/05/2022 1408   [place] tactile cues required at 01/05/2022 1408   Time Frame short-term goal (STG), 5 - 7 days at 01/05/2022 1408   Strategies/Barriers DME at 01/02/2022 0805   Progress/Outcome good progress toward goal, goal partially met, goal revised this date at 01/05/2022 1408   Bathing Goal 2    Activity/Assistive Device bathing skills, all at 01/02/2022 0805   [place] tactile cues required at 01/02/2022 0805   Time Frame long-term goal (LTG), 4 weeks at 01/02/2022 0805   Strategies/Barriers DME at 01/02/2022 0805   UB Dressing Goal 1    Activity/Assistive Device upper body dressing at 01/05/2022 1408   [place] tactile cues required at 01/05/2022 1408   Time Frame short-term goal (STG), 5 - 7 days at 01/05/2022 1408   Strategies/Barriers incl set up at 01/05/2022 1408   Progress/Outcome good progress toward goal, goal partially met, goal revised this date at 01/05/2022 1408   UB Dressing Goal 2    Activity/Assistive Device upper body dressing at 01/02/2022 0805   [place] modified independence at 01/02/2022 0805   Time Frame long-term goal  (LTG), 4 weeks at 01/02/2022 0805   Strategies/Barriers incl s/u at 01/02/2022 0805   LB Dressing Goal 1    Activity/Assistive Device lower body dressing at 01/05/2022 1408   [place] minimum assist (75% or more patient effort) at 01/05/2022 1408   Time Frame short-term goal (STG), 5 - 7 days at 01/05/2022 1408   Strategies/Barriers AD at 01/02/2022 0805   Progress/Outcome good progress toward goal, goal partially met, goal revised this date at 01/05/2022 1408   LB Dressing Goal 2    Activity/Assistive Device lower body dressing at 01/02/2022 0805   [place] supervision required at 01/02/2022 0805   Time Frame long-term goal (LTG), 4 weeks at 01/02/2022 0805   Strategies/Barriers AD at 01/02/2022 0805   Grooming Goal 1    Activity/Assistive Device grooming skills, all at 01/05/2022 1408   [place] supervision required at 01/05/2022 1408   Time Frame 5 - 7 days at 01/05/2022 1408   Strategies/Barriers std @ sink at 01/05/2022 1408   Progress/Outcome good progress toward goal, goal partially met, goal revised this date at 01/05/2022 1408   Grooming Goal 2    Activity/Assistive Device grooming skills, all at 01/02/2022 0805   [place] modified independence at 01/02/2022 0805   Time Frame long-term goal (LTG), 4 weeks at 01/02/2022 0805   Strategies/Barriers std @ sink at 01/02/2022 0805   Toileting Goal 1    Activity/Assistive Device toileting skills, all at 01/05/2022 1408   [place] tactile cues required at 01/05/2022 1408   Time Frame short-term goal (STG), 5 - 7 days at 01/05/2022 1408   Strategies/Barriers DME at 01/02/2022 0805   Progress/Outcome good progress toward goal, goal partially met, goal revised this date at 01/05/2022 1408   Toileting Goal 2    Activity/Assistive Device toileting skills, all at 01/02/2022 0805   [place] supervision required at 01/02/2022 0805   Time Frame long-term goal (LTG), 4 weeks at 01/02/2022 0805   Strategies/Barriers DME at 01/02/2022 0805

## 2022-01-09 NOTE — PLAN OF CARE
Problem: Rehabilitation (IRF) Plan of Care  Goal: Plan of Care Review  Outcome: Progressing  Flowsheets (Taken 1/9/2022 9214)  Plan of Care Reviewed With: patient  Outcome Summary: Patient stated her sleep was good. woke up once to use bathroom, remained continent. Had c/o moderate pain at bedtime during activities, oxy given, did not require additional dose overnight. Incision with staples, dry intact, covered with dry dressing per patient's request.

## 2022-01-10 ENCOUNTER — APPOINTMENT (INPATIENT)
Dept: PHYSICAL THERAPY | Facility: REHABILITATION | Age: 82
DRG: 560 | End: 2022-01-10
Payer: MEDICARE

## 2022-01-10 ENCOUNTER — APPOINTMENT (OUTPATIENT)
Dept: PSYCHOLOGY | Facility: CLINIC | Age: 82
End: 2022-01-10
Payer: MEDICARE

## 2022-01-10 ENCOUNTER — APPOINTMENT (INPATIENT)
Dept: OCCUPATIONAL THERAPY | Facility: REHABILITATION | Age: 82
DRG: 560 | End: 2022-01-10
Payer: MEDICARE

## 2022-01-10 LAB
ALBUMIN SERPL-MCNC: 2.6 G/DL (ref 3.4–5)
ALP SERPL-CCNC: 103 IU/L (ref 35–126)
ALT SERPL-CCNC: 13 IU/L (ref 11–54)
ANION GAP SERPL CALC-SCNC: 7 MEQ/L (ref 3–15)
AST SERPL-CCNC: 21 IU/L (ref 15–41)
BILIRUB SERPL-MCNC: 0.7 MG/DL (ref 0.3–1.2)
BUN SERPL-MCNC: 11 MG/DL (ref 8–20)
CALCIUM SERPL-MCNC: 8.2 MG/DL (ref 8.9–10.3)
CHLORIDE SERPL-SCNC: 106 MEQ/L (ref 98–109)
CO2 SERPL-SCNC: 25 MEQ/L (ref 22–32)
CREAT SERPL-MCNC: 0.5 MG/DL (ref 0.6–1.1)
ERYTHROCYTE [DISTWIDTH] IN BLOOD BY AUTOMATED COUNT: 18.1 % (ref 11.7–14.4)
GFR SERPL CREATININE-BSD FRML MDRD: >60 ML/MIN/1.73M*2
GLUCOSE SERPL-MCNC: 81 MG/DL (ref 70–99)
HCT VFR BLDCO AUTO: 24.6 % (ref 35–45)
HGB BLD-MCNC: 7.5 G/DL (ref 11.8–15.7)
MAGNESIUM SERPL-MCNC: 2.1 MG/DL (ref 1.8–2.5)
MCH RBC QN AUTO: 20.1 PG (ref 28–33.2)
MCHC RBC AUTO-ENTMCNC: 30.5 G/DL (ref 32.2–35.5)
MCV RBC AUTO: 65.8 FL (ref 83–98)
PDW BLD AUTO: 8.7 FL (ref 9.4–12.3)
PLATELET # BLD AUTO: 516 K/UL (ref 150–369)
POTASSIUM SERPL-SCNC: 4.1 MEQ/L (ref 3.6–5.1)
PROT SERPL-MCNC: 5.7 G/DL (ref 6–8.2)
RBC # BLD AUTO: 3.74 M/UL (ref 3.93–5.22)
SODIUM SERPL-SCNC: 138 MEQ/L (ref 136–144)
WBC # BLD AUTO: 13.5 K/UL (ref 3.8–10.5)

## 2022-01-10 PROCEDURE — 81001 URINALYSIS AUTO W/SCOPE: CPT | Performed by: HOSPITALIST

## 2022-01-10 PROCEDURE — 83735 ASSAY OF MAGNESIUM: CPT | Performed by: HOSPITALIST

## 2022-01-10 PROCEDURE — 97530 THERAPEUTIC ACTIVITIES: CPT | Mod: GP

## 2022-01-10 PROCEDURE — 25000000 HC PHARMACY GENERAL: Performed by: PHYSICAL MEDICINE & REHABILITATION

## 2022-01-10 PROCEDURE — 85027 COMPLETE CBC AUTOMATED: CPT | Performed by: HOSPITALIST

## 2022-01-10 PROCEDURE — 12800000 HC ROOM AND CARE SEMIPRIVATE REHAB

## 2022-01-10 PROCEDURE — 81003 URINALYSIS AUTO W/O SCOPE: CPT | Performed by: HOSPITALIST

## 2022-01-10 PROCEDURE — 63700000 HC SELF-ADMINISTRABLE DRUG: Performed by: PHYSICAL MEDICINE & REHABILITATION

## 2022-01-10 PROCEDURE — 36415 COLL VENOUS BLD VENIPUNCTURE: CPT | Performed by: HOSPITALIST

## 2022-01-10 PROCEDURE — 97110 THERAPEUTIC EXERCISES: CPT | Mod: GP,CQ

## 2022-01-10 PROCEDURE — 97535 SELF CARE MNGMENT TRAINING: CPT | Mod: GO

## 2022-01-10 PROCEDURE — 80053 COMPREHEN METABOLIC PANEL: CPT | Performed by: HOSPITALIST

## 2022-01-10 PROCEDURE — 63700000 HC SELF-ADMINISTRABLE DRUG: Performed by: INTERNAL MEDICINE

## 2022-01-10 PROCEDURE — 63700000 HC SELF-ADMINISTRABLE DRUG: Performed by: HOSPITALIST

## 2022-01-10 PROCEDURE — 97116 GAIT TRAINING THERAPY: CPT | Mod: GP,CQ

## 2022-01-10 PROCEDURE — 90832 PSYTX W PT 30 MINUTES: CPT | Performed by: PSYCHOLOGIST

## 2022-01-10 RX ADMIN — CLONIDINE HYDROCHLORIDE 0.1 MG: 0.1 TABLET ORAL at 07:59

## 2022-01-10 RX ADMIN — THERA TABS 1 TABLET: TAB at 07:59

## 2022-01-10 RX ADMIN — OXYCODONE HYDROCHLORIDE 5 MG: 5 TABLET ORAL at 20:23

## 2022-01-10 RX ADMIN — SENNOSIDES AND DOCUSATE SODIUM 1 TABLET: 50; 8.6 TABLET ORAL at 20:23

## 2022-01-10 RX ADMIN — PANTOPRAZOLE SODIUM 40 MG: 40 TABLET, DELAYED RELEASE ORAL at 07:59

## 2022-01-10 RX ADMIN — Medication 1000 UNITS: at 07:59

## 2022-01-10 RX ADMIN — DILTIAZEM HYDROCHLORIDE 240 MG: 120 CAPSULE, COATED, EXTENDED RELEASE ORAL at 07:58

## 2022-01-10 RX ADMIN — BUDESONIDE 0.5 MG: 0.5 SUSPENSION RESPIRATORY (INHALATION) at 05:53

## 2022-01-10 RX ADMIN — DABIGATRAN ETEXILATE MESYLATE 150 MG: 150 CAPSULE ORAL at 20:23

## 2022-01-10 RX ADMIN — BUDESONIDE 0.5 MG: 0.5 SUSPENSION RESPIRATORY (INHALATION) at 18:06

## 2022-01-10 RX ADMIN — LEVOTHYROXINE SODIUM 75 MCG: 75 TABLET ORAL at 05:53

## 2022-01-10 RX ADMIN — CLONIDINE HYDROCHLORIDE 0.1 MG: 0.1 TABLET ORAL at 20:23

## 2022-01-10 RX ADMIN — DIGOXIN 250 MCG: 250 TABLET ORAL at 05:53

## 2022-01-10 RX ADMIN — FERROUS SULFATE TAB 325 MG (65 MG ELEMENTAL FE) 325 MG: 325 (65 FE) TAB at 07:59

## 2022-01-10 RX ADMIN — ACETAMINOPHEN 1000 MG: 500 TABLET, FILM COATED ORAL at 05:53

## 2022-01-10 RX ADMIN — TIOTROPIUM BROMIDE INHALATION SPRAY 2 PUFF: 3.12 SPRAY, METERED RESPIRATORY (INHALATION) at 08:03

## 2022-01-10 RX ADMIN — OXYCODONE HYDROCHLORIDE 5 MG: 5 TABLET ORAL at 14:21

## 2022-01-10 RX ADMIN — ACETAMINOPHEN 1000 MG: 500 TABLET, FILM COATED ORAL at 14:23

## 2022-01-10 RX ADMIN — DORZOLAMIDE HYDROCHLORIDE 1 DROP: 20 SOLUTION/ DROPS OPHTHALMIC at 18:08

## 2022-01-10 RX ADMIN — OXYCODONE HYDROCHLORIDE 5 MG: 5 TABLET ORAL at 07:58

## 2022-01-10 RX ADMIN — LATANOPROST 1 DROP: 50 SOLUTION/ DROPS OPHTHALMIC at 20:25

## 2022-01-10 RX ADMIN — ROSUVASTATIN CALCIUM 5 MG: 5 TABLET, FILM COATED ORAL at 18:06

## 2022-01-10 RX ADMIN — DABIGATRAN ETEXILATE MESYLATE 150 MG: 150 CAPSULE ORAL at 07:59

## 2022-01-10 RX ADMIN — ACETAMINOPHEN 1000 MG: 500 TABLET, FILM COATED ORAL at 21:18

## 2022-01-10 RX ADMIN — DORZOLAMIDE HYDROCHLORIDE 1 DROP: 20 SOLUTION/ DROPS OPHTHALMIC at 05:53

## 2022-01-10 ASSESSMENT — COGNITIVE AND FUNCTIONAL STATUS - GENERAL
THOUGHT_PROCESS: WNL;WORRY
ATTENTION: WNL
DELUSIONS: NONE OR AGE APPROPRIATE
RECENT MEMORY: WNL
EST. PREMORBID INTELLIGENCE: AVERAGE
AROUSAL LEVEL: ATTENTIVE
AFFECT: FULL RANGE
CONCENTRATION: WNL
ORIENTATION: FULLY ORIENTED
SLEEP_WAKE_CYCLE: DECREASED
PERCEPTUAL FUNCTION: PAIN
APPEARANCE: WELL GROOMED
IMPULSE CONTROL: INTACT
MOOD: ANXIOUS;MOTIVATED
THOUGHT_CONTENT: APPROPRIATE
LIBIDO: NO CHANGE
REMOTE MEMORY: WNL
APPETITE: NO CHANGE
EYE_CONTACT: WNL
INSIGHT: INTACT
PSYCHOMOTOR FUNCTIONING: WNL
SPEECH: REGULAR

## 2022-01-10 NOTE — PLAN OF CARE
Problem: Rehabilitation (IRF) Plan of Care  Goal: Plan of Care Review  Outcome: Progressing  Flowsheets (Taken 1/10/2022 0422)  Plan of Care Reviewed With: patient  Outcome Summary: Patient had moderate pain in left hip during bedtime activities, PRN oxy given, did not require additional dose overnight. Appeared asleep throughout the night. incision with staples, no drainage noted, surrounding skin ecchymotic. No new issues overnight.

## 2022-01-10 NOTE — PROGRESS NOTES
Patient: Bee Cobb  [facility] [address]  MRN: [id number]  Today's date: 1/10/2022    History of Present Illness  Bee is a 81 y.o. female admitted on 1/1/2022 with Hip fracture requiring operative repair, left, closed, initial encounter (CMS/[facility]) [S72.002A]. Principal problem is Hip fracture requiring operative repair, left, closed, initial encounter (CMS/[facility]).    81 y.o. f who presents with left hip pain after falling out of bed. She had an immediate onset of left hip pain and inability to ambulate.  No LOC.  Radiographic evaluation showed a displaced transcervical femoral neck fracture by CT.  On 12-29-21 she underwent left hip hemiarthroplasty.  Pt is WBAT to LLE and anterior hip precautions x 6 weeks. Ortho recommends aspirin 325mg/day x 6 weeks in addition to pt's home pradaxa which was restarted postoperatively. Pt did well postoperatively, but was noted to have postop anemia with a hgb drop of 12-->9. Per [place], this is expected due to Pradaxa use.  Pain is being managed with multimodal analgesia; scheduled tylenol, lido patch, PRN oxycodone, ice therapy.  PT/OT/PM&R evaluated pt and recommend acute rehab at discharge.          Past Medical History  Bee has a past medical history of Atrial fibrillation (CMS/[facility]), Glaucoma, History of transfusion, Hyperthyroidism, Lipid disorder, Osteoporosis, PAN (polyarteritis nodosa) (CMS/[facility]), and Thalassemia. H/o recent clavicle fx 9/11/21 Completed course of therapy and has full ROM      PT Pain    Date/Time Pain Type Side/Orientation Location Rating: Rest Interventions [facility]   01/10/22 1409 Pain Assessment left hip 8 diversional activity provided [facility]   01/10/22 1421 Pain Assessment -- hip 8 -- MAS   01/10/22 1423 Pain Assessment left hip 8 medication administered MAS          Prior Living Environment      Most Recent Value   People in Home spouse   Current Living Arrangements home  [9 MADY, FF inside]   Home Accessibility  stairs to enter home (Group), stairs within home (Group)   Living Environment Comment Row home 13 steps into house, 9 steps to BR on 2nd floor   Number of Stairs, Main Entrance 9   Surface of Stairs, Main Entrance concrete   Stair Railings, Main Entrance railings on both sides of stairs   Location, Bathroom second floor, must negotiate stairs to access   [place], Bathroom tile floor   Bathroom Access Comment ztub/shower combo, hand held shower, bench and suction grab bars ( checks stability prior to each shower)   Number of Stairs, Within Home, Primary --  [13]   Stair Railings, Within Home, Primary railing on right side (ascending)          Prior Level of Function      Most Recent Value   Dominant Hand left   Ambulation independent   Transferring independent   Toileting independent   Bathing independent   Dressing independent   Eating independent   Assistive Device Currently Used at Home shower chair, grab bar           IRF PT Evaluation and Treatment - 01/10/22 1409        PT Time Calculation    Start Time 1400     Stop Time 1430     Time Calculation (min) 30 min        Session Details    Document Type daily treatment/progress note     Mode of Treatment physical therapy;individual therapy        Transfers    Transfers stand pivot transfer     Maintains Weight-Bearing Status (Transfers) able to maintain        Sit to Stand Transfer    [place], Sit to Stand Transfer close supervision     Verbal Cues safety     Assistive Device gait belt;walker, front-wheeled     Comment CLS for safety        Stand to Sit Transfer    [place], Stand to Sit Transfer close supervision     Verbal Cues safety     Assistive Device gait belt;walker, front-wheeled     Comment CLS for safety        Stand Pivot Transfer    [place], Stand Pivot/Stand Step Transfer close supervision     Verbal Cues safety     Assistive Device gait belt;walker, front-wheeled     Comment CLS for safety via amb approach from WC to EOB         Lower Extremity (Therapeutic Exercise)    Exercise Position/Type seated     General Exercise bilateral;ankle pumps;LAQ (long arc quad);gluteal sets;SAQ (short arc quad)     Comment Seated TE: AP x30 BLE. LAQ x30 LLE. SUPINE: glute sets x30, SAQ LLE x20        Daily Progress Summary (PT)    Daily Outcome Statement Pt received via direct handoff from previous PT session. Pt fatigue with increased pain, but agreeable to participate. She completed seated and supine TE and remained in bed at end of session. Pt received pain meds during session                           IRF PT Goals      Most Recent Value   Bed Mobility Goal 1    Activity/Assistive Device scooting, sit to supine, supine to sit at 01/02/2022 1102   [place] minimum assist (75% or more patient effort) at 01/02/2022 1102   Time Frame short-term goal (STG), 1 week at 01/02/2022 1102   Progress/Outcome goal met at 01/06/2022 0815   Bed Mobility Goal 2    Activity/Assistive Device scooting, sit to supine, supine to sit at 01/02/2022 1102   [place] modified independence at 01/02/2022 1102   Time Frame 1 week, long-term goal (LTG) at 01/06/2022 0815   Progress/Outcome continuing progress toward goal, goal ongoing at 01/06/2022 0815   Transfer Goal 1    Activity/Assistive Device sit-to-stand/stand-to-sit, stand pivot, walker, front-wheeled at 01/02/2022 1102   [place] supervision required at 01/02/2022 1102   Time Frame short-term goal (STG), 3 - 5 days at 01/06/2022 0815   Progress/Outcome good progress toward goal, continuing progress toward goal, goal ongoing at 01/06/2022 0815   Transfer Goal 2    Activity/Assistive Device sit-to-stand/stand-to-sit, stand pivot, walker, front-wheeled at 01/02/2022 1102   [place] modified independence at 01/02/2022 1102   Time Frame long-term goal (LTG), 1 week at 01/06/2022 0815   Progress/Outcome good progress toward goal, continuing progress toward goal, goal ongoing at 01/06/2022 0815   Gait/Walking  Locomotion Goal 1    Activity/Assistive Device gait (walking locomotion), decrease asymmetrical patterns, decrease fall risk, forward stepping, improve balance and speed at 01/02/2022 1102   Distance 100 feet at 01/06/2022 0815   [place] supervision required at 01/02/2022 1102   Time Frame short-term goal (STG), 3 - 5 days at 01/06/2022 0815   Progress/Outcome progress slower than expected, goal revised this date at 01/06/2022 0815   Gait/Walking Locomotion Goal 2    Activity/Assistive Device gait (walking locomotion), decrease asymmetrical patterns, decrease fall risk, forward stepping, improve balance and speed at 01/02/2022 1102   Distance 100 feet at 01/06/2022 0815   [place] modified independence at 01/02/2022 1102   Time Frame long-term goal (LTG), 1 week at 01/06/2022 0815   Progress/Outcome progress slower than expected, goal revised this date at 01/06/2022 0815   Stairs Goal 1    Activity/Assistive Device ascending stairs, descending stairs at 01/04/2022 1505   Number of Stairs 8 at 01/04/2022 1505   [place] minimum assist (75% or more patient effort) at 01/04/2022 1505   Time Frame short-term goal (STG), 1 week at 01/06/2022 0815   Progress/Outcome progress slower than expected, goal ongoing at 01/06/2022 0815   Stairs Goal 2    Activity/Assistive Device ascending stairs, descending stairs at 01/04/2022 1505   Number of Stairs 12 at 01/04/2022 1505   [place] supervision required at 01/04/2022 1505   Time Frame long-term goal (LTG), 14 days or less at 01/06/2022 0815   Progress/Outcome progress slower than expected, goal revised this date, goal ongoing at 01/06/2022 0815

## 2022-01-10 NOTE — PROGRESS NOTES
Cardiology Progress Note    Subjective:  -no CV complaints    Allergies: Atorvastatin; Penicillins; Shellfish derived; Iodinated contrast media; Rosuvastatin; Covid-19 vaccine, mrna, [id number], lnp-s (moderna); and Covid-19 vaccine, [id number], lnp-s (moderna)    ROS:  Negative except those listed in HPI and A&P      Physical Exam:  Constitutional: Appears well-developed and well-nourished. No distress.    Cardiovascular: RRR, no murmur noted.  Pulmonary/Chest: CTA, poor repiratory effort.  Abdominal: Soft. Bowel sounds are normal.  Musculoskeletal: LE edema.  Neurological: AAOx3.    VS:  Patient Vitals for the past 72 hrs:   BP Temp Temp src Pulse Resp SpO2   01/10/22 1305 140/89 -- -- 76 -- 97 %   01/10/22 0938 (!) 123/25 -- -- -- -- --   01/10/22 0625 139/64 36.8 °C (98.3 °F) Oral 65 16 95 %   01/09/22 2100 (!) 123/58 36.8 °C (98.2 °F) Oral 68 16 95 %   01/09/22 1500 (!) 115/58 36.9 °C (98.4 °F) Oral 73 16 95 %   01/09/22 1411 (!) 127/59 -- -- 77 -- 95 %   01/09/22 0700 130/60 36.7 °C (98.1 °F) Oral 67 18 96 %   01/09/22 0550 140/60 -- -- 86 -- --   01/08/22 1935 -- 36.5 °C (97.7 °F) Oral -- -- --   01/08/22 1934 (!) 141/66 -- -- 77 18 95 %   01/08/22 1609 133/76 36.8 °C (98.3 °F) Oral 78 17 95 %   01/08/22 1300 (!) 143/65 -- -- 81 -- 97 %   01/08/22 0627 135/63 36.6 °C (97.8 °F) Oral 73 16 96 %   01/08/22 0540 -- -- -- -- -- 96 %   01/07/22 2040 131/62 36.7 °C (98.1 °F) Oral 83 18 96 %   01/07/22 1500 (!) 136/59 36.8 °C (98.2 °F) Oral 86 18 93 %   01/07/22 1438 (!) 124/42 -- -- 88 -- --     Labs:  Recent labs reviewed  Results from last 7 days   Lab Units 01/10/22  0450   WBC K/uL 13.50*   HEMOGLOBIN g/dL 7.5*   HEMATOCRIT % 24.6*   PLATELETS K/uL 516*   SODIUM mEQ/L 138   POTASSIUM mEQ/L 4.1   CHLORIDE mEQ/L 106   CO2 mEQ/L 25   GLUCOSE mg/dL 81   BUN mg/dL 11   CREATININE mg/dL 0.5*   CALCIUM mg/dL 8.2*   ANION GAP mEQ/L 7   AST IU/L 21   ALT IU/L 13   ALBUMIN g/dL 2.6*   EGFR mL/min/1.73m*2 >60.0   MAGNESIUM  "mg/dL 2.1     No intake or output data in the 24 hours ending 01/10/22 1415     Tests:  EKG 12/29/21:  Normal sinus rhythm with sinus arrhythmia   Normal ECG     EKG 01/06/22:  pending     TTE 3/5/21:   1. Normal functioning bioprosthetic aortic valve replacement    2. Left ventricular hypertrophy with normal systolic function and moderate diastolic dysfunction      Catheterization:  [facility] 1/25/2019 \"Coronary angiography revealed no obstructive coronary artery disease in a codominant distribution.\"     Current CV Medications:  •  dabigatran etexilate (PRADAXA) capsule 150 mg, 150 mg, oral, BID  •  digoxin (LANOXIN) tablet 250 mcg, 250 mcg, oral, Daily  •  dilTIAZem CD (CARDIZEM CD) 24 hr ER capsule 240 mg, 240 mg, oral, Daily  •  levothyroxine (SYNTHROID) tablet 75 mcg, 75 mcg, oral, Daily  •  rosuvastatin (CRESTOR) tablet 5 mg, 5 mg, oral, Daily    Assessment and Plan:  Bee Cobb is a 81 y.o. female with h/o severe AS s/p TAVR, PAFib (on Pradaxa), HTN CARRINGTON who presented to [facility] ER c/o L hip pain s/p fall out of bed.      1. Mechanical fall with L hip Fx s/p  -also contributing to elevated HR, need for good pain control  -per Ortho     2. PAfib  -last EKG showed NSR  -[id number]= 4, on pradaxa  -on Dig 0.250 mg po daily and Cardizem  daily  - On 1/6 pts HR elevated, will order EKG     3. h/o severe AS s/p TAVR  -Echo 3/2021:  Normal functioning bioprosthetic aortic valve replacement      4. anemia  -post op anemia due to chronic blood loss on iron/mvi  -alpha thalassemia trait with chronic anemia  -also contributing to elevated HR     5. H/o CARRINGTON and COPD  -per IM    6. HTN  - BP elevated on current regimen  - Given comorbidities, will avoid ACEi/ARB for now due to recent hyponatremia, will avoid BB due to h/o COPD, will hold off for now dihydropyridines CCB given on Diltiazem (non-dihydropyridines) for rate control.  - On 1/6 will start a trial of low dose Clonidine 0.1mg BID for now (also for " HR control)   - Monitor for results      [facility]  Deedee Brown PA-C

## 2022-01-10 NOTE — ASSESSMENT & PLAN NOTE
Mrs. Cobb is an 81-year-old female who presented to [facility] on 12/29/2021 with complaints of left hip pain after falling out of bed.  X-ray left hip revealed left femoral neck fracture.  CT left hip confirmed displaced fracture left femoral neck.  X-ray left knee was negative.  Head CT was negative for skull fracture or intracranial hemorrhage.  CT cervical spine was negative for fracture dislocation.  Dr. Pena from orthopedic surgery was consulted and performed left total hip replacement on 12/29/2021.  She is cleared for weightbearing to left lower extremity with anterior hip precautions for 6 weeks.  She is anticoagulated with Pradaxa for atrial fibrillation.  Aspirin 325 mg daily for 6 weeks was added for DVT prophylaxis on top of Pradaxa.  Postoperative anemia stabilized at 8.6.  She was ultimately determined to be medically stable for transfer to [place] rehab on 1/1/2022 for an acute inpatient rehabilitation program to address deficits related to left hip fracture status post total hip replacement.    She is weightbearing as tolerated.  She will be maintained on left hip precautions and aspirin for 6 weeks.  She will participate in 3 hours of physical and occupational therapy as well as receive 24-hour rehab nursing care.  She will follow-up with Dr. Pena from orthopedic surgery in 2 weeks.    Incision intact.  Some mild serous drainage.  Staples intact.  Continue to monitor.    Pain control improving over time.  Using infrequent oxycodone.  Tylenol around-the-clock.    Patient progressing with therapies.  Min assist with transfers and ambulation and bed mobility.  Patient continues to improve nicely with therapies with better pain control.

## 2022-01-10 NOTE — PROGRESS NOTES
"Daily Progress Note       Patient was seen and examined.   Attestation Notes: Face to face encounter completed    Subjective    Patient's pain under better control.  Using oxycodone but infrequently.  Tolerating therapies and improving.  Objective     Visit Vitals  /64 (BP Location: Right upper arm, Patient Position: Lying)   Pulse 65   Temp 36.8 °C (98.3 °F) (Oral)   Resp 16   Ht 1.676 m (5' 5.98\")   Wt 58.3 kg (128 lb 8.5 oz)   SpO2 95%   BMI 20.76 kg/m²     Review of Systems:  Pertinent items are noted in HPI.  Denies chest pain and shortness of breath.  Review of systems otherwise negative.    Labs     Results from last 7 days   Lab Units 01/10/22  0450   WBC K/uL 13.50*   HEMOGLOBIN g/dL 7.5*   HEMATOCRIT % 24.6*   PLATELETS K/uL 516*     Results from last 7 days   Lab Units 01/10/22  0450   SODIUM mEQ/L 138   POTASSIUM mEQ/L 4.1   CHLORIDE mEQ/L 106   CO2 mEQ/L 25   BUN mg/dL 11   CREATININE mg/dL 0.5*   CALCIUM mg/dL 8.2*   ALBUMIN g/dL 2.6*   BILIRUBIN TOTAL mg/dL 0.7   ALK PHOS IU/L 103   ALT IU/L 13   AST IU/L 21   GLUCOSE mg/dL 81       Full Code    Physical Exam  General      Alert, cooperative, no distress, appears stated age.   Head:    Normocephalic, without obvious abnormality, atraumatic.   Eyes:    PERRL, conjunctiva/corneas clear, EOM's intact.        Nose:   Nares normal, septum midline, mucosa normal, no drainage or            sinus tenderness.   Throat:   Lips, mucosa, and tongue normal.    Neck:   Supple, symmetrical, trachea midline.    Back:     Symmetric, no curvature.   Lungs:     Clear to auscultation bilaterally, respirations unlabored.   Chest wall:    No tenderness or deformity.   Heart:    Regular rate and rhythm, S1 and S2 normal.   Abdomen:     Soft, non-tender, bowel sounds active all four quadrants,     no masses, no organomegaly.   Extremities:  Musculoskeletal:  1+ lower extremity edema bilaterally.  Left hip replacement.   Pulses:   1+ and symmetric all extremities. "   Skin:  Incision intact with staples with mild serous drainage.   Neurologic:          Behavior/  Emotional:  CNII-XII intact.  Alert and oriented ×3.  Motor exam stable left hip weakness post hip replacement.  Sensory exam intact.  Reflexes stable decreased reflexes.      Appropriate, cooperative           Plan of care was discussed with patient    Assessment & Plan  * Hip fracture requiring operative repair, left, closed, initial encounter (CMS/[facility])  Assessment & Plan  Mrs. Cobb is an 81-year-old female who presented to [facility] on 12/29/2021 with complaints of left hip pain after falling out of bed.  X-ray left hip revealed left femoral neck fracture.  CT left hip confirmed displaced fracture left femoral neck.  X-ray left knee was negative.  Head CT was negative for skull fracture or intracranial hemorrhage.  CT cervical spine was negative for fracture dislocation.  Dr. Pena from orthopedic surgery was consulted and performed left total hip replacement on 12/29/2021.  She is cleared for weightbearing to left lower extremity with anterior hip precautions for 6 weeks.  She is anticoagulated with Pradaxa for atrial fibrillation.  Aspirin 325 mg daily for 6 weeks was added for DVT prophylaxis on top of Pradaxa.  Postoperative anemia stabilized at 8.6.  She was ultimately determined to be medically stable for transfer to [facility] on 1/[facility]/2022 for an acute inpatient rehabilitation program to address deficits related to left hip fracture status post total hip replacement.    She is weightbearing as tolerated.  She will be maintained on left hip precautions and aspirin for 6 weeks.  She will participate in 3 hours of physical and occupational therapy as well as receive 24-hour rehab nursing care.  She will follow-up with Dr. Pena from orthopedic surgery in 2 weeks.    Incision intact.  Some mild serous drainage.  Staples intact.  Continue to monitor.    Pain control improving over time.   Using infrequent oxycodone.  Tylenol around-the-clock.    Patient progressing with therapies.  Min assist with transfers and ambulation and bed mobility.  Patient continues to improve nicely with therapies with better pain control.    Follow up  Assessment & Plan  PCP Dr. Kanchan Zamarripa after discharge  [phone]     Cardiology after discharge  Ortho Dr. Pena in 2 weeks    At high risk for pressure injury of skin  Assessment & Plan  Turns q2hr    Risk for falls  Assessment & Plan  wean off restraints as able    Pain  Assessment & Plan  Tylenol 1000 mg every 8 hours.  Oxycodone as needed.  Adjust medications as needed.    Postoperative anemia  Assessment & Plan  Iron replacement.  The patient has a history of thalassemia trait and discussed the reasoning behind iron replacement post hip replacement.  Hemoglobin down to 7.7.  Continue to monitor.    Paroxysmal atrial fibrillation (CMS/[facility])  Assessment & Plan  Rate controlled on dig and cardizem, anti-coag on pradaxa    Hypothyroidism  Assessment & Plan  Continue synthroid    Hypertension  Assessment & Plan  Cont cardizem    Glaucoma  Assessment & Plan  Trusopt, xalatan gtt    COPD (chronic obstructive pulmonary disease) (CMS/HCC)  Assessment & Plan  Cont pulmicort, spiriva        Expected Discharge Date:  1/16/2022

## 2022-01-10 NOTE — PROGRESS NOTES
Inpatient Psychology Progress Note    Duration: 25 minutes  Bee Cobb : 1940, a 81 y.o. female, for follow up visit.  Reason:  She has been experiencing adjustment issues.    HPI: hip fx    Current Evaluation:     Mental Status Evaluation:  Arousal Level: Attentive  Appearance: Well Groomed  Speech: Regular  Psychomotor Functioning: WNL  Eye Contact: WNL  Est. Premorbid Intelligence: Average  Orientation: Fully oriented  Attention: WNL  Concentration: WNL  Recent Memory: WNL  Remote Memory: WNL  Thought Content: Appropriate  Thought Process: WNL,Worry  Insight: Intact  Perceptual Function: Pain  Delusions: None or age appropriate  Sleeping: Decreased  Appetite: No Change  Libido: No change  Affect: Full Range  Mood: Anxious,Motivated    Comments:      Additional Assessments done this visit:     [place] Suicide Severity Rating Scale:  Not indicated       [place] Suicide Severity Rating Scale  1. Within the past month, have you wished you were dead or wished you could go to sleep and not wake up?: No  2. Within the past month, have you actually had any thoughts of killing yourself?: No  6. Have you ever done anything, started to do anything, or prepared to do anything to end your life?: No    Safe-T Assessment:  Not indicated        Session Summary:        Goals Addressed                 This Visit's Progress    • Reduce anxiety/depression             Interventions  Acceptance & Adjustment  Goal Setting  Monitoring of Symptoms    Psychoeducation provided on:  Orthopedic Recovery     Recommendations:      Individual Therapy  30 minutesweekly      Visit Diagnosis:     1. PAF (paroxysmal atrial fibrillation) (CMS/HCC)    2. Adjustment disorder with anxiety        Diagnostic Impression:   Weekly Progress Summary: progressing toward goals as expected  Weekly Outcome Statement: Pt c bright affect.  Seeing progress in dressing and mobility. Using pain meds appropriately but c some reluctance.  Anxiety about  falling has diminished. However, pt is worried about managing steps.  Has MADY and no 1st floor bathroom.  Refuses a commode. Has only 1 rail. Eating well. Sleeping ok. Needs occasional repetition due to hearing loss.    Psychological condition is generally improving       Kalie Fuentes, PhD @ 11:58 AM

## 2022-01-10 NOTE — PROGRESS NOTES
[facility] Internal Medicine Progress Note          Patient was seen and examined.   Attestation Notes: Face to face encounter completed    Bee Cobb is a 81 y.o. female who was admitted for Hip fracture requiring operative repair, left, closed, initial encounter (CMS/[facility]) [S72.002A]. Patient was referred by Rloan Crooks MD for medical assessment and management      CC: Hip fracture requiring operative repair, left, closed, initial encounter (CMS/[facility]) [S72.002A]     HPI: Bee Cobb is a 81 y.o. female with HTN, HL, hypothyroid, COPD, alpha thalassemia trait, CARRINGTON s/p treatment, glaucoma, HFpEF, AFIB (on Pradaxa), admitted to [facility] 12/29/21 s/p fall from bed found with left hip pain. X-ray left hip revealed left femoral neck fracture.  CT left hip confirmed displaced fracture left femoral neck.  X-ray left knee was negative.  Head CT was negative for skull fracture or intracranial hemorrhage.  CT cervical spine was negative for fracture dislocation.  Dr. Fazal Pena from orthopedic surgery was consulted and performed left total hip replacement on 12/29/2021. Post op she had anemia but did not require transfusion. She was restarted on Pradaxa for AFIB which also provided DVT ppx.  Her pain was managed with Tylenol and Oxycodone.      Her BP and HR were managed with Cardizem CD. She was also on Digoxin for AFIB.  She was on Crestor for HL and Synthroid for hypothyroid. She was on Spiriva and Pulmicort for COPD.  She was on Trusopt and Xalatan for glaucoma.     The patient was seen by PM&R and found to have residual deficits in ambulation and ADLs due to Hip fracture requiring operative repair, left, closed, initial encounter (CMS/[facility]) [S72.002A] and came to [facility] on 1/1/2022 for acute inpatient rehab.      She participated in therapy.     SUBJECTIVE:  Patient interviewed and examined     Noted to have ongoing leukocytosis and anemia. No obvious infection or bleeding.     BP  and HR remains stable.    Pain stable, improved constipation, no abdominal pain or nausea, no dysuria, no chest pain, palpitations, dyspnea or fevers    Review of Systems:  All other systems reviewed and negative except as noted in the HPI.    Current meds and allergies reviewed    Past Medical History:   Diagnosis Date   • (HFpEF) heart failure with preserved ejection fraction (CMS/HCC)    • Alpha thalassemia trait    • Atrial fibrillation (CMS/HCC)    • Closed left hip fracture (CMS/HCC) 12/29/2021   • COPD (chronic obstructive pulmonary disease) (CMS/HCC)    • Glaucoma    • History of transfusion    • Hypertension    • Hypothyroidism    • Lipid disorder    • Malignant melanoma (CMS/HCC)    • Mycobacterium avium-intracellulare complex (CMS/HCC)    • Osteoporosis    • PAN (polyarteritis nodosa) (CMS/HCC)      Past Surgical History:   Procedure Laterality Date   • AORTIC VALVE REPLACEMENT     • TONSILLECTOMY     • TOTAL HIP ARTHROPLASTY Left 12/29/2021     Social History     Tobacco Use   • Smoking status: Never Smoker   • Smokeless tobacco: Never Used   Vaping Use   • Vaping Use: Never used   Substance Use Topics   • Alcohol use: Never   • Drug use: Never      History reviewed. No pertinent family history.    Vital signs in last 24 hours:  Temp:  [36.8 °C (98.2 °F)-36.8 °C (98.3 °F)] 36.8 °C (98.3 °F)  Heart Rate:  [65-76] 76  Resp:  [16] 16  BP: (123-140)/(25-89) 140/89  Vital signs reviewed 01/10/22 3:39 PM    Physical Exam  Vitals and nursing note reviewed.   Constitutional:       Appearance: Normal appearance.   HENT:      Head: Normocephalic and atraumatic.      Right Ear: External ear normal.      Left Ear: External ear normal.      Nose: Nose normal.      Mouth/Throat:      Mouth: Mucous membranes are moist.      Pharynx: Oropharynx is clear.   Eyes:      Extraocular Movements: Extraocular movements intact.      Conjunctiva/sclera: Conjunctivae normal.      Pupils: Pupils are equal, round, and reactive to  light.   Cardiovascular:      Rate and Rhythm: Normal rate. Rhythm irregular.      Pulses: Normal pulses.      Heart sounds: Normal heart sounds.   Pulmonary:      Effort: Pulmonary effort is normal.      Breath sounds: Normal breath sounds.   Abdominal:      General: Abdomen is flat. Bowel sounds are normal.      Palpations: Abdomen is soft.   Musculoskeletal:         General: Signs of injury (s/p ORIF left hip fx) present.      Right lower leg: Edema present.      Left lower leg: Edema present.   Skin:     General: Skin is warm and dry.   Neurological:      Mental Status: She is alert and oriented to person, place, and time.   Psychiatric:         Mood and Affect: Mood normal.         Behavior: Behavior normal.                 Objective    Labs:  I have reviewed the patient's labs.  Significant abnormals are anemia, leukocytosis.  Results from last 7 days   Lab Units 01/10/22  0450   WBC K/uL 13.50*   HEMOGLOBIN g/dL 7.5*   HEMATOCRIT % 24.6*   PLATELETS K/uL 516*     Results from last 7 days   Lab Units 01/10/22  0450   SODIUM mEQ/L 138   POTASSIUM mEQ/L 4.1   CHLORIDE mEQ/L 106   CO2 mEQ/L 25   BUN mg/dL 11   CREATININE mg/dL 0.5*   CALCIUM mg/dL 8.2*   ALBUMIN g/dL 2.6*   BILIRUBIN TOTAL mg/dL 0.7   ALK PHOS IU/L 103   ALT IU/L 13   AST IU/L 21   GLUCOSE mg/dL 81     Lab Results   Component Value Date    DIGOXIN 1.0 01/05/2022       Imaging:  Not applicable        ASSESSMENT/PLAN:    81 y.o. female with HTN, HL, hypothyroid, COPD, alpha thalassemia trait, CARRINGTON s/p treatment, glaucoma, HFpEF, AFIB (on Pradaxa), admitted to [facility] 12/29/21 s/p fall from bed found to have left hip fracture and underwent left BARB by Dr. Fazal Pena 12/29/21.     1. Ortho:  -s/p fall with left hip fx s/p left BABR  -Tylenol and Oxycodone for pain     2. Vasc:  -bilat LE edema  -SCDs and Pradaxa for DVT ppx     3. Heme:  -post op anemia due to chronic blood loss on iron/mvi  -alpha thalassemia trait with chronic  anemia  -leukocytosis reactive  -follow CBC     4. Renal:  -increased risk of dehydration and electrolyte changes  -follow BMP, Mg     5. Gi:  -Senokot-S for bowels  -Protonix ulcer ppx     6. Gu:  -1/2/22 UA shows hematuria, possibly trauma for cath and being on Pradaxa  -no UTI or retention  -1/10/22 repeat UA due to previous hematuria with drop in Hgb and new leukocytosis     7. Cardiac:  -AFIB on Cardizem CD, Digoxin and Pradaxa  -remains tachycardic  -1/5/22 Digoxin level therapeutic at 1.0  -hx of AVR  -HFpEF  -HTN on Cardizem CD  -Clonidine added by Cardiology for BP and HR  -watch for orthostatic hypotension  -use cardiac precautions in therapy  -seen by Cardiology     8. Pulm:  -hx of CARRINGTON s/p treatmetn  -COPD on Pulmicort and Spiriva  -incentive spirometry for atelectasis     9. Derm:  -consulted [employer] for skin assessment     10. Nutrition:  -seen by Nutrition for assessment and education     11. Endo:  -HL on Crestor  -hypothyroid on Synthroid     12. Optho  -glaucoma on Xalatan and Trusopt     13. Psych:  -seen by Psychology for support    14. Dispo:  -anticipated discharge 1/16/22     plan discussed with patient, nurse, case management and Rolan Crooks MD Scott Sapperstein, MD  1/10/2022  3:39 PM

## 2022-01-10 NOTE — PROGRESS NOTES
Patient: Bee Cobb  [facility] [address]  MRN: [id number]  Today's date: 1/10/2022    History of Present Illness  Bee is a 81 y.o. female admitted on 1/1/2022 with Hip fracture requiring operative repair, left, closed, initial encounter (CMS/[facility]) [S72.002A]. Principal problem is Hip fracture requiring operative repair, left, closed, initial encounter (CMS/[facility]).    81 y.o. f who presents with left hip pain after falling out of bed. She had an immediate onset of left hip pain and inability to ambulate.  No LOC.  Radiographic evaluation showed a displaced transcervical femoral neck fracture by CT.  On 12-29-21 she underwent left hip hemiarthroplasty.  Pt is WBAT to LLE and anterior hip precautions x 6 weeks. Ortho recommends aspirin 325mg/day x 6 weeks in addition to pt's home pradaxa which was restarted postoperatively. Pt did well postoperatively, but was noted to have postop anemia with a hgb drop of 12-->9. Per [place], this is expected due to Pradaxa use.  Pain is being managed with multimodal analgesia; scheduled tylenol, lido patch, PRN oxycodone, ice therapy.  PT/OT/PM&R evaluated pt and recommend acute rehab at discharge.          Past Medical History  Bee has a past medical history of Atrial fibrillation (CMS/[facility]), Glaucoma, History of transfusion, Hyperthyroidism, Lipid disorder, Osteoporosis, PAN (polyarteritis nodosa) (CMS/[facility]), and Thalassemia. H/o recent clavicle fx 9/11/21 Completed course of therapy and has full ROM     01/10/22 1305   Pain/Comfort/Sleep   Pain Charting Type Pain Assessment   Preferred Pain Scale number (Numeric Rating Pain Scale)   (0-10) Pain Rating: Rest 4   Pain Side/Orientation left   Pain Body Location hip   Pain Management Interventions premedicated for activity   Vital Signs   Heart Rate 76   Heart Rate Source Monitor   SpO2 97 %   Patient Activity At rest   Oxygen Therapy None (Room air)   /89   BP Location Left upper arm   BP  Method Automatic   Patient Position Sitting      01/10/22 1355   Pain/Comfort/Sleep   Pain Charting Type Pain Reassessment   Preferred Pain Scale number (Numeric Rating Pain Scale)   (0-10) Pain Rating: Rest 8   Pain Side/Orientation left   Pain Body Location hip   Pain Management Interventions position adjusted       Prior Living Environment      Most Recent Value   People in Home spouse   Current Living Arrangements home  [9 MADY, FF inside]   Home Accessibility stairs to enter home (Group), stairs within home (Group)   Living Environment Comment Row home 13 steps into house, 9 steps to BR on 2nd floor   Number of Stairs, Main Entrance 9   Surface of Stairs, Main Entrance concrete   Stair Railings, Main Entrance railings on both sides of stairs   Location, Bathroom second floor, must negotiate stairs to access   [place], Bathroom tile floor   Bathroom Access Comment ztub/shower combo, hand held shower, bench and suction grab bars ( checks stability prior to each shower)   Number of Stairs, Within Home, Primary --  [13]   Stair Railings, Within Home, Primary railing on right side (ascending)          Prior Level of Function      Most Recent Value   Dominant Hand left   Ambulation independent   Transferring independent   Toileting independent   Bathing independent   Dressing independent   Eating independent   Assistive Device Currently Used at Home shower chair, grab bar           01/10/22 1305   PT Time Calculation   Start Time 1300   Stop Time 1400   Time Calculation (min) 60 min   Session Details   Document Type daily treatment/progress note   Mode of Treatment individual therapy;physical therapy   General Information   Patient Profile Reviewed yes   General Observations of Patient Pt reporting increase in pain post-tx and to receive scheduled pain medication.   Sit to Stand Transfer   [place], Sit to Stand Transfer close supervision   Verbal Cues safety   Assistive Device gait belt;walker,  front-wheeled   Comment Cl S for safety   Stand to Sit Transfer   [place], Stand to Sit Transfer close supervision   Verbal Cues safety   Assistive Device gait belt;walker, front-wheeled   Comment Cl S for safety   Stand Pivot Transfer   [place], Stand Pivot/Stand Step Transfer close supervision   Verbal Cues safety   Assistive Device gait belt;walker, front-wheeled   Comment CL S for safety   Toilet Transfer   Comment PT: Assisted pt into/out of bathroom post-tx with Cl S via amb approach.   Gait Training   [place], Gait close supervision;increased time to complete   Assistive Device gait belt;walker, front-wheeled   Distance in Feet 170 feet  (170'x1 and 20'x1)   Pattern (Gait) step-through   Deviations/Abnormal Patterns (Gait) antalgic;base of support, narrow;gait speed decreased;step length decreased   Comment (Gait/Stairs) Cl S for safety. Increased time due to 3 standing rest breaks x15 sec each during last 75-ft of amb trial due to dec endurance/+SOB.   Curb Negotiation   [place] minimum assist (75% or more patient effort)   Assistive Device walker, front-wheeled   Curb Height 6 inches   Comment Min A at posterior pelvis for balance/steadying + Min verbal cues for RW management/sequencing. Ascends with RLE. Descends with LLE.   Timed Up and Go Test   Trial One: Timed Up and Go Test 49.83   Comment, Timed Up and Go Test with use of the RW and CL S. Subsequent bouts limited due to fatigue/dec endurance.    Results, Timed Up and Go Test (Balance) patient demonstrating improvement from last assessment on 1/6/22.   Daily Progress Summary (PT)   Daily Outcome Statement Pt tolerated increased ambulation distance with progression to 170-ft, pt required increased time due to need for three short standing rest breaks due to fatigue/+SOB. Pt demonstrates continued improvement in the TUG requiring 50 sec (last re-assessment = 81 sec), pt continues to demonstrate below normative values of 13.5 sec  indicating fall risk/mobility deficit. Pt reporting increase in pain post-tx and to receive scheduled pain medication. Plan to continue to progress functional mobility per pt's tolerance.                 IRF PT Goals      Most Recent Value   Bed Mobility Goal 1    Activity/Assistive Device scooting, sit to supine, supine to sit at 01/02/2022 1102   [place] minimum assist (75% or more patient effort) at 01/02/2022 1102   Time Frame short-term goal (STG), 1 week at 01/02/2022 1102   Progress/Outcome goal met at 01/06/2022 0815   Bed Mobility Goal 2    Activity/Assistive Device scooting, sit to supine, supine to sit at 01/02/2022 1102   [place] modified independence at 01/02/2022 1102   Time Frame 1 week, long-term goal (LTG) at 01/06/2022 0815   Progress/Outcome continuing progress toward goal, goal ongoing at 01/06/2022 0815   Transfer Goal 1    Activity/Assistive Device sit-to-stand/stand-to-sit, stand pivot, walker, front-wheeled at 01/02/2022 1102   [place] supervision required at 01/02/2022 1102   Time Frame short-term goal (STG), 3 - 5 days at 01/06/2022 0815   Progress/Outcome good progress toward goal, continuing progress toward goal, goal ongoing at 01/06/2022 0815   Transfer Goal 2    Activity/Assistive Device sit-to-stand/stand-to-sit, stand pivot, walker, front-wheeled at 01/02/2022 1102   [place] modified independence at 01/02/2022 1102   Time Frame long-term goal (LTG), 1 week at 01/06/2022 0815   Progress/Outcome good progress toward goal, continuing progress toward goal, goal ongoing at 01/06/2022 0815   Gait/Walking Locomotion Goal 1    Activity/Assistive Device gait (walking locomotion), decrease asymmetrical patterns, decrease fall risk, forward stepping, improve balance and speed at 01/02/2022 1102   Distance 100 feet at 01/06/2022 0815   [place] supervision required at 01/02/2022 1102   Time Frame short-term goal (STG), 3 - 5 days at 01/06/2022 0815   Progress/Outcome  progress slower than expected, goal revised this date at 01/06/2022 0815   Gait/Walking Locomotion Goal 2    Activity/Assistive Device gait (walking locomotion), decrease asymmetrical patterns, decrease fall risk, forward stepping, improve balance and speed at 01/02/2022 1102   Distance 100 feet at 01/06/2022 0815   [place] modified independence at 01/02/2022 1102   Time Frame long-term goal (LTG), 1 week at 01/06/2022 0815   Progress/Outcome progress slower than expected, goal revised this date at 01/06/2022 0815   Stairs Goal 1    Activity/Assistive Device ascending stairs, descending stairs at 01/04/2022 1505   Number of Stairs 8 at 01/04/2022 1505   [place] minimum assist (75% or more patient effort) at 01/04/2022 1505   Time Frame short-term goal (STG), 1 week at 01/06/2022 0815   Progress/Outcome progress slower than expected, goal ongoing at 01/06/2022 0815   Stairs Goal 2    Activity/Assistive Device ascending stairs, descending stairs at 01/04/2022 1505   Number of Stairs 12 at 01/04/2022 1505   [place] supervision required at 01/04/2022 1505   Time Frame long-term goal (LTG), 14 days or less at 01/06/2022 0815   Progress/Outcome progress slower than expected, goal revised this date, goal ongoing at 01/06/2022 0815

## 2022-01-10 NOTE — ASSESSMENT & PLAN NOTE
Iron replacement.  The patient has a history of thalassemia trait and discussed the reasoning behind iron replacement post hip replacement.  Hemoglobin down to 7.7.  Continue to monitor.

## 2022-01-10 NOTE — SUBJECTIVE & OBJECTIVE
"   Patient was seen and examined.   Attestation Notes: Face to face encounter completed    Subjective    Patient's pain under better control.  Using oxycodone but infrequently.  Tolerating therapies and improving.  Objective     Visit Vitals  /64 (BP Location: Right upper arm, Patient Position: Lying)   Pulse 65   Temp 36.8 °C (98.3 °F) (Oral)   Resp 16   Ht 1.676 m (5' 5.98\")   Wt 58.3 kg (128 lb 8.5 oz)   SpO2 95%   BMI 20.76 kg/m²     Review of Systems:  Pertinent items are noted in HPI.  Denies chest pain and shortness of breath.  Review of systems otherwise negative.    Labs     Results from last 7 days   Lab Units 01/10/22  0450   WBC K/uL 13.50*   HEMOGLOBIN g/dL 7.5*   HEMATOCRIT % 24.6*   PLATELETS K/uL 516*     Results from last 7 days   Lab Units 01/10/22  0450   SODIUM mEQ/L 138   POTASSIUM mEQ/L 4.1   CHLORIDE mEQ/L 106   CO2 mEQ/L 25   BUN mg/dL 11   CREATININE mg/dL 0.5*   CALCIUM mg/dL 8.2*   ALBUMIN g/dL 2.6*   BILIRUBIN TOTAL mg/dL 0.7   ALK PHOS IU/L 103   ALT IU/L 13   AST IU/L 21   GLUCOSE mg/dL 81       Full Code    Physical Exam  General      Alert, cooperative, no distress, appears stated age.   Head:    Normocephalic, without obvious abnormality, atraumatic.   Eyes:    PERRL, conjunctiva/corneas clear, EOM's intact.        Nose:   Nares normal, septum midline, mucosa normal, no drainage or            sinus tenderness.   Throat:   Lips, mucosa, and tongue normal.    Neck:   Supple, symmetrical, trachea midline.    Back:     Symmetric, no curvature.   Lungs:     Clear to auscultation bilaterally, respirations unlabored.   Chest wall:    No tenderness or deformity.   Heart:    Regular rate and rhythm, S1 and S2 normal.   Abdomen:     Soft, non-tender, bowel sounds active all four quadrants,     no masses, no organomegaly.   Extremities:  Musculoskeletal:  1+ lower extremity edema bilaterally.  Left hip replacement.   Pulses:   1+ and symmetric all extremities.   Skin:  Incision intact " with staples with mild serous drainage.   Neurologic:          Behavior/  Emotional:  CNII-XII intact.  Alert and oriented ×3.  Motor exam stable left hip weakness post hip replacement.  Sensory exam intact.  Reflexes stable decreased reflexes.      Appropriate, cooperative           Plan of care was discussed with patient

## 2022-01-10 NOTE — PROGRESS NOTES
Patient: Bee Cobb  Location: [facility] [address]  MRN: [id number]  Today's date: 1/10/2022    History of Present Illness  Bee is a 81 y.o. female admitted on 1/1/2022 with Hip fracture requiring operative repair, left, closed, initial encounter (CMS/[facility]) [S72.002A]. Principal problem is Hip fracture requiring operative repair, left, closed, initial encounter (CMS/[facility]).    81 y.o. f who presents with left hip pain after falling out of bed. She had an immediate onset of left hip pain and inability to ambulate.  No LOC.  Radiographic evaluation showed a displaced transcervical femoral neck fracture by CT.  On 12-29-21 she underwent left hip hemiarthroplasty.  Pt is WBAT to LLE and anterior hip precautions x 6 weeks. Ortho recommends aspirin 325mg/day x 6 weeks in addition to pt's home pradaxa which was restarted postoperatively. Pt did well postoperatively, but was noted to have postop anemia with a hgb drop of 12-->9. Per [place], this is expected due to Pradaxa use.  Pain is being managed with multimodal analgesia; scheduled tylenol, lido patch, PRN oxycodone, ice therapy.  PT/OT/PM&R evaluated pt and recommend acute rehab at discharge.          Past Medical History  Bee has a past medical history of Atrial fibrillation (CMS/[facility]), Glaucoma, History of transfusion, Hyperthyroidism, Lipid disorder, Osteoporosis, PAN (polyarteritis nodosa) (CMS/[facility]), and Thalassemia. H/o recent clavicle fx 9/11/21 Completed course of therapy and has full ROM      OT Vitals    Date/Time BP BP Location BP Method Pt Position [facility]   01/10/22 0938 123/25 Left upper arm Manual Lying AEB      OT Pain    Date/Time Pain Type Rating: Rest Rating: Activity [facility]   01/10/22 0938 Pain Assessment 0 0 AEB   01/10/22 1054 Pain Reassessment 0 0 AEB          Prior Living Environment      Most Recent Value   People in Home spouse   Current Living Arrangements home  [9 MADY, FF inside]   Home Accessibility stairs to enter  "home (Group), stairs within home (Group)   Living Environment Comment Row home 13 steps into house, 9 steps to BR on 2nd floor   Number of Stairs, Main Entrance 9   Surface of Stairs, Main Entrance concrete   Stair Railings, Main Entrance railings on both sides of stairs   Location, Bathroom second floor, must negotiate stairs to access   [place], Bathroom tile floor   Bathroom Access Comment ztub/shower combo, hand held shower, bench and suction grab bars ( checks stability prior to each shower)   Number of Stairs, Within Home, Primary --  [13]   Stair Railings, Within Home, Primary railing on right side (ascending)          Prior Level of Function      Most Recent Value   Dominant Hand left   Ambulation independent   Transferring independent   Toileting independent   Bathing independent   Dressing independent   Eating independent   Assistive Device Currently Used at Home shower chair, grab bar          Occupational Profile      Most Recent Value   Reason for Services/Referral ADL deficit   Successful Occupations wife,- mother   Occupational History/Life Experiences retired [employer]    Performance Patterns independent in all areas   Patient Goals \"I want to be functional, walk and take care of myself\"           [facility] OT Evaluation and Treatment - 01/10/22 0938        OT Time Calculation    Start Time 0930     Stop Time 1100     Time Calculation (min) 90 min        Session Details    Document Type daily treatment/progress note     Mode of Treatment occupational therapy;individual therapy        General Information    General Observations of Patient Pt received sitting up in bed        Transfers    Transfers shower transfer;toilet transfer        Toilet Transfer    [place], Toilet Transfer touching/steadying assist;1 person assist     Verbal Cues safety;technique     Assistive Device grab bars/safety frame;raised toilet seat     Comment RW support- during hygiene/clothing mgmt        Shower " Transfer    Transfer Technique stand pivot     [place], Shower Transfer touching/steadying assist;1 person assist;safety considerations     Verbal Cues safety;hand placement     Assistive Device grab bars/tub rail;shower chair     Comment barrier free stall wr        Basic Activities of Daily Living (BADLs)    [employer] Conservation Techniques activity adapted to sitting;activity pacing encouraged;asking for necessary assistance promoted;correct body mechanics utilized;correct posture facilitated;equipment and device use facilitated;planning ahead encouraged;storing supplies in easy reach encouraged        Bathing    Self-Performance chest;left arm;right arm;abdomen;front perineal area;buttocks;left upper leg;right upper leg     [place] Assistance left lower leg, including foot;right lower leg, including foot     [place] touching/steadying assist;safety considerations;1 person assist     Position supported sitting;supported standing     Setup Assistance obtain supplies     Adaptive Equipment hand-held shower spray hose;long-handled sponge;shower chair;grab bar/tub rail     Comment seated on bench in barrier free stall shwr, THP, issued LH sponge for distal LEs        Upper Body Dressing    Self-Performance threads left arm, bra/undershirt;threads right arm, bra/undershirt;pulls bra/undershirt over head/around back;pulls bra/undershirt down/adjusts;threads left arm, shirt;threads right arm, shirt;pulls shirt over head/around back;pulls shirt down/adjusts     [place] Assistance obtains clothes     [place] close supervision;1 person assist;safety considerations;increased time to complete     Position supported sitting     Adaptive Equipment none        Lower Body Dressing    Self-Performance threads left leg, underpants;threads right leg, underpants;pulls underpants up or down;threads left leg, pants/shorts;threads right leg, pants/shorts;pulls pants/shorts up or down;dons/doffs left sock;dons/doffs right  sock     [place] Assistance obtains clothes     [place] touching/steadying assist;set up;1 person assist;safety considerations     Position supported sitting;supported standing     Adaptive Equipment reacher;sock aid;dressing stick     [place], Footwear touching/steadying assist;1 person assist;safety considerations     Comment increased time to manage AD        Grooming    [place] Assistance washes, rinses and dries face;washes, rinses and dries hands;brushes/baker hair;oral care (brushing teeth, cleaning dentures)     [place] close supervision;1 person assist;safety considerations     Position supported standing     Setup Assistance obtain supplies     Adaptive Equipment none     [place], Oral Hygiene supervision     Comment RW support sink side        Toileting    [place] perform bladder hygiene;perform bowel hygiene;adjust/manage clothing;touching/steadying assist;1 person assist;safety considerations     Position supported sitting     Setup Assistance obtain supplies     Adaptive Equipment accessible height toilet;commode, 3-in-1;grab bar/safety frame     Comment ongoing cues for posture        BADL Safety/Performance    [place], BADL Safety/Performance balance;endurance/activity tolerance;strength;range of motion     Skilled BADL Treatment/Intervention adaptive equipment training;BADL process/adaptation training     Progress in BADL Status improvement noted        Daily Progress Summary (OT)    Symptoms Noted During/After Treatment fatigue     Progress Toward Functional Goals (OT) progressing toward functional goals as expected     Daily Outcome Statement Pt participated in ADL assessment incl wet shower. Pt continues to be limited by fatigue and decreased postural control but is improving endurance and benefits from use of AD and DME     Barriers to Overall Progress (OT) balance, posture, endurance     Recommendations (OT) continue POC                           IRF OT Goals       Most Recent Value   Transfer Goal 1    Activity/Assistive Device toilet at 01/10/2022 0938   [place] supervision required, set-up required at 01/10/2022 0938   Time Frame short-term goal (STG), 5 - 7 days at 01/10/2022 0938   Strategies/Barriers DME at 01/10/2022 0938   Progress/Outcome good progress toward goal, goal met, goal revised this date at 01/10/2022 0938   Transfer Goal 2    Activity/Assistive Device toilet at 01/10/2022 0938   [place] modified independence at 01/10/2022 0938   Time Frame 2 weeks at 01/10/2022 0938   Strategies/Barriers DME at 01/02/2022 0805   Progress/Outcome goal revised this date at 01/10/2022 0938   Transfer Goal 3    Activity/Assistive Device shower at 01/10/2022 0938   [place] supervision required, set-up required at 01/10/2022 0938   Time Frame short-term goal (STG), 5 - 7 days at 01/10/2022 0938   Strategies/Barriers DME at 01/02/2022 0805   Progress/Outcome good progress toward goal, goal met, goal revised this date at 01/10/2022 0938   Transfer Goal 4    Activity/Assistive Device shower at 01/10/2022 0938   [place] supervision required at 01/10/2022 0938   Time Frame long-term goal (LTG), 2 weeks at 01/10/2022 0938   Strategies/Barriers DME at 01/02/2022 0805   Progress/Outcome goal met, goal revised this date at 01/10/2022 0938   Bathing Goal 1    Activity/Assistive Device bathing skills, all at 01/10/2022 0938   [place] supervision required at 01/10/2022 0938   Time Frame short-term goal (STG), 5 - 7 days at 01/10/2022 0938   Strategies/Barriers DME at 01/02/2022 0805   Progress/Outcome good progress toward goal, goal met, goal revised this date at 01/10/2022 0938   Bathing Goal 2    Activity/Assistive Device bathing skills, all at 01/10/2022 0938   [place] supervision required at 01/10/2022 0938   Time Frame long-term goal (LTG), 2 weeks at 01/10/2022 0938   Strategies/Barriers DME at 01/02/2022 0805   Progress/Outcome goal met, goal revised this  date at 01/10/2022 0938   UB Dressing Goal 1    Activity/Assistive Device upper body dressing at 01/10/2022 0938   [place] supervision required at 01/10/2022 0938   Time Frame short-term goal (STG), 5 - 7 days at 01/10/2022 0938   Strategies/Barriers incl set up at 01/05/2022 1408   Progress/Outcome good progress toward goal, goal met, goal revised this date at 01/10/2022 0938   UB Dressing Goal 2    Activity/Assistive Device upper body dressing at 01/02/2022 0805   [place] modified independence at 01/02/2022 0805   Time Frame long-term goal (LTG), 4 weeks at 01/02/2022 0805   Strategies/Barriers incl s/u at 01/02/2022 0805   LB Dressing Goal 1    Activity/Assistive Device lower body dressing at 01/05/2022 1408   [place] minimum assist (75% or more patient effort) at 01/05/2022 1408   Time Frame short-term goal (STG), 5 - 7 days at 01/05/2022 1408   Strategies/Barriers AD at 01/02/2022 0805   Progress/Outcome good progress toward goal, goal partially met, goal revised this date at 01/05/2022 1408   LB Dressing Goal 2    Activity/Assistive Device lower body dressing at 01/02/2022 0805   [place] supervision required at 01/02/2022 0805   Time Frame long-term goal (LTG), 4 weeks at 01/02/2022 0805   Strategies/Barriers AD at 01/02/2022 0805   Grooming Goal 1    Activity/Assistive Device grooming skills, all at 01/05/2022 1408   [place] supervision required at 01/05/2022 1408   Time Frame 5 - 7 days at 01/05/2022 1408   Strategies/Barriers std @ sink at 01/05/2022 1408   Progress/Outcome good progress toward goal, goal partially met, goal revised this date at 01/05/2022 1408   Grooming Goal 2    Activity/Assistive Device grooming skills, all at 01/02/2022 0805   [place] modified independence at 01/02/2022 0805   Time Frame long-term goal (LTG), 4 weeks at 01/02/2022 0805   Strategies/Barriers std @ sink at 01/02/2022 0805   Toileting Goal 1    Activity/Assistive Device toileting skills, all  at 01/05/2022 1408   [place] tactile cues required at 01/05/2022 1408   Time Frame short-term goal (STG), 5 - 7 days at 01/05/2022 1408   Strategies/Barriers DME at 01/02/2022 0805   Progress/Outcome good progress toward goal, goal partially met, goal revised this date at 01/05/2022 1408   Toileting Goal 2    Activity/Assistive Device toileting skills, all at 01/02/2022 0805   [place] supervision required at 01/02/2022 0805   Time Frame long-term goal (LTG), 4 weeks at 01/02/2022 0805   Strategies/Barriers DME at 01/02/2022 0805

## 2022-01-10 NOTE — ASSESSMENT & PLAN NOTE
PCP Dr. Kanchan Zamarripa after discharge  [phone]     Cardiology after discharge  Ortho Dr. Pena in 2 weeks

## 2022-01-11 ENCOUNTER — APPOINTMENT (INPATIENT)
Dept: OCCUPATIONAL THERAPY | Facility: REHABILITATION | Age: 82
DRG: 560 | End: 2022-01-11
Payer: MEDICARE

## 2022-01-11 ENCOUNTER — APPOINTMENT (INPATIENT)
Dept: PHYSICAL THERAPY | Facility: REHABILITATION | Age: 82
DRG: 560 | End: 2022-01-11
Payer: MEDICARE

## 2022-01-11 LAB
BACTERIA URNS QL MICRO: ABNORMAL /HPF
BILIRUB UR QL STRIP.AUTO: NEGATIVE MG/DL
CAOX CRY URNS QL MICRO: ABNORMAL /HPF
CLARITY UR REFRACT.AUTO: ABNORMAL
COLOR UR AUTO: YELLOW
GLUCOSE UR STRIP.AUTO-MCNC: NEGATIVE MG/DL
HGB UR QL STRIP.AUTO: 3
HYALINE CASTS #/AREA URNS LPF: ABNORMAL /LPF
KETONES UR STRIP.AUTO-MCNC: NEGATIVE MG/DL
LEUKOCYTE ESTERASE UR QL STRIP.AUTO: ABNORMAL
NITRITE UR QL STRIP.AUTO: NEGATIVE
PH UR STRIP.AUTO: 6 [PH]
PROT UR QL STRIP.AUTO: ABNORMAL
RBC #/AREA URNS HPF: ABNORMAL /HPF
RENAL EPI CELLS URNS QL MICRO: ABNORMAL /HPF
SP GR UR REFRACT.AUTO: >=1.03
SQUAMOUS URNS QL MICRO: 1 /HPF
UROBILINOGEN UR STRIP-ACNC: 0.2 EU/DL
WBC #/AREA URNS HPF: ABNORMAL /HPF

## 2022-01-11 PROCEDURE — 63700000 HC SELF-ADMINISTRABLE DRUG: Performed by: HOSPITALIST

## 2022-01-11 PROCEDURE — 97110 THERAPEUTIC EXERCISES: CPT | Mod: GO

## 2022-01-11 PROCEDURE — 97116 GAIT TRAINING THERAPY: CPT | Mod: GP

## 2022-01-11 PROCEDURE — 97530 THERAPEUTIC ACTIVITIES: CPT | Mod: GO

## 2022-01-11 PROCEDURE — 63700000 HC SELF-ADMINISTRABLE DRUG: Performed by: INTERNAL MEDICINE

## 2022-01-11 PROCEDURE — 12800000 HC ROOM AND CARE SEMIPRIVATE REHAB

## 2022-01-11 PROCEDURE — 97535 SELF CARE MNGMENT TRAINING: CPT | Mod: GO

## 2022-01-11 PROCEDURE — 63700000 HC SELF-ADMINISTRABLE DRUG: Performed by: PHYSICAL MEDICINE & REHABILITATION

## 2022-01-11 PROCEDURE — 97530 THERAPEUTIC ACTIVITIES: CPT | Mod: GP

## 2022-01-11 PROCEDURE — 97110 THERAPEUTIC EXERCISES: CPT | Mod: GP

## 2022-01-11 PROCEDURE — 25000000 HC PHARMACY GENERAL: Performed by: PHYSICAL MEDICINE & REHABILITATION

## 2022-01-11 RX ADMIN — DORZOLAMIDE HYDROCHLORIDE 1 DROP: 20 SOLUTION/ DROPS OPHTHALMIC at 18:19

## 2022-01-11 RX ADMIN — ACETAMINOPHEN 1000 MG: 500 TABLET, FILM COATED ORAL at 06:18

## 2022-01-11 RX ADMIN — OXYCODONE HYDROCHLORIDE 5 MG: 5 TABLET ORAL at 09:23

## 2022-01-11 RX ADMIN — TIOTROPIUM BROMIDE INHALATION SPRAY 2 PUFF: 3.12 SPRAY, METERED RESPIRATORY (INHALATION) at 07:59

## 2022-01-11 RX ADMIN — LEVOTHYROXINE SODIUM 75 MCG: 75 TABLET ORAL at 06:19

## 2022-01-11 RX ADMIN — BUDESONIDE 0.5 MG: 0.5 SUSPENSION RESPIRATORY (INHALATION) at 18:19

## 2022-01-11 RX ADMIN — ROSUVASTATIN CALCIUM 5 MG: 5 TABLET, FILM COATED ORAL at 18:18

## 2022-01-11 RX ADMIN — ACETAMINOPHEN 1000 MG: 500 TABLET, FILM COATED ORAL at 14:13

## 2022-01-11 RX ADMIN — DIGOXIN 250 MCG: 250 TABLET ORAL at 06:19

## 2022-01-11 RX ADMIN — CLONIDINE HYDROCHLORIDE 0.1 MG: 0.1 TABLET ORAL at 07:59

## 2022-01-11 RX ADMIN — DORZOLAMIDE HYDROCHLORIDE 1 DROP: 20 SOLUTION/ DROPS OPHTHALMIC at 06:22

## 2022-01-11 RX ADMIN — OXYCODONE HYDROCHLORIDE 5 MG: 5 TABLET ORAL at 03:11

## 2022-01-11 RX ADMIN — FERROUS SULFATE TAB 325 MG (65 MG ELEMENTAL FE) 325 MG: 325 (65 FE) TAB at 07:59

## 2022-01-11 RX ADMIN — BUDESONIDE 0.5 MG: 0.5 SUSPENSION RESPIRATORY (INHALATION) at 06:18

## 2022-01-11 RX ADMIN — ACETAMINOPHEN 1000 MG: 500 TABLET, FILM COATED ORAL at 21:46

## 2022-01-11 RX ADMIN — Medication 1000 UNITS: at 07:58

## 2022-01-11 RX ADMIN — DABIGATRAN ETEXILATE MESYLATE 150 MG: 150 CAPSULE ORAL at 20:05

## 2022-01-11 RX ADMIN — CLONIDINE HYDROCHLORIDE 0.1 MG: 0.1 TABLET ORAL at 20:05

## 2022-01-11 RX ADMIN — PANTOPRAZOLE SODIUM 40 MG: 40 TABLET, DELAYED RELEASE ORAL at 07:58

## 2022-01-11 RX ADMIN — DILTIAZEM HYDROCHLORIDE 240 MG: 120 CAPSULE, COATED, EXTENDED RELEASE ORAL at 07:57

## 2022-01-11 RX ADMIN — LATANOPROST 1 DROP: 50 SOLUTION/ DROPS OPHTHALMIC at 20:08

## 2022-01-11 RX ADMIN — DABIGATRAN ETEXILATE MESYLATE 150 MG: 150 CAPSULE ORAL at 07:58

## 2022-01-11 RX ADMIN — SENNOSIDES AND DOCUSATE SODIUM 1 TABLET: 50; 8.6 TABLET ORAL at 20:05

## 2022-01-11 RX ADMIN — THERA TABS 1 TABLET: TAB at 07:57

## 2022-01-11 RX ADMIN — OXYCODONE HYDROCHLORIDE 5 MG: 5 TABLET ORAL at 15:40

## 2022-01-11 NOTE — PROGRESS NOTES
Patient: Bee Cobb  Location: [facility] [address]  MRN: [id number]  Today's date: 1/11/2022    History of Present Illness  Bee is a 81 y.o. female admitted on 1/1/2022 with Hip fracture requiring operative repair, left, closed, initial encounter (CMS/[facility]) [S72.002A]. Principal problem is Hip fracture requiring operative repair, left, closed, initial encounter (CMS/[facility]).    81 y.o. f who presents with left hip pain after falling out of bed. She had an immediate onset of left hip pain and inability to ambulate.  No LOC.  Radiographic evaluation showed a displaced transcervical femoral neck fracture by CT.  On 12-29-21 she underwent left hip hemiarthroplasty.  Pt is WBAT to LLE and anterior hip precautions x 6 weeks. Ortho recommends aspirin 325mg/day x 6 weeks in addition to pt's home pradaxa which was restarted postoperatively. Pt did well postoperatively, but was noted to have postop anemia with a hgb drop of 12-->9. Per [place], this is expected due to Pradaxa use.  Pain is being managed with multimodal analgesia; scheduled tylenol, lido patch, PRN oxycodone, ice therapy.  PT/OT/PM&R evaluated pt and recommend acute rehab at discharge.          Past Medical History  Bee has a past medical history of Atrial fibrillation (CMS/[facility]), Glaucoma, History of transfusion, Hyperthyroidism, Lipid disorder, Osteoporosis, PAN (polyarteritis nodosa) (CMS/[facility]), and Thalassemia. H/o recent clavicle fx 9/11/21 Completed course of therapy and has full ROM           01/11/22 1008   Pain/Comfort/Sleep   Pain Charting Type Pain Assessment   Preferred Pain Scale number (Numeric Rating Pain Scale)   (0-10) Pain Rating: Rest 2   POSS ([place] Opioid-Induced Sed Scale) 1 - Awake and alert   Vital Signs   Heart Rate 66   Heart Rate Source Monitor   SpO2 95 %   Patient Activity At rest   Oxygen Therapy None (Room air)   BP (!) 126/42   BP Location Left upper arm   BP Method Manual   Patient Position Lying         01/11/22 1055   Pain/Comfort/Sleep   Pain Charting Type Pain Reassessment   Preferred Pain Scale number (Numeric Rating Pain Scale)   (0-10) Pain Rating: Rest 2   Pain Side/Orientation left   Pain Body Location hip   Pain Management Interventions position adjusted;other (see comments)  (tolerated during basic tasks)      01/11/22 1008   OT Time Calculation   Start Time 1000   Stop Time 1100   Time Calculation (min) 60 min   Session Details   Document Type daily treatment/progress note   Mode of Treatment occupational therapy;individual therapy   General Information   General Observations of Patient Pt participating in ADL and functional endurance   Safety Awareness/Health Promotion   Fall Prevention demonstrates fall risk prevention techniques;demonstrates safety awareness related to fall risk   Home Safety problem solves related to community safety plan and new situations/issues   Safety Awareness/Health Promotion Fall Prevention (Row);Safety Awareness (Row)   Cognition/Psychosocial   Comment, Cognition Alert and oriented, (F+) safety awareness and application of strategies to minimize falls.   Transfers   Transfers toilet transfer   Maintains Weight-Bearing Status (Transfers) able to maintain   Comment Amb approach to DME   Toilet Transfer   Transfer Technique stand pivot   [place], Toilet Transfer close supervision;1 person assist;safety considerations   Verbal Cues safety   Assistive Device grab bars/safety frame;raised toilet seat   Comment iene and clothing mgmt in stanceRW support during hygu   Safety Issues, Functional Mobility   Comment, Safety Issues/Impairments (Mobility) OT: Amb approach w touch assist and gait belt to DME in bathroom using RW w reminders for postural control.   Balance   Balance Assessment sitting static balance;sitting dynamic balance;sit to stand dynamic balance;standing static balance;standing dynamic balance   Static Sitting Balance WFL;supported;sitting, edge of bed    Dynamic Sitting Balance WFL;supported;mild impairment;unsupported;sitting, edge of bed   Sit to Stand Dynamic Balance mild impairment;supported;unsupported;standing   Static Standing Balance mild impairment;supported;standing   Dynamic Standing Balance mild impairment;supported;standing   Comment, Balance Ongoing cues needed for posture, tendency is to lean forward but is improving ability to self correct.   Therapeutic Exercise   Therapeutic Exercise upper extremity   Upper Extremity (Therapeutic Exercise)   Exercise Position/Type seated;AROM (active range of motion);resistive exercises   General Exercise bilateral   Range of Motion Exercises shoulder flexion/extension;elbow flexion/extension;wrist flexion/extension   Reps and Sets Arm Bike   Comment UBE seated 8 minutes seated   Basic Activities of Daily Living (BADLs)   Basic Activities of Daily Living grooming;toileting   Energy Conservation Techniques activity pacing encouraged;correct posture facilitated;equipment and device use facilitated   BADL Interventions BADL Safety/Performance (Group);Energy Conservation Techniques (Row)   Grooming   Self-Performance washes, rinses and dries face;washes, rinses and dries hands;oral care (brushing teeth, cleaning dentures)   [place] close supervision;1 person assist;safety considerations   Position supported standing   Setup Assistance obtain supplies   Adaptive Equipment none   [place], Oral Hygiene supervision   Comment Reminders to use sink edge for support when standing sink side w RW   Toileting   [place] adjust/manage clothing;perform bladder hygiene;close supervision;1 person assist;safety considerations   Position supported sitting;supported standing   Setup Assistance orthosis application   Adaptive Equipment accessible height toilet   Comment RTS over toilet, amb approach w RW   Daily Progress Summary (OT)   Symptoms Noted During/After Treatment fatigue   Progress Toward Functional Goals (OT)  "progressing toward functional goals as expected   Daily Outcome Statement Pt participated in basic standing balance exercises with focu on postural control using RW. Pt is improving self correction but continues to require reminders. Fair (+) endurance during basic daily tasks.   Barriers to Overall Progress (OT) balance, postural control, general strength   Recommendations (OT) continue POC       Prior Living Environment      Most Recent Value   People in Home spouse   Current Living Arrangements home  [9 MADY, FF inside]   Home Accessibility stairs to enter home (Group), stairs within home (Group)   Living Environment Comment Row home 13 steps into house, 9 steps to BR on 2nd floor   Number of Stairs, Main Entrance 9   Surface of Stairs, Main Entrance concrete   Stair Railings, Main Entrance railings on both sides of stairs   Location, Bathroom second floor, must negotiate stairs to access   [place], Bathroom tile floor   Bathroom Access Comment ztub/shower combo, hand held shower, bench and suction grab bars ( checks stability prior to each shower)   Number of Stairs, Within Home, Primary --  [13]   Stair Railings, Within Home, Primary railing on right side (ascending)          Prior Level of Function      Most Recent Value   Dominant Hand left   Ambulation independent   Transferring independent   Toileting independent   Bathing independent   Dressing independent   Eating independent   Assistive Device Currently Used at Home shower chair, grab bar          Occupational Profile      Most Recent Value   Reason for Services/Referral ADL deficit   Successful Occupations wife,- mother   Occupational History/Life Experiences retired [employer]    Performance Patterns independent in all areas   Patient Goals \"I want to be functional, walk and take care of myself\"                      IRF OT Goals      Most Recent Value   Transfer Goal 1    Activity/Assistive Device toilet at 01/10/2022 0938 "   [place] supervision required, set-up required at 01/10/2022 0938   Time Frame short-term goal (STG), 5 - 7 days at 01/10/2022 0938   Strategies/Barriers DME at 01/10/2022 0938   Progress/Outcome good progress toward goal, goal met, goal revised this date at 01/10/2022 0938   Transfer Goal 2    Activity/Assistive Device toilet at 01/10/2022 0938   [place] modified independence at 01/10/2022 0938   Time Frame 2 weeks at 01/10/2022 0938   Strategies/Barriers DME at 01/02/2022 0805   Progress/Outcome goal revised this date at 01/10/2022 0938   Transfer Goal 3    Activity/Assistive Device shower at 01/10/2022 0938   [place] supervision required, set-up required at 01/10/2022 0938   Time Frame short-term goal (STG), 5 - 7 days at 01/10/2022 0938   Strategies/Barriers DME at 01/02/2022 0805   Progress/Outcome good progress toward goal, goal met, goal revised this date at 01/10/2022 0938   Transfer Goal 4    Activity/Assistive Device shower at 01/10/2022 0938   [place] supervision required at 01/10/2022 0938   Time Frame long-term goal (LTG), 2 weeks at 01/10/2022 0938   Strategies/Barriers DME at 01/02/2022 0805   Progress/Outcome goal met, goal revised this date at 01/10/2022 0938   Bathing Goal 1    Activity/Assistive Device bathing skills, all at 01/10/2022 0938   [place] supervision required at 01/10/2022 0938   Time Frame short-term goal (STG), 5 - 7 days at 01/10/2022 0938   Strategies/Barriers DME at 01/02/2022 0805   Progress/Outcome good progress toward goal, goal met, goal revised this date at 01/10/2022 0938   Bathing Goal 2    Activity/Assistive Device bathing skills, all at 01/10/2022 0938   [place] supervision required at 01/10/2022 0938   Time Frame long-term goal (LTG), 2 weeks at 01/10/2022 0938   Strategies/Barriers DME at 01/02/2022 0805   Progress/Outcome goal met, goal revised this date at 01/10/2022 0938   UB Dressing Goal 1    Activity/Assistive Device upper body  dressing at 01/10/2022 0938   [place] supervision required at 01/10/2022 0938   Time Frame short-term goal (STG), 5 - 7 days at 01/10/2022 0938   Strategies/Barriers incl set up at 01/05/2022 1408   Progress/Outcome good progress toward goal, goal met, goal revised this date at 01/10/2022 0938   UB Dressing Goal 2    Activity/Assistive Device upper body dressing at 01/02/2022 0805   [place] modified independence at 01/02/2022 0805   Time Frame long-term goal (LTG), 4 weeks at 01/02/2022 0805   Strategies/Barriers incl s/u at 01/02/2022 0805   LB Dressing Goal 1    Activity/Assistive Device lower body dressing at 01/05/2022 1408   [place] minimum assist (75% or more patient effort) at 01/05/2022 1408   Time Frame short-term goal (STG), 5 - 7 days at 01/05/2022 1408   Strategies/Barriers AD at 01/02/2022 0805   Progress/Outcome good progress toward goal, goal partially met, goal revised this date at 01/05/2022 1408   LB Dressing Goal 2    Activity/Assistive Device lower body dressing at 01/02/2022 0805   [place] [place] supervision required at 01/02/2022 0805   Time Frame long-term goal (LTG), 4 weeks at 01/02/2022 0805   Strategies/Barriers AD at 01/02/2022 0805   Grooming Goal 1    Activity/Assistive Device grooming skills, all at 01/05/2022 1408   [facility] supervision required at 01/05/2022 1408   Time Frame 5 - 7 days at 01/05/2022 1408   Strategies/Barriers std @ sink at 01/05/2022 1408   Progress/Outcome good progress toward goal, goal partially met, goal revised this date at 01/05/2022 1408   Grooming Goal 2    Activity/Assistive Device grooming skills, all at 01/02/2022 0805   [place] modified independence at 01/02/2022 0805   Time Frame long-term goal (LTG), 4 weeks at 01/02/2022 0805   Strategies/Barriers std @ sink at 01/02/2022 0805   Toileting Goal 1    Activity/Assistive Device toileting skills, all at 01/05/2022 1408   [facility] tactile cues required at 01/05/2022 1408   Time  Frame short-term goal (STG), 5 - 7 days at 01/05/2022 1408   Strategies/Barriers DME at 01/02/2022 0805   Progress/Outcome good progress toward goal, goal partially met, goal revised this date at 01/05/2022 1408   Toileting Goal 2    Activity/Assistive Device toileting skills, all at 01/02/2022 0805   [place] supervision required at 01/02/2022 0805   Time Frame long-term goal (LTG), 4 weeks at 01/02/2022 0805   Strategies/Barriers DME at 01/02/2022 0805

## 2022-01-11 NOTE — PATIENT CARE CONFERENCE
Inpatient Rehabilitation Team Conference Note  Date: 1/11/2022  Time: 9:10 AM       Patient Name: Bee Cobb     Medical Record Number: [id number]   YOB: 1940  Sex: Female      Room/Bed: 152/152W  Payor Info: Payor: MEDICARE / Plan: MEDICARE PART A & B / Product Type: Medicare /     Admitting Diagnosis: Hip fracture requiring operative repair, left, closed, initial encounter (CMS/HCC) [S72.002A]   Admit Date/Time: 1/1/2022  4:54 PM  Admission Comments: No comment available     Primary Diagnosis: Hip fracture requiring operative repair, left, closed, initial encounter (CMS/HCC)  Principal Problem: Hip fracture requiring operative repair, left, closed, initial encounter (CMS/HCC)    Patient Active Problem List    Diagnosis Date Noted   • Pain 01/02/2022   • Risk for falls 01/02/2022   • At high risk for pressure injury of skin 01/02/2022   • Follow up 01/02/2022   • Hip fracture requiring operative repair, left, closed, initial encounter (CMS/HCC) 01/01/2022   • Postoperative anemia 12/30/2021   • S/P AVR (aortic valve replacement) 12/30/2021   • Closed fracture of left hip (CMS/HCC) 12/29/2021   • Hyperlipidemia 03/08/2021   • Mycobacterium avium-intracellulare complex (CMS/HCC) 01/12/2021   • Glaucoma 11/13/2019   • Chronic diastolic CHF (congestive heart failure) (CMS/HCC) 02/14/2019   • Hypertension 01/24/2019   • Hypothyroidism 01/24/2019   • Malignant melanoma (CMS/HCC) 09/01/2016   • Alpha trait thalassemia 07/18/2013   • COPD (chronic obstructive pulmonary disease) (CMS/HCC) 01/01/2010   • Paroxysmal atrial fibrillation (CMS/HCC) 01/01/2000       Premorbid Status:  Dominant Hand: left  Ambulation: independent  Transferring: independent  Toileting: independent  Bathing: independent  Dressing: independent  Eating: independent  Communication: understands/communicates without difficulty  Swallowing: swallows foods/liquids without difficulty  Baseline Diet/Method of Nutritional Intake: no diet  "restrictions  Past History of Dysphagia: No hx of dysphagia  Assistive Device/Animal Currently Used at Home: shower chair; grab bar  Prior Level of Function Comment: independent for ADL and functional mobility no AD      Current Functional Status:  Mobility  Gait  [place], Gait: close supervision; increased time to complete  Assistive Device: gait belt; walker, front-wheeled  Comment (Gait/Stairs): Cl S for safety. Increased time due to 3 standing rest breaks x15 sec each during last 75-ft of amb trial due to dec endurance/+SOB.    Stairs  [place], Stairs: minimum assist (75% or more patient effort); verbal cues  Assistive Device: railing  Handrail Location (Stairs): both sides  Number of Stairs: 4  Stair Height: 6 inches  Comment: Steadying assist at lateral pelvis up/down 4, 6\" steps leading up with RLE and down with LLE; v. cues for sequencing    Wheelchair  Comment, Wheelchair Mobility: Not an anticipated need for d/c, patient expected to be amb level    Transfers  [place], All Bed Mobility Activities: touching/steadying assist; 1 person assist  [place], Roll Left: safety considerations; unable to assess; not tested  [place], Roll Right: safety considerations; unable to assess; not tested  [place], Supine to Sit: moderate assist (50-74% patient effort) (patient requested assist with both LE management)  Verbal Cues (Supine to Sit): technique  [place], Sit to Supine: moderate assist (50-74% patient effort) (requested assist for LLE)  Assistive Device: bed rails  Comment (Bed Mobility): hospital bed  Maintains Weight-Bearing Status (Transfers): able to maintain  Comment: DME Cues for posture, RW  [place], Bed to Chair: verbal cues; minimum assist (75% or more patient effort); increased time to complete; safety considerations; 1 person assist  Verbal Cues: hand placement; preparatory posture; proper use of assistive device; safety; technique  Assistive Device: walker, " front-wheeled; wheelchair  Comment: Min A x 1 SPT from EOB to w/c with gait belt donned and walker.  [place], Chair to Bed: not tested  [place], Sit to Stand Transfer: close supervision  Verbal Cues: safety  Assistive Device: gait belt; walker, front-wheeled  Comment: CLS for safety  [place], Stand to Sit Transfer: close supervision  Verbal Cues: safety  Assistive Device: gait belt; walker, front-wheeled  Comment: CLS for safety  [place], Stand Pivot/Stand Step Transfer: close supervision  Verbal Cues: safety  Assistive Device: gait belt; walker, front-wheeled  Comment: CLS for safety via amb approach from WC to EOB    Transfer Technique: stand pivot  [place], Toilet Transfer: touching/steadying assist; 1 person assist  Verbal Cues: safety; technique  Assistive Device: grab bars/safety frame; raised toilet seat  Comment: PT: Assisted pt into/out of bathroom post-tx with Cl S via amb approach.  Transfer Technique: stand pivot  [place], Shower Transfer: touching/steadying assist; 1 person assist; safety considerations  Verbal Cues: safety; hand placement  Assistive Device: grab bars/tub rail; shower chair  Comment: barrier free stall shwr      Self Care  Self-Performance: threads left arm, bra/undershirt; threads right arm, bra/undershirt; pulls bra/undershirt over head/around back; pulls bra/undershirt down/adjusts; threads left arm, shirt; threads right arm, shirt; pulls shirt over head/around back; pulls shirt down/adjusts  [place] Assistance: obtains clothes  Adaptive Equipment: none  Comment: cues for clothing orientation, seated EOB  Tasks: socks  Self-Performance: threads left leg, underpants; threads right leg, underpants; pulls underpants up or down; threads left leg, pants/shorts; threads right leg, pants/shorts; pulls pants/shorts up or down; dons/doffs left sock; dons/doffs right sock  [place] Assistance: obtains clothes  Adaptive Equipment: reacher; sock aid; dressing  stick  [place]: touching/steadying assist; set up; 1 person assist; safety considerations  Comment: increased time to manage AD  Self-Performance: chest; left arm; right arm; abdomen; front perineal area; buttocks; left upper leg; right upper leg  Adaptive Equipment: hand-held shower spray hose; long-handled sponge; shower chair; grab bar/tub rail  [place] Assistance: obtain supplies  Comment: seated on bench in barrier free stall shwr, THP, issued LH sponge for distal LEs  [place]: perform bladder hygiene; perform bowel hygiene; adjust/manage clothing; touching/steadying assist; 1 person assist; safety considerations  Adaptive Equipment: accessible height toilet; commode, 3-in-1; grab bar/safety frame  Setup Assistance: obtain supplies  Comment: ongoing cues for posture  Self-Performance: washes, rinses and dries hands  [place] Assistance: washes, rinses and dries face; washes, rinses and dries hands; brushes/baker hair; oral care (brushing teeth, cleaning dentures)  Adaptive Equipment: none  Setup Assistance: obtain supplies  [place]: close supervision; 1 person assist; safety considerations  Comment: RW support sink side  [place]: independent  Comment: sitting up in recliner chair    Cognition  Affect/Mental Status (Cognition): WFL  Orientation Status (Cognition): oriented x 4  Follows Commands (Cognition): WFL; follows one-step commands; follows two-step commands  Cognitive Function: executive function deficit  Executive Function Deficit (Cognition): information processing  Comment, Cognition: Pt. participated in therapeutic activity (to complete partially completed puzzle). Pt. demonstrated difficulty with correctly locating puzzle pieces and placing into puzzle board, but activity had to be discontinued due to pt. leaning posteriorly into heels and increased risk for fall. Recommend addressing pt. posture/balance when not dividing pt. attention to increase pt. safety at this  time.    Communication       Frequency of Treatment (OT): 5-7 times per week,60-90 minutes per day  Frequency of Treatment (PT): 5-7 times per week,60-90 minutes per day    Weekly Outcome Summaries:  Weekly Progress Summary (PT)  Progress Toward Functional Goals (PT): progressing toward functional goals as expected  Weekly Outcome Statement: Ongoing impairments include: Increased L hip pain, decreased strength LLE, decreased endurance w/ occasional use of supplemental O2, impaired balance w/ need for RW. Current functional status: Min A supine<>sit,  Close S sit<>stand, SPT with RW, Close S to amb up to 170 feet at best, min A up/down 4 steps with 2 HR and TUG test time improved to 50 seconds.  PTA, patient indep without use of AD.  Patient making slow but appropriate progress towards LTGs of Mod I level. Patient will benefit from ongoing comprehensive inpatient rehab to continue to address the above impairments to maximize safety and indep following d/c.  Impairments Continuing to Limit Function: decreased strength,impaired balance,impaired motor control,impaired safety awareness,pain  Recommendations: Ongoing inpatient rehab, PT 1.5 hours daily, 5+ days/week  Weekly Progress Summary (OT)  Progress Toward Functional Goals (OT): progressing toward functional goals as expected  Weekly Outcome Statement: Pt continues to be limited with fatigue/pain/weakness affecting all areas of ADL function but is progressing with intervention and instruction in use of AD/DME training/intervention during basic ADL/functional mobility. Currently, pt req touch assist bathing seated in barrier free stall shower w grab bar support, Cl Sup UB dsg seated, touch assist clothing retrieval w cues for safety w RW; Touch asst LB dsg with ongoing instruction in AD use for distal LEs; Cl Sup grooming in standing @ sink w RW for safety Toileting w touch assist during hygiene and clothing mgmt in standing w RW support. Pt requires Min A toilet tx  to commode over toilet and Min A transfer to bench w back using grab bars for support during wet shower. Pt continues to work on improving postural control during all standing tasks and would benefit from ongoing safety instruction in all areas to minimize risk of falls while in hosp and p d/c to least restrictive environment.  Barriers to Overall Progress (OT): balalnce, posture, endurance, safety concern  Recommendations: continue POC  Weekly Outcome Summary (Interdisciplinary)  Weekly Progress Summary: progressing toward goals as expected  Weekly Outcome Statement: Pt c bright affect.  Seeing progress in dressing and mobility. Using pain meds appropriately but c some reluctance.  Anxiety about falling has diminished. However, pt is worried about managing steps.  Has MADY and no 1st floor bathroom.  Refuses a commode. Has only 1 rail. Eating well. Sleeping ok. Needs occasional repetition due to hearing loss.    Problem Resolution:    Self-Care Promotion: independence encouraged,safe use of adaptive equipment encouraged  Identified Problems & Impairments: (not recorded)  Comment, Identified Problems & Impairments: (not recorded)  Resolved Problems and Impairments: (not recorded)  Comment, Resolved Problems & Impairments: (not recorded) Team Weekly Outcome Statement: Need to find out about staples can come out, ;spasms while walking oxy for pain, tylenol scheduled, cont b/b, left hip covered, anemic 7.5, elevated WBC, nebs inhalers at home, pradaxia, min BM CS transfers amb rw  min for 4 BHR, UE LB S  toileting steady assist and transfer, min for shower , affect brighter, see sprogressless worried about falling, (01/11/22 0904)        Goals/Support System:  Patient/Family Goals  Patient's Goals For Discharge: take care of myself at home,return to all previous roles/activities  Family/Support System  Family/Support System Care: support provided,self-care encouraged    IRF PT Goals      Most Recent Value   Bed  Mobility Goal 1    Activity/Assistive Device scooting, sit to supine, supine to sit at 01/02/2022 1102   [place] minimum assist (75% or more patient effort) at 01/02/2022 1102   Time Frame short-term goal (STG), 1 week at 01/02/2022 1102   Progress/Outcome goal met at 01/06/2022 0815   Bed Mobility Goal 2    Activity/Assistive Device scooting, sit to supine, supine to sit at 01/02/2022 1102   [place] modified independence at 01/02/2022 1102   Time Frame 5 days at 01/11/2022 0848   Progress/Outcome continuing progress toward goal, goal ongoing at 01/11/2022 0848   Transfer Goal 1    Activity/Assistive Device sit-to-stand/stand-to-sit, stand pivot, walker, front-wheeled at 01/02/2022 1102   [place] supervision required at 01/02/2022 1102   Time Frame short-term goal (STG), 3 - 5 days at 01/06/2022 0815   Progress/Outcome goal met at 01/11/2022 0848   Transfer Goal 2    Activity/Assistive Device sit-to-stand/stand-to-sit, stand pivot, walker, front-wheeled at 01/02/2022 1102   [place] modified independence at 01/02/2022 1102   Time Frame 5 days at 01/11/2022 0848   Progress/Outcome continuing progress toward goal, goal ongoing at 01/11/2022 0848   Gait/Walking Locomotion Goal 1    Activity/Assistive Device gait (walking locomotion), decrease asymmetrical patterns, decrease fall risk, forward stepping, improve balance and speed at 01/02/2022 1102   Distance 100 feet at 01/06/2022 0815   [place] supervision required at 01/02/2022 1102   Time Frame short-term goal (STG), 3 - 5 days at 01/06/2022 0815   Progress/Outcome goal met at 01/11/2022 0848   Gait/Walking Locomotion Goal 2    Activity/Assistive Device gait (walking locomotion), decrease asymmetrical patterns, decrease fall risk, forward stepping, improve balance and speed at 01/02/2022 1102   Distance 200 feet at 01/11/2022 0848   [place] modified independence at 01/02/2022 1102   Time Frame 5 days at 01/11/2022 0848   Progress/Outcome  continuing progress toward goal, goal ongoing at 01/11/2022 0848   Stairs Goal 1    Activity/Assistive Device ascending stairs, descending stairs at 01/04/2022 1505   Number of Stairs 8 at 01/04/2022 1505   [place] minimum assist (75% or more patient effort) at 01/04/2022 1505   Time Frame 3 days at 01/11/2022 0848   Progress/Outcome progress slower than expected, goal ongoing at 01/11/2022 0848   Stairs Goal 2    Activity/Assistive Device ascending stairs, descending stairs at 01/04/2022 1505   Number of Stairs 12 at 01/04/2022 1505   [place] supervision required at 01/04/2022 1505   Time Frame 5 days at 01/11/2022 0848   Progress/Outcome continuing progress toward goal, goal ongoing at 01/11/2022 0848        IRF OT Goals      Most Recent Value   Transfer Goal 1    Activity/Assistive Device toilet at 01/10/2022 0938   [place] supervision required, set-up required at 01/10/2022 0938   Time Frame short-term goal (STG), 5 - 7 days at 01/10/2022 0938   Strategies/Barriers DME at 01/10/2022 0938   Progress/Outcome good progress toward goal, goal met, goal revised this date at 01/10/2022 0938   Transfer Goal 2    Activity/Assistive Device toilet at 01/10/2022 0938   [place] modified independence at 01/10/2022 0938   Time Frame 2 weeks at 01/10/2022 0938   Strategies/Barriers DME at 01/02/2022 0805   Progress/Outcome goal revised this date at 01/10/2022 0938   Transfer Goal 3    Activity/Assistive Device shower at 01/10/2022 0938   [place] supervision required, set-up required at 01/10/2022 0938   Time Frame short-term goal (STG), 5 - 7 days at 01/10/2022 0938   Strategies/Barriers DME at 01/02/2022 0805   Progress/Outcome good progress toward goal, goal met, goal revised this date at 01/10/2022 0938   Transfer Goal 4    Activity/Assistive Device shower at 01/10/2022 0938   [place] supervision required at 01/10/2022 0938   Time Frame long-term goal (LTG), 2 weeks at 01/10/2022 0900    Strategies/Barriers DME at 01/02/2022 0805   Progress/Outcome goal met, goal revised this date at 01/10/2022 0938   Bathing Goal 1    Activity/Assistive Device bathing skills, all at 01/10/2022 0938   [place] supervision required at 01/10/2022 0938   Time Frame short-term goal (STG), 5 - 7 days at 01/10/2022 0938   Strategies/Barriers DME at 01/02/2022 0805   Progress/Outcome good progress toward goal, goal met, goal revised this date at 01/10/2022 0938   Bathing Goal 2    Activity/Assistive Device bathing skills, all at 01/10/2022 0938   [place] supervision required at 01/10/2022 0938   Time Frame long-term goal (LTG), 2 weeks at 01/10/2022 0938   Strategies/Barriers DME at 01/02/2022 0805   Progress/Outcome goal met, goal revised this date at 01/10/2022 0938   UB Dressing Goal 1    Activity/Assistive Device upper body dressing at 01/10/2022 0938   [place] supervision required at 01/10/2022 0938   Time Frame short-term goal (STG), 5 - 7 days at 01/10/2022 0938   Strategies/Barriers incl set up at 01/05/2022 1408   Progress/Outcome good progress toward goal, goal met, goal revised this date at 01/10/2022 0938   UB Dressing Goal 2    Activity/Assistive Device upper body dressing at 01/02/2022 0805   [place] modified independence at 01/02/2022 0805   Time Frame long-term goal (LTG), 4 weeks at 01/02/2022 0805   Strategies/Barriers incl s/u at 01/02/2022 0805   LB Dressing Goal 1    Activity/Assistive Device lower body dressing at 01/05/2022 1408   [place] minimum assist (75% or more patient effort) at 01/05/2022 1408   Time Frame short-term goal (STG), 5 - 7 days at 01/05/2022 1408   Strategies/Barriers AD at 01/02/2022 0805   Progress/Outcome good progress toward goal, goal partially met, goal revised this date at 01/05/2022 1408   LB Dressing Goal 2    Activity/Assistive Device lower body dressing at 01/02/2022 0805   [place] supervision required at 01/02/2022 0805   Time Frame  long-term goal (LTG), 4 weeks at 01/02/2022 0805   Strategies/Barriers AD at 01/02/2022 0805   Grooming Goal 1    Activity/Assistive Device grooming skills, all at 01/05/2022 1408   [place] supervision required at 01/05/2022 1408   Time Frame 5 - 7 days at 01/05/2022 1408   Strategies/Barriers std @ sink at 01/05/2022 1408   Progress/Outcome good progress toward goal, goal partially met, goal revised this date at 01/05/2022 1408   Grooming Goal 2    Activity/Assistive Device grooming skills, all at 01/02/2022 0805   [place] modified independence at 01/02/2022 0805   Time Frame long-term goal (LTG), 4 weeks at 01/02/2022 0805   Strategies/Barriers std @ sink at 01/02/2022 0805   Toileting Goal 1    Activity/Assistive Device toileting skills, all at 01/05/2022 1408   [place] tactile cues required at 01/05/2022 1408   Time Frame short-term goal (STG), 5 - 7 days at 01/05/2022 1408   Strategies/Barriers DME at 01/02/2022 0805   Progress/Outcome good progress toward goal, goal partially met, goal revised this date at 01/05/2022 1408   Toileting Goal 2    Activity/Assistive Device toileting skills, all at 01/02/2022 0805   [place] supervision required at 01/02/2022 0805   Time Frame long-term goal (LTG), 4 weeks at 01/02/2022 0805   Strategies/Barriers DME at 01/02/2022 0805          Risk for Complications  Falls: Low      The patient's medical prognosis is good to achieve the stated goals below.    Expected Level of Function  Expected Functional Improvement: mobility; motor dysfunction; safety; self-care  Self-Care: Supervision or touching assistance (transfers)  Sphincter Control: Independent  Transfers: Independent  Locomotion: Independent  Communication: Independent  Social Cognition: Independent       Discharge Planning:  Anticipated Discharge Disposition: home with home health  Type of Home Care Services: home PT; home OT    Equipment/Device Needs at Discharge  Assistive Device/Animal Currently  Used at Home: shower chair,grab bar  Discharge Planning  Does the patient need discharge transport arranged?: No    Anticipated Discharge Date: 1/16/2022    Needs Identified:       Team Members Present:     Rehab Attending Present:  Rolan Crooks MD    Care Coordinator Present:  Vivien Bell LSW    Nurse Present:  Kim Tuttle RN    OT Present:  Falguni Pino OT    PT Present:  Karoline Aguilar PT    Psychologist Present:  Kalie Fuentes, PhD        Next Team Conference Date: 01/13/22

## 2022-01-11 NOTE — ASSESSMENT & PLAN NOTE
Tylenol 1000 mg every 8 hours.  Oxycodone as needed.  Adjust medications as needed.  And lidocaine patch.

## 2022-01-11 NOTE — ASSESSMENT & PLAN NOTE
PCP Dr. Kanchan Zamarripa after discharge  [phone]     Cardiology after discharge  Ortho Dr. Pena in 2 weeks

## 2022-01-11 NOTE — PROGRESS NOTES
Cardiology Progress Note    Subjective:  -no CV complaints    Allergies: Atorvastatin; Penicillins; Shellfish derived; Iodinated contrast media; Rosuvastatin; Covid-19 vaccine, mrna, [id number], lnp-s (moderna); and Covid-19 vaccine, [id number], lnp-s (moderna)    ROS:  Negative except those listed in HPI and A&P      Physical Exam:  Constitutional: Appears well-developed and well-nourished. No distress.    Cardiovascular: RRR, no murmur noted.  Pulmonary/Chest: Inspiratory crackles at b/l bases. No wheezing or rhonchi.  Abdominal: Soft. Bowel sounds are normal.  Musculoskeletal: No LE edema.  Neurological: AAOx3.    VS:  Patient Vitals for the past 72 hrs:   BP Temp Temp src Pulse Resp SpO2   01/11/22 1008 (!) 126/42 -- -- 66 -- 95 %   01/11/22 0743 (!) 164/70 36.5 °C (97.7 °F) Oral 64 16 94 %   01/10/22 2017 (!) 121/53 37.1 °C (98.7 °F) Oral 72 16 95 %   01/10/22 2014 124/60 -- -- -- -- --   01/10/22 1937 (!) 162/113 36.3 °C (97.4 °F) Oral 88 18 96 %   01/10/22 1547 -- 36.9 °C (98.4 °F) Oral -- -- --   01/10/22 1544 (!) 128/59 36.7 °C (98 °F) Oral 65 18 97 %   01/10/22 1305 140/89 -- -- 76 -- 97 %   01/10/22 0938 (!) 123/25 -- -- -- -- --   01/10/22 0625 139/64 36.8 °C (98.3 °F) Oral 65 16 95 %   01/09/22 2100 (!) 123/58 36.8 °C (98.2 °F) Oral 68 16 95 %   01/09/22 1500 (!) 115/58 36.9 °C (98.4 °F) Oral 73 16 95 %   01/09/22 1411 (!) 127/59 -- -- 77 -- 95 %   01/09/22 0700 130/60 36.7 °C (98.1 °F) Oral 67 18 96 %   01/09/22 0550 140/60 -- -- 86 -- --   01/08/22 1935 -- 36.5 °C (97.7 °F) Oral -- -- --   01/08/22 1934 (!) 141/66 -- -- 77 18 95 %   01/08/22 1609 133/76 36.8 °C (98.3 °F) Oral 78 17 95 %   01/08/22 1300 (!) 143/65 -- -- 81 -- 97 %     Labs:  Recent labs reviewed  Results from last 7 days   Lab Units 01/10/22  0450   WBC K/uL 13.50*   HEMOGLOBIN g/dL 7.5*   HEMATOCRIT % 24.6*   PLATELETS K/uL 516*   SODIUM mEQ/L 138   POTASSIUM mEQ/L 4.1   CHLORIDE mEQ/L 106   CO2 mEQ/L 25   GLUCOSE mg/dL 81   BUN mg/dL  "11   CREATININE mg/dL 0.5*   CALCIUM mg/dL 8.2*   ANION GAP mEQ/L 7   AST IU/L 21   ALT IU/L 13   ALBUMIN g/dL 2.6*   EGFR mL/min/1.73m*2 >60.0   MAGNESIUM mg/dL 2.1     No intake or output data in the 24 hours ending 01/11/22 1158     Tests:  EKG 12/29/21:  Normal sinus rhythm with sinus arrhythmia   Normal ECG     EKG 01/06/22:  NSR at 97 bpm  Moderate voltage criteria for LVH  SC interval 156 ms  QRS duration 90 ms  QT/QTcB 324/411 ms  P-R-T axes 61 49 62     TTE 3/5/21:   1. Normal functioning bioprosthetic aortic valve replacement    2. Left ventricular hypertrophy with normal systolic function and moderate diastolic dysfunction      Catheterization:  [facility] 1/25/2019 \"Coronary angiography revealed no obstructive coronary artery disease in a codominant distribution.\"     Current CV Medications:      cloNIDine (CATAPRES) tablet 0.1 mg, 0.1 mg, oral, BID    dabigatran etexilate (PRADAXA) capsule 150 mg, 150 mg, oral, BID    digoxin (LANOXIN) tablet 250 mcg, 250 mcg, oral, Daily (6a)    dilTIAZem CD (CARDIZEM CD) 24 hr ER capsule 240 mg, 240 mg, oral, Daily    levothyroxine (SYNTHROID) tablet 75 mcg, 75 mcg, oral, Daily (6a)    rosuvastatin (CRESTOR) tablet 5 mg, 5 mg, oral, Daily (6p)      Assessment and Plan:  Bee Cobb is a 81 y.o. female with h/o severe AS s/p TAVR, PAFib (on Pradaxa), HTN CARRINGTON who presented to [facility] ER c/o L hip pain s/p fall out of bed.      1. Mechanical fall with L hip Fx s/p  -also contributing to elevated HR, need for good pain control  -per Ortho     2. PAfib  -last EKG showed NSR  -[id number]= 4, on pradaxa  -on Dig 0.250 mg po daily and Cardizem  daily  - On 1/6 pts HR elevated, will order EKG     3. h/o severe AS s/p TAVR  -Echo 3/2021:  Normal functioning bioprosthetic aortic valve replacement      4. anemia  -post op anemia due to chronic blood loss on iron/mvi  -alpha thalassemia trait with chronic anemia  -also contributing to elevated HR     5. H/o CARRINGTON and " COPD  -per IM    6. HTN  - BP stable on current regimen  - Given comorbidities, will avoid ACEi/ARB for now due to recent hyponatremia, will avoid BB due to h/o COPD, will hold off for now dihydropyridines CCB given on Diltiazem (non-dihydropyridines) for rate control.  - On low dose Clonidine 0.1mg BID for now (also for HR control)   - Monitor for results      [facility]  Francisco Root PA-C  Patient seen and examined, chart reviewed and case discussed, plan in place.  antoni carranza

## 2022-01-11 NOTE — PROGRESS NOTES
[facility] Internal Medicine Progress Note          Patient was seen and examined.   Attestation Notes: Face to face encounter completed    Bee Cobb is a 81 y.o. female who was admitted for Hip fracture requiring operative repair, left, closed, initial encounter (CMS/[facility]) [S72.002A]. Patient was referred by Rolan Crooks MD for medical assessment and management      CC: Hip fracture requiring operative repair, left, closed, initial encounter (CMS/[facility]) [S72.002A]     HPI: Bee Cobb is a 81 y.o. female with HTN, HL, hypothyroid, COPD, alpha thalassemia trait, CARRINGTON s/p treatment, glaucoma, HFpEF, AFIB (on Pradaxa), admitted to [facility] 12/29/21 s/p fall from bed found with left hip pain. X-ray left hip revealed left femoral neck fracture.  CT left hip confirmed displaced fracture left femoral neck.  X-ray left knee was negative.  Head CT was negative for skull fracture or intracranial hemorrhage.  CT cervical spine was negative for fracture dislocation.  Dr. Fazal Pena from orthopedic surgery was consulted and performed left total hip replacement on 12/29/2021. Post op she had anemia but did not require transfusion. She was restarted on Pradaxa for AFIB which also provided DVT ppx.  Her pain was managed with Tylenol and Oxycodone.      Her BP and HR were managed with Cardizem CD. She was also on Digoxin for AFIB.  She was on Crestor for HL and Synthroid for hypothyroid. She was on Spiriva and Pulmicort for COPD.  She was on Trusopt and Xalatan for glaucoma.     The patient was seen by PM&R and found to have residual deficits in ambulation and ADLs due to Hip fracture requiring operative repair, left, closed, initial encounter (CMS/[facility]) [S72.002A] and came to [facility] on 1/1/2022 for acute inpatient rehab.      She participated in therapy.     SUBJECTIVE:  Patient interviewed and examined     No new complaints.     BP and HR remains stable.    Pain stable, improved constipation,  no abdominal pain or nausea, no dysuria, no chest pain, palpitations, dyspnea or fevers    Review of Systems:  All other systems reviewed and negative except as noted in the HPI.    Current meds and allergies reviewed    Past Medical History:   Diagnosis Date   • (HFpEF) heart failure with preserved ejection fraction (CMS/HCC)    • Alpha thalassemia trait    • Atrial fibrillation (CMS/HCC)    • Closed left hip fracture (CMS/HCC) 12/29/2021   • COPD (chronic obstructive pulmonary disease) (CMS/HCC)    • Glaucoma    • History of transfusion    • Hypertension    • Hypothyroidism    • Lipid disorder    • Malignant melanoma (CMS/HCC)    • Mycobacterium avium-intracellulare complex (CMS/HCC)    • Osteoporosis    • PAN (polyarteritis nodosa) (CMS/HCC)      Past Surgical History:   Procedure Laterality Date   • AORTIC VALVE REPLACEMENT     • TONSILLECTOMY     • TOTAL HIP ARTHROPLASTY Left 12/29/2021     Social History     Tobacco Use   • Smoking status: Never Smoker   • Smokeless tobacco: Never Used   Vaping Use   • Vaping Use: Never used   Substance Use Topics   • Alcohol use: Never   • Drug use: Never      History reviewed. No pertinent family history.    Vital signs in last 24 hours:  Temp:  [36.3 °C (97.4 °F)-37.1 °C (98.7 °F)] 36.5 °C (97.7 °F)  Heart Rate:  [64-88] 67  Resp:  [16-18] 16  BP: (121-164)/() 126/58  Vital signs reviewed 01/11/22 2:30 PM    Physical Exam  Vitals and nursing note reviewed.   Constitutional:       Appearance: Normal appearance.   HENT:      Head: Normocephalic and atraumatic.      Right Ear: External ear normal.      Left Ear: External ear normal.      Nose: Nose normal.      Mouth/Throat:      Mouth: Mucous membranes are moist.      Pharynx: Oropharynx is clear.   Eyes:      Extraocular Movements: Extraocular movements intact.      Conjunctiva/sclera: Conjunctivae normal.      Pupils: Pupils are equal, round, and reactive to light.   Cardiovascular:      Rate and Rhythm: Normal rate.  Rhythm irregular.      Pulses: Normal pulses.      Heart sounds: Normal heart sounds.   Pulmonary:      Effort: Pulmonary effort is normal.      Breath sounds: Normal breath sounds.   Abdominal:      General: Abdomen is flat. Bowel sounds are normal.      Palpations: Abdomen is soft.   Musculoskeletal:         General: Signs of injury (s/p ORIF left hip fx) present.      Right lower leg: Edema present.      Left lower leg: Edema present.   Skin:     General: Skin is warm and dry.   Neurological:      Mental Status: She is alert and oriented to person, place, and time.   Psychiatric:         Mood and Affect: Mood normal.         Behavior: Behavior normal.                 Objective    Labs:  I have reviewed the patient's labs.  Significant abnormals are anemia, leukocytosis.  Results from last 7 days   Lab Units 01/10/22  0450   WBC K/uL 13.50*   HEMOGLOBIN g/dL 7.5*   HEMATOCRIT % 24.6*   PLATELETS K/uL 516*     Results from last 7 days   Lab Units 01/10/22  0450   SODIUM mEQ/L 138   POTASSIUM mEQ/L 4.1   CHLORIDE mEQ/L 106   CO2 mEQ/L 25   BUN mg/dL 11   CREATININE mg/dL 0.5*   CALCIUM mg/dL 8.2*   ALBUMIN g/dL 2.6*   BILIRUBIN TOTAL mg/dL 0.7   ALK PHOS IU/L 103   ALT IU/L 13   AST IU/L 21   GLUCOSE mg/dL 81     Lab Results   Component Value Date    DIGOXIN 1.0 01/05/2022       Imaging:  Not applicable        ASSESSMENT/PLAN:    81 y.o. female with HTN, HL, hypothyroid, COPD, alpha thalassemia trait, CARRINGTON s/p treatment, glaucoma, HFpEF, AFIB (on Pradaxa), admitted to [facility] 12/29/21 s/p fall from bed found to have left hip fracture and underwent left BARB by Dr. Fazal Pena 12/29/21.     1. Ortho:  -s/p fall with left hip fx s/p left BARB  -Tylenol and Oxycodone for pain     2. Vasc:  -bilat LE edema  -SCDs and Pradaxa for DVT ppx     3. Heme:  -post op anemia due to chronic blood loss on iron/mvi  -alpha thalassemia trait with chronic anemia  -leukocytosis reactive  -follow CBC     4.  Renal:  -increased risk of dehydration and electrolyte changes  -follow BMP, Mg     5. Gi:  -Senokot-S for bowels  -Protonix ulcer ppx     6. Gu:  -1/2/22 UA shows hematuria, possibly trauma for cath and being on Pradaxa  -no UTI or retention  -1/10/22 repeat UA shows less hematuria, no bacteria     7. Cardiac:  -AFIB on Cardizem CD, Digoxin and Pradaxa  -remains tachycardic  -1/5/22 Digoxin level therapeutic at 1.0  -hx of AVR  -HFpEF  -HTN on Cardizem CD  -Clonidine added by Cardiology for BP and HR  -watch for orthostatic hypotension  -use cardiac precautions in therapy  -seen by Cardiology     8. Pulm:  -hx of CARRINGTON s/p treatmetn  -COPD on Pulmicort and Spiriva  -incentive spirometry for atelectasis     9. Derm:  -consulted [employer] for skin assessment     10. Nutrition:  -seen by Nutrition for assessment and education     11. Endo:  -HL on Crestor  -hypothyroid on Synthroid     12. Optho  -glaucoma on Xalatan and Trusopt     13. Psych:  -seen by Psychology for support    14. Dispo:  -anticipated discharge 1/16/22     plan discussed with patient, nurse, case management and Rolan Crooks MD Scott Sapperstein, MD  1/11/2022  2:30 PM

## 2022-01-11 NOTE — ASSESSMENT & PLAN NOTE
Mrs. Cobb is an 81-year-old female who presented to [facility] on 12/29/2021 with complaints of left hip pain after falling out of bed.  X-ray left hip revealed left femoral neck fracture.  CT left hip confirmed displaced fracture left femoral neck.  X-ray left knee was negative.  Head CT was negative for skull fracture or intracranial hemorrhage.  CT cervical spine was negative for fracture dislocation.  Dr. Pena from orthopedic surgery was consulted and performed left total hip replacement on 12/29/2021.  She is cleared for weightbearing to left lower extremity with anterior hip precautions for 6 weeks.  She is anticoagulated with Pradaxa for atrial fibrillation.  Aspirin 325 mg daily for 6 weeks was added for DVT prophylaxis on top of Pradaxa.  Postoperative anemia stabilized at 8.6.  She was ultimately determined to be medically stable for transfer to [place] on 1/1/2022 for an acute inpatient rehabilitation program to address deficits related to left hip fracture status post total hip replacement.    She is weightbearing as tolerated.  She will be maintained on left hip precautions and aspirin for 6 weeks.  She will participate in 3 hours of physical and occupational therapy as well as receive 24-hour rehab nursing care.  She will follow-up with Dr. Pena from orthopedic surgery in 2 weeks.    Incision intact.  Some mild serous drainage.  Staples intact.  Continue to monitor.    Pain control improving over time.  Using infrequent oxycodone.  Tylenol around-the-clock.    Patient improving slowly.  Has progressed to close supervision with transfers and ambulation.  Still needs min assist with bed mobility and stairs.

## 2022-01-11 NOTE — PLAN OF CARE
Problem: Rehabilitation ([facility]) Plan of Care  Goal: Plan of Care Review  Outcome: Progressing  Flowsheets (Taken 1/11/2022 1427)  Progress: improving  Plan of Care Reviewed With: patient  Outcome Summary: Pt received oxy and scheduled Tylenol for hip pain. Call bell within reach and bed alarm audible. Hip incision has some drainage and was changed with gauze and tegaderm.   Plan of Care Review  Plan of Care Reviewed With: patient  Progress: improving  Outcome Summary: Pt received oxy and scheduled Tylenol for hip pain. Call bell within reach and bed alarm audible. Hip incision has some drainage and was changed with gauze and tegaderm.

## 2022-01-11 NOTE — PLAN OF CARE
Plan of Care Review  Plan of Care Reviewed With: patient  Progress: improving  Outcome Summary: Patient is alert and oriented with intermittent forgetfullness, requiring reorientation. Patient received scheduled medication as per order. Patient complained of pain  on left hip, prn oxycodone rendered. Call bell within reach, patient sleeping well during shift. Safety and fall precautions promoted.

## 2022-01-11 NOTE — PROGRESS NOTES
Patient: Bee Cobb  [facility] [address]  MRN: [id number]  Today's date: 1/11/2022    History of Present Illness  Bee is a 81 y.o. female admitted on 1/1/2022 with Hip fracture requiring operative repair, left, closed, initial encounter (CMS/[facility]) [S72.002A]. Principal problem is Hip fracture requiring operative repair, left, closed, initial encounter (CMS/[facility]).    81 y.o. f who presents with left hip pain after falling out of bed. She had an immediate onset of left hip pain and inability to ambulate.  No LOC.  Radiographic evaluation showed a displaced transcervical femoral neck fracture by CT.  On 12-29-21 she underwent left hip hemiarthroplasty.  Pt is WBAT to LLE and anterior hip precautions x 6 weeks. Ortho recommends aspirin 325mg/day x 6 weeks in addition to pt's home pradaxa which was restarted postoperatively. Pt did well postoperatively, but was noted to have postop anemia with a hgb drop of 12-->9. Per [place], this is expected due to Pradaxa use.  Pain is being managed with multimodal analgesia; scheduled tylenol, lido patch, PRN oxycodone, ice therapy.  PT/OT/PM&R evaluated pt and recommend acute rehab at discharge.          Past Medical History  Bee has a past medical history of Atrial fibrillation (CMS/[facility]), Glaucoma, History of transfusion, Hyperthyroidism, Lipid disorder, Osteoporosis, PAN (polyarteritis nodosa) (CMS/[facility]), and Thalassemia. H/o recent clavicle fx 9/11/21 Completed course of therapy and has full ROM      OT Vitals    Date/Time Pulse BP BP Location BP Method Pt Position [facility]   01/11/22 1311 88 146/81 Left upper arm Automatic Sitting CF      OT Pain    Date/Time Pain Type Side/Orientation Location Rating: [facility] Who   01/11/22 1311 Pain Assessment left hip 2 CF          Prior Living Environment      Most Recent Value   People in Home spouse   Current Living Arrangements home  [9 MADY, FF inside]   Home Accessibility stairs to enter home (Group),  "stairs within home (Group)   Living Environment Comment Row home 13 steps into house, 9 steps to BR on 2nd floor   Number of Stairs, Main Entrance 9   Surface of Stairs, Main Entrance concrete   Stair Railings, Main Entrance railings on both sides of stairs   Location, Bathroom second floor, must negotiate stairs to access   [place], Bathroom tile floor   Bathroom Access Comment ztub/shower combo, hand held shower, bench and suction grab bars ( checks stability prior to each shower)   Number of Stairs, Within Home, Primary --  [13]   Stair Railings, Within Home, Primary railing on right side (ascending)          Prior Level of Function      Most Recent Value   Dominant Hand left   Ambulation independent   Transferring independent   Toileting independent   Bathing independent   Dressing independent   Eating independent   Assistive Device Currently Used at Home shower chair, grab bar          Occupational Profile      Most Recent Value   Reason for Services/Referral ADL deficit   Successful Occupations wife,- mother   Occupational History/Life Experiences retired [employer]    Performance Patterns independent in all areas   Patient Goals \"I want to be functional, walk and take care of myself\"           IRF OT Evaluation and Treatment - 01/11/22 1301        OT Time Calculation    Start Time 1300     Stop Time 1330     Time Calculation (min) 30 min        Session Details    Document Type daily treatment/progress note     Mode of Treatment occupational therapy;individual therapy        Bed Mobility    [place], Supine to Sit touching/steadying assist     Assistive Device head of bed elevated;bed rails     Comment (Bed Mobility) hosp bed; inc time for pt to manage LLE        Transfers    Transfers tub transfer        Sit to Stand Transfer    [place], Sit to Stand Transfer close supervision     Verbal Cues safety     Assistive Device walker, front-wheeled     Comment from EOB and WC        Stand " to Sit Transfer    [place], Stand to Sit Transfer close supervision     Verbal Cues safety;technique     Assistive Device walker, front-wheeled     Comment to WC        Stand Pivot Transfer    [place], Stand Pivot/Stand Step Transfer close supervision     Assistive Device walker, front-wheeled     Comment amb approach with rw        Toilet Transfer    Transfer Technique stand pivot     [place], Toilet Transfer close supervision     Verbal Cues safety     Assistive Device raised toilet seat     Comment RTS with rails over comfort height toilet; short distance amb approach with RW        Tub Transfer    Transfer Technique sit and swing     [place], Tub Transfer minimum assist (75% or more patient effort)     Verbal Cues safety;technique     Assistive Device tub bench;walker, front-wheeled     Comment sit and swing using ext tub bench; A for LLE mgt only over tub ledge + T/S to rise and lower        Safety Issues, Functional Mobility    Comment, Safety Issues/Impairments (Mobility) OT: T/S with RW, short HHD        Grooming    Self-Performance washes, rinses and dries hands     [place] close supervision     Position supported standing     Adaptive Equipment none     Comment Cl S for balance in stance        Toileting    [place] close supervision     Position unsupported sitting;unsupported standing     Adaptive Equipment raised toilet seat;grab bar/safety frame     Comment Cl S for balance in stance while completing clothing mgt; seated for gilbert care        Daily Progress Summary (OT)    Daily Outcome Statement Initated practice of simulated home set-up tub txfer; using ext tub bench pt able to complete Min A. May benefit from family education to review safe bathroom txfers and DME set-up upon d/c.                           IRF OT Goals      Most Recent Value   Transfer Goal 1    Activity/Assistive Device toilet at 01/10/2022 0938   [place] supervision required, set-up required at  01/10/2022 0938   Time Frame short-term goal (STG), 5 - 7 days at 01/10/2022 0938   Strategies/Barriers DME at 01/10/2022 0938   Progress/Outcome good progress toward goal, goal met, goal revised this date at 01/10/2022 0938   Transfer Goal 2    Activity/Assistive Device toilet at 01/10/2022 0938   [place] modified independence at 01/10/2022 0938   Time Frame 2 weeks at 01/10/2022 0938   Strategies/Barriers DME at 01/02/2022 0805   Progress/Outcome goal revised this date at 01/10/2022 0938   Transfer Goal 3    Activity/Assistive Device shower at 01/10/2022 0938   [place] supervision required, set-up required at 01/10/2022 0938   Time Frame short-term goal (STG), 5 - 7 days at 01/10/2022 0938   Strategies/Barriers DME at 01/02/2022 0805   Progress/Outcome good progress toward goal, goal met, goal revised this date at 01/10/2022 0938   Transfer Goal 4    Activity/Assistive Device shower at 01/10/2022 0938   [place] supervision required at 01/10/2022 0938   Time Frame long-term goal (LTG), 2 weeks at 01/10/2022 0938   Strategies/Barriers DME at 01/02/2022 0805   Progress/Outcome goal met, goal revised this date at 01/10/2022 0938   Bathing Goal 1    Activity/Assistive Device bathing skills, all at 01/10/2022 0938   [place] supervision required at 01/10/2022 0938   Time Frame short-term goal (STG), 5 - 7 days at 01/10/2022 0938   Strategies/Barriers DME at 01/02/2022 0805   Progress/Outcome good progress toward goal, goal met, goal revised this date at 01/10/2022 0938   Bathing Goal 2    Activity/Assistive Device bathing skills, all at 01/10/2022 0938   [place] supervision required at 01/10/2022 0938   Time Frame long-term goal (LTG), 2 weeks at 01/10/2022 0938   Strategies/Barriers DME at 01/02/2022 0805   Progress/Outcome goal met, goal revised this date at 01/10/2022 0938   UB Dressing Goal 1    Activity/Assistive Device upper body dressing at 01/10/2022 0938   [place] supervision  required at 01/10/2022 0938   Time Frame short-term goal (STG), 5 - 7 days at 01/10/2022 0938   Strategies/Barriers incl set up at 01/05/2022 1408   Progress/Outcome good progress toward goal, goal met, goal revised this date at 01/10/2022 0938   UB Dressing Goal 2    Activity/Assistive Device upper body dressing at 01/02/2022 0805   [place] modified independence at 01/02/2022 0805   Time Frame long-term goal (LTG), 4 weeks at 01/02/2022 0805   Strategies/Barriers incl s/u at 01/02/2022 0805   LB Dressing Goal 1    Activity/Assistive Device lower body dressing at 01/05/2022 1408   [place] minimum assist (75% or more patient effort) at 01/05/2022 1408   Time Frame short-term goal (STG), 5 - 7 days at 01/05/2022 1408   Strategies/Barriers AD at 01/02/2022 0805   Progress/Outcome good progress toward goal, goal partially met, goal revised this date at 01/05/2022 1408   LB Dressing Goal 2    Activity/Assistive Device lower body dressing at 01/02/2022 0805   [place] supervision required at 01/02/2022 0805   Time Frame long-term goal (LTG), 4 weeks at 01/02/2022 0805   Strategies/Barriers AD at 01/02/2022 0805   Grooming Goal 1    Activity/Assistive Device grooming skills, all at 01/05/2022 1408   [place] supervision required at 01/05/2022 1408   Time Frame 5 - 7 days at 01/05/2022 1408   Strategies/Barriers std @ sink at 01/05/2022 1408   Progress/Outcome good progress toward goal, goal partially met, goal revised this date at 01/05/2022 1408   Grooming Goal 2    Activity/Assistive Device grooming skills, all at 01/02/2022 0805   [place] modified independence at 01/02/2022 0805   Time Frame long-term goal (LTG), 4 weeks at 01/02/2022 0805   Strategies/Barriers std @ sink at 01/02/2022 0805   Toileting Goal 1    Activity/Assistive Device toileting skills, all at 01/05/2022 1408   [place] tactile cues required at 01/05/2022 1408   Time Frame short-term goal (STG), 5 - 7 days at 01/05/2022 1401    Strategies/Barriers DME at 01/02/2022 0805   Progress/Outcome good progress toward goal, goal partially met, goal revised this date at 01/05/2022 1408   Toileting Goal 2    Activity/Assistive Device toileting skills, all at 01/02/2022 0805   [place] supervision required at 01/02/2022 0805   Time Frame long-term goal (LTG), 4 weeks at 01/02/2022 0805   Strategies/Barriers DME at 01/02/2022 0805

## 2022-01-11 NOTE — PROGRESS NOTES
Patient: Bee Cobb  [facility] [address]  MRN: [id number]  Today's date: 1/11/2022    History of Present Illness  Bee is a 81 y.o. female admitted on 1/1/2022 with Hip fracture requiring operative repair, left, closed, initial encounter (CMS/[facility]) [S72.002A]. Principal problem is Hip fracture requiring operative repair, left, closed, initial encounter (CMS/[facility]).    81 y.o. f who presents with left hip pain after falling out of bed. She had an immediate onset of left hip pain and inability to ambulate.  No LOC.  Radiographic evaluation showed a displaced transcervical femoral neck fracture by CT.  On 12-29-21 she underwent left hip hemiarthroplasty.  Pt is WBAT to LLE and anterior hip precautions x 6 weeks. Ortho recommends aspirin 325mg/day x 6 weeks in addition to pt's home pradaxa which was restarted postoperatively. Pt did well postoperatively, but was noted to have postop anemia with a hgb drop of 12-->9. Per [place], this is expected due to Pradaxa use.  Pain is being managed with multimodal analgesia; scheduled tylenol, lido patch, PRN oxycodone, ice therapy.  PT/OT/PM&R evaluated pt and recommend acute rehab at discharge.          Past Medical History  Bee has a past medical history of Atrial fibrillation (CMS/[facility]), Glaucoma, History of transfusion, Hyperthyroidism, Lipid disorder, Osteoporosis, PAN (polyarteritis nodosa) (CMS/[facility]), and Thalassemia. H/o recent clavicle fx 9/11/21 Completed course of therapy and has full ROM      PT Vitals    Date/Time Pulse BP BP Location BP Method Pt Position [facility]   01/11/22 1412 67 126/58 Left upper arm Automatic Sitting EML      PT Pain    Date/Time Pain Type Side/Orientation Location Rating: Rest Rating: Activity Interventions [facility]   01/11/22 1412 Pain Assessment left hip 4 -- other (see comments) EML                    PM          PM   01/11/22 1524 Pain Reassessment left hip -- 8 position adjusted EML          Prior Living  Environment      Most Recent Value   People in Home spouse   Current Living Arrangements home  [9 AMDY, FF inside]   Home Accessibility stairs to enter home (Group), stairs within home (Group)   Living Environment Comment Row home 13 steps into house, 9 steps to BR on 2nd floor   Number of Stairs, Main Entrance 9   Surface of Stairs, Main Entrance concrete   Stair Railings, Main Entrance railings on both sides of stairs   Location, Bathroom second floor, must negotiate stairs to access   [place], Bathroom tile floor   Bathroom Access Comment ztub/shower combo, hand held shower, bench and suction grab bars ( checks stability prior to each shower)   Number of Stairs, Within Home, Primary --  [13]   Stair Railings, Within Home, Primary railing on right side (ascending)          Prior Level of Function      Most Recent Value   Dominant Hand left   Ambulation independent   Transferring independent   Toileting independent   Bathing independent   Dressing independent   Eating independent   Assistive Device Currently Used at Home shower chair, grab bar           IRF PT Evaluation and Treatment - 01/11/22 1410        PT Time Calculation    Start Time 1407     Stop Time 1537     Time Calculation (min) 90 min        Session Details    Document Type daily treatment/progress note     Mode of Treatment physical therapy;individual therapy        Bed Mobility    [place], Sit to Supine close supervision     Assistive Device bed rails;head of bed elevated     Comment (Bed Mobility) increased time required with HOB elevated        Sit to Stand Transfer    [place], Sit to Stand Transfer close supervision     Verbal Cues safety;hand placement     Assistive Device walker, front-wheeled        Stand to Sit Transfer    [place], Stand to Sit Transfer close supervision     Verbal Cues safety     Assistive Device walker, front-wheeled        Stand Pivot Transfer    [place], Stand Pivot/Stand Step Transfer close  "supervision     Verbal Cues safety     Assistive Device walker, front-wheeled        Toilet Transfer    Comment Cl S via ambulatory approach with RW to RTS with rails over comfort height toilet        Gait Training    [place], Gait close supervision;increased time to complete     Assistive Device gait belt;walker, front-wheeled     Distance in Feet 150 feet     Pattern (Gait) step-through;step-to     Deviations/Abnormal Patterns (Gait) antalgic;base of support, narrow;gait speed decreased;step length decreased     Comment (Gait/Stairs) 1 x 125' with 1 standing rest break, 1 x 150', demonstrates intermittent step to pattern with fatigue        Curb Negotiation    [place] touching/steadying assist     Assistive Device walker, front-wheeled     Curb Height 6 inches     Comment ascending/descending 2\" and 6\" curb with RW, cues for sequencing/safety        Stairs Training    [place], Stairs touching/steadying assist;minimum assist (75% or more patient effort)     Assistive Device railing;cane, quad     Handrail Location (Stairs) both sides;right side (ascending);left side (descending)     Number of Stairs 4     Stair Height 6 inches     Ascending Stairs Technique step-to-step   RLE leading    Descending Stairs Technique step-to-step   LLE leading    Comment x 3 trials, 1st trial: steadying assist ascending/descending stairs with B/L rails, 2nd trial: min A with R rail ascending/L rail descending with quad cane in opposite hand, 3rd trial: min A with  B/L UEs on unilateral rail        Balance    Comment, Balance Cl S standing at sink to wash hands        Lower Extremity (Therapeutic Exercise)    Exercise Position/Type seated     General Exercise bilateral     Reps and Sets 3 x 10     Comment seated AP, LAQ        Daily Progress Summary (PT)    Daily Outcome Statement Pt able to tolerate increased stair negotiation this session however requires seated rest break after 4 stairs. Trialed negotiating stairs " with unilateral rail and quad cane vs. B/L UEs on unilateral rail per home set up.  Pt demonstrates improved balance and safety with use of railing and quad cane.  Pt would benefit from continued practice negotiating stairs with unilateral railing and quad cane in order to negotiate a full flight consecutively prior to d/c.                           IRF PT Goals      Most Recent Value   Bed Mobility Goal 1    Activity/Assistive Device scooting, sit to supine, supine to sit at 01/02/2022 1102   [place] minimum assist (75% or more patient effort) at 01/02/2022 1102   Time Frame short-term goal (STG), 1 week at 01/02/2022 1102   Progress/Outcome goal met at 01/06/2022 0815   Bed Mobility Goal 2    Activity/Assistive Device scooting, sit to supine, supine to sit at 01/02/2022 1102   [place] modified independence at 01/02/2022 1102   Time Frame 5 days at 01/11/2022 0848   Progress/Outcome continuing progress toward goal, goal ongoing at 01/11/2022 0848   Transfer Goal 1    Activity/Assistive Device sit-to-stand/stand-to-sit, stand pivot, walker, front-wheeled at 01/02/2022 1102   [place] supervision required at 01/02/2022 1102   Time Frame short-term goal (STG), 3 - 5 days at 01/06/2022 0815   Progress/Outcome goal met at 01/11/2022 0848   Transfer Goal 2    Activity/Assistive Device sit-to-stand/stand-to-sit, stand pivot, walker, front-wheeled at 01/02/2022 1102   [place] modified independence at 01/02/2022 1102   Time Frame 5 days at 01/11/2022 0848   Progress/Outcome continuing progress toward goal, goal ongoing at 01/11/2022 0848   Gait/Walking Locomotion Goal 1    Activity/Assistive Device gait (walking locomotion), decrease asymmetrical patterns, decrease fall risk, forward stepping, improve balance and speed at 01/02/2022 1102   Distance 100 feet at 01/06/2022 0815   [place] supervision required at 01/02/2022 1102   Time Frame short-term goal (STG), 3 - 5 days at 01/06/2022 0815    Progress/Outcome goal met at 01/11/2022 0848   Gait/Walking Locomotion Goal 2    Activity/Assistive Device gait (walking locomotion), decrease asymmetrical patterns, decrease fall risk, forward stepping, improve balance and speed at 01/02/2022 1102   Distance 200 feet at 01/11/2022 0848   [place] modified independence at 01/02/2022 1102   Time Frame 5 days at 01/11/2022 0848   Progress/Outcome continuing progress toward goal, goal ongoing at 01/11/2022 0848   Stairs Goal 1    Activity/Assistive Device ascending stairs, descending stairs at 01/04/2022 1505   Number of Stairs 8 at 01/04/2022 1505   [place] minimum assist (75% or more patient effort) at 01/04/2022 1505   Time Frame 3 days at 01/11/2022 0848   Progress/Outcome progress slower than expected, goal ongoing at 01/11/2022 0848   Stairs Goal 2    Activity/Assistive Device ascending stairs, descending stairs at 01/04/2022 1505   Number of Stairs 12 at 01/04/2022 1505   [place] supervision required at 01/04/2022 1505   Time Frame 5 days at 01/11/2022 0848   Progress/Outcome continuing progress toward goal, goal ongoing at 01/11/2022 0848

## 2022-01-11 NOTE — SUBJECTIVE & OBJECTIVE
"   Patient was seen and examined.   Attestation Notes: Face to face encounter completed    Subjective    Patient improving slowly, min assist for bed mobility and stairs.  Close supervision with transfers and ambulation.  Persistent left hip pain overall under fair control.  Objective     Visit Vitals  BP (!) 164/70   Pulse 64   Temp 36.5 °C (97.7 °F) (Oral)   Resp 16   Ht 1.676 m (5' 5.98\")   Wt 58.3 kg (128 lb 8.5 oz)   SpO2 94%   BMI 20.76 kg/m²     Review of Systems:  Pertinent items are noted in HPI.  Denies chest pain and shortness of breath.  Review of systems otherwise negative.    Labs     Results from last 7 days   Lab Units 01/10/22  0450   WBC K/uL 13.50*   HEMOGLOBIN g/dL 7.5*   HEMATOCRIT % 24.6*   PLATELETS K/uL 516*     Results from last 7 days   Lab Units 01/10/22  0450   SODIUM mEQ/L 138   POTASSIUM mEQ/L 4.1   CHLORIDE mEQ/L 106   CO2 mEQ/L 25   BUN mg/dL 11   CREATININE mg/dL 0.5*   CALCIUM mg/dL 8.2*   ALBUMIN g/dL 2.6*   BILIRUBIN TOTAL mg/dL 0.7   ALK PHOS IU/L 103   ALT IU/L 13   AST IU/L 21   GLUCOSE mg/dL 81     Full Code    Physical Exam  General      Alert, cooperative, no distress, appears stated age.   Head:    Normocephalic, without obvious abnormality, atraumatic.   Eyes:    PERRL, conjunctiva/corneas clear, EOM's intact.        Nose:   Nares normal, septum midline, mucosa normal, no drainage or            sinus tenderness.   Throat:   Lips, mucosa, and tongue normal.    Neck:   Supple, symmetrical, trachea midline.    Back:     Symmetric, no curvature.   Lungs:     Clear to auscultation bilaterally, respirations unlabored.   Chest wall:    No tenderness or deformity.   Heart:    Regular rate and rhythm, S1 and S2 normal.   Abdomen:     Soft, non-tender, bowel sounds active all four quadrants,     no masses, no organomegaly.   Extremities:  Musculoskeletal:  1+ left lower extremity edema.  Left hip replacement.   Pulses:   1+ and symmetric all extremities.   Skin:  Incision intact " with staples.  Mild drainage.   Neurologic:          Behavior/  Emotional:  CNII-XII intact.  Alert and oriented ×3.  Motor exam stable left hip weakness.  Sensory exam intact.  Reflexes stable decreased reflexes.      Appropriate, cooperative           Plan of care was discussed with patient

## 2022-01-11 NOTE — PROGRESS NOTES
"Daily Progress Note       Patient was seen and examined.   Attestation Notes: Face to face encounter completed    Subjective    Patient improving slowly, min assist for bed mobility and stairs.  Close supervision with transfers and ambulation.  Persistent left hip pain overall under fair control.  Objective     Visit Vitals  BP (!) 164/70   Pulse 64   Temp 36.5 °C (97.7 °F) (Oral)   Resp 16   Ht 1.676 m (5' 5.98\")   Wt 58.3 kg (128 lb 8.5 oz)   SpO2 94%   BMI 20.76 kg/m²     Review of Systems:  Pertinent items are noted in HPI.  Denies chest pain and shortness of breath.  Review of systems otherwise negative.    Labs     Results from last 7 days   Lab Units 01/10/22  0450   WBC K/uL 13.50*   HEMOGLOBIN g/dL 7.5*   HEMATOCRIT % 24.6*   PLATELETS K/uL 516*     Results from last 7 days   Lab Units 01/10/22  0450   SODIUM mEQ/L 138   POTASSIUM mEQ/L 4.1   CHLORIDE mEQ/L 106   CO2 mEQ/L 25   BUN mg/dL 11   CREATININE mg/dL 0.5*   CALCIUM mg/dL 8.2*   ALBUMIN g/dL 2.6*   BILIRUBIN TOTAL mg/dL 0.7   ALK PHOS IU/L 103   ALT IU/L 13   AST IU/L 21   GLUCOSE mg/dL 81     Full Code    Physical Exam  General      Alert, cooperative, no distress, appears stated age.   Head:    Normocephalic, without obvious abnormality, atraumatic.   Eyes:    PERRL, conjunctiva/corneas clear, EOM's intact.        Nose:   Nares normal, septum midline, mucosa normal, no drainage or            sinus tenderness.   Throat:   Lips, mucosa, and tongue normal.    Neck:   Supple, symmetrical, trachea midline.    Back:     Symmetric, no curvature.   Lungs:     Clear to auscultation bilaterally, respirations unlabored.   Chest wall:    No tenderness or deformity.   Heart:    Regular rate and rhythm, S1 and S2 normal.   Abdomen:     Soft, non-tender, bowel sounds active all four quadrants,     no masses, no organomegaly.   Extremities:  Musculoskeletal:  1+ left lower extremity edema.  Left hip replacement.   Pulses:   1+ and symmetric all extremities. "   Skin:  Incision intact with staples.  Mild drainage.   Neurologic:          Behavior/  Emotional:  CNII-XII intact.  Alert and oriented ×3.  Motor exam stable left hip weakness.  Sensory exam intact.  Reflexes stable decreased reflexes.      Appropriate, cooperative           Plan of care was discussed with patient    Assessment & Plan  * Hip fracture requiring operative repair, left, closed, initial encounter (CMS/[facility])  Assessment & Plan  Mrs. Cobb is an 81-year-old female who presented to [facility] on 12/29/2021 with complaints of left hip pain after falling out of bed.  X-ray left hip revealed left femoral neck fracture.  CT left hip confirmed displaced fracture left femoral neck.  X-ray left knee was negative.  Head CT was negative for skull fracture or intracranial hemorrhage.  CT cervical spine was negative for fracture dislocation.  Dr. Pena from orthopedic surgery was consulted and performed left total hip replacement on 12/29/2021.  She is cleared for weightbearing to left lower extremity with anterior hip precautions for 6 weeks.  She is anticoagulated with Pradaxa for atrial fibrillation.  Aspirin 325 mg daily for 6 weeks was added for DVT prophylaxis on top of Pradaxa.  Postoperative anemia stabilized at 8.6.  She was ultimately determined to be medically stable for transfer to [facility] on 1/1/2022 for an acute inpatient rehabilitation program to address deficits related to left hip fracture status post total hip replacement.    She is weightbearing as tolerated.  She will be maintained on left hip precautions and aspirin for 6 weeks.  She will participate in 3 hours of physical and occupational therapy as well as receive 24-hour rehab nursing care.  She will follow-up with Dr. Pena from orthopedic surgery in 2 weeks.    Incision intact.  Some mild serous drainage.  Staples intact.  Continue to monitor.    Pain control improving over time.  Using infrequent oxycodone.   Tylenol around-the-clock.    Patient improving slowly.  Has progressed to close supervision with transfers and ambulation.  Still needs min assist with bed mobility and stairs.    Follow up  Assessment & Plan  PCP Dr. Kanchan Zamarripa after discharge  [phone]     Cardiology after discharge  Ortho Dr. Pena in 2 weeks    At high risk for pressure injury of skin  Assessment & Plan  Turns q2hr    Risk for falls  Assessment & Plan  Safety belt while in wheelchair.    Pain  Assessment & Plan  Tylenol 1000 mg every 8 hours.  Oxycodone as needed.  Adjust medications as needed.  And lidocaine patch.    Postoperative anemia  Assessment & Plan  Iron replacement.  The patient has a history of thalassemia trait and discussed the reasoning behind iron replacement post hip replacement.  Hemoglobin down to 7.7.  Continue to monitor.    Paroxysmal atrial fibrillation (CMS/[facility])  Assessment & Plan  Rate controlled on dig and cardizem, anti-coag on pradaxa    Hypothyroidism  Assessment & Plan  Continue synthroid    Hypertension  Assessment & Plan  Labile blood pressures.  Continue Cardizem with blood pressure parameters.  Continue Catapres.    Glaucoma  Assessment & Plan  Trusopt, xalatan gtt    COPD (chronic obstructive pulmonary disease) (CMS/HCC)  Assessment & Plan  Cont pulmicort, spiriva        Expected Discharge Date:  1/16/2022

## 2022-01-12 ENCOUNTER — APPOINTMENT (INPATIENT)
Dept: PHYSICAL THERAPY | Facility: REHABILITATION | Age: 82
DRG: 560 | End: 2022-01-12
Payer: MEDICARE

## 2022-01-12 ENCOUNTER — APPOINTMENT (INPATIENT)
Dept: OCCUPATIONAL THERAPY | Facility: REHABILITATION | Age: 82
DRG: 560 | End: 2022-01-12
Payer: MEDICARE

## 2022-01-12 LAB
ATRIAL RATE: 97
BASOPHILS # BLD: 0.08 K/UL (ref 0.01–0.1)
BASOPHILS NFR BLD: 0.7 %
DIFFERENTIAL METHOD BLD: ABNORMAL
EOSINOPHIL # BLD: 0.18 K/UL (ref 0.04–0.36)
EOSINOPHIL NFR BLD: 1.6 %
ERYTHROCYTE [DISTWIDTH] IN BLOOD BY AUTOMATED COUNT: 18.6 % (ref 11.7–14.4)
HCT VFR BLDCO AUTO: 25.7 % (ref 35–45)
HGB BLD-MCNC: 7.9 G/DL (ref 11.8–15.7)
HYPOCHROMIA BLD QL SMEAR: ABNORMAL
IMM GRANULOCYTES # BLD AUTO: 0.09 K/UL (ref 0–0.08)
IMM GRANULOCYTES NFR BLD AUTO: 0.8 %
LYMPHOCYTES # BLD: 1.07 K/UL (ref 1.2–3.5)
LYMPHOCYTES NFR BLD: 9.2 %
MCH RBC QN AUTO: 20.3 PG (ref 28–33.2)
MCHC RBC AUTO-ENTMCNC: 30.7 G/DL (ref 32.2–35.5)
MCV RBC AUTO: 66.1 FL (ref 83–98)
MONOCYTES # BLD: 0.74 K/UL (ref 0.28–0.8)
MONOCYTES NFR BLD: 6.4 %
NEUTROPHILS # BLD: 9.42 K/UL (ref 1.7–7)
NEUTS SEG NFR BLD: 81.3 %
NRBC BLD-RTO: 0 %
OVALOCYTES BLD QL SMEAR: ABNORMAL
P AXIS: 61
PDW BLD AUTO: 8.7 FL (ref 9.4–12.3)
PLAT MORPH BLD: NORMAL
PLATELET # BLD AUTO: 504 K/UL (ref 150–369)
PLATELET # BLD EST: ABNORMAL 10*3/UL
POLYCHROMASIA BLD QL SMEAR: ABNORMAL
PR INTERVAL: 156
QRS DURATION: 90
QT INTERVAL: 324
QTC CALCULATION(BAZETT): 411
R AXIS: 49
RBC # BLD AUTO: 3.89 M/UL (ref 3.93–5.22)
SCHISTOCYTES BLD QL SMEAR: ABNORMAL
T WAVE AXIS: 62
TARGETS BLD QL SMEAR: ABNORMAL
VENTRICULAR RATE: 97
WBC # BLD AUTO: 11.58 K/UL (ref 3.8–10.5)

## 2022-01-12 PROCEDURE — 85025 COMPLETE CBC W/AUTO DIFF WBC: CPT | Performed by: HOSPITALIST

## 2022-01-12 PROCEDURE — 97535 SELF CARE MNGMENT TRAINING: CPT | Mod: GO

## 2022-01-12 PROCEDURE — 12800000 HC ROOM AND CARE SEMIPRIVATE REHAB

## 2022-01-12 PROCEDURE — 63700000 HC SELF-ADMINISTRABLE DRUG: Performed by: HOSPITALIST

## 2022-01-12 PROCEDURE — 97116 GAIT TRAINING THERAPY: CPT | Mod: GP

## 2022-01-12 PROCEDURE — 63700000 HC SELF-ADMINISTRABLE DRUG: Performed by: PHYSICAL MEDICINE & REHABILITATION

## 2022-01-12 PROCEDURE — 97530 THERAPEUTIC ACTIVITIES: CPT | Mod: GO

## 2022-01-12 PROCEDURE — 97110 THERAPEUTIC EXERCISES: CPT | Mod: GP

## 2022-01-12 PROCEDURE — 25000000 HC PHARMACY GENERAL: Performed by: PHYSICAL MEDICINE & REHABILITATION

## 2022-01-12 PROCEDURE — 36415 COLL VENOUS BLD VENIPUNCTURE: CPT | Performed by: HOSPITALIST

## 2022-01-12 PROCEDURE — 97530 THERAPEUTIC ACTIVITIES: CPT | Mod: GP

## 2022-01-12 PROCEDURE — 63700000 HC SELF-ADMINISTRABLE DRUG: Performed by: INTERNAL MEDICINE

## 2022-01-12 RX ADMIN — DABIGATRAN ETEXILATE MESYLATE 150 MG: 150 CAPSULE ORAL at 20:18

## 2022-01-12 RX ADMIN — ROSUVASTATIN CALCIUM 5 MG: 5 TABLET, FILM COATED ORAL at 18:13

## 2022-01-12 RX ADMIN — ACETAMINOPHEN 1000 MG: 500 TABLET, FILM COATED ORAL at 22:45

## 2022-01-12 RX ADMIN — OXYCODONE HYDROCHLORIDE 5 MG: 5 TABLET ORAL at 13:28

## 2022-01-12 RX ADMIN — LEVOTHYROXINE SODIUM 75 MCG: 75 TABLET ORAL at 05:50

## 2022-01-12 RX ADMIN — SENNOSIDES AND DOCUSATE SODIUM 1 TABLET: 50; 8.6 TABLET ORAL at 20:18

## 2022-01-12 RX ADMIN — Medication 1000 UNITS: at 07:43

## 2022-01-12 RX ADMIN — ACETAMINOPHEN 1000 MG: 500 TABLET, FILM COATED ORAL at 05:49

## 2022-01-12 RX ADMIN — BUDESONIDE 0.5 MG: 0.5 SUSPENSION RESPIRATORY (INHALATION) at 05:51

## 2022-01-12 RX ADMIN — DABIGATRAN ETEXILATE MESYLATE 150 MG: 150 CAPSULE ORAL at 07:43

## 2022-01-12 RX ADMIN — ACETAMINOPHEN 1000 MG: 500 TABLET, FILM COATED ORAL at 13:28

## 2022-01-12 RX ADMIN — TIOTROPIUM BROMIDE INHALATION SPRAY 2 PUFF: 3.12 SPRAY, METERED RESPIRATORY (INHALATION) at 07:52

## 2022-01-12 RX ADMIN — THERA TABS 1 TABLET: TAB at 07:43

## 2022-01-12 RX ADMIN — DORZOLAMIDE HYDROCHLORIDE 1 DROP: 20 SOLUTION/ DROPS OPHTHALMIC at 05:51

## 2022-01-12 RX ADMIN — DIGOXIN 250 MCG: 250 TABLET ORAL at 05:50

## 2022-01-12 RX ADMIN — FERROUS SULFATE TAB 325 MG (65 MG ELEMENTAL FE) 325 MG: 325 (65 FE) TAB at 07:43

## 2022-01-12 RX ADMIN — LATANOPROST 1 DROP: 50 SOLUTION/ DROPS OPHTHALMIC at 20:20

## 2022-01-12 RX ADMIN — DILTIAZEM HYDROCHLORIDE 240 MG: 120 CAPSULE, COATED, EXTENDED RELEASE ORAL at 07:43

## 2022-01-12 RX ADMIN — CLONIDINE HYDROCHLORIDE 0.1 MG: 0.1 TABLET ORAL at 07:43

## 2022-01-12 RX ADMIN — BUDESONIDE 0.5 MG: 0.5 SUSPENSION RESPIRATORY (INHALATION) at 19:08

## 2022-01-12 RX ADMIN — OXYCODONE HYDROCHLORIDE 5 MG: 5 TABLET ORAL at 05:50

## 2022-01-12 RX ADMIN — SENNOSIDES AND DOCUSATE SODIUM 1 TABLET: 50; 8.6 TABLET ORAL at 07:43

## 2022-01-12 RX ADMIN — CLONIDINE HYDROCHLORIDE 0.1 MG: 0.1 TABLET ORAL at 20:18

## 2022-01-12 RX ADMIN — DORZOLAMIDE HYDROCHLORIDE 1 DROP: 20 SOLUTION/ DROPS OPHTHALMIC at 18:13

## 2022-01-12 RX ADMIN — PANTOPRAZOLE SODIUM 40 MG: 40 TABLET, DELAYED RELEASE ORAL at 07:43

## 2022-01-12 NOTE — PROGRESS NOTES
[facility] Progress Note          Patient was seen and examined.   Attestation Notes: Face to face encounter completed    Bee Cobb is a 81 y.o. female who was admitted for Hip fracture requiring operative repair, left, closed, initial encounter (CMS/[facility]) [S72.002A]. Patient was referred by Rolan Crooks MD for medical assessment and management      CC: Hip fracture requiring operative repair, left, closed, initial encounter (CMS/[facility]) [S72.002A]     HPI: Bee Cobb is a 81 y.o. female with HTN, HL, hypothyroid, COPD, alpha thalassemia trait, CARRINGTON s/p treatment, glaucoma, HFpEF, AFIB (on Pradaxa), admitted to [facility] 12/29/21 s/p fall from bed found with left hip pain. X-ray left hip revealed left femoral neck fracture.  CT left hip confirmed displaced fracture left femoral neck.  X-ray left knee was negative.  Head CT was negative for skull fracture or intracranial hemorrhage.  CT cervical spine was negative for fracture dislocation.  Dr. Fazal Pena from orthopedic surgery was consulted and performed left total hip replacement on 12/29/2021. Post op she had anemia but did not require transfusion. She was restarted on Pradaxa for AFIB which also provided DVT ppx.  Her pain was managed with Tylenol and Oxycodone.      Her BP and HR were managed with Cardizem CD. She was also on Digoxin for AFIB.  She was on Crestor for HL and Synthroid for hypothyroid. She was on Spiriva and Pulmicort for COPD.  She was on Trusopt and Xalatan for glaucoma.     The patient was seen by PM&R and found to have residual deficits in ambulation and ADLs due to Hip fracture requiring operative repair, left, closed, initial encounter (CMS/[facility]) [S72.002A] and came to [facility] on 1/1/2022 for acute inpatient rehab.      She participated in therapy.     SUBJECTIVE:  Patient interviewed and examined     She remains without new complaints.     BP and HR remains stable.    Pain stable,  improved constipation, no abdominal pain or nausea, no dysuria, no chest pain, palpitations, dyspnea or fevers    Review of Systems:  All other systems reviewed and negative except as noted in the HPI.    Current meds and allergies reviewed    Past Medical History:   Diagnosis Date   • (HFpEF) heart failure with preserved ejection fraction (CMS/HCC)    • Alpha thalassemia trait    • Atrial fibrillation (CMS/HCC)    • Closed left hip fracture (CMS/HCC) 12/29/2021   • COPD (chronic obstructive pulmonary disease) (CMS/HCC)    • Glaucoma    • History of transfusion    • Hypertension    • Hypothyroidism    • Lipid disorder    • Malignant melanoma (CMS/HCC)    • Mycobacterium avium-intracellulare complex (CMS/HCC)    • Osteoporosis    • PAN (polyarteritis nodosa) (CMS/HCC)      Past Surgical History:   Procedure Laterality Date   • AORTIC VALVE REPLACEMENT     • TONSILLECTOMY     • TOTAL HIP ARTHROPLASTY Left 12/29/2021     Social History     Tobacco Use   • Smoking status: Never Smoker   • Smokeless tobacco: Never Used   Vaping Use   • Vaping Use: Never used   Substance Use Topics   • Alcohol use: Never   • Drug use: Never      History reviewed. No pertinent family history.    Vital signs in last 24 hours:  Temp:  [36.6 °C (97.9 °F)-36.8 °C (98.3 °F)] 36.8 °C (98.3 °F)  Heart Rate:  [66-77] 73  Resp:  [16-18] 18  BP: (122-159)/(58-89) 135/89  Vital signs reviewed 01/12/22 2:45 PM    Physical Exam  Vitals and nursing note reviewed.   Constitutional:       Appearance: Normal appearance.   HENT:      Head: Normocephalic and atraumatic.      Right Ear: External ear normal.      Left Ear: External ear normal.      Nose: Nose normal.      Mouth/Throat:      Mouth: Mucous membranes are moist.      Pharynx: Oropharynx is clear.   Eyes:      Extraocular Movements: Extraocular movements intact.      Conjunctiva/sclera: Conjunctivae normal.      Pupils: Pupils are equal, round, and reactive to light.   Cardiovascular:      Rate and  Rhythm: Normal rate. Rhythm irregular.      Pulses: Normal pulses.      Heart sounds: Normal heart sounds.   Pulmonary:      Effort: Pulmonary effort is normal.      Breath sounds: Normal breath sounds.   Abdominal:      General: Abdomen is flat. Bowel sounds are normal.      Palpations: Abdomen is soft.   Musculoskeletal:         General: Signs of injury (s/p ORIF left hip fx) present.      Right lower leg: Edema present.      Left lower leg: Edema present.   Skin:     General: Skin is warm and dry.   Neurological:      Mental Status: She is alert and oriented to person, place, and time.   Psychiatric:         Mood and Affect: Mood normal.         Behavior: Behavior normal.                 Objective    Labs:  I have reviewed the patient's labs.  Significant abnormals are anemia, leukocytosis.  Results from last 7 days   Lab Units 01/12/22  0619   WBC K/uL 11.58*   HEMOGLOBIN g/dL 7.9*   HEMATOCRIT % 25.7*   PLATELETS K/uL 504*     Results from last 7 days   Lab Units 01/10/22  0450   SODIUM mEQ/L 138   POTASSIUM mEQ/L 4.1   CHLORIDE mEQ/L 106   CO2 mEQ/L 25   BUN mg/dL 11   CREATININE mg/dL 0.5*   CALCIUM mg/dL 8.2*   ALBUMIN g/dL 2.6*   BILIRUBIN TOTAL mg/dL 0.7   ALK PHOS IU/L 103   ALT IU/L 13   AST IU/L 21   GLUCOSE mg/dL 81     Lab Results   Component Value Date    DIGOXIN 1.0 01/05/2022       Imaging:  Not applicable        ASSESSMENT/PLAN:    81 y.o. female with HTN, HL, hypothyroid, COPD, alpha thalassemia trait, CARRINGTON s/p treatment, glaucoma, HFpEF, AFIB (on Pradaxa), admitted to [facility] 12/29/21 s/p fall from bed found to have left hip fracture and underwent left BARB by Dr. Fazal Pena 12/29/21.     1. Ortho:  -s/p fall with left hip fx s/p left BARB  -Tylenol and Oxycodone for pain     2. Vasc:  -bilat LE edema  -SCDs and Pradaxa for DVT ppx     3. Heme:  -post op anemia due to chronic blood loss on iron/mvi  -alpha thalassemia trait with chronic anemia  -leukocytosis reactive  -follow  CBC     4. Renal:  -increased risk of dehydration and electrolyte changes  -follow BMP, Mg     5. Gi:  -Senokot-S for bowels  -Protonix ulcer ppx     6. Gu:  -1/2/22 UA shows hematuria, possibly trauma for cath and being on Pradaxa  -no UTI or retention  -1/10/22 repeat UA shows less hematuria, no bacteria     7. Cardiac:  -AFIB on Cardizem CD, Digoxin and Pradaxa  -remains tachycardic  -1/5/22 Digoxin level therapeutic at 1.0  -hx of AVR  -HFpEF  -HTN on Cardizem CD  -Clonidine added by Cardiology for BP and HR  -watch for orthostatic hypotension  -use cardiac precautions in therapy  -seen by Cardiology     8. Pulm:  -hx of CARRINGTON s/p treatmetn  -COPD on Pulmicort and Spiriva  -incentive spirometry for atelectasis     9. Derm:  -consulted [employer] for skin assessment     10. Nutrition:  -seen by Nutrition for assessment and education     11. Endo:  -HL on Crestor  -hypothyroid on Synthroid     12. Optho  -glaucoma on Xalatan and Trusopt     13. Psych:  -seen by Psychology for support    14. Dispo:  -anticipated discharge 1/16/22     plan discussed with patient, nurse, case management and Rolan Crooks MD Scott Sapperstein, MD  1/12/2022  2:45 PM

## 2022-01-12 NOTE — PROGRESS NOTES
Patient: Bee Cobb  [facility] [address] [facility]  MRN: [id number]  Today's date: 1/12/2022    History of Present Illness  Bee is a 81 y.o. female admitted on 1/1/2022 with Hip fracture requiring operative repair, left, closed, initial encounter (CMS/[facility]) [S72.002A]. Principal problem is Hip fracture requiring operative repair, left, closed, initial encounter (CMS/[facility]).    81 y.o. f who presents with left hip pain after falling out of bed. She had an immediate onset of left hip pain and inability to ambulate.  No LOC.  Radiographic evaluation showed a displaced transcervical femoral neck fracture by CT.  On 12-29-21 she underwent left hip hemiarthroplasty.  Pt is WBAT to LLE and anterior hip precautions x 6 weeks. Ortho recommends aspirin 325mg/day x 6 weeks in addition to pt's home pradaxa which was restarted postoperatively. Pt did well postoperatively, but was noted to have postop anemia with a hgb drop of 12-->9. Per [place], this is expected due to Pradaxa use.  Pain is being managed with multimodal analgesia; scheduled tylenol, lido patch, PRN oxycodone, ice therapy.  PT/OT/PM&R evaluated pt and recommend acute rehab at discharge.          Past Medical History  Bee has a past medical history of Atrial fibrillation (CMS/[facility]), Glaucoma, History of transfusion, Hyperthyroidism, Lipid disorder, Osteoporosis, PAN (polyarteritis nodosa) (CMS/[facility]), and Thalassemia. H/o recent clavicle fx 9/11/21 Completed course of therapy and has full ROM      OT Vitals    Date/Time Pulse HR Source SpO2 Pt Activity BP BP Location BP Method Pt Position [facility]   01/12/22 0812 76 Left Radial 96 % At rest 122/58 Left upper arm Manual Lying AEB      OT Pain    Date/Time Pain Type Rating: Rest Rating: Activity Radiation to Description Nonverbal Indicators Interventions [facility]   01/12/22 0812 Pain Assessment 0 0 -- -- -- -- AEB   01/12/22 0927 Pain Reassessment 6 6 leg, left intermittent anxious position  "adjusted;premedicated for activity AEB          Prior Living Environment      Most Recent Value   People in Home spouse   Current Living Arrangements home  [9 MADY, FF inside]   Home Accessibility stairs to enter home (Group), stairs within home (Group)   Living Environment Comment Row home 13 steps into house, 9 steps to BR on 2nd floor   Number of Stairs, Main Entrance 9   Surface of Stairs, Main Entrance concrete   Stair Railings, Main Entrance railings on both sides of stairs   Location, Bathroom second floor, must negotiate stairs to access   [place], Bathroom tile floor   Bathroom Access Comment ztub/shower combo, hand held shower, bench and suction grab bars ( checks stability prior to each shower)   Number of Stairs, Within Home, Primary --  [13]   Stair Railings, Within Home, Primary railing on right side (ascending)          Prior Level of Function      Most Recent Value   Dominant Hand left   Ambulation independent   Transferring independent   Toileting independent   Bathing independent   Dressing independent   Eating independent   Assistive Device Currently Used at Home shower chair, grab bar          Occupational Profile      Most Recent Value   Reason for Services/Referral ADL deficit   Successful Occupations wife,- mother   Occupational History/Life Experiences retired [employer]    Performance Patterns independent in all areas   Patient Goals \"I want to be functional, walk and take care of myself\"           IRF OT Evaluation and Treatment - 01/12/22 0808        OT Time Calculation    Start Time 0800     Stop Time 0930     Time Calculation (min) 90 min        Session Details    Document Type daily treatment/progress note     Mode of Treatment occupational therapy;individual therapy        General Information    General Observations of Patient Pt received sitting up in bed awake        Cognition/Psychosocial    Comment, Cognition Alert, oriented, (G-) problem solving but mild limitation " due to anxiety, fair carryover of strategies to max safety and requires ongoing tactile and physical cues for posture correctionbut can verbally state strategy when questioned. Overall, good problem solving related to ADL performance.        Transfers    Transfers toilet transfer;shower transfer     Maintains Weight-Bearing Status (Transfers) able to maintain     Comment Amb approach to bath/shower room        Toilet Transfer    Transfer Technique sit-stand     [place], Toilet Transfer close supervision;1 person assist;safety considerations     Verbal Cues safety;technique;hand placement     Assistive Device grab bars/safety frame;raised toilet seat;walker, front-wheeled     Comment amb approach        Shower Transfer    Transfer Technique stand pivot     [place], Shower Transfer touching/steadying assist     Verbal Cues safety;hand placement     Assistive Device grab bars/tub rail;shower chair;walker, front-wheeled     Comment amb approach w RW        Safety Issues, Functional Mobility    Comment, Safety Issues/Impairments (Mobility) OT: Pt require minreminders to apply safety strategies and self correct posture when amb in hallway w RW to shower room using RW.        Basic Activities of Daily Living (BADLs)    Energy Conservation Techniques activity adapted to sitting;activity pacing encouraged;asking for necessary assistance promoted;breathing techniques encouraged;correct posture facilitated;equipment and device use facilitated;prioritizing activities promoted;storing supplies in easy reach encouraged        Bathing    Self-Performance chest;left arm;right arm;abdomen;front perineal area;buttocks;left upper leg;right upper leg     [place] Assistance left lower leg, including foot;right lower leg, including foot     [place] touching/steadying assist;nonverbal cues (demo/gesture);verbal cues     Position supported sitting;supported standing     Setup Assistance obtain supplies     Adaptive Equipment  grab bar/tub rail;hand-held shower spray hose;shower chair;long-handled sponge     Comment barrier free stall shower, issued and instructed in use of LH sponge for use at home        Upper Body Dressing    Self-Performance obtains clothes;threads left arm, bra/undershirt;threads right arm, bra/undershirt;pulls bra/undershirt over head/around back;pulls bra/undershirt down/adjusts;threads left arm, shirt;threads right arm, shirt;pulls shirt over head/around back;pulls shirt down/adjusts     [place] Assistance obtains clothes     [place] close supervision     Position supported sitting;supported standing     Adaptive Equipment none     Comment Cl sup w safety instruction using RW during clothing retrieval        Lower Body Dressing    Self-Performance threads left leg, underpants;threads right leg, underpants;pulls underpants up or down;threads left leg, pants/shorts;threads right leg, pants/shorts;pulls pants/shorts up or down;dons/doffs left sock;dons/doffs right sock;obtains clothes     [place] Assistance obtains clothes;threads left leg, underpants;threads left leg, pants/shorts     [place] touching/steadying assist;set up     Position supported sitting     Adaptive Equipment reacher;dressing stick;sock aid     [place], Footwear touching/steadying assist     Comment Increased time for use of AD        Grooming    Self-Performance washes, rinses and dries face;washes, rinses and dries hands;brushes/baker hair;oral care (brushing teeth, cleaning dentures)     [place] supervision;verbal cues;1 person assist;safety considerations     Position supported standing     Setup Assistance obtain supplies     Adaptive Equipment none     [place], Oral Hygiene supervision     Comment Standing @ sink w RW support, cues for postural control        Toileting    [place] close supervision;1 person assist;safety considerations     Position supported sitting;unsupported sitting;supported standing     Setup  Assistance obtain supplies     Adaptive Equipment raised toilet seat;grab bar/safety frame     Comment Cl Sup std at sink w RW        Daily Progress Summary (OT)    Symptoms Noted During/After Treatment fatigue     Progress Toward Functional Goals (OT) progressing toward functional goals as expected     Daily Outcome Statement Pt participated in ADL assessment to include wet shower, toileting, dressing and grooming. Pt is making nice progress and benefits from ongoing instruction during clothing retrieval/management and use of DME/AD. Pt is limited by L THP, balance and postural deficit, general weakness. Continue POC     Recommendations (OT) continue POC                          IRF OT Goals      Most Recent Value   Transfer Goal 1    Activity/Assistive Device toilet at 01/10/2022 0938   [place] supervision required at 01/12/2022 0737   Time Frame short-term goal (STG), 5 - 7 days at 01/12/2022 0737   Strategies/Barriers DME at 01/10/2022 0938   Progress/Outcome good progress toward goal, goal ongoing at 01/12/2022 0737   Transfer Goal 2    Activity/Assistive Device toilet at 01/10/2022 0938   [place] modified independence at 01/10/2022 0938   Time Frame 2 weeks at 01/10/2022 0938   Strategies/Barriers DME at 01/02/2022 0805   Progress/Outcome goal revised this date at 01/10/2022 0938   Transfer Goal 3    Activity/Assistive Device shower at 01/10/2022 0938   [place] supervision required at 01/12/2022 0737   Time Frame short-term goal (STG), 5 - 7 days at 01/12/2022 0737   Strategies/Barriers DME at 01/02/2022 0805   Progress/Outcome good progress toward goal, goal ongoing at 01/12/2022 0737   Transfer Goal 4    Activity/Assistive Device shower at 01/10/2022 0938   [place] supervision required at 01/10/2022 0938   Time Frame long-term goal (LTG), 2 weeks at 01/10/2022 0938   Strategies/Barriers DME at 01/02/2022 0805   Progress/Outcome goal met, goal revised this date at 01/10/2022 0938   Bathing  Goal 1    Activity/Assistive Device bathing skills, all at 01/12/2022 0737   [place] supervision required at 01/12/2022 0737   Time Frame short-term goal (STG), 5 - 7 days at 01/12/2022 0737   Strategies/Barriers DME at 01/02/2022 0805   Progress/Outcome good progress toward goal, goal ongoing at 01/12/2022 0737   Bathing Goal 2    Activity/Assistive Device bathing skills, all at 01/10/2022 0938   [place] supervision required at 01/10/2022 0938   Time Frame long-term goal (LTG), 2 weeks at 01/10/2022 0938   Strategies/Barriers DME at 01/02/2022 0805   Progress/Outcome goal met, goal revised this date at 01/10/2022 0938   UB Dressing Goal 1    Activity/Assistive Device upper body dressing at 01/12/2022 0737   [place] supervision required at 01/12/2022 0737   Time Frame 5 - 7 days at 01/12/2022 0737   Strategies/Barriers incl set up at 01/05/2022 1408   Progress/Outcome good progress toward goal, goal ongoing at 01/12/2022 0737   UB Dressing Goal 2    Activity/Assistive Device upper body dressing at 01/02/2022 0805   [place] modified independence at 01/02/2022 0805   Time Frame long-term goal (LTG), 4 weeks at 01/02/2022 0805   Strategies/Barriers incl s/u at 01/02/2022 0805   LB Dressing Goal 1    Activity/Assistive Device lower body dressing at 01/12/2022 0737   [place] tactile cues required at 01/12/2022 0737   Time Frame short-term goal (STG), 5 - 7 days at 01/12/2022 0737   Strategies/Barriers AD at 01/02/2022 0805   Progress/Outcome good progress toward goal, goal revised this date at 01/12/2022 0737   LB Dressing Goal 2    Activity/Assistive Device lower body dressing at 01/02/2022 0805   [place] supervision required at 01/02/2022 0805   Time Frame long-term goal (LTG), 4 weeks at 01/02/2022 0805   Strategies/Barriers AD at 01/02/2022 0805   Grooming Goal 1    Activity/Assistive Device grooming skills, all at 01/12/2022 0737   [place] supervision required at 01/12/2022 0744    Time Frame short-term goal (STG), 5 - 7 days at 01/12/2022 0737   Strategies/Barriers std @ sink at 01/05/2022 1408   Progress/Outcome good progress toward goal, goal ongoing at 01/12/2022 0737   Grooming Goal 2    Activity/Assistive Device grooming skills, all at 01/02/2022 0805   [place] modified independence at 01/02/2022 0805   Time Frame long-term goal (LTG), 4 weeks at 01/02/2022 0805   Strategies/Barriers std @ sink at 01/02/2022 0805   Toileting Goal 1    Activity/Assistive Device toileting skills, all at 01/12/2022 0737   [place] supervision required at 01/12/2022 0737   Time Frame short-term goal (STG), 5 - 7 days at 01/12/2022 0737   Strategies/Barriers DME at 01/02/2022 0805   Progress/Outcome good progress toward goal, goal revised this date at 01/12/2022 0737   Toileting Goal 2    Activity/Assistive Device toileting skills, all at 01/02/2022 0805   [place] supervision required at 01/02/2022 0805   Time Frame long-term goal (LTG), 4 weeks at 01/02/2022 0805   Strategies/Barriers DME at 01/02/2022 0805

## 2022-01-12 NOTE — PLAN OF CARE
Problem: Rehabilitation (IRF) Plan of Care  Goal: Plan of Care Review  Outcome: Progressing  Flowsheets (Taken 1/12/2022 1507)  Progress: improving  Plan of Care Reviewed With: patient  Outcome Summary: Patient presents with left hip incision approximated with steri strips, covered in gauze/tegaderm. Small amount of serrous draining noted. Medicated with PRN oxy for pain with effective results. Appetite good. BM 1/12/22. Makes needs known.   Plan of Care Review  Plan of Care Reviewed With: patient  Progress: improving  Outcome Summary: Patient presents with left hip incision approximated with steri strips, covered in gauze/tegaderm. Small amount of serrous draining noted. Medicated with PRN oxy for pain with effective results. Appetite good. BM 1/12/22. Makes needs known.

## 2022-01-12 NOTE — PROGRESS NOTES
Patient: Bee Cobb  [facility]  MRN: [id number]  Today's date: 1/12/2022    History of Present Illness  Bee is a 81 y.o. female admitted on 1/1/2022 with Hip fracture requiring operative repair, left, closed, initial encounter (CMS/[facility]) [S72.002A]. Principal problem is Hip fracture requiring operative repair, left, closed, initial encounter (CMS/[facility]).    81 y.o. f who presents with left hip pain after falling out of bed. She had an immediate onset of left hip pain and inability to ambulate.  No LOC.  Radiographic evaluation showed a displaced transcervical femoral neck fracture by CT.  On 12-29-21 she underwent left hip hemiarthroplasty.  Pt is WBAT to LLE and anterior hip precautions x 6 weeks. Ortho recommends aspirin 325mg/day x 6 weeks in addition to pt's home pradaxa which was restarted postoperatively. Pt did well postoperatively, but was noted to have postop anemia with a hgb drop of 12-->9. Per [place], this is expected due to Pradaxa use.  Pain is being managed with multimodal analgesia; scheduled tylenol, lido patch, PRN oxycodone, ice therapy.  PT/OT/PM&R evaluated pt and recommend acute rehab at discharge.          Past Medical History  Bee has a past medical history of Atrial fibrillation (CMS/[facility]), Glaucoma, History of transfusion, Hyperthyroidism, Lipid disorder, Osteoporosis, PAN (polyarteritis nodosa) (CMS/[facility]), and Thalassemia. H/o recent clavicle fx 9/11/21 Completed course of therapy and has full ROM      PT Vitals    Date/Time Pulse HR Source BP BP Location BP Method Pt Position [facility]   01/12/22 1435 73 Monitor 135/89 Left upper arm Automatic Sitting RC      PT Pain    Date/Time Pain Type Side/Orientation Location Rating: Rest Rating: Activity Interventions [facility]   01/12/22 1435 Pain Assessment left hip 3 -- diversional activity provided    01/12/22 1458 Pain Reassessment left hip -- 5 position adjusted RC          Prior Living Environment       Most Recent Value   People in Home spouse   Current Living Arrangements home  [9 MADY, FF inside]   Home Accessibility stairs to enter home (Group), stairs within home (Group)   Living Environment Comment Row home 13 steps into house, 9 steps to BR on 2nd floor   Number of Stairs, Main Entrance 9   Surface of Stairs, Main Entrance concrete   Stair Railings, Main Entrance railings on both sides of stairs   Location, Bathroom second floor, must negotiate stairs to access   [place], Bathroom tile floor   Bathroom Access Comment ztub/shower combo, hand held shower, bench and suction grab bars ( checks stability prior to each shower)   Number of Stairs, Within Home, Primary --  [13]   Stair Railings, Within Home, Primary railing on right side (ascending)          Prior Level of Function      Most Recent Value   Dominant Hand left   Ambulation independent   Transferring independent   Toileting independent   Bathing independent   Dressing independent   Eating independent   Assistive Device Currently Used at Home shower chair, grab bar           IRF PT Evaluation and Treatment - 01/12/22 1436        PT Time Calculation    Start Time 1430     Stop Time 1500     Time Calculation (min) 30 min        Session Details    Document Type daily treatment/progress note     Mode of Treatment physical therapy;individual therapy        Sit to Stand Transfer    [place], Sit to Stand Transfer close supervision     Verbal Cues safety;technique     Assistive Device walker, front-wheeled     Comment multiple bouts from w/c; closeS for safety        Stand to Sit Transfer    [place], Stand to Sit Transfer close supervision     Verbal Cues safety     Assistive Device walker, front-wheeled     Comment multiple bouts to w/c; closeS for safety        Stand Pivot Transfer    [place], Stand Pivot/Stand Step Transfer close supervision     Verbal Cues safety     Assistive Device walker, front-wheeled     Comment via short  distance ambulation; closeS for safety.        Stairs Training    [place], Stairs minimum assist (75% or more patient effort)     Assistive Device cane, quad;railing     Handrail Location (Stairs) both sides;right side (ascending);left side (descending)     Number of Stairs 4     Stair Height 6 inches     Ascending Stairs Technique step-to-step     Descending Stairs Technique step-to-step     Comment 1.) ascend/descend 4 steps with B HR's and Janice at pelvis fro balance and lateral WS, decreased VC's required for proper sequencing. 2.) x2 bouts ascend/descned 4 steps with [place] at plevis for balance and lateral WS, one hand rail on the R ascend and L HR descend and quad cane in opposite hand. All bouts ascending with RLE and descending with LLE.        Daily Progress Summary (PT)    Symptoms Noted During/After Treatment none     Daily Outcome Statement Session focused on blocked practice stair negotiation of 4 steps, simulating home setup. Pt reports increased confidence following blocked practice, but would continue to benefit from additional trials prior to d/c home.                      Education Documentation  Home Safety, taught by Kim Butcher, PT at 1/12/2022  2:45 PM.  Learner: Patient  Readiness: Acceptance  Method: Explanation  Response: Needs Reinforcement  Comment: reviewed safety techniques when negotiating home stiar set up including hand rail location, use of quad cane and proper sequencing.    Safety Techniques, taught by Kim Butcher, PT at 1/12/2022 10:45 AM.  Learner: Patient  Readiness: Acceptance  Method: Explanation  Response: Needs Reinforcement  Comment: reviewed safety considerations when negotiating stairs and proving encouragement for confidence. Discussed various approaches to home set of stairs for problem solving for patient.          IRF PT Goals      Most Recent Value   Bed Mobility Goal 1    Activity/Assistive Device scooting, sit to supine, supine to sit at  01/02/2022 1102   [place] minimum assist (75% or more patient effort) at 01/02/2022 1102   Time Frame short-term goal (STG), 1 week at 01/02/2022 1102   Progress/Outcome goal met at 01/06/2022 0815   Bed Mobility Goal 2    Activity/Assistive Device scooting, sit to supine, supine to sit at 01/02/2022 1102   [place] modified independence at 01/02/2022 1102   Time Frame 5 days at 01/11/2022 0848   Progress/Outcome continuing progress toward goal, goal ongoing at 01/11/2022 0848   Transfer Goal 1    Activity/Assistive Device sit-to-stand/stand-to-sit, stand pivot, walker, front-wheeled at 01/02/2022 1102   [place] supervision required at 01/02/2022 1102   Time Frame short-term goal (STG), 3 - 5 days at 01/06/2022 0815   Progress/Outcome goal met at 01/11/2022 0848   Transfer Goal 2    Activity/Assistive Device sit-to-stand/stand-to-sit, stand pivot, walker, front-wheeled at 01/02/2022 1102   [place] modified independence at 01/02/2022 1102   Time Frame 5 days at 01/11/2022 0848   Progress/Outcome continuing progress toward goal, goal ongoing at 01/11/2022 0848   Gait/Walking Locomotion Goal 1    Activity/Assistive Device gait (walking locomotion), decrease asymmetrical patterns, decrease fall risk, forward stepping, improve balance and speed at 01/02/2022 1102   Distance 100 feet at 01/06/2022 0815   [place] supervision required at 01/02/2022 1102   Time Frame short-term goal (STG), 3 - 5 days at 01/06/2022 0815   Progress/Outcome goal met at 01/11/2022 0848   Gait/Walking Locomotion Goal 2    Activity/Assistive Device gait (walking locomotion), decrease asymmetrical patterns, decrease fall risk, forward stepping, improve balance and speed at 01/02/2022 1102   Distance 200 feet at 01/11/2022 0848   [place] modified independence at 01/02/2022 1102   Time Frame 5 days at 01/11/2022 0848   Progress/Outcome continuing progress toward goal, goal ongoing at 01/11/2022 0848   Stairs Goal 1     Activity/Assistive Device ascending stairs, descending stairs at 01/04/2022 1505   Number of Stairs 8 at 01/04/2022 1505   [place] minimum assist (75% or more patient effort) at 01/04/2022 1505   Time Frame 3 days at 01/11/2022 0848   Progress/Outcome progress slower than expected, goal ongoing at 01/11/2022 0848   Stairs Goal 2    Activity/Assistive Device ascending stairs, descending stairs at 01/04/2022 1505   Number of Stairs 12 at 01/04/2022 1505   [place] supervision required at 01/04/2022 1505   Time Frame 5 days at 01/11/2022 0848   Progress/Outcome continuing progress toward goal, goal ongoing at 01/11/2022 0848

## 2022-01-12 NOTE — NURSING NOTE
Removed 25 staples from left hip incision without difficulty. No drainage noted.  Applied steri strips to incision. Covered proximal incision with gauze and tegaderm.

## 2022-01-12 NOTE — PROGRESS NOTES
Patient: Bee Cobb  Location: [facility] [address]  MRN: [id number]  Today's date: 1/12/2022    History of Present Illness  Bee is a 81 y.o. female admitted on 1/1/2022 with Hip fracture requiring operative repair, left, closed, initial encounter (CMS/[facility]) [S72.002A]. Principal problem is Hip fracture requiring operative repair, left, closed, initial encounter (CMS/[facility]).    81 y.o. f who presents with left hip pain after falling out of bed. She had an immediate onset of left hip pain and inability to ambulate.  No LOC.  Radiographic evaluation showed a displaced transcervical femoral neck fracture by CT.  On 12-29-21 she underwent left hip hemiarthroplasty.  Pt is WBAT to LLE and anterior hip precautions x 6 weeks. Ortho recommends aspirin 325mg/day x 6 weeks in addition to pt's home pradaxa which was restarted postoperatively. Pt did well postoperatively, but was noted to have postop anemia with a hgb drop of 12-->9. Per Branch, this is expected due to Pradaxa use.  Pain is being managed with multimodal analgesia; scheduled tylenol, lido patch, PRN oxycodone, ice therapy.  PT/OT/PM&R evaluated pt and recommend acute rehab at discharge.          Past Medical History  Bee has a past medical history of Atrial fibrillation (CMS/[facility]), Glaucoma, History of transfusion, Hyperthyroidism, Lipid disorder, Osteoporosis, PAN (polyarteritis nodosa) (CMS/[facility]), and Thalassemia. H/o recent clavicle fx 9/11/21 Completed course of therapy and has full ROM      PT Vitals    Date/Time Pulse HR Source BP BP Location BP Method Pt Position [facility]   01/12/22 1005 73 Monitor 148/66 Right upper arm Automatic Sitting       PT Pain    Date/Time Pain Type Side/Orientation Location Rating: Rest Rating: Activity Description Interventions [facility]   01/12/22 1005 Pain Assessment left hip 4 -- dull diversional activity provided    01/12/22 1058 Pain Reassessment left hip -- 6 aching position adjusted RC           Prior Living Environment      Most Recent Value   People in Home spouse   Current Living Arrangements home  [9 MDAY, FF inside]   Home Accessibility stairs to enter home (Group), stairs within home (Group)   Living Environment Comment Row home 13 steps into house, 9 steps to BR on 2nd floor   Number of Stairs, Main Entrance 9   Surface of Stairs, Main Entrance concrete   Stair Railings, Main Entrance railings on both sides of stairs   Location, Bathroom second floor, must negotiate stairs to access   [place], Bathroom tile floor   Bathroom Access Comment ztub/shower combo, hand held shower, bench and suction grab bars ( checks stability prior to each shower)   Number of Stairs, Within Home, Primary --  [13]   Stair Railings, Within Home, Primary railing on right side (ascending)          Prior Level of Function      Most Recent Value   Dominant Hand left   Ambulation independent   Transferring independent   Toileting independent   Bathing independent   Dressing independent   Eating independent   Assistive Device Currently Used at Home shower chair, grab bar           IRF PT Evaluation and Treatment - 01/12/22 1005        PT Time Calculation    Start Time 1000     Stop Time 1100     Time Calculation (min) 60 min        Session Details    Document Type daily treatment/progress note     Mode of Treatment physical therapy;individual therapy        General Information    Patient Profile Reviewed yes        Bed Mobility    [place], Sit to Supine close supervision     Assistive Device bed rails     Comment (Bed Mobility) returned to bed at end of session for positional comfort        Sit to Stand Transfer    [place], Sit to Stand Transfer close supervision     Verbal Cues safety;technique     Assistive Device walker, front-wheeled;gait belt     Comment multiple bouts from EOB and w/c; closeS for safety        Stand to Sit Transfer    [place], Stand to Sit Transfer close supervision     Verbal  "Cues safety     Assistive Device walker, front-wheeled;gait belt     Comment multiple bouts to w/c; closeS for safety        Stand Pivot Transfer    [place], Stand Pivot/Stand Step Transfer close supervision     Verbal Cues safety     Assistive Device walker, front-wheeled;gait belt     Comment via ambulatory approach to w/c; closeS for safety and VC's for AD management with turning.        Gait Training    [place], Gait close supervision     Assistive Device gait belt;walker, front-wheeled     Pattern (Gait) step-through     Deviations/Abnormal Patterns (Gait) antalgic;base of support, narrow;gait speed decreased;step length decreased;weight shifting decreased     Bilateral Gait Deviations heel strike decreased     Maintains Weight-bearing Status (Gait) able to maintain     Comment (Gait/Stairs) 105'x1,        Stairs Training    [place], Stairs minimum assist (75% or more patient effort)     Assistive Device railing     Handrail Location (Stairs) both sides     Number of Stairs 4     Stair Height 6 inches     Ascending Stairs Technique step-over-step     Descending Stairs Technique step-to-step     Comment 1 trial up/down 4 steps with B HR for support and Janice at pelvis for safety and balance, pt able to perform with touch assist, but reports wanting more assistance this bout d/t increased anxiety and fear of falling. Plan to re-assess in afternoon session.        Motor Skills    Motor Control/Coordination Interventions stepping/walking        Advanced Stepping/Walking Interventions    Stepping/Walking Interventions box stepping     Box Stepping (Stepping/Walking Interventions) box taps to 6\" block with Janice at pelvis for balance and lateral WS; 2x10 reps with seated rest break between bouts.        Lower Extremity (Therapeutic Exercise)    Exercise Position/Type seated     General Exercise bilateral     Reps and Sets 3 sets x 10 reps each     Comment seated in w/c; LAQ's, RLE marching, hip abd with " TB and hip add with ball.        Daily Progress Summary (PT)    Symptoms Noted During/After Treatment increased pain     Daily Outcome Statement Pt session focused on functional mobility and LE strengthening. Pt expressed significant fear of stairs stating she does not feel ready to be discharged over the weekend d/t stairs in home. PT reassured pt, discussing specific fears and providing pt encouragement. Pt would continue to benefit from blocked practice with stiar negotiation to improve overall confidence prior to d/c. Pt returned to bed at end of session per request for improved positioning for pain.                      Education Documentation  Safety Techniques, taught by Kim Butcher, PT at 1/12/2022 10:45 AM.  Learner: Patient  Readiness: Acceptance  Method: Explanation  Response: Needs Reinforcement  Comment: reviewed safety considerations when negotiating stairs and proving encouragement for confidence. Discussed various approaches to home set of stairs for problem solving for patient.          IRF PT Goals      Most Recent Value   Bed Mobility Goal 1    Activity/Assistive Device scooting, sit to supine, supine to sit at 01/02/2022 1102   [place] minimum assist (75% or more patient effort) at 01/02/2022 1102   Time Frame short-term goal (STG), 1 week at 01/02/2022 1102   Progress/Outcome goal met at 01/06/2022 0815   Bed Mobility Goal 2    Activity/Assistive Device scooting, sit to supine, supine to sit at 01/02/2022 1102   [place] modified independence at 01/02/2022 1102   Time Frame 5 days at 01/11/2022 0848   Progress/Outcome continuing progress toward goal, goal ongoing at 01/11/2022 0848   Transfer Goal 1    Activity/Assistive Device sit-to-stand/stand-to-sit, stand pivot, walker, front-wheeled at 01/02/2022 1102   [place] supervision required at 01/02/2022 1102   Time Frame short-term goal (STG), 3 - 5 days at 01/06/2022 0815   Progress/Outcome goal met at 01/11/2022 0848    Transfer Goal 2    Activity/Assistive Device sit-to-stand/stand-to-sit, stand pivot, walker, front-wheeled at 01/02/2022 1102   [place] modified independence at 01/02/2022 1102   Time Frame 5 days at 01/11/2022 0848   Progress/Outcome continuing progress toward goal, goal ongoing at 01/11/2022 0848   Gait/Walking Locomotion Goal 1    Activity/Assistive Device gait (walking locomotion), decrease asymmetrical patterns, decrease fall risk, forward stepping, improve balance and speed at 01/02/2022 1102   Distance 100 feet at 01/06/2022 0815   [place] supervision required at 01/02/2022 1102   Time Frame short-term goal (STG), 3 - 5 days at 01/06/2022 0815   Progress/Outcome goal met at 01/11/2022 0848   Gait/Walking Locomotion Goal 2    Activity/Assistive Device gait (walking locomotion), decrease asymmetrical patterns, decrease fall risk, forward stepping, improve balance and speed at 01/02/2022 1102   Distance 200 feet at 01/11/2022 0848   [place] modified independence at 01/02/2022 1102   Time Frame 5 days at 01/11/2022 0848   Progress/Outcome continuing progress toward goal, goal ongoing at 01/11/2022 0848   Stairs Goal 1    Activity/Assistive Device ascending stairs, descending stairs at 01/04/2022 1505   Number of Stairs 8 at 01/04/2022 1505   [place] minimum assist (75% or more patient effort) at 01/04/2022 1505   Time Frame 3 days at 01/11/2022 0848   Progress/Outcome progress slower than expected, goal ongoing at 01/11/2022 0848   Stairs Goal 2    Activity/Assistive Device ascending stairs, descending stairs at 01/04/2022 1505   Number of Stairs 12 at 01/04/2022 1505   [place] supervision required at 01/04/2022 1505   Time Frame 5 days at 01/11/2022 0848   Progress/Outcome continuing progress toward goal, goal ongoing at 01/11/2022 0848

## 2022-01-12 NOTE — PLAN OF CARE
Plan of Care Review  Plan of Care Reviewed With: patient  Progress: improving  Outcome Summary: Patient is alert and oriented, scheduled medications rendered as per order, Patient sleeping well, continent to bb. Sleeping well during shift.

## 2022-01-12 NOTE — PLAN OF CARE
Problem: Rehabilitation (IRF) Plan of Care  Goal: Plan of Care Review  Flowsheets (Taken 1/11/2022 1923)  Progress: improving  Plan of Care Reviewed With: patient  Outcome Summary: Pt teamed today and doing well and confirmed dc for Sun.  Met with pt to review the team and she is a little anxious about it but agrees with ths.  She is anxious about falling agin and being able to do things at home.  She feels she is getting a little more confident but will see how the rest of the week goes.  PT arranged FT with the  on Fri and that makes pt feel better and less anxious..  She is pleased with the care and will follow.

## 2022-01-13 ENCOUNTER — APPOINTMENT (INPATIENT)
Dept: OCCUPATIONAL THERAPY | Facility: REHABILITATION | Age: 82
DRG: 560 | End: 2022-01-13
Payer: MEDICARE

## 2022-01-13 ENCOUNTER — APPOINTMENT (INPATIENT)
Dept: PHYSICAL THERAPY | Facility: REHABILITATION | Age: 82
DRG: 560 | End: 2022-01-13
Payer: MEDICARE

## 2022-01-13 PROCEDURE — 97530 THERAPEUTIC ACTIVITIES: CPT | Mod: GO

## 2022-01-13 PROCEDURE — 63700000 HC SELF-ADMINISTRABLE DRUG: Performed by: HOSPITALIST

## 2022-01-13 PROCEDURE — 63700000 HC SELF-ADMINISTRABLE DRUG: Performed by: PHYSICAL MEDICINE & REHABILITATION

## 2022-01-13 PROCEDURE — 97110 THERAPEUTIC EXERCISES: CPT | Mod: GP

## 2022-01-13 PROCEDURE — 97116 GAIT TRAINING THERAPY: CPT | Mod: GP

## 2022-01-13 PROCEDURE — 97530 THERAPEUTIC ACTIVITIES: CPT | Mod: GP

## 2022-01-13 PROCEDURE — 12800000 HC ROOM AND CARE SEMIPRIVATE REHAB

## 2022-01-13 PROCEDURE — 25000000 HC PHARMACY GENERAL: Performed by: PHYSICAL MEDICINE & REHABILITATION

## 2022-01-13 PROCEDURE — 97112 NEUROMUSCULAR REEDUCATION: CPT | Mod: GP

## 2022-01-13 PROCEDURE — 63700000 HC SELF-ADMINISTRABLE DRUG: Performed by: INTERNAL MEDICINE

## 2022-01-13 PROCEDURE — 97535 SELF CARE MNGMENT TRAINING: CPT | Mod: GO

## 2022-01-13 RX ORDER — HYDRALAZINE HYDROCHLORIDE 10 MG/1
10 TABLET, FILM COATED ORAL EVERY 8 HOURS
Status: DISCONTINUED | OUTPATIENT
Start: 2022-01-13 | End: 2022-01-16 | Stop reason: HOSPADM

## 2022-01-13 RX ORDER — OXYCODONE HYDROCHLORIDE 5 MG/1
5 TABLET ORAL EVERY 6 HOURS PRN
Status: DISCONTINUED | OUTPATIENT
Start: 2022-01-13 | End: 2022-01-16 | Stop reason: HOSPADM

## 2022-01-13 RX ADMIN — ROSUVASTATIN CALCIUM 5 MG: 5 TABLET, FILM COATED ORAL at 18:16

## 2022-01-13 RX ADMIN — Medication 1000 UNITS: at 08:28

## 2022-01-13 RX ADMIN — BUDESONIDE 0.5 MG: 0.5 SUSPENSION RESPIRATORY (INHALATION) at 05:41

## 2022-01-13 RX ADMIN — ACETAMINOPHEN 1000 MG: 500 TABLET, FILM COATED ORAL at 20:27

## 2022-01-13 RX ADMIN — PANTOPRAZOLE SODIUM 40 MG: 40 TABLET, DELAYED RELEASE ORAL at 08:28

## 2022-01-13 RX ADMIN — CLONIDINE HYDROCHLORIDE 0.1 MG: 0.1 TABLET ORAL at 08:28

## 2022-01-13 RX ADMIN — SENNOSIDES AND DOCUSATE SODIUM 1 TABLET: 50; 8.6 TABLET ORAL at 20:27

## 2022-01-13 RX ADMIN — OXYCODONE HYDROCHLORIDE 5 MG: 5 TABLET ORAL at 05:41

## 2022-01-13 RX ADMIN — ACETAMINOPHEN 1000 MG: 500 TABLET, FILM COATED ORAL at 13:12

## 2022-01-13 RX ADMIN — SENNOSIDES AND DOCUSATE SODIUM 1 TABLET: 50; 8.6 TABLET ORAL at 08:28

## 2022-01-13 RX ADMIN — FERROUS SULFATE TAB 325 MG (65 MG ELEMENTAL FE) 325 MG: 325 (65 FE) TAB at 08:28

## 2022-01-13 RX ADMIN — OXYCODONE HYDROCHLORIDE 5 MG: 5 TABLET ORAL at 11:24

## 2022-01-13 RX ADMIN — DABIGATRAN ETEXILATE MESYLATE 150 MG: 150 CAPSULE ORAL at 08:28

## 2022-01-13 RX ADMIN — TIOTROPIUM BROMIDE INHALATION SPRAY 2 PUFF: 3.12 SPRAY, METERED RESPIRATORY (INHALATION) at 08:42

## 2022-01-13 RX ADMIN — DORZOLAMIDE HYDROCHLORIDE 1 DROP: 20 SOLUTION/ DROPS OPHTHALMIC at 05:43

## 2022-01-13 RX ADMIN — DORZOLAMIDE HYDROCHLORIDE 1 DROP: 20 SOLUTION/ DROPS OPHTHALMIC at 18:16

## 2022-01-13 RX ADMIN — CLONIDINE HYDROCHLORIDE 0.1 MG: 0.1 TABLET ORAL at 20:27

## 2022-01-13 RX ADMIN — HYDRALAZINE HYDROCHLORIDE 10 MG: 10 TABLET, FILM COATED ORAL at 14:38

## 2022-01-13 RX ADMIN — LATANOPROST 1 DROP: 50 SOLUTION/ DROPS OPHTHALMIC at 20:28

## 2022-01-13 RX ADMIN — HYDRALAZINE HYDROCHLORIDE 10 MG: 10 TABLET, FILM COATED ORAL at 20:27

## 2022-01-13 RX ADMIN — DIGOXIN 250 MCG: 250 TABLET ORAL at 05:41

## 2022-01-13 RX ADMIN — DABIGATRAN ETEXILATE MESYLATE 150 MG: 150 CAPSULE ORAL at 20:27

## 2022-01-13 RX ADMIN — ACETAMINOPHEN 1000 MG: 500 TABLET, FILM COATED ORAL at 05:41

## 2022-01-13 RX ADMIN — THERA TABS 1 TABLET: TAB at 08:28

## 2022-01-13 RX ADMIN — BUDESONIDE 0.5 MG: 0.5 SUSPENSION RESPIRATORY (INHALATION) at 20:31

## 2022-01-13 RX ADMIN — LEVOTHYROXINE SODIUM 75 MCG: 75 TABLET ORAL at 05:41

## 2022-01-13 RX ADMIN — DILTIAZEM HYDROCHLORIDE 240 MG: 120 CAPSULE, COATED, EXTENDED RELEASE ORAL at 08:28

## 2022-01-13 NOTE — SUBJECTIVE & OBJECTIVE
"   Patient was seen and examined.   Attestation Notes: Face to face encounter completed    Subjective    The patient continues to tolerate and progress with her mobility with therapies.  Pain under fair control.  Objective     Visit Vitals  /62 (BP Location: Left upper arm, Patient Position: Lying)   Pulse 72   Temp 36.4 °C (97.5 °F) (Oral)   Resp 18   Ht 1.676 m (5' 5.98\")   Wt 58.3 kg (128 lb 8.5 oz)   SpO2 96%   BMI 20.76 kg/m²     Review of Systems:  Pertinent items are noted in HPI.  Denies chest pain and shortness of breath.  Review of systems otherwise negative.    Labs     Results from last 7 days   Lab Units 01/12/22  0619   WBC K/uL 11.58*   HEMOGLOBIN g/dL 7.9*   HEMATOCRIT % 25.7*   PLATELETS K/uL 504*     Results from last 7 days   Lab Units 01/10/22  0450   SODIUM mEQ/L 138   POTASSIUM mEQ/L 4.1   CHLORIDE mEQ/L 106   CO2 mEQ/L 25   BUN mg/dL 11   CREATININE mg/dL 0.5*   CALCIUM mg/dL 8.2*   ALBUMIN g/dL 2.6*   BILIRUBIN TOTAL mg/dL 0.7   ALK PHOS IU/L 103   ALT IU/L 13   AST IU/L 21   GLUCOSE mg/dL 81     Full Code    Physical Exam  General      Alert, cooperative, no distress, appears stated age.   Head:    Normocephalic, without obvious abnormality, atraumatic.   Eyes:    PERRL, conjunctiva/corneas clear, EOM's intact.        Nose:   Nares normal, septum midline, mucosa normal, no drainage or            sinus tenderness.   Throat:   Lips, mucosa, and tongue normal.    Neck:   Supple, symmetrical, trachea midline.    Back:     Symmetric, no curvature.   Lungs:     Clear to auscultation bilaterally, respirations unlabored.   Chest wall:    No tenderness or deformity.   Heart:    Regular rate and rhythm, S1 and S2 normal.   Abdomen:     Soft, non-tender, bowel sounds active all four quadrants,     no masses, no organomegaly.   Extremities:  Musculoskeletal:  1+ lower extremity edema bilaterally.   Left hip replacement      Pulses:   1+ and symmetric all extremities.   Skin:   Incision intact " without drainage.   Neurologic:          Behavior/  Emotional:  CNII-XII intact.  Alert and oriented ×3.  Stable left hip weakness.  Sensory exam intact.  Reflexes stable decreased reflexes.      Appropriate, cooperative           Plan of care was discussed with patient

## 2022-01-13 NOTE — PLAN OF CARE
Problem: Rehabilitation (IRF) Plan of Care  Goal: Plan of Care Review  Flowsheets (Taken 1/13/2022 3558)  Progress: improving  Plan of Care Reviewed With:   patient   spouse  Outcome Summary: Ot discussed johana bansal today and felt to be on target for Sun dc.  Spoke with pt and she chose [facility].  Received dominic from the  after getting call about [facility] stating wife never told him of the dc and he did not think she would be ready.  CM reviewed with him that pt had said she would let him know and CM apologized for this.  He had already been scheduled to come in for family training tomorrow and confirmed this with the .  CM reviewed the pt's current status and that we would want to make sure they are both safe and feel ok with the care needs.  Told  to bring up any issues to the therapists but she is progressing but still with anxiety about stairs which is expected.  Renate appreciated the discussion and will see how it goes tomorrow.  Made the team aware of the above and will follow.  A list of providers that are participating in the Medicare program and are located in the desired geographic area was presented and reviewed with the patient and/or patient guardian or advocate. The list and ratings were provided from the Medicare.gov website.

## 2022-01-13 NOTE — PROGRESS NOTES
Cardiology Progress Note    Subjective:  -no CV complaints    Allergies: Atorvastatin; Penicillins; Shellfish derived; Iodinated contrast media; Rosuvastatin; Covid-19 vaccine, mrna, [id number], lnp-s (moderna); and Covid-19 vaccine, [id number], lnp-s (moderna)    ROS:  Negative except those listed in HPI and A&P      Physical Exam:  Constitutional: Appears well-developed and well-nourished. No distress.    Cardiovascular: RRR, no murmur noted.  Pulmonary/Chest: CTA, poor repiratory effort.  Abdominal: Soft. Bowel sounds are normal.  Musculoskeletal: LE edema.  Neurological: AAOx3.    VS:  Patient Vitals for the past 72 hrs:   BP Temp Temp src Pulse Resp SpO2   01/13/22 1151 (!) 171/74 -- -- 80 -- --   01/13/22 1107 129/63 -- -- 72 -- --   01/13/22 1031 131/60 -- -- 73 -- --   01/12/22 2100 (!) 176/72 36.3 °C (97.4 °F) Oral -- 18 96 %   01/12/22 1510 133/62 36.4 °C (97.5 °F) Oral 72 18 --   01/12/22 1435 135/89 -- -- 73 -- --   01/12/22 1005 (!) 148/66 -- -- 73 -- --   01/12/22 0812 (!) 122/58 -- -- 76 -- 96 %   01/12/22 0652 (!) 159/67 36.8 °C (98.3 °F) Oral 66 18 94 %   01/11/22 2003 125/77 36.6 °C (97.9 °F) Oral 67 18 95 %   01/11/22 1550 139/64 36.6 °C (97.9 °F) Oral 77 16 94 %   01/11/22 1412 (!) 126/58 -- -- 67 -- --   01/11/22 1311 (!) 146/81 -- -- 88 -- --   01/11/22 1008 (!) 126/42 -- -- 66 -- 95 %   01/11/22 0743 (!) 164/70 36.5 °C (97.7 °F) Oral 64 16 94 %   01/10/22 2017 (!) 121/53 37.1 °C (98.7 °F) Oral 72 16 95 %   01/10/22 2014 124/60 -- -- -- -- --   01/10/22 1937 (!) 162/113 36.3 °C (97.4 °F) Oral 88 18 96 %   01/10/22 1547 -- 36.9 °C (98.4 °F) Oral -- -- --   01/10/22 1544 (!) 128/59 36.7 °C (98 °F) Oral 65 18 97 %     Labs:  Recent labs reviewed  Results from last 7 days   Lab Units 01/12/22  0619 01/10/22  0450   WBC K/uL 11.58* 13.50*   HEMOGLOBIN g/dL 7.9* 7.5*   HEMATOCRIT % 25.7* 24.6*   PLATELETS K/uL 504* 516*   SODIUM mEQ/L  --  138   POTASSIUM mEQ/L  --  4.1   CHLORIDE mEQ/L  --  106   CO2  "mEQ/L  --  25   GLUCOSE mg/dL  --  81   BUN mg/dL  --  11   CREATININE mg/dL  --  0.5*   CALCIUM mg/dL  --  8.2*   ANION GAP mEQ/L  --  7   AST IU/L  --  21   ALT IU/L  --  13   ALBUMIN g/dL  --  2.6*   EGFR mL/min/1.73m*2  --  >60.0   MAGNESIUM mg/dL  --  2.1     No intake or output data in the 24 hours ending 01/13/22 1327     Tests:  EKG 12/29/21:  Normal sinus rhythm with sinus arrhythmia   Normal ECG     EKG 01/06/22:  pending     TTE 3/5/21:   1. Normal functioning bioprosthetic aortic valve replacement    2. Left ventricular hypertrophy with normal systolic function and moderate diastolic dysfunction      Catheterization:  [facility] 1/25/2019 \"Coronary angiography revealed no obstructive coronary artery disease in a codominant distribution.\"     Current CV Medications:    cloNIDine (CATAPRES) tablet 0.1 mg, 0.1 mg, oral, BID    dabigatran etexilate (PRADAXA) capsule 150 mg, 150 mg, oral, BID    digoxin (LANOXIN) tablet 250 mcg, 250 mcg, oral, Daily (6a)    dilTIAZem CD (CARDIZEM CD) 24 hr ER capsule 240 mg, 240 mg, oral, Daily    levothyroxine (SYNTHROID) tablet 75 mcg, 75 mcg, oral, Daily (6a)    rosuvastatin (CRESTOR) tablet 5 mg, 5 mg, oral, Daily (6p)    Assessment and Plan:  Bee Cobb is a 81 y.o. female with h/o severe AS s/p TAVR, PAFib (on Pradaxa), HTN C[place] who presented to [facility] ER c/o L hip pain s/p fall out of bed.      1. Mechanical fall with L hip Fx s/p  -also contributing to elevated HR, need for good pain control  -per Ortho     2. PAfib  -last EKG showed NSR  -[id number]= 4, on pradaxa  -on Dig 0.250 mg po daily and Cardizem  daily  - On 1/6 pts HR elevated, will order EKG     3. h/o severe AS s/p TAVR  -Echo 3/2021:  Normal functioning bioprosthetic aortic valve replacement      4. anemia  -post op anemia due to chronic blood loss on iron/mvi  -alpha thalassemia trait with chronic anemia  -also contributing to elevated HR     5. H/o CARR[place] and COPD  -per IM    6. HTN  - BP " elevated on current regimen  - Given comorbidities, will avoid ACEi/ARB for now due to recent hyponatremia, will avoid BB due to h/o COPD, will hold off for now dihydropyridines CCB given on Diltiazem (non-dihydropyridines) for rate control.  - On low dose Clonidine 0.1mg BID for now (also for HR control)   - Add hydralazine 10mg q 8hr for additional BP control  - Monitor for results      [facility]  Francisco Root PA-C  Patient seen and examined, chart reviewed and case discussed, plan in place.  antoni carranza

## 2022-01-13 NOTE — PLAN OF CARE
Plan of Care Review  Plan of Care Reviewed With: patient  Progress: improving  Outcome Summary: Patient is alert and oriented, cooperative during care, scheduled medications rendered as per order, denies pain. Call bell within reach, sleeping well during shift.

## 2022-01-13 NOTE — PROGRESS NOTES
[facility] Progress Note          Patient was seen and examined.   Attestation Notes: Face to face encounter completed    Bee Cobb is a 81 y.o. female who was admitted for Hip fracture requiring operative repair, left, closed, initial encounter (CMS/[facility]) [S72.002A]. Patient was referred by Rolan Crooks MD for medical assessment and management      CC: Hip fracture requiring operative repair, left, closed, initial encounter (CMS/[facility]) [S72.002A]     HPI: Bee Cobb is a 81 y.o. female with HTN, HL, hypothyroid, COPD, alpha thalassemia trait, CARRINGTON s/p treatment, glaucoma, HFpEF, AFIB (on Pradaxa), admitted to [facility] 12/29/21 s/p fall from bed found with left hip pain. X-ray left hip revealed left femoral neck fracture.  CT left hip confirmed displaced fracture left femoral neck.  X-ray left knee was negative.  Head CT was negative for skull fracture or intracranial hemorrhage.  CT cervical spine was negative for fracture dislocation.  Dr. Fazal Pena from orthopedic surgery was consulted and performed left total hip replacement on 12/29/2021. Post op she had anemia but did not require transfusion. She was restarted on Pradaxa for AFIB which also provided DVT ppx.  Her pain was managed with Tylenol and Oxycodone.      Her BP and HR were managed with Cardizem CD. She was also on Digoxin for AFIB.  She was on Crestor for HL and Synthroid for hypothyroid. She was on Spiriva and Pulmicort for COPD.  She was on Trusopt and Xalatan for glaucoma.     The patient was seen by PM&R and found to have residual deficits in ambulation and ADLs due to Hip fracture requiring operative repair, left, closed, initial encounter (CMS/[facility]) [S72.002A] and came to [facility] on 1/1/2022 for acute inpatient rehab.      She participated in therapy.     SUBJECTIVE:  Patient interviewed and examined     Her blood pressure continues to be elevated at times and Hydralazine was added to her  regimen by Cardiology.     Pain stable, improved constipation, no abdominal pain or nausea, no dysuria, no chest pain, palpitations, dyspnea or fevers    Review of Systems:  All other systems reviewed and negative except as noted in the HPI.    Current meds and allergies reviewed    Past Medical History:   Diagnosis Date   • (HFpEF) heart failure with preserved ejection fraction (CMS/HCC)    • Alpha thalassemia trait    • Atrial fibrillation (CMS/HCC)    • Closed left hip fracture (CMS/HCC) 12/29/2021   • COPD (chronic obstructive pulmonary disease) (CMS/HCC)    • Glaucoma    • History of transfusion    • Hypertension    • Hypothyroidism    • Lipid disorder    • Malignant melanoma (CMS/HCC)    • Mycobacterium avium-intracellulare complex (CMS/HCC)    • Osteoporosis    • PAN (polyarteritis nodosa) (CMS/HCC)      Past Surgical History:   Procedure Laterality Date   • AORTIC VALVE REPLACEMENT     • TONSILLECTOMY     • TOTAL HIP ARTHROPLASTY Left 12/29/2021     Social History     Tobacco Use   • Smoking status: Never Smoker   • Smokeless tobacco: Never Used   Vaping Use   • Vaping Use: Never used   Substance Use Topics   • Alcohol use: Never   • Drug use: Never      History reviewed. No pertinent family history.    Vital signs in last 24 hours:  Temp:  [36.3 °C (97.4 °F)-36.4 °C (97.5 °F)] 36.3 °C (97.4 °F)  Heart Rate:  [72-80] 80  Resp:  [18] 18  BP: (129-176)/(43-74) 142/43  Vital signs reviewed 01/13/22 2:37 PM    Physical Exam  Vitals and nursing note reviewed.   Constitutional:       Appearance: Normal appearance.   HENT:      Head: Normocephalic and atraumatic.      Right Ear: External ear normal.      Left Ear: External ear normal.      Nose: Nose normal.      Mouth/Throat:      Mouth: Mucous membranes are moist.      Pharynx: Oropharynx is clear.   Eyes:      Extraocular Movements: Extraocular movements intact.      Conjunctiva/sclera: Conjunctivae normal.      Pupils: Pupils are equal, round, and reactive to  light.   Cardiovascular:      Rate and Rhythm: Normal rate. Rhythm irregular.      Pulses: Normal pulses.      Heart sounds: Normal heart sounds.   Pulmonary:      Effort: Pulmonary effort is normal.      Breath sounds: Normal breath sounds.   Abdominal:      General: Abdomen is flat. Bowel sounds are normal.      Palpations: Abdomen is soft.   Musculoskeletal:         General: Signs of injury (s/p ORIF left hip fx) present.      Right lower leg: Edema present.      Left lower leg: Edema present.   Skin:     General: Skin is warm and dry.   Neurological:      Mental Status: She is alert and oriented to person, place, and time.   Psychiatric:         Mood and Affect: Mood normal.         Behavior: Behavior normal.                 Objective    Labs:  I have reviewed the patient's labs.  Significant abnormals are anemia, leukocytosis.  Results from last 7 days   Lab Units 01/12/22  0619   WBC K/uL 11.58*   HEMOGLOBIN g/dL 7.9*   HEMATOCRIT % 25.7*   PLATELETS K/uL 504*     Results from last 7 days   Lab Units 01/10/22  0450   SODIUM mEQ/L 138   POTASSIUM mEQ/L 4.1   CHLORIDE mEQ/L 106   CO2 mEQ/L 25   BUN mg/dL 11   CREATININE mg/dL 0.5*   CALCIUM mg/dL 8.2*   ALBUMIN g/dL 2.6*   BILIRUBIN TOTAL mg/dL 0.7   ALK PHOS IU/L 103   ALT IU/L 13   AST IU/L 21   GLUCOSE mg/dL 81     Lab Results   Component Value Date    DIGOXIN 1.0 01/05/2022       Imaging:  Not applicable        ASSESSMENT/PLAN:    81 y.o. female with HTN, HL, hypothyroid, COPD, alpha thalassemia trait, CARRINGTON s/p treatment, glaucoma, HFpEF, AFIB (on Pradaxa), admitted to [facility] 12/29/21 s/p fall from bed found to have left hip fracture and underwent left BARB by Dr. Fazal Pena 12/29/21.     1. Ortho:  -s/p fall with left hip fx s/p left BARB  -Tylenol and Oxycodone for pain     2. Vasc:  -bilat LE edema  -SCDs and Pradaxa for DVT ppx     3. Heme:  -post op anemia due to chronic blood loss on iron/mvi  -alpha thalassemia trait with chronic  anemia  -leukocytosis reactive  -follow CBC     4. Renal:  -increased risk of dehydration and electrolyte changes  -follow BMP, Mg     5. Gi:  -Senokot-S for bowels  -Protonix ulcer ppx     6. Gu:  -1/2/22 UA shows hematuria, possibly trauma for cath and being on Pradaxa  -no UTI or retention  -1/10/22 repeat UA shows less hematuria, no bacteria     7. Cardiac:  -AFIB on Cardizem CD, Digoxin and Pradaxa  -remains tachycardic  -1/5/22 Digoxin level therapeutic at 1.0  -hx of AVR  -HFpEF  -HTN on Cardizem CD  -Clonidine added by Cardiology for BP and HR  -1/13/22 Hydralazine added due to ongoing HTN  -watch for orthostatic hypotension  -use cardiac precautions in therapy  -seen by Cardiology     8. Pulm:  -hx of CARRINGTON s/p treatmetn  -COPD on Pulmicort and Spiriva  -incentive spirometry for atelectasis     9. Derm:  -consulted [employer] for skin assessment     10. Nutrition:  -seen by Nutrition for assessment and education     11. Endo:  -HL on Crestor  -hypothyroid on Synthroid     12. Optho  -glaucoma on Xalatan and Trusopt     13. Psych:  -seen by Psychology for support    14. Dispo:  -anticipated discharge 1/16/22     plan discussed with patient, nurse, case management and Rolan Crooks MD Scott Sapperstein, MD  1/13/2022  2:37 PM

## 2022-01-13 NOTE — SUBJECTIVE & OBJECTIVE
"   Patient was seen and examined.   Attestation Notes: Face to face encounter completed    Subjective    The patient overall has improved pain control.  Elevated blood pressure this morning.  Progressing in therapies.  Staples removed yesterday.  Objective     Visit Vitals  BP (!) 176/72 (BP Location: Right upper arm, Patient Position: Sitting)   Pulse 72   Temp 36.3 °C (97.4 °F) (Oral)   Resp 18   Ht 1.676 m (5' 5.98\")   Wt 58.3 kg (128 lb 8.5 oz)   SpO2 96%   BMI 20.76 kg/m²       Review of Systems:  Pertinent items are noted in HPI.  Denies chest pain and shortness of breath.  Review of systems otherwise negative.    Labs     Results from last 7 days   Lab Units 01/12/22  0619   WBC K/uL 11.58*   HEMOGLOBIN g/dL 7.9*   HEMATOCRIT % 25.7*   PLATELETS K/uL 504*     Results from last 7 days   Lab Units 01/10/22  0450   SODIUM mEQ/L 138   POTASSIUM mEQ/L 4.1   CHLORIDE mEQ/L 106   CO2 mEQ/L 25   BUN mg/dL 11   CREATININE mg/dL 0.5*   CALCIUM mg/dL 8.2*   ALBUMIN g/dL 2.6*   BILIRUBIN TOTAL mg/dL 0.7   ALK PHOS IU/L 103   ALT IU/L 13   AST IU/L 21   GLUCOSE mg/dL 81     Full Code    Physical Exam  General      Alert, cooperative, no distress, appears stated age.   Head:    Normocephalic, without obvious abnormality, atraumatic.   Eyes:    PERRL, conjunctiva/corneas clear, EOM's intact.        Nose:   Nares normal, septum midline, mucosa normal, no drainage or            sinus tenderness.   Throat:   Lips, mucosa, and tongue normal.    Neck:   Supple, symmetrical, trachea midline.    Back:     Symmetric, no curvature.   Lungs:     Clear to auscultation bilaterally, respirations unlabored.   Chest wall:    No tenderness or deformity.   Heart:    Regular rate and rhythm, S1 and S2 normal.   Abdomen:     Soft, non-tender, bowel sounds active all four quadrants,     no masses, no organomegaly.   Extremities:  Musculoskeletal:  1+ left lower extremity edema.  Left hip replacement.   Pulses:   1+ and symmetric all " extremities.   Skin:  Incision intact with mild drainage.   Neurologic:          Behavior/  Emotional:  CNII-XII intact.  Alert and oriented ×3.  Motor exam stable left hip weakness postsurgery.  Sensory exam intact.  Reflexes stable decreased reflexes.      Appropriate, cooperative           Plan of care was discussed with patient

## 2022-01-13 NOTE — ASSESSMENT & PLAN NOTE
PCP Dr. Kanchan Zamarripa after discharge  [phone]     Cardiology after discharge  Ortho Dr. Pena in 2 weeks

## 2022-01-13 NOTE — ASSESSMENT & PLAN NOTE
PCP Dr. Kanchan Zamarripa after discharge  [phone]     Cardiology after discharge  Ortho Dr. Pena in 2 weeks

## 2022-01-13 NOTE — ASSESSMENT & PLAN NOTE
Tylenol 1000 mg every 8 hours.  Oxycodone as needed.  Adjust medications as needed.  Added lidocaine patch.

## 2022-01-13 NOTE — PROGRESS NOTES
Patient: Bee Cobb  Location: [facility] [address]  MRN: [id number]  Today's date: 1/13/2022 01/13/22 1306   Pain/Comfort/Sleep   Pain Charting Type Pain Assessment   Preferred Pain Scale number (Numeric Rating Pain Scale)   (0-10) Pain Rating: Activity 3   Word Pain Rating: Rest 8 - severe pain   Pain Management Interventions premedicated for activity   Vital Signs   Heart Rate 77   Heart Rate Source Monitor   BP (!) 142/43   BP Location Left upper arm   BP Method Manual   Patient Position Sitting        01/13/22 1355   Pain/Comfort/Sleep   Pain Charting Type Pain Reassessment   Preferred Pain Scale number (Numeric Rating Pain Scale)   (0-10) Pain Rating: Rest 3   Pain Side/Orientation left   Pain Body Location hip   Pain Description intermittent;aching   Pain Management Interventions premedicated for activity   Response to Pain Interventions nonverbal indicators present     Prior Living Environment      Most Recent Value   People in Home spouse   Current Living Arrangements home  [9 MADY, FF inside]   Home Accessibility stairs to enter home (Group), stairs within home (Group)   Living Environment Comment Row home 13 steps into house, 9 steps to BR on 2nd floor   Number of Stairs, Main Entrance 9   Surface of Stairs, Main Entrance concrete   Stair Railings, Main Entrance railings on both sides of stairs   Location, Bathroom second floor, must negotiate stairs to access   [place], Bathroom tile floor   Bathroom Access Comment ztub/shower combo, hand held shower, bench and suction grab bars ( checks stability prior to each shower)   Number of Stairs, Within Home, Primary --  [13]   Stair Railings, Within Home, Primary railing on right side (ascending)          Prior Level of Function      Most Recent Value   Dominant Hand left   Ambulation independent   Transferring independent   Toileting independent   Bathing independent   Dressing independent   Eating independent  "  Assistive Device Currently Used at Home shower chair, grab bar          Occupational Profile      Most Recent Value   Reason for Services/Referral ADL deficit   Successful Occupations wife,- mother   Occupational History/Life Experiences retired bank    Performance Patterns independent in all areas   Patient Goals \"I want to be functional, walk and take care of myself\"           [facility] OT Evaluation and Treatment - 01/13/22 1308        OT Time Calculation    Start Time 1300     Stop Time 1400     Time Calculation (min) 60 min        Session Details    Document Type daily treatment/progress note     Mode of Treatment occupational therapy;individual therapy        General Information    General Observations of Patient Pt received sitting up in bed, awake        Bed Mobility    Bed Mobility bed mobility (all) activities     [place], All Bed Mobility Activities touching/steadying assist;1 person assist;safety considerations     Comment (Bed Mobility) Hosp bed: Sup LEs off, touch assist LEs onto bed surface        Transfers    Transfers stand pivot transfer     Maintains Weight-Bearing Status (Transfers) nonverbal cues (demo/gesture) to maintain;verbal cues to maintain     Comment RW        Stand Pivot Transfer    [place], Stand Pivot/Stand Step Transfer close supervision;1 person assist;safety considerations     Verbal Cues safety;technique;preparatory posture     Assistive Device walker, front-wheeled;gait belt     Comment bed <> w/c        Safety Issues, Functional Mobility    Safety Issues Affecting Function (Mobility) positioning of assistive device;safety precautions follow-through/compliance;sequencing abilities     Impairments Affecting Function (Mobility) balance;endurance/activity tolerance;postural/trunk control;strength     Comment, Safety Issues/Impairments (Mobility) Pt participated in general endurance during amb using RW in the giftshop. Pt able to bibi ~8 min x2 of amb, standing " while looking at items on shelves. Fair postural control with decreased ability to let go of support surface to reach for items. Pt also amb short HHD in hallway while browsing artwork, stopping to read plaques w Cl Sup, gait belt and cues for upright postural corrections due to fatigue.        Balance    Balance Assessment sit to stand dynamic balance;standing static balance;standing dynamic balance     Sit to Stand Dynamic Balance mild impairment;supported;standing     Static Standing Balance mild impairment;supported;standing     Dynamic Standing Balance mild impairment;supported;standing     Comment, Balance Ongoing cues for postural control due to tendency to forward flex. Pt is improving but was demonstrating increased fatigue during session.        Postural Control Assessment    Issues Impacting Performance misalignment of body segments during activity     Comment, Impact of Muscle Weakness Pt is limited in postural control by general fatigue and baseline kyphosis.        Postural Deviations    Postural Deviations head and neck     Head and Neck forward head     Shoulder left shoulder forward;right shoulder forward     Upper Back kyphosis        Daily Progress Summary (OT)    Symptoms Noted During/After Treatment fatigue     Progress Toward Functional Goals (OT) progressing toward functional goals as expected     Daily Outcome Statement Pt participated in general endurance training while amb in hallway and in [employer] using RW. Pt continues to be limited in balance and safety but with v/cs can impliment correction improving overall safety.     Barriers to Overall Progress (OT) balance, fatigue     Recommendations (OT) continue POC                           IRF OT Goals      Most Recent Value   Transfer Goal 1    Activity/Assistive Device toilet at 01/10/2022 0938   [place] supervision required at 01/12/2022 0737   Time Frame short-term goal (STG), 5 - 7 days at 01/12/2022 0737   Strategies/Barriers DME  at 01/10/2022 0938   Progress/Outcome good progress toward goal, goal ongoing at 01/12/2022 0737   Transfer Goal 2    Activity/Assistive Device toilet at 01/10/2022 0938   [place] modified independence at 01/10/2022 0938   Time Frame 2 weeks at 01/10/2022 0938   Strategies/Barriers DME at 01/02/2022 0805   Progress/Outcome goal revised this date at 01/10/2022 0938   Transfer Goal 3    Activity/Assistive Device shower at 01/10/2022 0938   [place] supervision required at 01/12/2022 0737   Time Frame short-term goal (STG), 5 - 7 days at 01/12/2022 0737   Strategies/Barriers DME at 01/02/2022 0805   Progress/Outcome good progress toward goal, goal ongoing at 01/12/2022 0737   Transfer Goal 4    Activity/Assistive Device shower at 01/10/2022 0938   [place] supervision required at 01/10/2022 0938   Time Frame long-term goal (LTG), 2 weeks at 01/10/2022 0938   Strategies/Barriers DME at 01/02/2022 0805   Progress/Outcome goal met, goal revised this date at 01/10/2022 0938   Bathing Goal 1    Activity/Assistive Device bathing skills, all at 01/12/2022 0737   [place] supervision required at 01/12/2022 0737   Time Frame short-term goal (STG), 5 - 7 days at 01/12/2022 0737   Strategies/Barriers DME at 01/02/2022 0805   Progress/Outcome good progress toward goal, goal ongoing at 01/12/2022 0737   Bathing Goal 2    Activity/Assistive Device bathing skills, all at 01/10/2022 0938   [place] supervision required at 01/10/2022 0938   Time Frame long-term goal (LTG), 2 weeks at 01/10/2022 0938   Strategies/Barriers DME at 01/02/2022 0805   Progress/Outcome goal met, goal revised this date at 01/10/2022 0938   UB Dressing Goal 1    Activity/Assistive Device upper body dressing at 01/12/2022 0737   [place] supervision required at 01/12/2022 0737   Time Frame 5 - 7 days at 01/12/2022 0737   Strategies/Barriers incl set up at 01/05/2022 7971   Progress/Outcome good progress toward goal, goal ongoing at  01/12/2022 0737   UB Dressing Goal 2    Activity/Assistive Device upper body dressing at 01/02/2022 0805   [place] modified independence at 01/02/2022 0805   Time Frame long-term goal (LTG), 4 weeks at 01/02/2022 0805   Strategies/Barriers incl s/u at 01/02/2022 0805   LB Dressing Goal 1    Activity/Assistive Device lower body dressing at 01/12/2022 0737   [place] tactile cues required at 01/12/2022 0737   Time Frame short-term goal (STG), 5 - 7 days at 01/12/2022 0737   Strategies/Barriers AD at 01/02/2022 0805   Progress/Outcome good progress toward goal, goal revised this date at 01/12/2022 0737   LB Dressing Goal 2    Activity/Assistive Device lower body dressing at 01/02/2022 0805   [place] supervision required at 01/02/2022 0805   Time Frame long-term goal (LTG), 4 weeks at 01/02/2022 0805   Strategies/Barriers AD at 01/02/2022 0805   Grooming Goal 1    Activity/Assistive Device grooming skills, all at 01/12/2022 0737   [place] supervision required at 01/12/2022 0737   Time Frame short-term goal (STG), 5 - 7 days at 01/12/2022 0737   Strategies/Barriers std @ sink at 01/05/2022 1408   Progress/Outcome good progress toward goal, goal ongoing at 01/12/2022 0737   Grooming Goal 2    Activity/Assistive Device grooming skills, all at 01/02/2022 0805   [place] modified independence at 01/02/2022 0805   Time Frame long-term goal (LTG), 4 weeks at 01/02/2022 0805   Strategies/Barriers std @ sink at 01/02/2022 0805   Toileting Goal 1    Activity/Assistive Device toileting skills, all at 01/12/2022 0737   [place] supervision required at 01/12/2022 0737   Time Frame short-term goal (STG), 5 - 7 days at 01/12/2022 0737   Strategies/Barriers DME at 01/02/2022 0805   Progress/Outcome good progress toward goal, goal revised this date at 01/12/2022 0737   Toileting Goal 2    Activity/Assistive Device toileting skills, all at 01/02/2022 0805   [place] supervision required at 01/02/2022 0805    Time Frame long-term goal (LTG), 4 weeks at 01/02/2022 0805   Strategies/Barriers DME at 01/02/2022 08

## 2022-01-13 NOTE — PLAN OF CARE
Problem: Rehabilitation (IRF) Plan of Care  Goal: Plan of Care Review  Outcome: Progressing  Flowsheets (Taken 1/13/2022 1443)  Progress: improving  Plan of Care Reviewed With: patient  Outcome Summary: Patient is AAOx3, pleasant and cooperative. Appetite good. Continent of bowel and bladder. Medicated with PRN oxycodone for left hip pain with effective results. Left hip incsion approximated with steri strips in place. Proximal incision covered with gauze and tegaderm with small amount of serous drainage noted. Makes needs known.

## 2022-01-13 NOTE — PROGRESS NOTES
"Daily Progress Note       Patient was seen and examined.   Attestation Notes: Face to face encounter completed    Subjective    The patient overall has improved pain control.  Elevated blood pressure this morning.  Progressing in therapies.  Staples removed yesterday.  Objective     Visit Vitals  BP (!) 176/72 (BP Location: Right upper arm, Patient Position: Sitting)   Pulse 72   Temp 36.3 °C (97.4 °F) (Oral)   Resp 18   Ht 1.676 m (5' 5.98\")   Wt 58.3 kg (128 lb 8.5 oz)   SpO2 96%   BMI 20.76 kg/m²       Review of Systems:  Pertinent items are noted in HPI.  Denies chest pain and shortness of breath.  Review of systems otherwise negative.    Labs     Results from last 7 days   Lab Units 01/12/22  0619   WBC K/uL 11.58*   HEMOGLOBIN g/dL 7.9*   HEMATOCRIT % 25.7*   PLATELETS K/uL 504*     Results from last 7 days   Lab Units 01/10/22  0450   SODIUM mEQ/L 138   POTASSIUM mEQ/L 4.1   CHLORIDE mEQ/L 106   CO2 mEQ/L 25   BUN mg/dL 11   CREATININE mg/dL 0.5*   CALCIUM mg/dL 8.2*   ALBUMIN g/dL 2.6*   BILIRUBIN TOTAL mg/dL 0.7   ALK PHOS IU/L 103   ALT IU/L 13   AST IU/L 21   GLUCOSE mg/dL 81     Full Code    Physical Exam  General      Alert, cooperative, no distress, appears stated age.   Head:    Normocephalic, without obvious abnormality, atraumatic.   Eyes:    PERRL, conjunctiva/corneas clear, EOM's intact.        Nose:   Nares normal, septum midline, mucosa normal, no drainage or            sinus tenderness.   Throat:   Lips, mucosa, and tongue normal.    Neck:   Supple, symmetrical, trachea midline.    Back:     Symmetric, no curvature.   Lungs:     Clear to auscultation bilaterally, respirations unlabored.   Chest wall:    No tenderness or deformity.   Heart:    Regular rate and rhythm, S1 and S2 normal.   Abdomen:     Soft, non-tender, bowel sounds active all four quadrants,     no masses, no organomegaly.   Extremities:  Musculoskeletal:  1+ left lower extremity edema.  Left hip replacement.   Pulses:   1+ and " symmetric all extremities.   Skin:  Incision intact with mild drainage.   Neurologic:          Behavior/  Emotional:  CNII-XII intact.  Alert and oriented ×3.  Motor exam stable left hip weakness postsurgery.  Sensory exam intact.  Reflexes stable decreased reflexes.      Appropriate, cooperative           Plan of care was discussed with patient    Assessment & Plan  * Hip fracture requiring operative repair, left, closed, initial encounter (CMS/[facility])  Assessment & Plan  Mrs. Cobb is an 81-year-old female who presented to [facility] on 12/29/2021 with complaints of left hip pain after falling out of bed.  X-ray left hip revealed left femoral neck fracture.  CT left hip confirmed displaced fracture left femoral neck.  X-ray left knee was negative.  Head CT was negative for skull fracture or intracranial hemorrhage.  CT cervical spine was negative for fracture dislocation.  Dr. Pena from orthopedic surgery was consulted and performed left total hip replacement on 12/29/2021.  She is cleared for weightbearing to left lower extremity with anterior hip precautions for 6 weeks.  She is anticoagulated with Pradaxa for atrial fibrillation.  Aspirin 325 mg daily for 6 weeks was added for DVT prophylaxis on top of Pradaxa.  Postoperative anemia stabilized at 8.6.  She was ultimately determined to be medically stable for transfer to [facility] on 1/1/2022 for an acute inpatient rehabilitation program to address deficits related to left hip fracture status post total hip replacement.    She is weightbearing as tolerated.  She will be maintained on left hip precautions and aspirin for 6 weeks.  She will participate in 3 hours of physical and occupational therapy as well as receive 24-hour rehab nursing care.  She will follow-up with Dr. Pena from orthopedic surgery in 2 weeks.    Incision intact.  Staples removed today.    Patient with ongoing pain improvement over time.  Pain fluctuates and can be more  moderate at times with activity.  Continue Tylenol around-the-clock, and oxycodone as needed.    Pain continues to be better overall.    Continues to improve with therapies and close supervision with ambulation with rolling walker.  Improving with transfers as well as bed mobility.    Follow up  Assessment & Plan  PCP Dr. Kanchan Zamarripa after discharge  [phone]     Cardiology after discharge  Ortho Dr. Pena in 2 weeks    At high risk for pressure injury of skin  Assessment & Plan  Turns q2hr    Risk for falls  Assessment & Plan  Safety belt while in wheelchair.    Pain  Assessment & Plan  Tylenol 1000 mg every 8 hours.  Oxycodone as needed.  Adjust medications as needed.  Added lidocaine patch.    Postoperative anemia  Assessment & Plan  Iron replacement.  The patient has a history of thalassemia trait and discussed the reasoning behind iron replacement post hip replacement.  Hemoglobin stable at 7.9.    Paroxysmal atrial fibrillation (CMS/HCC)  Assessment & Plan  Rate controlled on dig and cardizem, anti-coag on pradaxa    Hypothyroidism  Assessment & Plan  Continue synthroid    Hypertension  Assessment & Plan  Labile blood pressures.  Continue Cardizem with blood pressure parameters.  Continue Catapres.    Glaucoma  Assessment & Plan  Trusopt, xalatan gtt    COPD (chronic obstructive pulmonary disease) (CMS/HCC)  Assessment & Plan  Cont pulmicort, spiriva        Expected Discharge Date:  1/16/2022

## 2022-01-13 NOTE — PROGRESS NOTES
"Daily Progress Note       Patient was seen and examined.   Attestation Notes: Face to face encounter completed    Subjective    The patient continues to tolerate and progress with her mobility with therapies.  Pain under fair control.  Objective     Visit Vitals  /62 (BP Location: Left upper arm, Patient Position: Lying)   Pulse 72   Temp 36.4 °C (97.5 °F) (Oral)   Resp 18   Ht 1.676 m (5' 5.98\")   Wt 58.3 kg (128 lb 8.5 oz)   SpO2 96%   BMI 20.76 kg/m²     Review of Systems:  Pertinent items are noted in HPI.  Denies chest pain and shortness of breath.  Review of systems otherwise negative.    Labs     Results from last 7 days   Lab Units 01/12/22  0619   WBC K/uL 11.58*   HEMOGLOBIN g/dL 7.9*   HEMATOCRIT % 25.7*   PLATELETS K/uL 504*     Results from last 7 days   Lab Units 01/10/22  0450   SODIUM mEQ/L 138   POTASSIUM mEQ/L 4.1   CHLORIDE mEQ/L 106   CO2 mEQ/L 25   BUN mg/dL 11   CREATININE mg/dL 0.5*   CALCIUM mg/dL 8.2*   ALBUMIN g/dL 2.6*   BILIRUBIN TOTAL mg/dL 0.7   ALK PHOS IU/L 103   ALT IU/L 13   AST IU/L 21   GLUCOSE mg/dL 81     Full Code    Physical Exam  General      Alert, cooperative, no distress, appears stated age.   Head:    Normocephalic, without obvious abnormality, atraumatic.   Eyes:    PERRL, conjunctiva/corneas clear, EOM's intact.        Nose:   Nares normal, septum midline, mucosa normal, no drainage or            sinus tenderness.   Throat:   Lips, mucosa, and tongue normal.    Neck:   Supple, symmetrical, trachea midline.    Back:     Symmetric, no curvature.   Lungs:     Clear to auscultation bilaterally, respirations unlabored.   Chest wall:    No tenderness or deformity.   Heart:    Regular rate and rhythm, S1 and S2 normal.   Abdomen:     Soft, non-tender, bowel sounds active all four quadrants,     no masses, no organomegaly.   Extremities:  Musculoskeletal:  1+ lower extremity edema bilaterally.   Left hip replacement      Pulses:   1+ and symmetric all extremities.   Skin: "   Incision intact without drainage.   Neurologic:          Behavior/  Emotional:  CNII-XII intact.  Alert and oriented ×3.  Stable left hip weakness.  Sensory exam intact.  Reflexes stable decreased reflexes.      Appropriate, cooperative           Plan of care was discussed with patient    Assessment & Plan  * Hip fracture requiring operative repair, left, closed, initial encounter (CMS/[facility])  Assessment & Plan  Mrs. Cobb is an 81-year-old female who presented to [facility] on 12/29/2021 with complaints of left hip pain after falling out of bed.  X-ray left hip revealed left femoral neck fracture.  CT left hip confirmed displaced fracture left femoral neck.  X-ray left knee was negative.  Head CT was negative for skull fracture or intracranial hemorrhage.  CT cervical spine was negative for fracture dislocation.  Dr. Pena from orthopedic surgery was consulted and performed left total hip replacement on 12/29/2021.  She is cleared for weightbearing to left lower extremity with anterior hip precautions for 6 weeks.  She is anticoagulated with Pradaxa for atrial fibrillation.  Aspirin 325 mg daily for 6 weeks was added for DVT prophylaxis on top of Pradaxa.  Postoperative anemia stabilized at 8.6.  She was ultimately determined to be medically stable for transfer to [facility] on 1/1/2022 for an acute inpatient rehabilitation program to address deficits related to left hip fracture status post total hip replacement.    She is weightbearing as tolerated.  She will be maintained on left hip precautions and aspirin for 6 weeks.  She will participate in 3 hours of physical and occupational therapy as well as receive 24-hour rehab nursing care.  She will follow-up with Dr. Pena from orthopedic surgery in 2 weeks.    Incision intact.  Staples removed today.    Patient with ongoing pain improvement over time.  Pain fluctuates and can be more moderate at times with activity.  Continue Tylenol  around-the-clock, and oxycodone as needed.    Improving with therapies.  Close supervision with transfers and ambulation and progressing with her bed mobility and stairs.    Follow up  Assessment & Plan  PCP Dr. Kanchan Zamarripa after discharge  [phone]     Cardiology after discharge  Ortho Dr. Pena in 2 weeks    At high risk for pressure injury of skin  Assessment & Plan  Turns q2hr    Risk for falls  Assessment & Plan  Safety belt while in wheelchair.    Pain  Assessment & Plan  Tylenol 1000 mg every 8 hours.  Oxycodone as needed.  Adjust medications as needed.  Added lidocaine patch.    Postoperative anemia  Assessment & Plan  Iron replacement.  The patient has a history of thalassemia trait and discussed the reasoning behind iron replacement post hip replacement.  Hemoglobin stable at 7.9.    Paroxysmal atrial fibrillation (CMS/[facility])  Assessment & Plan  Rate controlled on dig and cardizem, anti-coag on pradaxa    Hypothyroidism  Assessment & Plan  Continue synthroid    Hypertension  Assessment & Plan  Labile blood pressures.  Continue Cardizem with blood pressure parameters.  Continue Catapres.    Glaucoma  Assessment & Plan  Trusopt, xalatan gtt    COPD (chronic obstructive pulmonary disease) (CMS/HCC)  Assessment & Plan  Cont pulmicort, spiriva        Expected Discharge Date:  1/16/2022

## 2022-01-13 NOTE — ASSESSMENT & PLAN NOTE
Iron replacement.  The patient has a history of thalassemia trait and discussed the reasoning behind iron replacement post hip replacement.  Hemoglobin stable at 7.9.

## 2022-01-13 NOTE — PROGRESS NOTES
Patient: Bee Cobb  [facility]  MRN: [id number]  Today's date: 1/13/2022    History of Present Illness  Bee is a 81 y.o. female admitted on 1/1/2022 with Hip fracture requiring operative repair, left, closed, initial encounter (CMS/[facility]) [S72.002A]. Principal problem is Hip fracture requiring operative repair, left, closed, initial encounter (CMS/[facility]).    81 y.o. f who presents with left hip pain after falling out of bed. She had an immediate onset of left hip pain and inability to ambulate.  No LOC.  Radiographic evaluation showed a displaced transcervical femoral neck fracture by CT.  On 12-29-21 she underwent left hip hemiarthroplasty.  Pt is WBAT to LLE and anterior hip precautions x 6 weeks. Ortho recommends aspirin 325mg/day x 6 weeks in addition to pt's home pradaxa which was restarted postoperatively. Pt did well postoperatively, but was noted to have postop anemia with a hgb drop of 12-->9. Per [place], this is expected due to Pradaxa use.  Pain is being managed with multimodal analgesia; scheduled tylenol, lido patch, PRN oxycodone, ice therapy.  PT/OT/PM&R evaluated pt and recommend acute rehab at discharge.          Past Medical History  Bee has a past medical history of Atrial fibrillation (CMS/[facility]), Glaucoma, History of transfusion, Hyperthyroidism, Lipid disorder, Osteoporosis, PAN (polyarteritis nodosa) (CMS/[facility]), and Thalassemia. H/o recent clavicle fx 9/11/21 Completed course of therapy and has full ROM      OT Vitals    Date/Time Pulse HR Source BP BP Location BP Method Pt Position [facility]   01/13/22 1031 73 Monitor 131/60 Right upper arm Automatic Sitting [facility]      OT Pain    Date/Time Pain Type Side/Orientation Location Rating: Rest Rating: Activity Rating: Rest Rating: Activity [facility]   01/13/22 1031 Pain Assessment -- -- -- -- 0 - no pain 0 - no pain [facility]   01/13/22 1056 Pain Reassessment left hip 5 5 -- -- [facility]          Prior Living Environment       "Most Recent Value   People in Home spouse   Current Living Arrangements home  [9 MADY, FF inside]   Home Accessibility stairs to enter home (Group), stairs within home (Group)   Living Environment Comment Row home 13 steps into house, 9 steps to BR on 2nd floor   Number of Stairs, Main Entrance 9   Surface of Stairs, Main Entrance concrete   Stair Railings, Main Entrance railings on both sides of stairs   Location, Bathroom second floor, must negotiate stairs to access   [place], Bathroom tile floor   Bathroom Access Comment ztub/shower combo, hand held shower, bench and suction grab bars ( checks stability prior to each shower)   Number of Stairs, Within Home, Primary --  [13]   Stair Railings, Within Home, Primary railing on right side (ascending)          Prior Level of Function      Most Recent Value   Dominant Hand left   Ambulation independent   Transferring independent   Toileting independent   Bathing independent   Dressing independent   Eating independent   Assistive Device Currently Used at Home shower chair, grab bar          Occupational Profile      Most Recent Value   Reason for Services/Referral ADL deficit   Successful Occupations wife,- mother   Occupational History/Life Experiences retired [employer]    Performance Patterns independent in all areas   Patient Goals \"I want to be functional, walk and take care of myself\"           IRF OT Evaluation and Treatment - 01/13/22 1031        OT Time Calculation    Start Time 1030     Stop Time 1100     Time Calculation (min) 30 min        Session Details    Document Type daily treatment/progress note     Mode of Treatment occupational therapy;individual therapy        General Information    Patient Profile Reviewed yes     General Observations of Patient recieved sitting up EOB, agreeable to therapy. Pt is not yet dressed for the day        Sit to Stand Transfer    [place], Sit to Stand Transfer close supervision     Verbal Cues safety  "    Assistive Device walker, front-wheeled     Comment from bed        Stand to Sit Transfer    [place], Stand to Sit Transfer close supervision     Verbal Cues safety     Assistive Device walker, front-wheeled     Comment to w/c and bed        Toilet Transfer    Transfer Technique sit-stand;stand-sit     [place], Toilet Transfer close supervision     Verbal Cues safety     Assistive Device grab bars/safety frame;walker, front-wheeled;raised toilet seat     Comment amb approach to/from bed and toilet in bathroom        Balance    Comment, Balance requires ClS static and dynamic sitting, ClS static and dynamic standing during adl completion. Benefits from RW use.        Motor Skills    Functional Endurance fair-, pt fatigues easily c ADL completion.        Upper Body Dressing    Tasks bra/undergarment;pull over garment     Self-Performance threads left arm, bra/undershirt;threads right arm, bra/undershirt;pulls bra/undershirt over head/around back;pulls bra/undershirt down/adjusts;threads left arm, shirt;threads right arm, shirt;pulls shirt over head/around back;pulls shirt down/adjusts     [place] Assistance obtains clothes     [place] close supervision     Position supported sitting     Adaptive Equipment none        Lower Body Dressing    Tasks pants/bottoms;underwear     Self-Performance threads left leg, underpants;threads right leg, underpants;pulls underpants up or down;threads left leg, pants/shorts;threads right leg, pants/shorts;pulls pants/shorts up or down     [place] Assistance obtains clothes     [place] minimum assist (75% or more patient effort)     Position supported standing;edge of bed sitting     Adaptive Equipment reacher     Comment able to initiate use of reacher for doffing and threading LLE through pants. Min A to doff and don pants over LLE. Pt fatigued following LB dressing        Toileting    [place] close supervision     Position supported sitting     Setup  Assistance obtain supplies;adaptive equipment setup     Adaptive Equipment grab bar/safety frame;raised toilet seat     Comment ClS seated to void bladder        Daily Progress Summary (OT)    Symptoms Noted During/After Treatment fatigue     Daily Outcome Statement Tolerated session well, focus of session on ADL performance. Pt would benefit from ongoing use of lHAE for LB dressing and addressing stadning tolerance/balance as well as functional mobility c RW c item retireval. Continue OT per POC.                      Education Documentation  Treatment Plan, taught by Paige Alcaraz, OT at 1/13/2022 10:59 AM.  Learner: Patient  Readiness: Acceptance  Method: Explanation  Response: Verbalizes Understanding  Comment: use of LHAE for LB dressing          IRF OT Goals      Most Recent Value   Transfer Goal 1    Activity/Assistive Device toilet at 01/10/2022 0938   [place] supervision required at 01/12/2022 0737   Time Frame short-term goal (STG), 5 - 7 days at 01/12/2022 0737   Strategies/Barriers DME at 01/10/2022 0938   Progress/Outcome good progress toward goal, goal ongoing at 01/12/2022 0737   Transfer Goal 2    Activity/Assistive Device toilet at 01/10/2022 0938   [place] modified independence at 01/10/2022 0938   Time Frame 2 weeks at 01/10/2022 0938   Strategies/Barriers DME at 01/02/2022 0805   Progress/Outcome goal revised this date at 01/10/2022 0938   Transfer Goal 3    Activity/Assistive Device shower at 01/10/2022 0938   [place] supervision required at 01/12/2022 0737   Time Frame short-term goal (STG), 5 - 7 days at 01/12/2022 0737   Strategies/Barriers DME at 01/02/2022 0805   Progress/Outcome good progress toward goal, goal ongoing at 01/12/2022 0737   Transfer Goal 4    Activity/Assistive Device shower at 01/10/2022 0938   [place] supervision required at 01/10/2022 0938   Time Frame long-term goal (LTG), 2 weeks at 01/10/2022 0938   Strategies/Barriers DME at 01/02/2022 0805    Progress/Outcome goal met, goal revised this date at 01/10/2022 0938   Bathing Goal 1    Activity/Assistive Device bathing skills, all at 01/12/2022 0737   [place] supervision required at 01/12/2022 0737   Time Frame short-term goal (STG), 5 - 7 days at 01/12/2022 0737   Strategies/Barriers DME at 01/02/2022 0805   Progress/Outcome good progress toward goal, goal ongoing at 01/12/2022 0737   Bathing Goal 2    Activity/Assistive Device bathing skills, all at 01/10/2022 0938   [place] supervision required at 01/10/2022 0938   Time Frame long-term goal (LTG), 2 weeks at 01/10/2022 0938   Strategies/Barriers DME at 01/02/2022 0805   Progress/Outcome goal met, goal revised this date at 01/10/2022 0938   UB Dressing Goal 1    Activity/Assistive Device upper body dressing at 01/12/2022 0737   [place] supervision required at 01/12/2022 0737   Time Frame 5 - 7 days at 01/12/2022 0737   Strategies/Barriers incl set up at 01/05/2022 1408   Progress/Outcome good progress toward goal, goal ongoing at 01/12/2022 0737   UB Dressing Goal 2    Activity/Assistive Device upper body dressing at 01/02/2022 0805   [place] modified independence at 01/02/2022 0805   Time Frame long-term goal (LTG), 4 weeks at 01/02/2022 0805   Strategies/Barriers incl s/u at 01/02/2022 0805   LB Dressing Goal 1    Activity/Assistive Device lower body dressing at 01/12/2022 0737   [place] tactile cues required at 01/12/2022 0737   Time Frame short-term goal (STG), 5 - 7 days at 01/12/2022 0737   Strategies/Barriers AD at 01/02/2022 0805   Progress/Outcome good progress toward goal, goal revised this date at 01/12/2022 0737   LB Dressing Goal 2    Activity/Assistive Device lower body dressing at 01/02/2022 0805   [place] supervision required at 01/02/2022 0805   Time Frame long-term goal (LTG), 4 weeks at 01/02/2022 0805   Strategies/Barriers AD at 01/02/2022 0805   Grooming Goal 1    Activity/Assistive Device grooming skills,  all at 01/12/2022 0737   [place] supervision required at 01/12/2022 0737   Time Frame short-term goal (STG), 5 - 7 days at 01/12/2022 0737   Strategies/Barriers std @ sink at 01/05/2022 1408   Progress/Outcome good progress toward goal, goal ongoing at 01/12/2022 0737   Grooming Goal 2    Activity/Assistive Device grooming skills, all at 01/02/2022 0805   [place] modified independence at 01/02/2022 0805   Time Frame long-term goal (LTG), 4 weeks at 01/02/2022 0805   Strategies/Barriers std @ sink at 01/02/2022 0805   Toileting Goal 1    Activity/Assistive Device toileting skills, all at 01/12/2022 0737   [place] supervision required at 01/12/2022 0737   Time Frame short-term goal (STG), 5 - 7 days at 01/12/2022 0737   Strategies/Barriers DME at 01/02/2022 0805   Progress/Outcome good progress toward goal, goal revised this date at 01/12/2022 0737   Toileting Goal 2    Activity/Assistive Device toileting skills, all at 01/02/2022 0805   [place] supervision required at 01/02/2022 0805   Time Frame long-term goal (LTG), 4 weeks at 01/02/2022 0805   Strategies/Barriers DME at 01/02/2022 0805

## 2022-01-13 NOTE — PROGRESS NOTES
Patient: Bee Cobb  Location: [facility] [address]  MRN: [id number]  Today's date: 1/13/2022    History of Present Illness  Bee is a 81 y.o. female admitted on 1/1/2022 with Hip fracture requiring operative repair, left, closed, initial encounter (CMS/[facility]) [S72.002A]. Principal problem is Hip fracture requiring operative repair, left, closed, initial encounter (CMS/[facility]).    81 y.o. f who presents with left hip pain after falling out of bed. She had an immediate onset of left hip pain and inability to ambulate.  No LOC.  Radiographic evaluation showed a displaced transcervical femoral neck fracture by CT.  On 12-29-21 she underwent left hip hemiarthroplasty.  Pt is WBAT to LLE and anterior hip precautions x 6 weeks. Ortho recommends aspirin 325mg/day x 6 weeks in addition to pt's home pradaxa which was restarted postoperatively. Pt did well postoperatively, but was noted to have postop anemia with a hgb drop of 12-->9. Per Baylis, this is expected due to Pradaxa use.  Pain is being managed with multimodal analgesia; scheduled tylenol, lido patch, PRN oxycodone, ice therapy.  PT/OT/PM&R evaluated pt and recommend acute rehab at discharge.          Past Medical History  Bee has a past medical history of Atrial fibrillation (CMS/[facility]), Glaucoma, History of transfusion, Hyperthyroidism, Lipid disorder, Osteoporosis, PAN (polyarteritis nodosa) (CMS/[facility]), and Thalassemia. H/o recent clavicle fx 9/11/21 Completed course of therapy and has full ROM      PT Vitals    Date/Time Pulse HR Source Resp SpO2 Pt Activity O2 Therapy BP BP Location BP Method Pt Position [facility]   01/13/22 1537 68 Monitor 18 96 % At rest None (Room air) 127/58 Right upper arm Automatic Lying NB   01/13/22 1539 68 -- -- -- -- -- 127/58 Right upper arm Automatic Sitting EML      PT Pain    Date/Time Pain Type Side/Orientation Location Rating: Rest Interventions [facility]   01/13/22 1539 Pain Assessment left hip 3 premedicated for  activity EML          Prior Living Environment      Most Recent Value   People in Home spouse   Current Living Arrangements home  [9 MADY, FF inside]   Home Accessibility stairs to enter home (Group), stairs within home (Group)   Living Environment Comment Row home 13 steps into house, 9 steps to BR on 2nd floor   Number of Stairs, Main Entrance 9   Surface of Stairs, Main Entrance concrete   Stair Railings, Main Entrance railings on both sides of stairs   Location, Bathroom second floor, must negotiate stairs to access   [place], Bathroom tile floor   Bathroom Access Comment ztub/shower combo, hand held shower, bench and suction grab bars ( checks stability prior to each shower)   Number of Stairs, Within Home, Primary --  [13]   Stair Railings, Within Home, Primary railing on right side (ascending)          Prior Level of Function      Most Recent Value   Dominant Hand left   Ambulation independent   Transferring independent   Toileting independent   Bathing independent   Dressing independent   Eating independent   Assistive Device Currently Used at Home shower chair, grab bar           IRF PT Evaluation and Treatment - 01/13/22 1538        PT Time Calculation    Start Time 1535     Stop Time 1605     Time Calculation (min) 30 min        Session Details    Document Type daily treatment/progress note     Mode of Treatment physical therapy;individual therapy        General Information    General Observations of Patient Pt in bed upon PT arrival        Bed Mobility    [place], Supine to Sit close supervision     Assistive Device bed rails;head of bed elevated        Sit to Stand Transfer    [place], Sit to Stand Transfer close supervision     Verbal Cues safety     Assistive Device walker, front-wheeled        Stand to Sit Transfer    [place], Stand to Sit Transfer close supervision     Verbal Cues safety     Assistive Device walker, front-wheeled        Stand Pivot Transfer    [place],  Stand Pivot/Stand Step Transfer close supervision     Verbal Cues safety     Assistive Device walker, front-wheeled     Comment via ambulatory approach + mat > w/c        Toilet Transfer    Comment Cl S via ambulatory approach to RTS with grab bars/safety frame        Gait Training    [place], Gait close supervision     Assistive Device walker, front-wheeled;gait belt     Distance in Feet 100 feet     Pattern (Gait) step-through     Deviations/Abnormal Patterns (Gait) antalgic;digna decreased;gait speed decreased;stride length decreased;weight shifting decreased        Balance    Comment, Balance Cl S standing at sink to wash hands        Daily Progress Summary (PT)    Daily Outcome Statement Pt ambulated from room to gym with plan of negotiating stairs again this session. Pt reporting increased confidence when ascending/descending stairs with unilateral rail and HHA.  Pt requesting to use restroom limiting stair negotiation this session.  Plan to complete stair negotiation with pt's  during FTR to determine technique to safely access house with 's assistance.                           IRF PT Goals      Most Recent Value   Bed Mobility Goal 1    Activity/Assistive Device scooting, sit to supine, supine to sit at 01/02/2022 1102   [place] minimum assist (75% or more patient effort) at 01/02/2022 1102   Time Frame short-term goal (STG), 1 week at 01/02/2022 1102   Progress/Outcome goal met at 01/06/2022 0815   Bed Mobility Goal 2    Activity/Assistive Device scooting, sit to supine, supine to sit at 01/02/2022 1102   [place] modified independence at 01/02/2022 1102   Time Frame 5 days at 01/11/2022 0848   Progress/Outcome continuing progress toward goal, goal ongoing at 01/11/2022 0848   Transfer Goal 1    Activity/Assistive Device sit-to-stand/stand-to-sit, stand pivot, walker, front-wheeled at 01/02/2022 1102   [place] supervision required at 01/02/2022 1102   Time Frame  short-term goal (STG), 3 - 5 days at 01/06/2022 0815   Progress/Outcome goal met at 01/11/2022 0848   Transfer Goal 2    Activity/Assistive Device sit-to-stand/stand-to-sit, stand pivot, walker, front-wheeled at 01/02/2022 1102   [place] modified independence at 01/02/2022 1102   Time Frame 5 days at 01/11/2022 0848   Progress/Outcome continuing progress toward goal, goal ongoing at 01/11/2022 0848   Gait/Walking Locomotion Goal 1    Activity/Assistive Device gait (walking locomotion), decrease asymmetrical patterns, decrease fall risk, forward stepping, improve balance and speed at 01/02/2022 1102   Distance 100 feet at 01/06/2022 0815   [place] supervision required at 01/02/2022 1102   Time Frame short-term goal (STG), 3 - 5 days at 01/06/2022 0815   Progress/Outcome goal met at 01/11/2022 0848   Gait/Walking Locomotion Goal 2    Activity/Assistive Device gait (walking locomotion), decrease asymmetrical patterns, decrease fall risk, forward stepping, improve balance and speed at 01/02/2022 1102   Distance 200 feet at 01/11/2022 0848   [place] modified independence at 01/02/2022 1102   Time Frame 5 days at 01/11/2022 0848   Progress/Outcome continuing progress toward goal, goal ongoing at 01/11/2022 0848   Stairs Goal 1    Activity/Assistive Device ascending stairs, descending stairs at 01/04/2022 1505   Number of Stairs 8 at 01/04/2022 1505   [place] minimum assist (75% or more patient effort) at 01/04/2022 1505   Time Frame 3 days at 01/11/2022 0848   Progress/Outcome progress slower than expected, goal ongoing at 01/11/2022 0848   Stairs Goal 2    Activity/Assistive Device ascending stairs, descending stairs at 01/04/2022 1505   Number of Stairs 12 at 01/04/2022 1505   [place] supervision required at 01/04/2022 1505   Time Frame 5 days at 01/11/2022 0848   Progress/Outcome continuing progress toward goal, goal ongoing at 01/11/2022 0848

## 2022-01-13 NOTE — ASSESSMENT & PLAN NOTE
Mrs. Cobb is an 81-year-old female who presented to [facility] on 12/29/2021 with complaints of left hip pain after falling out of bed.  X-ray left hip revealed left femoral neck fracture.  CT left hip confirmed displaced fracture left femoral neck.  X-ray left knee was negative.  Head CT was negative for skull fracture or intracranial hemorrhage.  CT cervical spine was negative for fracture dislocation.  Dr. Pena from orthopedic surgery was consulted and performed left total hip replacement on 12/29/2021.  She is cleared for weightbearing to left lower extremity with anterior hip precautions for 6 weeks.  She is anticoagulated with Pradaxa for atrial fibrillation.  Aspirin 325 mg daily for 6 weeks was added for DVT prophylaxis on top of Pradaxa.  Postoperative anemia stabilized at 8.6.  She was ultimately determined to be medically stable for transfer to [facility] on 1/1/2022 for an acute inpatient rehabilitation program to address deficits related to left hip fracture status post total hip replacement.    She is weightbearing as tolerated.  She will be maintained on left hip precautions and aspirin for 6 weeks.  She will participate in 3 hours of physical and occupational therapy as well as receive 24-hour rehab nursing care.  She will follow-up with Dr. Pena from orthopedic surgery in 2 weeks.    Incision intact.  Staples removed today.    Patient with ongoing pain improvement over time.  Pain fluctuates and can be more moderate at times with activity.  Continue Tylenol around-the-clock, and oxycodone as needed.    Pain continues to be better overall.    Continues to improve with therapies and close supervision with ambulation with rolling walker.  Improving with transfers as well as bed mobility.

## 2022-01-13 NOTE — ASSESSMENT & PLAN NOTE
Mrs. Cobb is an 81-year-old female who presented to [facility] on 12/29/2021 with complaints of left hip pain after falling out of bed.  X-ray left hip revealed left femoral neck fracture.  CT left hip confirmed displaced fracture left femoral neck.  X-ray left knee was negative.  Head CT was negative for skull fracture or intracranial hemorrhage.  CT cervical spine was negative for fracture dislocation.  Dr. Pena from orthopedic surgery was consulted and performed left total hip replacement on 12/29/2021.  She is cleared for weightbearing to left lower extremity with anterior hip precautions for 6 weeks.  She is anticoagulated with Pradaxa for atrial fibrillation.  Aspirin 325 mg daily for 6 weeks was added for DVT prophylaxis on top of Pradaxa.  Postoperative anemia stabilized at 8.6.  She was ultimately determined to be medically stable for transfer to [facility] on 1/1/2022 for an acute inpatient rehabilitation program to address deficits related to left hip fracture status post total hip replacement.    She is weightbearing as tolerated.  She will be maintained on left hip precautions and aspirin for 6 weeks.  She will participate in 3 hours of physical and occupational therapy as well as receive 24-hour rehab nursing care.  She will follow-up with Dr. Pena from orthopedic surgery in 2 weeks.    Incision intact.  Staples removed today.    Patient with ongoing pain improvement over time.  Pain fluctuates and can be more moderate at times with activity.  Continue Tylenol around-the-clock, and oxycodone as needed.    Improving with therapies.  Close supervision with transfers and ambulation and progressing with her bed mobility and stairs.

## 2022-01-14 ENCOUNTER — APPOINTMENT (INPATIENT)
Dept: PHYSICAL THERAPY | Facility: REHABILITATION | Age: 82
DRG: 560 | End: 2022-01-14
Payer: MEDICARE

## 2022-01-14 ENCOUNTER — APPOINTMENT (INPATIENT)
Dept: OCCUPATIONAL THERAPY | Facility: REHABILITATION | Age: 82
DRG: 560 | End: 2022-01-14
Payer: MEDICARE

## 2022-01-14 PROCEDURE — 97530 THERAPEUTIC ACTIVITIES: CPT | Mod: GP

## 2022-01-14 PROCEDURE — 63700000 HC SELF-ADMINISTRABLE DRUG: Performed by: HOSPITALIST

## 2022-01-14 PROCEDURE — 97530 THERAPEUTIC ACTIVITIES: CPT | Mod: GO

## 2022-01-14 PROCEDURE — 97116 GAIT TRAINING THERAPY: CPT | Mod: GP

## 2022-01-14 PROCEDURE — 25000000 HC PHARMACY GENERAL: Performed by: PHYSICAL MEDICINE & REHABILITATION

## 2022-01-14 PROCEDURE — 97110 THERAPEUTIC EXERCISES: CPT | Mod: GP

## 2022-01-14 PROCEDURE — 63700000 HC SELF-ADMINISTRABLE DRUG: Performed by: PHYSICAL MEDICINE & REHABILITATION

## 2022-01-14 PROCEDURE — 12800000 HC ROOM AND CARE SEMIPRIVATE REHAB

## 2022-01-14 PROCEDURE — 63700000 HC SELF-ADMINISTRABLE DRUG: Performed by: INTERNAL MEDICINE

## 2022-01-14 RX ORDER — AMOXICILLIN 250 MG
1 CAPSULE ORAL 2 TIMES DAILY
Qty: 60 TABLET | Refills: 0 | COMMUNITY
Start: 2022-01-14 | End: 2022-02-13

## 2022-01-14 RX ORDER — OXYCODONE HYDROCHLORIDE 5 MG/1
5 TABLET ORAL EVERY 6 HOURS PRN
Qty: 15 TABLET | Refills: 0 | Status: SHIPPED | OUTPATIENT
Start: 2022-01-14 | End: 2022-01-19

## 2022-01-14 RX ORDER — ACETAMINOPHEN 500 MG
1000 TABLET ORAL EVERY 8 HOURS
Qty: 180 TABLET | Refills: 0 | COMMUNITY
Start: 2022-01-14 | End: 2022-02-13

## 2022-01-14 RX ORDER — CLONIDINE HYDROCHLORIDE 0.1 MG/1
0.1 TABLET ORAL 2 TIMES DAILY
Qty: 60 TABLET | Refills: 0 | Status: SHIPPED | OUTPATIENT
Start: 2022-01-14 | End: 2022-02-13

## 2022-01-14 RX ORDER — HYDRALAZINE HYDROCHLORIDE 10 MG/1
10 TABLET, FILM COATED ORAL EVERY 8 HOURS
Qty: 90 TABLET | Refills: 0 | Status: SHIPPED | OUTPATIENT
Start: 2022-01-14 | End: 2022-02-13

## 2022-01-14 RX ADMIN — LEVOTHYROXINE SODIUM 75 MCG: 75 TABLET ORAL at 05:34

## 2022-01-14 RX ADMIN — HYDRALAZINE HYDROCHLORIDE 10 MG: 10 TABLET, FILM COATED ORAL at 21:25

## 2022-01-14 RX ADMIN — THERA TABS 1 TABLET: TAB at 09:22

## 2022-01-14 RX ADMIN — PANTOPRAZOLE SODIUM 40 MG: 40 TABLET, DELAYED RELEASE ORAL at 09:22

## 2022-01-14 RX ADMIN — CLONIDINE HYDROCHLORIDE 0.1 MG: 0.1 TABLET ORAL at 09:22

## 2022-01-14 RX ADMIN — HYDRALAZINE HYDROCHLORIDE 10 MG: 10 TABLET, FILM COATED ORAL at 14:56

## 2022-01-14 RX ADMIN — FERROUS SULFATE TAB 325 MG (65 MG ELEMENTAL FE) 325 MG: 325 (65 FE) TAB at 09:22

## 2022-01-14 RX ADMIN — SENNOSIDES AND DOCUSATE SODIUM 1 TABLET: 50; 8.6 TABLET ORAL at 21:25

## 2022-01-14 RX ADMIN — CLONIDINE HYDROCHLORIDE 0.1 MG: 0.1 TABLET ORAL at 21:25

## 2022-01-14 RX ADMIN — OXYCODONE HYDROCHLORIDE 5 MG: 5 TABLET ORAL at 12:11

## 2022-01-14 RX ADMIN — DORZOLAMIDE HYDROCHLORIDE 1 DROP: 20 SOLUTION/ DROPS OPHTHALMIC at 17:40

## 2022-01-14 RX ADMIN — HYDRALAZINE HYDROCHLORIDE 10 MG: 10 TABLET, FILM COATED ORAL at 05:34

## 2022-01-14 RX ADMIN — DORZOLAMIDE HYDROCHLORIDE 1 DROP: 20 SOLUTION/ DROPS OPHTHALMIC at 09:26

## 2022-01-14 RX ADMIN — Medication 1000 UNITS: at 09:22

## 2022-01-14 RX ADMIN — SENNOSIDES AND DOCUSATE SODIUM 1 TABLET: 50; 8.6 TABLET ORAL at 09:22

## 2022-01-14 RX ADMIN — DABIGATRAN ETEXILATE MESYLATE 150 MG: 150 CAPSULE ORAL at 21:24

## 2022-01-14 RX ADMIN — ROSUVASTATIN CALCIUM 5 MG: 5 TABLET, FILM COATED ORAL at 17:40

## 2022-01-14 RX ADMIN — LATANOPROST 1 DROP: 50 SOLUTION/ DROPS OPHTHALMIC at 21:26

## 2022-01-14 RX ADMIN — ACETAMINOPHEN 1000 MG: 500 TABLET, FILM COATED ORAL at 21:24

## 2022-01-14 RX ADMIN — BUDESONIDE 0.5 MG: 0.5 SUSPENSION RESPIRATORY (INHALATION) at 17:40

## 2022-01-14 RX ADMIN — DIGOXIN 250 MCG: 250 TABLET ORAL at 05:34

## 2022-01-14 RX ADMIN — ACETAMINOPHEN 1000 MG: 500 TABLET, FILM COATED ORAL at 05:34

## 2022-01-14 RX ADMIN — DILTIAZEM HYDROCHLORIDE 240 MG: 120 CAPSULE, COATED, EXTENDED RELEASE ORAL at 09:22

## 2022-01-14 RX ADMIN — ACETAMINOPHEN 1000 MG: 500 TABLET, FILM COATED ORAL at 14:56

## 2022-01-14 RX ADMIN — TIOTROPIUM BROMIDE INHALATION SPRAY 2 PUFF: 3.12 SPRAY, METERED RESPIRATORY (INHALATION) at 09:26

## 2022-01-14 RX ADMIN — DABIGATRAN ETEXILATE MESYLATE 150 MG: 150 CAPSULE ORAL at 09:22

## 2022-01-14 RX ADMIN — BUDESONIDE 0.5 MG: 0.5 SUSPENSION RESPIRATORY (INHALATION) at 05:34

## 2022-01-14 NOTE — ASSESSMENT & PLAN NOTE
PCP Dr. Kanchan Zamarripa after discharge  [phone]     Cardiology after discharge  Ortho Dr. Pena in 2 weeks

## 2022-01-14 NOTE — ASSESSMENT & PLAN NOTE
Mrs. Cobb is an 81-year-old female who presented to [facility] on 12/29/2021 with complaints of left hip pain after falling out of bed.  X-ray left hip revealed left femoral neck fracture.  CT left hip confirmed displaced fracture left femoral neck.  X-ray left knee was negative.  Head CT was negative for skull fracture or intracranial hemorrhage.  CT cervical spine was negative for fracture dislocation.  Dr. Pena from orthopedic surgery was consulted and performed left total hip replacement on 12/29/2021.  She is cleared for weightbearing to left lower extremity with anterior hip precautions for 6 weeks.  She is anticoagulated with Pradaxa for atrial fibrillation.  Aspirin 325 mg daily for 6 weeks was added for DVT prophylaxis on top of Pradaxa.  Postoperative anemia stabilized at 8.6.  She was ultimately determined to be medically stable for transfer to [place] on 1/1/2022 for an acute inpatient rehabilitation program to address deficits related to left hip fracture status post total hip replacement.    She is weightbearing as tolerated.  She will be maintained on left hip precautions and aspirin for 6 weeks.  She will participate in 3 hours of physical and occupational therapy as well as receive 24-hour rehab nursing care.  She will follow-up with Dr. Pena from orthopedic surgery in 2 weeks.    Incision intact.  Staples removed today.    Patient with ongoing pain improvement over time.  Pain fluctuates and can be more moderate at times with activity.  Continue Tylenol around-the-clock, and oxycodone as needed.    Pain continues to be better overall.    Continues to improve with therapies and close supervision with ambulation with rolling walker.  Improving with transfers as well as bed mobility.  The patient progressing nicely and should be ready in 2 days for discharge with family.  Discharge planning discussed.    Ongoing episodes of more significant hypertension.  Hydralazine added  to current antihypertensives.

## 2022-01-14 NOTE — DISCHARGE INSTR - ACTIVITY
Occupational Therapy     Toilet Transfers:  will purchase a commode for over toilet. Pt requires close supervision to walk to and transfer on/off toilet using rolling walker.     Tub Transfers: Chris requires minimal assistance for sit and spin technique. Family training completed with  using [employer] bench with back. Sam owns a bench with a back and grab bars.  can consult with homecare for ongoing training if needed.     Upper Body Dressing: Close supervision including set up with rolling walker and reminders for safe technique.     Lower Body Dressing: Touch assistance for completion of low body dressing using assistive devices due to limitation of hip precautions    Bathing: Supervision seated post set up, touch assist standing for perineal hygiene using grab bars.     Toileting: Close supervision. Will use commode over toilet at home.     Grooming: Cl Sup standing at sink w rolling walker support.    Household Mobility/Household Activity:  will assist with all household activities. OT instructed bee in safety during item retrieval and transport but would benefit from ongoing instruction. OT issued reacher for safety.     Driving: Driving is unsafe and not recommended at this time. Consult your physician for approval before resuming driving. Bee already has access to [employer].   will provide transportation to all appointments.     Upper Extremity Management: Within functional limits. Bee has access to [employer]rab[employer] home exercises.    Entered by: Chantelle MOSLEY/L 1/14/22    Physical Therapy:      Bed mobility: close supervision for safety, increased time required      Transfers: close supervision sit to stand, stand-pivot transfer with rolling walker      Ambulation: close supervision to ambulate up to 150 feet with rolling walker.     Elevations: minimal assistance with 1 hand rail and cane or hand-held assist       Entered by: Karoline  Jeff, PT, DPT  on: 1/15/22      Additional     Weight-Bearing Status: as tolerated, bilateral lower extremeties     Precautions: no bending of the hip, no crossing legs, no turning in of the hip      Durable Medical Equipment: Patient was issued a rolling walker  and a small-based quad cane from [employer].  Please call [phone] with any issues or questions.  Patient agreeable to out of pocket cost for small based quad cane to be billed from [employer].      Home Exercise Program: handout provided, complete once daily in bed

## 2022-01-14 NOTE — PROGRESS NOTES
Patient: Bee Cobb  [facility] [address] [facility]  MRN: [id number]  Today's date: 1/14/2022    History of Present Illness  Bee is a 81 y.o. female admitted on 1/1/2022 with Hip fracture requiring operative repair, left, closed, initial encounter (CMS/[facility]) [S72.002A]. Principal problem is Hip fracture requiring operative repair, left, closed, initial encounter (CMS/[facility]).    81 y.o. f who presents with left hip pain after falling out of bed. She had an immediate onset of left hip pain and inability to ambulate.  No LOC.  Radiographic evaluation showed a displaced transcervical femoral neck fracture by CT.  On 12-29-21 she underwent left hip hemiarthroplasty.  Pt is WBAT to LLE and anterior hip precautions x 6 weeks. Ortho recommends aspirin 325mg/day x 6 weeks in addition to pt's home pradaxa which was restarted postoperatively. Pt did well postoperatively, but was noted to have postop anemia with a hgb drop of 12-->9. Per [place], this is expected due to Pradaxa use.  Pain is being managed with multimodal analgesia; scheduled tylenol, lido patch, PRN oxycodone, ice therapy.  PT/OT/PM&R evaluated pt and recommend acute rehab at discharge.          Past Medical History  Bee has a past medical history of Atrial fibrillation (CMS/[facility]), Glaucoma, History of transfusion, Hyperthyroidism, Lipid disorder, Osteoporosis, PAN (polyarteritis nodosa) (CMS/[facility]), and Thalassemia. H/o recent clavicle fx 9/11/21 Completed course of therapy and has full ROM       01/14/22 1332   Pain/Comfort/Sleep   Pain Charting Type Pain Assessment   Preferred Pain Scale number (Numeric Rating Pain Scale)   (0-10) Pain Rating: Rest 2   Pain Side/Orientation left   Pain Body Location hip   Pain Description aching   Vital Signs   Heart Rate 80   Heart Rate Source Monitor   BP (!) 150/65   BP Location Left upper arm   BP Method Automatic   Patient Position Sitting        01/14/22 1458   Pain/Comfort/Sleep   Pain  Charting Type Pain Reassessment   Preferred Pain Scale number (Numeric Rating Pain Scale)   (0-10) Pain Rating: Activity 6   Pain Side/Orientation left   Pain Body Location hip   Pain Description aching   Pain Management Interventions position adjusted  (pt returned to supine in hospital bed)         Prior Living Environment      Most Recent Value   People in Home spouse   Current Living Arrangements home  [9 MADY, FF inside]   Home Accessibility stairs to enter home (Group), stairs within home (Group)   Living Environment Comment Row home 13 steps into house, 9 steps to BR on 2nd floor   Number of Stairs, Main Entrance 9   Surface of Stairs, Main Entrance concrete   Stair Railings, Main Entrance railings on both sides of stairs   Location, Bathroom second floor, must negotiate stairs to access   [place], Bathroom tile floor   Bathroom Access Comment ztub/shower combo, hand held shower, bench and suction grab bars ( checks stability prior to each shower)   Number of Stairs, Within Home, Primary --  [13]   Stair Railings, Within Home, Primary railing on right side (ascending)          Prior Level of Function      Most Recent Value   Dominant Hand left   Ambulation independent   Transferring independent   Toileting independent   Bathing independent   Dressing independent   Eating independent   Assistive Device Currently Used at Home shower chair, grab bar               01/14/22 1332   PT Time Calculation   Start Time 1330   Stop Time 1500   Time Calculation (min) 90 min   Session Details   Document Type daily treatment/progress note   Mode of Treatment physical therapy;individual therapy   General Information   General Observations of Patient Pt recevied via direct hand off from OT in [employer] for family training with , Ok present.   Sit to Stand Transfer   [place], Sit to Stand Transfer close supervision   Verbal Cues safety   Assistive Device walker, front-wheeled;gait belt   Comment multiple  bouts from w/c; closeS for safety   Stand to Sit Transfer   [place], Stand to Sit Transfer close supervision   Verbal Cues safety   Assistive Device walker, front-wheeled;gait belt   Comment multiple bouts to w/c, closeS for safety   Stand Pivot Transfer   [place], Stand Pivot/Stand Step Transfer close supervision   Verbal Cues safety   Assistive Device walker, front-wheeled;gait belt   Comment via ambulatory approach to w/c; closeS for safety   Toilet Transfer   Comment PT: performed SPT via ambulatory approach to commode, with use of grab bars for assistance. Independent with lower body dressing and hygiene needs. CloseS for hand washing at sink.   Car Transfer   Transfer Technique stand pivot;sit-stand;stand-sit   [place], Car Transfer touching/steadying assist   Verbal Cues safety;technique   Assistive Device walker, front-wheeled;gait belt   Comment via short distance ambulatory approach to passenger seat of simulator car. VC's for sequencing and touch assist at pelvis for safety and balance.   Gait Training   [place], Gait close supervision   Assistive Device walker, front-wheeled;gait belt   Distance in Feet 100 feet   Pattern (Gait) step-through   Deviations/Abnormal Patterns (Gait) antalgic;base of support, narrow;gait speed decreased;step length decreased;weight shifting decreased   Bilateral Gait Deviations heel strike decreased   Maintains Weight-bearing Status (Gait) able to maintain   Comment (Gait/Stairs) 100'x1 on flat surface with small thresholds in doorway.  present with closeS for safety. PT explained reasoning for closeS and pt's need for standing rest breaks for longer distances.   Stairs Training   [place], Stairs minimum assist (75% or more patient effort)   Assistive Device railing   Handrail Location (Stairs) right side (ascending);left side (descending)   Number of Stairs 12  (4 steps x4 bouts, 12 steps x1 bout)   Stair Height 7 inches   Ascending Stairs  "Technique step-to-step  (leading with RLE)   Descending Stairs Technique step-to-step  (leading with LLE)   Comment 1.) performed 4 steps (6\") with RHR and HHA on L ascending and LHR and R HHA descending with [place] at pelvis for balance and safety. VC's for sequencing d/t increased anxiety from pt. Explained appropriate hand placement and reasoning for assistance throughout. 2.)  demonstrated with pt with same assistance as PT in prior bout. Pt and  performed 4 steps x 3 bouts demonstrating safety and appropriate assistance and hand placement. 3.) performed full flight of carpeted stairs with PT (12 steps of 7\") with R HR and L HHA ascending and LHR and R HHA descending. Increased VC's when descending d/t increased anxiety, but able to complete safely with Janice for balance and safety.   Daily Progress Summary (PT)   Symptoms Noted During/After Treatment fatigue   Progress Toward Functional Goals (PT) progressing toward functional goals as expected   Daily Outcome Statement Pt's  present for family training. Pt's  pleased with pt's progress thus far and states their biggest concern is the amount of stairs to be negotiated in their home. Pt demonstrated stairs with pt once and then pt's  demonstrated with pt with PT on stand by. Pt's  demonstrates safe and appropriate assistance, guarding techniques and cueing for pt throughout stair negotiation and ambulation. Pt and  are prepared for safe discharge home on sunday 1/16. Pt performed full flight of stairs with PT, but continues to demonstrate increased anxiety and would benefit from additional trials before d/c home. Pt performed car transfer to passenger seat of car this session with touch assist for balance and safety. Pt progressing toward goals appropriately and prepared for d/c home this weekend.          Education Documentation  Guarding Techniques, Assist With Transfers/Ambulation, taught by Kim Butcher, " PT at 1/14/2022  2:15 PM.  Learner: Patient  Readiness: Acceptance  Method: Explanation  Response: Demonstrated Understanding, Verbalizes Understanding  Comment: demonstrated appropriate and safe guarding techniques when ascending and descending stiars as well as ambulating. Discussed appropriate techniques to prevent falls with all functional mobility tasks. Pt's  demonstrates safe techniques.    Fall Risk Prevention/Management, Strategies, taught by Kim Butcher PT at 1/14/2022  2:15 PM.  Learner: Patient  Readiness: Acceptance  Method: Explanation  Response: Demonstrated Understanding, Verbalizes Understanding  Comment: demonstrated appropriate and safe guarding techniques when ascending and descending stiars as well as ambulating. Discussed appropriate techniques to prevent falls with all functional mobility tasks. Pt's  demonstrates safe techniques.    Fall Recovery, Body Mechanics, taught by Kim Butcher PT at 1/14/2022  2:15 PM.  Learner: Patient  Readiness: Acceptance  Method: Explanation  Response: Demonstrated Understanding, Verbalizes Understanding  Comment: demonstrated appropriate and safe guarding techniques when ascending and descending stiars as well as ambulating. Discussed appropriate techniques to prevent falls with all functional mobility tasks. Pt's  demonstrates safe techniques.          IRF PT Goals      Most Recent Value   Bed Mobility Goal 1    Activity/Assistive Device scooting, sit to supine, supine to sit at 01/02/2022 1102   [place] minimum assist (75% or more patient effort) at 01/02/2022 1102   Time Frame short-term goal (STG), 1 week at 01/02/2022 1102   Progress/Outcome goal met at 01/06/2022 0815   Bed Mobility Goal 2    Activity/Assistive Device scooting, sit to supine, supine to sit at 01/02/2022 1102   [place] modified independence at 01/02/2022 1102   Time Frame 5 days at 01/11/2022 0848   Progress/Outcome continuing progress toward  goal, goal ongoing at 01/11/2022 0848   Transfer Goal 1    Activity/Assistive Device sit-to-stand/stand-to-sit, stand pivot, walker, front-wheeled at 01/02/2022 1102   [place] supervision required at 01/02/2022 1102   Time Frame short-term goal (STG), 3 - 5 days at 01/06/2022 0815   Progress/Outcome goal met at 01/11/2022 0848   Transfer Goal 2    Activity/Assistive Device sit-to-stand/stand-to-sit, stand pivot, walker, front-wheeled at 01/02/2022 1102   [place] modified independence at 01/02/2022 1102   Time Frame 5 days at 01/11/2022 0848   Progress/Outcome continuing progress toward goal, goal ongoing at 01/11/2022 0848   Gait/Walking Locomotion Goal 1    Activity/Assistive Device gait (walking locomotion), decrease asymmetrical patterns, decrease fall risk, forward stepping, improve balance and speed at 01/02/2022 1102   Distance 100 feet at 01/06/2022 0815   [place] supervision required at 01/02/2022 1102   Time Frame short-term goal (STG), 3 - 5 days at 01/06/2022 0815   Progress/Outcome goal met at 01/11/2022 0848   Gait/Walking Locomotion Goal 2    Activity/Assistive Device gait (walking locomotion), decrease asymmetrical patterns, decrease fall risk, forward stepping, improve balance and speed at 01/02/2022 1102   Distance 200 feet at 01/11/2022 0848   [place] modified independence at 01/02/2022 1102   Time Frame 5 days at 01/11/2022 0848   Progress/Outcome continuing progress toward goal, goal ongoing at 01/11/2022 0848   Stairs Goal 1    Activity/Assistive Device ascending stairs, descending stairs at 01/04/2022 1505   Number of Stairs 8 at 01/04/2022 1505   [place] minimum assist (75% or more patient effort) at 01/04/2022 1505   Time Frame 3 days at 01/11/2022 0848   Progress/Outcome progress slower than expected, goal ongoing at 01/11/2022 0848   Stairs Goal 2    Activity/Assistive Device ascending stairs, descending stairs at 01/04/2022 1505   Number of Stairs 12 at 01/04/2022  1505   [place] supervision required at 01/04/2022 1505   Time Frame 5 days at 01/11/2022 0848   Progress/Outcome continuing progress toward goal, goal ongoing at 01/11/2022 0814

## 2022-01-14 NOTE — PLAN OF CARE
Problem: Rehabilitation (IRF) Plan of Care  Goal: Plan of Care Review  Outcome: Progressing  Flowsheets (Taken 1/13/2022 2035)  Progress: improving  Plan of Care Reviewed With: patient  Outcome Summary: Mild pain at this time. Ambulates with close supervision. Dressing intact to L hip incision. Bed alarm on for safety. POC reviewed.   Plan of Care Review  Plan of Care Reviewed With: patient  Progress: improving  Outcome Summary: Mild pain at this time. Ambulates with close supervision. Dressing intact to L hip incision. Bed alarm on for safety. POC reviewed.

## 2022-01-14 NOTE — PROGRESS NOTES
[facility] Progress Note          Patient was seen and examined.   Attestation Notes: Face to face encounter completed    Bee Cobb is a 81 y.o. female who was admitted for Hip fracture requiring operative repair, left, closed, initial encounter (CMS/[facility]) [S72.002A]. Patient was referred by Rolan Crooks MD for medical assessment and management      CC: Hip fracture requiring operative repair, left, closed, initial encounter (CMS/[facility]) [S72.002A]     HPI: Bee Cobb is a 81 y.o. female with HTN, HL, hypothyroid, COPD, alpha thalassemia trait, CARR[place] s/p treatment, glaucoma, HFpEF, AFIB (on Pradaxa), admitted to [facility] 12/29/21 s/p fall from bed found with left hip pain. X-ray left hip revealed left femoral neck fracture.  CT left hip confirmed displaced fracture left femoral neck.  X-ray left knee was negative.  Head CT was negative for skull fracture or intracranial hemorrhage.  CT cervical spine was negative for fracture dislocation.  Dr. Fazal Pena from orthopedic surgery was consulted and performed left total hip replacement on 12/29/2021. Post op she had anemia but did not require transfusion. She was restarted on Pradaxa for AFIB which also provided DVT ppx.  Her pain was managed with Tylenol and Oxycodone.      Her BP and HR were managed with Cardizem CD. She was also on Digoxin for AFIB.  She was on Crestor for HL and Synthroid for hypothyroid. She was on Spiriva and Pulmicort for COPD.  She was on Trusopt and Xalatan for glaucoma.     The patient was seen by PM&R and found to have residual deficits in ambulation and ADLs due to Hip fracture requiring operative repair, left, closed, initial encounter (CMS/[facility]) [S72.002A] and came to [facility] on 1/1/2022 for acute inpatient rehab.      She participated in therapy. Her BP was persistently elevated and she was seen by Cardiology. Clonidine and Hydralazine added and BP improved.     The patient has  steadily improved in therapy and is for discharge home 1/16/22    SUBJECTIVE:  Patient interviewed and examined     She feels ready for pending discharge.     Pain stable, improved constipation, no abdominal pain or nausea, no dysuria, no chest pain, palpitations, dyspnea or fevers    Review of Systems:  All other systems reviewed and negative except as noted in the HPI.    Current meds and allergies reviewed    Past Medical History:   Diagnosis Date   • (HFpEF) heart failure with preserved ejection fraction (CMS/HCC)    • Alpha thalassemia trait    • Atrial fibrillation (CMS/HCC)    • Closed left hip fracture (CMS/HCC) 12/29/2021   • COPD (chronic obstructive pulmonary disease) (CMS/HCC)    • Glaucoma    • History of transfusion    • Hypertension    • Hypothyroidism    • Lipid disorder    • Malignant melanoma (CMS/HCC)    • Mycobacterium avium-intracellulare complex (CMS/HCC)    • Osteoporosis    • PAN (polyarteritis nodosa) (CMS/HCC)      Past Surgical History:   Procedure Laterality Date   • AORTIC VALVE REPLACEMENT     • TONSILLECTOMY     • TOTAL HIP ARTHROPLASTY Left 12/29/2021     Social History     Tobacco Use   • Smoking status: Never Smoker   • Smokeless tobacco: Never Used   Vaping Use   • Vaping Use: Never used   Substance Use Topics   • Alcohol use: Never   • Drug use: Never      History reviewed. No pertinent family history.    Vital signs in last 24 hours:  Temp:  [36.7 °C (98.1 °F)-37.3 °C (99.1 °F)] 37.3 °C (99.1 °F)  Heart Rate:  [68-77] 70  Resp:  [15-18] 15  BP: (127-152)/(43-67) 152/67  Vital signs reviewed 01/14/22 12:43 PM    Physical Exam  Vitals and nursing note reviewed.   Constitutional:       Appearance: Normal appearance.   HENT:      Head: Normocephalic and atraumatic.      Right Ear: External ear normal.      Left Ear: External ear normal.      Nose: Nose normal.      Mouth/Throat:      Mouth: Mucous membranes are moist.      Pharynx: Oropharynx is clear.   Eyes:      Extraocular  Movements: Extraocular movements intact.      Conjunctiva/sclera: Conjunctivae normal.      Pupils: Pupils are equal, round, and reactive to light.   Cardiovascular:      Rate and Rhythm: Normal rate. Rhythm irregular.      Pulses: Normal pulses.      Heart sounds: Normal heart sounds.   Pulmonary:      Effort: Pulmonary effort is normal.      Breath sounds: Normal breath sounds.   Abdominal:      General: Abdomen is flat. Bowel sounds are normal.      Palpations: Abdomen is soft.   Musculoskeletal:         General: Signs of injury (s/p ORIF left hip fx) present.      Right lower leg: Edema present.      Left lower leg: Edema present.   Skin:     General: Skin is warm and dry.   Neurological:      Mental Status: She is alert and oriented to person, place, and time.   Psychiatric:         Mood and Affect: Mood normal.         Behavior: Behavior normal.                 Objective    Labs:  I have reviewed the patient's labs.  Significant abnormals are anemia, leukocytosis.  Results from last 7 days   Lab Units 01/12/22  0619   WBC K/uL 11.58*   HEMOGLOBIN g/dL 7.9*   HEMATOCRIT % 25.7*   PLATELETS K/uL 504*     Results from last 7 days   Lab Units 01/10/22  0450   SODIUM mEQ/L 138   POTASSIUM mEQ/L 4.1   CHLORIDE mEQ/L 106   CO2 mEQ/L 25   BUN mg/dL 11   CREATININE mg/dL 0.5*   CALCIUM mg/dL 8.2*   ALBUMIN g/dL 2.6*   BILIRUBIN TOTAL mg/dL 0.7   ALK PHOS IU/L 103   ALT IU/L 13   AST IU/L 21   GLUCOSE mg/dL 81     Lab Results   Component Value Date    DIGOXIN 1.0 01/05/2022       Imaging:  Not applicable        ASSESSMENT/PLAN:    81 y.o. female with HTN, HL, hypothyroid, COPD, alpha thalassemia trait, [place] s/p treatment, glaucoma, HFpEF, AFIB (on Pradaxa), admitted to [facility] 12/29/21 s/p fall from bed found to have left hip fracture and underwent left BARB by Dr. Fazal Pena 12/29/21.     1. Ortho:  -s/p fall with left hip fx s/p left BARB  -Tylenol and Oxycodone for pain     2. Vasc:  -bilat LE  edema  -SCDs and Pradaxa for DVT ppx     3. Heme:  -post op anemia due to chronic blood loss on iron/mvi  -alpha thalassemia trait with chronic anemia  -leukocytosis reactive  -follow CBC     4. Renal:  -increased risk of dehydration and electrolyte changes  -follow BMP, Mg     5. Gi:  -Senokot-S for bowels  -Protonix ulcer ppx     6. Gu:  -1/2/22 UA shows hematuria, possibly trauma for cath and being on Pradaxa  -no UTI or retention  -1/10/22 repeat UA shows less hematuria, no bacteria     7. Cardiac:  -AFIB on Cardizem CD, Digoxin and Pradaxa  -remains tachycardic  -1/5/22 Digoxin level therapeutic at 1.0  -hx of AVR  -HFpEF  -HTN on Cardizem CD  -Clonidine added by Cardiology for BP and HR  -1/13/22 Hydralazine added due to ongoing HTN  -watch for orthostatic hypotension  -use cardiac precautions in therapy  -seen by Cardiology  -follow up with PCP in 1 week to recheck BP     8. Pulm:  -hx of [place] s/p treatmetn  -COPD on Pulmicort and Spiriva  -incentive spirometry for atelectasis     9. Derm:  -consulted [employer] for skin assessment     10. Nutrition:  -seen by Nutrition for assessment and education     11. Endo:  -HL on Crestor  -hypothyroid on Synthroid     12. Optho  -glaucoma on Xalatan and Trusopt     13. Psych:  -seen by Psychology for support    14. Dispo:  -Anticipated discharge to home 1/16/22  -Reviewed chart and meds  -Completed discharge documentation and wrote scripts as needed       plan discussed with patient, nurse, case management and Rolan Crooks MD Scott Sapperstein, MD  1/14/2022  12:43 PM

## 2022-01-14 NOTE — ASSESSMENT & PLAN NOTE
Labile blood pressures.  Continue Cardizem with blood pressure parameters.  Continue Catapres.  Hydralazine added for elevated blood pressure.

## 2022-01-14 NOTE — PROGRESS NOTES
Cardiology Progress Note    Subjective:  -started on hydralazine 10 TID from yesterday    Allergies: Atorvastatin; Penicillins; Shellfish derived; Iodinated contrast media; Rosuvastatin; Covid-19 vaccine, mrna, [id number], lnp-s (moderna); and Covid-19 vaccine, [id number], lnp-s (moderna)    ROS:  Negative except those listed in HPI and A&P      Physical Exam:  Constitutional: Appears well-developed and well-nourished. No distress.    Cardiovascular: RRR, no murmur noted.  Pulmonary/Chest: CTA, poor repiratory effort.  Abdominal: Soft. Bowel sounds are normal.  Musculoskeletal: LE edema.  Neurological: AAOx3.    VS:  Patient Vitals for the past 72 hrs:   BP Temp Temp src Pulse Resp SpO2   01/14/22 0535 (!) 152/67 37.3 °C (99.1 °F) Oral 70 15 94 %   01/13/22 1539 (!) 127/58 -- -- 68 -- --   01/13/22 1537 (!) 127/58 36.7 °C (98.1 °F) Oral 68 18 96 %   01/13/22 1306 (!) 142/43 -- -- 77 -- --   01/13/22 1151 (!) 171/74 -- -- 80 -- --   01/13/22 1107 129/63 -- -- 72 -- --   01/13/22 1031 131/60 -- -- 73 -- --   01/12/22 2100 (!) 176/72 36.3 °C (97.4 °F) Oral -- 18 96 %   01/12/22 1510 133/62 36.4 °C (97.5 °F) Oral 72 18 --   01/12/22 1435 135/89 -- -- 73 -- --   01/12/22 1005 (!) 148/66 -- -- 73 -- --   01/12/22 0812 (!) 122/58 -- -- 76 -- 96 %   01/12/22 0652 (!) 159/67 36.8 °C (98.3 °F) Oral 66 18 94 %   01/11/22 2003 125/77 36.6 °C (97.9 °F) Oral 67 18 95 %   01/11/22 1550 139/64 36.6 °C (97.9 °F) Oral 77 16 94 %   01/11/22 1412 (!) 126/58 -- -- 67 -- --     Labs:  Recent labs reviewed  Results from last 7 days   Lab Units 01/12/22  0619 01/10/22  0450   WBC K/uL 11.58* 13.50*   HEMOGLOBIN g/dL 7.9* 7.5*   HEMATOCRIT % 25.7* 24.6*   PLATELETS K/uL 504* 516*   SODIUM mEQ/L  --  138   POTASSIUM mEQ/L  --  4.1   CHLORIDE mEQ/L  --  106   CO2 mEQ/L  --  25   GLUCOSE mg/dL  --  81   BUN mg/dL  --  11   CREATININE mg/dL  --  0.5*   CALCIUM mg/dL  --  8.2*   ANION GAP mEQ/L  --  7   AST IU/L  --  21   ALT IU/L  --  13  "  ALBUMIN g/dL  --  2.6*   EGFR mL/min/1.73m*2  --  >60.0   MAGNESIUM mg/dL  --  2.1     No intake or output data in the 24 hours ending 01/14/22 1339     Tests:  EKG 12/29/21:  Normal sinus rhythm with sinus arrhythmia   Normal ECG     EKG 01/06/22:  pending     TTE 3/5/21:   1. Normal functioning bioprosthetic aortic valve replacement    2. Left ventricular hypertrophy with normal systolic function and moderate diastolic dysfunction      Catheterization:  [facility] 1/25/2019 \"Coronary angiography revealed no obstructive coronary artery disease in a codominant distribution.\"     Current CV Medications:  •  cloNIDine (CATAPRES) tablet 0.1 mg, 0.1 mg, oral, BID, Gustavo Murray MD, 0.1 mg at   •  dabigatran etexilate (PRADAXA) capsule 150 mg, 150 mg, oral, BID, Karley Mar, DO, 150   •  digoxin (LANOXIN) tablet 250 mcg, 250 mcg, oral, Daily (6a), Karley Mar DO, 250 mcg at   •  dilTIAZem CD (CARDIZEM CD) 24 hr ER capsule 240 mg, 240 mg, oral, Daily,   •  hydrALAZINE (APRESOLINE) tablet 10 mg, 10 mg, oral, q8h SLADE, Rolan Crooks,   •  levothyroxine (SYNTHROID) tablet 75 mcg, 75 mcg, oral, Daily (6a), Karley Mar, DO, 75   •  rosuvastatin (CRESTOR) tablet 5 mg, 5 mg, oral, Daily (6p), Tal Rojas MD, 5 mg       Assessment and Plan:  Bee Cobb is a 81 y.o. female with h/o severe AS s/p TAVR, PAFib (on Pradaxa), HTN CARRINGTON who presented to [facility] ER c/o L hip pain s/p fall out of bed.      1. Mechanical fall with L hip Fx s/p  -also contributing to elevated HR, need for good pain control  -per Ortho     2. PAfib  -last EKG showed NSR  -[id number]= 4, on pradaxa  -on Dig 0.250 mg po daily and Cardizem  daily     3. h/o severe AS s/p TAVR  -Echo 3/2021:  Normal functioning bioprosthetic aortic valve replacement      4. anemia  -post op anemia due to chronic blood loss on iron/mvi  -alpha thalassemia trait with chronic anemia  -also contributing to elevated HR     5. H/o CARRINGTON and COPD  -per " IM    6. HTN  - BP elevated on current regimen  - Given comorbidities, will avoid ACEi/ARB for now due to recent hyponatremia, will avoid BB due to h/o COPD, will hold off for now dihydropyridines CCB given on Diltiazem (non-dihydropyridines) for rate control.  - On low dose Clonidine 0.1mg BID for now (also for HR control)   - Add hydralazine 10mg q 8hr for additional BP control  - Monitor for results      [facility]  Deedee Brown PA-C

## 2022-01-14 NOTE — PROGRESS NOTES
01/14/22 1600   Discharge Needs Assessment   Anticipated Discharge Disposition home with home health   Discharge Planning   Type of Home Care Services home OT;home PT;home health aide;nursing   Patient/Family Concerns Related to Expected Discharge Disposition [facility]   Discharge Transportation   Does the patient need discharge transport arranged? Yes   Has discharge transport been arranged? Yes   Discharge Transportation Vendor private vehicle   What day is the transport expected? 01/16/22   What time is the transport expected? 1000   Plan   Plan Home with  and HC   Patient/Family in Agreement with Plan yes   Discharge Review for Designated Lay Caregiver Designated Lay Caregiver available

## 2022-01-14 NOTE — ASSESSMENT & PLAN NOTE
Iron replacement.  The patient has a history of thalassemia trait and discussed the reasoning behind iron replacement post hip replacement.  Hemoglobin stable at 7.9.   18

## 2022-01-14 NOTE — PLAN OF CARE
Problem: Rehabilitation (IRF) Plan of Care  Goal: Plan of Care Review  Flowsheets (Taken 1/14/2022 2658)  Plan of Care Reviewed With:   patient   spouse  Outcome Summary: Family training went well and pt and  n agreement with dc on Sun as planned.  Confirmed with [facility] RN PT OT and hha to be set up for the pt and pt in agreement with the arrangements.  DC all set for Sunday.

## 2022-01-14 NOTE — PROGRESS NOTES
Patient: Bee Cobb  [facility]  MRN: [id number]  Today's date: 1/14/2022    History of Present Illness  Bee is a 81 y.o. female admitted on 1/1/2022 with Hip fracture requiring operative repair, left, closed, initial encounter (CMS/[facility]) [S72.002A]. Principal problem is Hip fracture requiring operative repair, left, closed, initial encounter (CMS/[facility]).    81 y.o. f who presents with left hip pain after falling out of bed. She had an immediate onset of left hip pain and inability to ambulate.  No LOC.  Radiographic evaluation showed a displaced transcervical femoral neck fracture by CT.  On 12-29-21 she underwent left hip hemiarthroplasty.  Pt is WBAT to LLE and anterior hip precautions x 6 weeks. Ortho recommends aspirin 325mg/day x 6 weeks in addition to pt's home pradaxa which was restarted postoperatively. Pt did well postoperatively, but was noted to have postop anemia with a hgb drop of 12-->9. Per [place], this is expected due to Pradaxa use.  Pain is being managed with multimodal analgesia; scheduled tylenol, lido patch, PRN oxycodone, ice therapy.  PT/OT/PM&R evaluated pt and recommend acute rehab at discharge.          Past Medical History  Bee has a past medical history of Atrial fibrillation (CMS/[facility]), Glaucoma, History of transfusion, Hyperthyroidism, Lipid disorder, Osteoporosis, PAN (polyarteritis nodosa) (CMS/[facility]), and Thalassemia. H/o recent clavicle fx 9/11/21 Completed course of therapy and has full ROM      PT Vitals    Date/Time Pulse HR Source BP BP Location BP Method Pt Position [facility]   01/14/22 1505 85 Monitor 152/67 Left upper arm Automatic Sitting PLP      PT Pain    Date/Time Pain Type Side/Orientation Location Rating: Rest Rating: Activity Interventions [facility]   01/14/22 1505 Pain Assessment left hip 5 6 premedicated for activity;prescribed exercises encouraged PLP   01/14/22 1525 Pain Reassessment left hip 5 -- position adjusted PLP     "      Prior Living Environment      Most Recent Value   People in Home spouse   Current Living Arrangements home  [9 MADY, FF inside]   Home Accessibility stairs to enter home (Group), stairs within home (Group)   Living Environment Comment Row home 13 steps into house, 9 steps to BR on 2nd floor   Number of Stairs, Main Entrance 9   Surface of Stairs, Main Entrance concrete   Stair Railings, Main Entrance railings on both sides of stairs   Location, Bathroom second floor, must negotiate stairs to access   [place], Bathroom tile floor   Bathroom Access Comment ztub/shower combo, hand held shower, bench and suction grab bars ( checks stability prior to each shower)   Number of Stairs, Within Home, Primary --  [13]   Stair Railings, Within Home, Primary railing on right side (ascending)          Prior Level of Function      Most Recent Value   Dominant Hand left   Ambulation independent   Transferring independent   Toileting independent   Bathing independent   Dressing independent   Eating independent   Assistive Device Currently Used at Home shower chair, grab bar           IRF PT Evaluation and Treatment - 01/14/22 1505        PT Time Calculation    Start Time 1500     Stop Time 1530     Time Calculation (min) 30 min        Session Details    Document Type daily treatment/progress note     Mode of Treatment individual therapy;physical therapy        General Information    Patient Profile Reviewed yes     General Observations of Patient Pt received in bed, hand-off from Kim HENDERSON PT. Pt agreeable to in bed supine ther-ex.        Weight-bearing Status    Left LE Weight-Bearing Status weight-bearing as tolerated (WBAT)        Lower Extremity (Therapeutic Exercise)    Exercise Position/Type supine;AROM (active range of motion)     General Exercise bilateral     Reps and Sets 2x10     Comment Glute sets 3\" hold, Quad sets 3\" iso hold, APs, SAQ, Hip abd/add on powder board (L LE not crossing midline), L heel " slides        Daily Progress Summary (PT)    Daily Outcome Statement Pt fatigued from stair training during earlier PT session. She tolerated supine ther-ex well with no issue.                           IRF PT Goals      Most Recent Value   Bed Mobility Goal 1    Activity/Assistive Device scooting, sit to supine, supine to sit at 01/02/2022 1102   [place] minimum assist (75% or more patient effort) at 01/02/2022 1102   Time Frame short-term goal (STG), 1 week at 01/02/2022 1102   Progress/Outcome goal met at 01/06/2022 0815   Bed Mobility Goal 2    Activity/Assistive Device scooting, sit to supine, supine to sit at 01/02/2022 1102   [place] modified independence at 01/02/2022 1102   Time Frame 5 days at 01/11/2022 0848   Progress/Outcome continuing progress toward goal, goal ongoing at 01/11/2022 0848   Transfer Goal 1    Activity/Assistive Device sit-to-stand/stand-to-sit, stand pivot, walker, front-wheeled at 01/02/2022 1102   [place] supervision required at 01/02/2022 1102   Time Frame short-term goal (STG), 3 - 5 days at 01/06/2022 0815   Progress/Outcome goal met at 01/11/2022 0848   Transfer Goal 2    Activity/Assistive Device sit-to-stand/stand-to-sit, stand pivot, walker, front-wheeled at 01/02/2022 1102   [place] modified independence at 01/02/2022 1102   Time Frame 5 days at 01/11/2022 0848   Progress/Outcome continuing progress toward goal, goal ongoing at 01/11/2022 0848   Gait/Walking Locomotion Goal 1    Activity/Assistive Device gait (walking locomotion), decrease asymmetrical patterns, decrease fall risk, forward stepping, improve balance and speed at 01/02/2022 1102   Distance 100 feet at 01/06/2022 0815   [place] supervision required at 01/02/2022 1102   Time Frame short-term goal (STG), 3 - 5 days at 01/06/2022 0815   Progress/Outcome goal met at 01/11/2022 0848   Gait/Walking Locomotion Goal 2    Activity/Assistive Device gait (walking locomotion), decrease asymmetrical  patterns, decrease fall risk, forward stepping, improve balance and speed at 01/02/2022 1102   Distance 200 feet at 01/11/2022 0848   [place] modified independence at 01/02/2022 1102   Time Frame 5 days at 01/11/2022 0848   Progress/Outcome continuing progress toward goal, goal ongoing at 01/11/2022 0848   Stairs Goal 1    Activity/Assistive Device ascending stairs, descending stairs at 01/04/2022 1505   Number of Stairs 8 at 01/04/2022 1505   [place] minimum assist (75% or more patient effort) at 01/04/2022 1505   Time Frame 3 days at 01/11/2022 0848   Progress/Outcome progress slower than expected, goal ongoing at 01/11/2022 0848   Stairs Goal 2    Activity/Assistive Device ascending stairs, descending stairs at 01/04/2022 1505   Number of Stairs 12 at 01/04/2022 1505   [place] supervision required at 01/04/2022 1505   Time Frame 5 days at 01/11/2022 0848   Progress/Outcome continuing progress toward goal, goal ongoing at 01/11/2022 0848

## 2022-01-14 NOTE — SUBJECTIVE & OBJECTIVE
"   Patient was seen and examined.   Attestation Notes: Face to face encounter completed    Subjective    The patient with adequate pain control.  Progressing in therapies to close supervision with transfers and ambulation.  Disposition home this Sunday.  For hypertension cardiology and internal medicine adjusted medications the patient now on hydralazine.  Objective     Visit Vitals  /60 (BP Location: Right upper arm, Patient Position: Lying)   Pulse 75   Temp 36.7 °C (98 °F) (Oral)   Resp 16   Ht 1.676 m (5' 5.98\")   Wt 58.3 kg (128 lb 8.5 oz)   SpO2 93%   BMI 20.76 kg/m²       Review of Systems:  Pertinent items are noted in HPI.  Denies chest pain and shortness of breath.  Review of systems otherwise negative.    Labs     Results from last 7 days   Lab Units 01/12/22  0619   WBC K/uL 11.58*   HEMOGLOBIN g/dL 7.9*   HEMATOCRIT % 25.7*   PLATELETS K/uL 504*     Results from last 7 days   Lab Units 01/10/22  0450   SODIUM mEQ/L 138   POTASSIUM mEQ/L 4.1   CHLORIDE mEQ/L 106   CO2 mEQ/L 25   BUN mg/dL 11   CREATININE mg/dL 0.5*   CALCIUM mg/dL 8.2*   ALBUMIN g/dL 2.6*   BILIRUBIN TOTAL mg/dL 0.7   ALK PHOS IU/L 103   ALT IU/L 13   AST IU/L 21   GLUCOSE mg/dL 81     Full Code    Physical Exam  General      Alert, cooperative, no distress, appears stated age.   Head:    Normocephalic, without obvious abnormality, atraumatic.   Eyes:    PERRL, conjunctiva/corneas clear, EOM's intact.        Nose:   Nares normal, septum midline, mucosa normal, no drainage or            sinus tenderness.   Throat:   Lips, mucosa, and tongue normal.    Neck:   Supple, symmetrical, trachea midline.    Back:     Symmetric, no curvature.   Lungs:     Clear to auscultation bilaterally, respirations unlabored.   Chest wall:    No tenderness or deformity.   Heart:    Regular rate and rhythm, S1 and S2 normal.   Abdomen:     Soft, non-tender, bowel sounds active all four quadrants,     no masses, no organomegaly. "   Extremities:  Musculoskeletal:  1+ left lower extremity edema.   Left hip replacement.     Pulses:   1+ and symmetric all extremities.   Skin:   Incision with minimal drainage.   Neurologic:          Behavior/  Emotional:  CNII-XII intact.  Alert and oriented ×3.  Motor exam with stable left hip weakness.  Sensory exam intact.  Reflexes stable decreased reflexes.      Appropriate, cooperative           Plan of care was discussed with patient

## 2022-01-14 NOTE — PROGRESS NOTES
"Daily Progress Note       Patient was seen and examined.   Attestation Notes: Face to face encounter completed    Subjective    The patient with adequate pain control.  Progressing in therapies to close supervision with transfers and ambulation.  Disposition home this Sunday.  For hypertension cardiology and internal medicine adjusted medications the patient now on hydralazine.  Objective     Visit Vitals  /60 (BP Location: Right upper arm, Patient Position: Lying)   Pulse 75   Temp 36.7 °C (98 °F) (Oral)   Resp 16   Ht 1.676 m (5' 5.98\")   Wt 58.3 kg (128 lb 8.5 oz)   SpO2 93%   BMI 20.76 kg/m²       Review of Systems:  Pertinent items are noted in HPI.  Denies chest pain and shortness of breath.  Review of systems otherwise negative.    Labs     Results from last 7 days   Lab Units 01/12/22  0619   WBC K/uL 11.58*   HEMOGLOBIN g/dL 7.9*   HEMATOCRIT % 25.7*   PLATELETS K/uL 504*     Results from last 7 days   Lab Units 01/10/22  0450   SODIUM mEQ/L 138   POTASSIUM mEQ/L 4.1   CHLORIDE mEQ/L 106   CO2 mEQ/L 25   BUN mg/dL 11   CREATININE mg/dL 0.5*   CALCIUM mg/dL 8.2*   ALBUMIN g/dL 2.6*   BILIRUBIN TOTAL mg/dL 0.7   ALK PHOS IU/L 103   ALT IU/L 13   AST IU/L 21   GLUCOSE mg/dL 81     Full Code    Physical Exam  General      Alert, cooperative, no distress, appears stated age.   Head:    Normocephalic, without obvious abnormality, atraumatic.   Eyes:    PERRL, conjunctiva/corneas clear, EOM's intact.        Nose:   Nares normal, septum midline, mucosa normal, no drainage or            sinus tenderness.   Throat:   Lips, mucosa, and tongue normal.    Neck:   Supple, symmetrical, trachea midline.    Back:     Symmetric, no curvature.   Lungs:     Clear to auscultation bilaterally, respirations unlabored.   Chest wall:    No tenderness or deformity.   Heart:    Regular rate and rhythm, S1 and S2 normal.   Abdomen:     Soft, non-tender, bowel sounds active all four quadrants,     no masses, no organomegaly. "   Extremities:  Musculoskeletal:  1+ left lower extremity edema.   Left hip replacement.     Pulses:   1+ and symmetric all extremities.   Skin:   Incision with minimal drainage.   Neurologic:          Behavior/  Emotional:  CNII-XII intact.  Alert and oriented ×3.  Motor exam with stable left hip weakness.  Sensory exam intact.  Reflexes stable decreased reflexes.      Appropriate, cooperative           Plan of care was discussed with patient    Assessment & Plan  * Hip fracture requiring operative repair, left, closed, initial encounter (CMS/[facility])  Assessment & Plan  Mrs. Cobb is an 81-year-old female who presented to [facility] with complaints of left hip pain after falling out of bed.  X-ray left hip revealed left femoral neck fracture.  CT left hip confirmed displaced fracture left femoral neck.  X-ray left knee was negative.  Head CT was negative for skull fracture or intracranial hemorrhage.  CT cervical spine was negative for fracture dislocation.  Dr. Pena from orthopedic surgery was consulted and performed left total hip replacement on 12/29/2021.  She is cleared for weightbearing to left lower extremity with anterior hip precautions for 6 weeks.  She is anticoagulated with Pradaxa for atrial fibrillation.  Aspirin 325 mg daily for 6 weeks was added for DVT prophylaxis on top of Pradaxa.  Postoperative anemia stabilized at 8.6.  She was ultimately determined to be medically stable for transfer to [facility] on 1/1/2022 for an acute inpatient rehabilitation program to address deficits related to left hip fracture status post total hip replacement.    She is weightbearing as tolerated.  She will be maintained on left hip precautions and aspirin for 6 weeks.  She will participate in 3 hours of physical and occupational therapy as well as receive 24-hour rehab nursing care.  She will follow-up with Dr. Pena from orthopedic surgery in 2 weeks.    Incision intact.  Staples  removed today.    Patient with ongoing pain improvement over time.  Pain fluctuates and can be more moderate at times with activity.  Continue Tylenol around-the-clock, and oxycodone as needed.    Pain continues to be better overall.    Continues to improve with therapies and close supervision with ambulation with rolling walker.  Improving with transfers as well as bed mobility.  The patient progressing nicely and should be ready in 2 days for discharge with family.  Discharge planning discussed.    Ongoing episodes of more significant hypertension.  Hydralazine added to current antihypertensives.    Follow up  Assessment & Plan  PCP Dr. Kanchan Zamarripa after discharge  [phone]     Cardiology after discharge  Ortho Dr. Pena in 2 weeks    At high risk for pressure injury of skin  Assessment & Plan  Turns q2hr    Risk for falls  Assessment & Plan  Safety belt while in wheelchair.    Pain  Assessment & Plan  Tylenol 1000 mg every 8 hours.  Oxycodone as needed.  Adjust medications as needed.  Added lidocaine patch.    Postoperative anemia  Assessment & Plan  Iron replacement.  The patient has a history of thalassemia trait and discussed the reasoning behind iron replacement post hip replacement.  Hemoglobin stable at 7.9.    Paroxysmal atrial fibrillation (CMS/HCC)  Assessment & Plan  Rate controlled on dig and cardizem, anti-coag on pradaxa    Hypothyroidism  Assessment & Plan  Continue synthroid    Hypertension  Assessment & Plan  Labile blood pressures.  Continue Cardizem with blood pressure parameters.  Continue Catapres.  Hydralazine added for elevated blood pressure.    Glaucoma  Assessment & Plan  Trusopt, xalatan gtt    COPD (chronic obstructive pulmonary disease) (CMS/HCC)  Assessment & Plan  Cont pulmicort, spiriva        Expected Discharge Date:  1/16/2022

## 2022-01-14 NOTE — PROGRESS NOTES
Patient: Bee Cobb  Location: [facility] [address]  MRN: [id number]  Today's date: 1/14/2022    History of Present Illness  Bee is a 81 y.o. female admitted on 1/1/2022 with Hip fracture requiring operative repair, left, closed, initial encounter (CMS/[facility]) [S72.002A]. Principal problem is Hip fracture requiring operative repair, left, closed, initial encounter (CMS/[facility]).    81 y.o. f who presents with left hip pain after falling out of bed. She had an immediate onset of left hip pain and inability to ambulate.  No LOC.  Radiographic evaluation showed a displaced transcervical femoral neck fracture by CT.  On 12-29-21 she underwent left hip hemiarthroplasty.  Pt is WBAT to LLE and anterior hip precautions x 6 weeks. Ortho recommends aspirin 325mg/day x 6 weeks in addition to pt's home pradaxa which was restarted postoperatively. Pt did well postoperatively, but was noted to have postop anemia with a hgb drop of 12-->9. Per [place], this is expected due to Pradaxa use.  Pain is being managed with multimodal analgesia; scheduled tylenol, lido patch, PRN oxycodone, ice therapy.  PT/OT/PM&R evaluated pt and recommend acute rehab at discharge.          Past Medical History  Bee has a past medical history of Atrial fibrillation (CMS/[facility]), Glaucoma, History of transfusion, Hyperthyroidism, Lipid disorder, Osteoporosis, PAN (polyarteritis nodosa) (CMS/[facility]), and Thalassemia. H/o recent clavicle fx 9/11/21 Completed course of therapy and has full ROM       01/14/22 1238   Pain/Comfort/Sleep   Pain Charting Type Pain Assessment   Preferred Pain Scale number (Numeric Rating Pain Scale)   (0-10) Pain Rating: Rest 4   Pain Side/Orientation left   Pain Body Location hip   Pain Description intermittent;aching   Vital Signs   Heart Rate 75   /64   BP Location Left upper arm   BP Method Manual   Patient Position Sitting        01/14/22 1326   Pain/Comfort/Sleep   Pain Charting Type Pain  "Reassessment   Preferred Pain Scale number (Numeric Rating Pain Scale)   (0-10) Pain Rating: Rest 2       Prior Living Environment      Most Recent Value   People in Home spouse   Current Living Arrangements home  [9 MADY, FF inside]   Home Accessibility stairs to enter home (Group), stairs within home (Group)   Living Environment Comment Row home 13 steps into house, 9 steps to BR on 2nd floor   Number of Stairs, Main Entrance 9   Surface of Stairs, Main Entrance concrete   Stair Railings, Main Entrance railings on both sides of stairs   Location, Bathroom second floor, must negotiate stairs to access   [place], Bathroom tile floor   Bathroom Access Comment ztub/shower combo, hand held shower, bench and suction grab bars ( checks stability prior to each shower)   Number of Stairs, Within Home, Primary --  [13]   Stair Railings, Within Home, Primary railing on right side (ascending)          Prior Level of Function      Most Recent Value   Dominant Hand left   Ambulation independent   Transferring independent   Toileting independent   Bathing independent   Dressing independent   Eating independent   Assistive Device Currently Used at Home shower chair, grab bar          Occupational Profile      Most Recent Value   Reason for Services/Referral ADL deficit   Successful Occupations wife,- mother   Occupational History/Life Experiences retired [employer]    Performance Patterns independent in all areas   Patient Goals \"I want to be functional, walk and take care of myself\"           [facility] OT Evaluation and Treatment - 01/14/22 1238        OT Time Calculation    Start Time 1230     Stop Time 1330     Time Calculation (min) 60 min        Session Details    Document Type discharge evaluation     Mode of Treatment occupational therapy;individual therapy;concurrent therapy        General Information    Patient Profile Reviewed yes     General Observations of Patient Pt received sitting in w/c completing lunch. " Family training scheduled with pt's [facility]     Existing Precautions/Restrictions fall;hip;weight bearing;restraints     Limitations/Impairments hearing;safety/cognitive        Weight-bearing Status    Left LE Weight-Bearing Status weight-bearing as tolerated (WBAT)        Coping/Community Reintegration    Observed Emotional State anxious;cooperative;pleasant     Verbalized Emotional State acceptance        Family/Support System    Family/Support System Care self-care encouraged;support provided        Coping Strategies    Trust Relationship/Rapport emotional support provided;empathic listening provided;questions answered;questions encouraged;reassurance provided        Caregiver Training    Caregiver(s) to be Trained spouse/significant other     Caregiver Training Plan transfer training;safe ADL techniques     Comment, Caregiver Training Plan Instruction and demonstration of tub transfers using Carex Bench w back (pt own bench and grab bars). Consult about toilet tx using commode over.  Issued handout to [facility] w info about standard commode.        Cognition/Psychosocial    Comment, Cognition Alert, oriented x3, able to follow simple directions, fair carryover and application of safety techniques when amb, benefits from reminds for hand placement, able to recall THP        Hearing Assessment    Hearing Status hearing aid, left;hearing aid, right     Impact of Hearing Impairment on Functional Status Need to provide clear verbal instruction        Vision Assessment/Intervention    Visual Impairment/Limitations corrective lenses for distance        Sensory Assessment (Somatosensory)    Sensory Assessment (Somatosensory) UE sensation intact;left UE;right UE     Left UE Sensory Assessment light touch awareness;light touch localization;proprioception;intact     Right UE Sensory Assessment light touch awareness;light touch localization;proprioception;intact        Range of Motion (ROM)    Range of Motion ROM is WFL;bilateral  upper extremities     Left Upper Extremity (ROM) left UE ROM is WFL except;shoulder     Shoulder, Left (ROM) Recently completed therapy for cL clavicular fx but was WFL for basic ADL tasks.     Right Upper Extremity (ROM) right UE ROM is WFL;shoulder;elbow;wrist;hand     Shoulder, Right (ROM) WFL     Elbow, Right (ROM) WFL     Wrist, Right (ROM) WFL     Hand, Right (ROM) WFL        Strength (Manual Muscle Testing)    Strength (Manual Muscle Testing) strength is WFL;bilateral upper extremities     Left Upper Extremity Strength left UE strength is WFL except;shoulder     Shoulder, Left (Strength) 4-/5     Elbow, Left (Strength) 4+/5     Wrist, Left (Strength) 4+/5     Hand, Left (Strength) 4+/5     Right Upper Extremity Strength right UE strength is WFL     Shoulder, Right (Strength) 4+/5     Elbow, Right (Strength) 4+/5     Wrist, Right (Strength) 4+/5     Hand, Right (Strength) 4+/5        Transfers    Transfers toilet transfer;shower transfer     Maintains Weight-Bearing Status (Transfers) able to maintain     Comment Completed family training with husb w focus on BR tx. RW, Cl Sup for safety, benefits from DME in bathroom        Toilet Transfer    Transfer Technique stand pivot     [place], Toilet Transfer close supervision;1 person assist;safety considerations     Verbal Cues safety     Assistive Device commode, 3-in-1;grab bars/safety frame     Comment Husb will secure a commode over toilet to elevate seat and provide rails for safety. OT suggested a splash guard may be useful. Issued handout of commode to  for reference.        Tub Transfer    Transfer Technique sit and swing     [place], Tub Transfer minimum assist (75% or more patient effort);safety considerations;1 person assist     Verbal Cues safety;technique;hand placement     Assistive Device grab bars/tub rail;tub bench     Comment Pt demonstrated and trials sit and spin tx to Carex bench w back without use of bars. Pt anxious but  improvied with repetition. Pt required Min A from std to/from sit but was able to raise B LEs over threshold. Pt owns bench w back and grab bar (s).        Safety Issues, Functional Mobility    Comment, Safety Issues/Impairments (Mobility) Pt amb short household distance using RW in simulated living envrionment using gait belt w touch assist        IADL Recommendations and Interventions    Home Recommendations (IADLs) assistance for IADLs;adaptive equipment recommendation;avoid lifting/carrying items, slide on countertops;energy conservation technique use;rearrange environment/furniture     Skilled Interventions (IADLs) adaptive equipment training        Daily Progress Summary (OT)    Symptoms Noted During/After Treatment fatigue     Progress Toward Functional Goals (OT) progressing toward functional goals as expected     Daily Outcome Statement Pt participated in family observation/training w husb in simulated living environment w focus on tub transfers. Pt is currently touch assist to cl sup for basic ADL post and/or including set up w v/cs for safety. Husb asked appropriate questions, demonstrated hands on assistance and appeared to understand level of care needed p discharge in area of ADL. [facility] will consult with homecare once pt is home to assess safety of own bathroom set up. Pt owns bath bench w back, grab bars. [facility] will purchase a commode for over the toilet. Handout issued for reference.     Barriers to Overall Progress (OT) balance, anxiety, fatigue, THP     Recommendations (OT) Pt will have performance day ADL assessment tomorrow with ADL status updated at that time. Pt reports feeling prepared for discharge home with [facility] and homecare        Discharge Summary (OT)    Outcomes Achieved Upon Discharge (OT) goals partially achieved prior to discharge     Discharge Summary Statement (OT) Pt made excellent progress in area of ADL and is touch assist to cl sup for bathing, grooming, dressing and toileting w  DME using RW w cues for posture and safety sequence. Pt will be seen by homecare who can assess tub tx should husb have more questions or concerns.     Transfer to Another Level of Care or Facility (OT) recommend continued therapy following discharge                      Education Documentation  Safety Techniques, taught by Chantelle Murillo OT at 1/14/2022  4:33 PM.  Learner: Significant Other  Readiness: Acceptance  Method: Explanation, Demonstration  Response: Verbalizes Understanding, Demonstrated Understanding, Needs Reinforcement  Comment: Husb present for obser and instruct in BR safety during txs. Husb demo comfort in providing nececssary asst for sit/spin method of tub tx. Husb will purchase commode for toilet. Pt has bench w back/gb in tub. Use of AD reviewed for LE access dsg/bathing    Safety Techniques, taught by Chantelle Murillo OT at 1/14/2022  4:33 PM.  Learner: Patient  Readiness: Acceptance  Method: Explanation, Demonstration  Response: Verbalizes Understanding, Demonstrated Understanding, Needs Reinforcement  Comment: Husb present for obser and instruct in BR safety during txs. Husb demo comfort in providing nececssary asst for sit/spin method of tub tx. [facility]. Pt has bench w back/gb in tub. Use of AD reviewed for LE access dsg/bathing    Safe ADL Techniques, taught by Chantelle Murillo OT at 1/14/2022  1:44 PM.  Learner: Significant Other  Readiness: Acceptance  Method: Explanation, Demonstration, Handout  Response: Verbalizes Understanding, Demonstrated Understanding, Needs Reinforcement  Comment: Instruct/demo tub tx w Carex Bench. Owns bars/bench. Consult about toilet tx using commode over.  Issued DME h/o to [facility] w info  about commode. [facility] feels confident about providing care to pt. May consult with HC prior to attempting bathing in tub.    Safe ADL Techniques, taught by Chantelle Murillo OT at 1/14/2022  1:44 PM.  Learner: Patient  Readiness: Acceptance  Method:  Explanation, Demonstration, Handout  Response: Verbalizes Understanding, Demonstrated Understanding, Needs Reinforcement  Comment: Instruct/demo tub tx w Carex Bench. Owns bars/bench. Consult about toilet tx using commode over.  Issued DME h/o to [facility] w info  about commode. [facility] feels confident about providing care to pt. May consult with HC prior to attempting bathing in tub.    Adaptive Equipment Use, taught by Chantelle Murillo OT at 1/14/2022  1:44 PM.  Learner: Significant Other  Readiness: Acceptance  Method: Explanation, Demonstration, Handout  Response: Verbalizes Understanding, Demonstrated Understanding, Needs Reinforcement  Comment: Instruct/demo tub tx w Carex Bench. Owns bars/bench. Consult about toilet tx using commode over.  Issued DME h/o to [facility] w info  about commode. [facility] feels confident about providing care to pt. May consult with HC prior to attempting bathing in tub.    Adaptive Equipment Use, taught by Chantelle Murillo OT at 1/14/2022  1:44 PM.  Learner: Patient  Readiness: Acceptance  Method: Explanation, Demonstration, Handout  Response: Verbalizes Understanding, Demonstrated Understanding, Needs Reinforcement  Comment: Instruct/demo tub tx w Carex Bench. Owns bars/bench. Consult about toilet tx using commode over.  Issued DME h/o to [facility] w info  about commode. [facility] feels confident about providing care to pt. May consult with HC prior to attempting bathing in tub.          IRF OT Goals      Most Recent Value   Transfer Goal 1    Activity/Assistive Device toilet at 01/14/2022 1238   [place] supervision required at 01/14/2022 1238   Time Frame short-term goal (STG), 5 - 7 days at 01/14/2022 1238   Strategies/Barriers DME at 01/10/2022 0938   Progress/Outcome good progress toward goal, goal partially met at 01/14/2022 1238   Transfer Goal 2    Activity/Assistive Device toilet at 01/10/2022 0938   [place] modified independence at 01/10/2022 0938   Time Frame 2 weeks at 01/10/2022 0938    Strategies/Barriers DME at 01/02/2022 0805   Progress/Outcome goal not met at 01/14/2022 1238   Transfer Goal 3    Activity/Assistive Device shower at 01/14/2022 1238   [place] supervision required at 01/12/2022 0737   Time Frame short-term goal (STG), 5 - 7 days at 01/12/2022 0737   Strategies/Barriers DME at 01/02/2022 0805   Progress/Outcome good progress toward goal, goal not met at 01/14/2022 1238   Transfer Goal 4    Activity/Assistive Device shower at 01/14/2022 1238   [place] supervision required at 01/10/2022 0938   Time Frame long-term goal (LTG), 2 weeks at 01/10/2022 0938   Strategies/Barriers DME at 01/02/2022 0805   Progress/Outcome good progress toward goal, goal not met at 01/14/2022 1238   Bathing Goal 1    Activity/Assistive Device bathing skills, all at 01/14/2022 1238   [place] supervision required at 01/14/2022 1238   Time Frame short-term goal (STG), 1 week at 01/14/2022 1238   Strategies/Barriers DME at 01/02/2022 0805   Progress/Outcome good progress toward goal, goal partially met at 01/14/2022 1238   Bathing Goal 2    Activity/Assistive Device bathing skills, all at 01/14/2022 1238   [place] supervision required at 01/14/2022 1238   Time Frame long-term goal (LTG), 1 week at 01/14/2022 1238   Strategies/Barriers DME at 01/02/2022 0805   Progress/Outcome good progress toward goal, goal met at 01/14/2022 1238   UB Dressing Goal 1    Activity/Assistive Device upper body dressing at 01/14/2022 1238   [place] supervision required at 01/14/2022 1238   Time Frame short-term goal (STG), 5 - 7 days at 01/14/2022 1238   Strategies/Barriers p set up at 01/14/2022 1238   Progress/Outcome goal partially met at 01/14/2022 1238   UB Dressing Goal 2    Activity/Assistive Device upper body dressing at 01/14/2022 1238   [place] modified independence at 01/02/2022 0805   Time Frame long-term goal (LTG) at 01/14/2022 1238   Strategies/Barriers incl s/u at 01/02/2022 0858    Progress/Outcome goal partially met at 01/14/2022 1238   LB Dressing Goal 1    Activity/Assistive Device lower body dressing at 01/14/2022 1238   [place] tactile cues required at 01/12/2022 0737   Time Frame short-term goal (STG), 5 - 7 days at 01/12/2022 0737   Strategies/Barriers AD at 01/02/2022 0805   Progress/Outcome goal partially met at 01/14/2022 1238   LB Dressing Goal 2    Activity/Assistive Device lower body dressing at 01/14/2022 1238   [place] supervision required at 01/02/2022 0805   Time Frame long-term goal (LTG), 4 weeks at 01/02/2022 0805   Strategies/Barriers AD at 01/02/2022 0805   Progress/Outcome goal partially met at 01/14/2022 1238   Grooming Goal 1    Activity/Assistive Device grooming skills, all at 01/14/2022 1238   [place] supervision required at 01/12/2022 0737   Time Frame short-term goal (STG), 5 - 7 days at 01/12/2022 0737   Strategies/Barriers std @ sink at 01/05/2022 1408   Progress/Outcome goal met at 01/14/2022 1238   Grooming Goal 2    Activity/Assistive Device grooming skills, all at 01/14/2022 1238   [place] modified independence at 01/02/2022 0805   Time Frame long-term goal (LTG), 4 weeks at 01/02/2022 0805   Strategies/Barriers std @ sink at 01/02/2022 0805   Progress/Outcome goal partially met at 01/14/2022 1238   Toileting Goal 1    Activity/Assistive Device toileting skills, all at 01/14/2022 1238   [place] supervision required at 01/12/2022 0737   Time Frame short-term goal (STG), 5 - 7 days at 01/12/2022 0737   Strategies/Barriers DME at 01/02/2022 0805   Progress/Outcome goal partially met at 01/14/2022 1238   Toileting Goal 2    Activity/Assistive Device toileting skills, all at 01/14/2022 1238   [place] supervision required at 01/02/2022 0805   Time Frame long-term goal (LTG), 4 weeks at 01/02/2022 0805   Strategies/Barriers DME at 01/02/2022 0805   Progress/Outcome goal partially met at 01/14/2022 1235

## 2022-01-14 NOTE — PLAN OF CARE
Problem: Rehabilitation (IRF) Plan of Care  Goal: Plan of Care Review  Outcome: Progressing  Flowsheets (Taken 1/14/2022 7875)  Progress: improving  Plan of Care Reviewed With: patient  Outcome Summary: Patient is AAOX4, cooperative, makes needs known. Amb w rolling walker with steady gait and good safety awareness. Dressing removed from Left hip, no drainage at this time, left open to air. Medicated with PRN oxycodone prior to therapy with effective results. Good appetite.  Continent of bowel and bladder.

## 2022-01-15 ENCOUNTER — APPOINTMENT (INPATIENT)
Dept: PHYSICAL THERAPY | Facility: REHABILITATION | Age: 82
DRG: 560 | End: 2022-01-15
Payer: MEDICARE

## 2022-01-15 ENCOUNTER — APPOINTMENT (INPATIENT)
Dept: OCCUPATIONAL THERAPY | Facility: REHABILITATION | Age: 82
DRG: 560 | End: 2022-01-15
Payer: MEDICARE

## 2022-01-15 PROBLEM — Z91.89 AT HIGH RISK FOR PRESSURE INJURY OF SKIN: Status: RESOLVED | Noted: 2022-01-02 | Resolved: 2022-01-15

## 2022-01-15 PROCEDURE — 25000000 HC PHARMACY GENERAL: Performed by: PHYSICAL MEDICINE & REHABILITATION

## 2022-01-15 PROCEDURE — 12800000 HC ROOM AND CARE SEMIPRIVATE REHAB

## 2022-01-15 PROCEDURE — 97535 SELF CARE MNGMENT TRAINING: CPT | Mod: GO

## 2022-01-15 PROCEDURE — 63700000 HC SELF-ADMINISTRABLE DRUG: Performed by: INTERNAL MEDICINE

## 2022-01-15 PROCEDURE — 63700000 HC SELF-ADMINISTRABLE DRUG: Performed by: HOSPITALIST

## 2022-01-15 PROCEDURE — 63700000 HC SELF-ADMINISTRABLE DRUG: Performed by: PHYSICAL MEDICINE & REHABILITATION

## 2022-01-15 PROCEDURE — 97530 THERAPEUTIC ACTIVITIES: CPT | Mod: GP

## 2022-01-15 PROCEDURE — 97116 GAIT TRAINING THERAPY: CPT | Mod: GP

## 2022-01-15 RX ADMIN — BUDESONIDE 0.5 MG: 0.5 SUSPENSION RESPIRATORY (INHALATION) at 06:03

## 2022-01-15 RX ADMIN — DILTIAZEM HYDROCHLORIDE 240 MG: 120 CAPSULE, COATED, EXTENDED RELEASE ORAL at 08:24

## 2022-01-15 RX ADMIN — LATANOPROST 1 DROP: 50 SOLUTION/ DROPS OPHTHALMIC at 20:05

## 2022-01-15 RX ADMIN — PANTOPRAZOLE SODIUM 40 MG: 40 TABLET, DELAYED RELEASE ORAL at 08:24

## 2022-01-15 RX ADMIN — DABIGATRAN ETEXILATE MESYLATE 150 MG: 150 CAPSULE ORAL at 20:02

## 2022-01-15 RX ADMIN — HYDRALAZINE HYDROCHLORIDE 10 MG: 10 TABLET, FILM COATED ORAL at 21:01

## 2022-01-15 RX ADMIN — ROSUVASTATIN CALCIUM 5 MG: 5 TABLET, FILM COATED ORAL at 18:39

## 2022-01-15 RX ADMIN — THERA TABS 1 TABLET: TAB at 08:24

## 2022-01-15 RX ADMIN — ACETAMINOPHEN 1000 MG: 500 TABLET, FILM COATED ORAL at 06:03

## 2022-01-15 RX ADMIN — OXYCODONE HYDROCHLORIDE 5 MG: 5 TABLET ORAL at 10:22

## 2022-01-15 RX ADMIN — TIOTROPIUM BROMIDE INHALATION SPRAY 2 PUFF: 3.12 SPRAY, METERED RESPIRATORY (INHALATION) at 15:12

## 2022-01-15 RX ADMIN — CLONIDINE HYDROCHLORIDE 0.1 MG: 0.1 TABLET ORAL at 08:25

## 2022-01-15 RX ADMIN — DIGOXIN 250 MCG: 250 TABLET ORAL at 06:10

## 2022-01-15 RX ADMIN — Medication 1000 UNITS: at 08:25

## 2022-01-15 RX ADMIN — CLONIDINE HYDROCHLORIDE 0.1 MG: 0.1 TABLET ORAL at 20:02

## 2022-01-15 RX ADMIN — HYDRALAZINE HYDROCHLORIDE 10 MG: 10 TABLET, FILM COATED ORAL at 15:05

## 2022-01-15 RX ADMIN — FERROUS SULFATE TAB 325 MG (65 MG ELEMENTAL FE) 325 MG: 325 (65 FE) TAB at 08:25

## 2022-01-15 RX ADMIN — LEVOTHYROXINE SODIUM 75 MCG: 75 TABLET ORAL at 06:03

## 2022-01-15 RX ADMIN — DABIGATRAN ETEXILATE MESYLATE 150 MG: 150 CAPSULE ORAL at 08:24

## 2022-01-15 RX ADMIN — ACETAMINOPHEN 1000 MG: 500 TABLET, FILM COATED ORAL at 21:00

## 2022-01-15 RX ADMIN — ACETAMINOPHEN 1000 MG: 500 TABLET, FILM COATED ORAL at 15:05

## 2022-01-15 RX ADMIN — HYDRALAZINE HYDROCHLORIDE 10 MG: 10 TABLET, FILM COATED ORAL at 06:16

## 2022-01-15 RX ADMIN — DORZOLAMIDE HYDROCHLORIDE 1 DROP: 20 SOLUTION/ DROPS OPHTHALMIC at 06:10

## 2022-01-15 RX ADMIN — BUDESONIDE 0.5 MG: 0.5 SUSPENSION RESPIRATORY (INHALATION) at 18:38

## 2022-01-15 RX ADMIN — DORZOLAMIDE HYDROCHLORIDE 1 DROP: 20 SOLUTION/ DROPS OPHTHALMIC at 18:39

## 2022-01-15 ASSESSMENT — COGNITIVE AND FUNCTIONAL STATUS - GENERAL
AFFECT: WFL
AFFECT: WFL

## 2022-01-15 NOTE — PLAN OF CARE
Assumed care of Pt at 2300 while Pt was sleeping.  Pt is AOx3 but a little confused as to which medications she was taking.  She continues to be continent of b/b; LBM 1/15 early AM.  Patient slept well throughout the night with no c/o pn until AM med pass.  Hip pn 4/0 reported; scheduled Tylenol administered. Pt tolerated AM neb well. All medications reviewed prior to administration. Safety & hip precautions maintained.

## 2022-01-15 NOTE — PROGRESS NOTES
"Daily Progress Note       Patient was seen and examined.   Attestation Notes: Face to face encounter completed    Subjective    The patient feels well this morning.  Pain control improving.  Reviewed discharge planning for tomorrow.  Medications reviewed in detail.  Objective     Visit Vitals  BP (!) 124/46   Pulse 76   Temp 36.4 °C (97.5 °F) (Oral)   Resp 18   Ht 1.676 m (5' 5.98\")   Wt 58.3 kg (128 lb 8.5 oz)   SpO2 96%   BMI 20.76 kg/m²       Review of Systems:  Pertinent items are noted in HPI.  Denies chest pain and shortness of breath.  Review of systems otherwise negative.    Labs     Results from last 7 days   Lab Units 01/12/22  0619   WBC K/uL 11.58*   HEMOGLOBIN g/dL 7.9*   HEMATOCRIT % 25.7*   PLATELETS K/uL 504*     Results from last 7 days   Lab Units 01/10/22  0450   SODIUM mEQ/L 138   POTASSIUM mEQ/L 4.1   CHLORIDE mEQ/L 106   CO2 mEQ/L 25   BUN mg/dL 11   CREATININE mg/dL 0.5*   CALCIUM mg/dL 8.2*   ALBUMIN g/dL 2.6*   BILIRUBIN TOTAL mg/dL 0.7   ALK PHOS IU/L 103   ALT IU/L 13   AST IU/L 21   GLUCOSE mg/dL 81     Full Code    Physical Exam  General      Alert, cooperative, no distress, appears stated age.   Head:    Normocephalic, without obvious abnormality, atraumatic.   Eyes:    PERRL, conjunctiva/corneas clear, EOM's intact.        Nose:   Nares normal, septum midline, mucosa normal, no drainage or            sinus tenderness.   Throat:   Lips, mucosa, and tongue normal.    Neck:   Supple, symmetrical, trachea midline.    Back:     Symmetric, no curvature.   Lungs:     Clear to auscultation bilaterally, respirations unlabored.   Chest wall:    No tenderness or deformity.   Heart:    Regular rate and rhythm, S1 and S2 normal.   Abdomen:     Soft, non-tender, bowel sounds active all four quadrants,     no masses, no organomegaly.   Extremities:  Musculoskeletal:  1+ left lower extremity edema.  Left hip replacement.   Pulses:   1+ and symmetric all extremities.   Skin:  Incision intact without " further drainage.   Neurologic:          Behavior/  Emotional:  CNII-XII intact.  Alert and oriented ×3.  Motor exam able left hip weakness postsurgery.  Sensory exam intact.  Reflexes stable decreased reflexes.      Appropriate, cooperative           Plan of care was discussed with patient    Assessment & Plan  * Hip fracture requiring operative repair, left, closed, initial encounter (CMS/[facility])  Assessment & Plan  Mrs. Cobb is an 81-year-old female who presented to [facility] on 12/29/2021 with complaints of left hip pain after falling out of bed.  X-ray left hip revealed left femoral neck fracture.  CT left hip confirmed displaced fracture left femoral neck.  X-ray left knee was negative.  Head CT was negative for skull fracture or intracranial hemorrhage.  CT cervical spine was negative for fracture dislocation.  Dr. Pena from orthopedic surgery was consulted and performed left total hip replacement on 12/29/2021.  She is cleared for weightbearing to left lower extremity with anterior hip precautions for 6 weeks.  She is anticoagulated with Pradaxa for atrial fibrillation.  Aspirin 325 mg daily for 6 weeks was added for DVT prophylaxis on top of Pradaxa.  Postoperative anemia stabilized at 8.6.  She was ultimately determined to be medically stable for transfer to [facility] on 1/1/2022 for an acute inpatient rehabilitation program to address deficits related to left hip fracture status post total hip replacement.    She is weightbearing as tolerated.  She will be maintained on left hip precautions and aspirin for 6 weeks.      The patient is completing a comprehensive inpatient rehabilitation program and is reaching supervision to modified independent level with transfers and ambulation and self-care activities.  Family training completed.  Family will be at assist patient on return to home.  Home care to start including nursing, physical therapy and Occupational Therapy.    Pain overall  controlled with current medications.  Discharge analgesics discussed as well as tapering off of oxycodone at home.    Staples removed during rehab stay.  Incision healing well with resolution of drainage.    Patient will follow-up with Dr. Pena of orthopedic surgery.    Follow-up with primary care physician.    Hypertension treated with the addition of clonidine and more recently hydralazine.  Patient remains on Cardizem.  Cardiology followed throughout stay and adjusted antihypertensives.  Internal medicine followed and assisted with Co. medical management during stay.    Discharge planning discussed at length including discharge medications.    Discharge medications and instructions reviewed with the patient.    Risk for falls  Assessment & Plan  Patient remained safe throughout stay without falls.    Pain  Assessment & Plan  Patient's pain has improved over stay.  Will change Tylenol to as needed at home.  Oxycodone as needed and we discussed weaning off of this at home.  Patient will need to contact her orthopedic surgeon or primary care physician if she requires further use of oxycodone beyond 5-day supply of oxycodone from hospital stay.  Lidocaine patches as needed.    Postoperative anemia  Assessment & Plan  Iron replacement.  The patient has a history of thalassemia trait and discussed the reasoning behind iron replacement post hip replacement.  Hemoglobin stable at 7.9.    Paroxysmal atrial fibrillation (CMS/[facility])  Assessment & Plan  Rate controlled on dig and cardizem, anti-coag on pradaxa    Hypothyroidism  Assessment & Plan  Continue synthroid    Hypertension  Assessment & Plan  Labile blood pressures.  Continue Cardizem with blood pressure parameters.  Continue Catapres.  Hydralazine added for elevated blood pressure.    Glaucoma  Assessment & Plan  Trusopt, xalatan gtt    COPD (chronic obstructive pulmonary disease) (CMS/HCC)  Assessment & Plan  Cont pulmicort, spiriva        Expected Discharge  Date:  1/16/2022

## 2022-01-15 NOTE — PROGRESS NOTES
Patient: Bee Cobb  [facility]  MRN: [id number]  Today's date: 1/15/2022    History of Present Illness  Bee is a 81 y.o. female admitted on 1/1/2022 with Hip fracture requiring operative repair, left, closed, initial encounter (CMS/[facility]) [S72.002A]. Principal problem is Hip fracture requiring operative repair, left, closed, initial encounter (CMS/[facility]).    81 y.o. f who presents with left hip pain after falling out of bed. She had an immediate onset of left hip pain and inability to ambulate.  No LOC.  Radiographic evaluation showed a displaced transcervical femoral neck fracture by CT.  On 12-29-21 she underwent left hip hemiarthroplasty.  Pt is WBAT to LLE and anterior hip precautions x 6 weeks. Ortho recommends aspirin 325mg/day x 6 weeks in addition to pt's home pradaxa which was restarted postoperatively. Pt did well postoperatively, but was noted to have postop anemia with a hgb drop of 12-->9. Per [place], this is expected due to Pradaxa use.  Pain is being managed with multimodal analgesia; scheduled tylenol, lido patch, PRN oxycodone, ice therapy.  PT/OT/PM&R evaluated pt and recommend acute rehab at discharge.          Past Medical History  Bee has a past medical history of Atrial fibrillation (CMS/[facility]), Glaucoma, History of transfusion, Hyperthyroidism, Lipid disorder, Osteoporosis, PAN (polyarteritis nodosa) (CMS/[facility]), and Thalassemia. H/o recent clavicle fx 9/11/21 Completed course of therapy and has full ROM      OT Vitals    Date/Time Pulse HR Source BP BP Location BP Method Pt Position Observations [facility]   01/15/22 0658 66 Monitor 170/70 Left upper arm Automatic Sitting sitting EOB KW   01/15/22 0753 76 Monitor 169/74 Left upper arm Automatic Sitting -- KW      OT Pain    Date/Time Pain Type Side/Orientation Location Rating: Rest Rating: Activity Interventions [facility]   01/15/22 0658 Pain Assessment left hip 4 6 premedicated for activity    01/15/22  "0753 Pain Reassessment left hip 4 6 premedicated for activity KW          Prior Living Environment      Most Recent Value   People in Home spouse   Current Living Arrangements home  [9 MADY, FF inside]   Home Accessibility stairs to enter home (Group), stairs within home (Group)   Living Environment Comment Row home 13 steps into house, 9 steps to BR on 2nd floor   Number of Stairs, Main Entrance 9   Surface of Stairs, Main Entrance concrete   Stair Railings, Main Entrance railings on both sides of stairs   Location, Bathroom second floor, must negotiate stairs to access   [place], Bathroom tile floor   Bathroom Access Comment ztub/shower combo, hand held shower, bench and suction grab bars ( checks stability prior to each shower)   Number of Stairs, Within Home, Primary --  [13]   Stair Railings, Within Home, Primary railing on right side (ascending)          Prior Level of Function      Most Recent Value   Dominant Hand left   Ambulation independent   Transferring independent   Toileting independent   Bathing independent   Dressing independent   Eating independent   Assistive Device Currently Used at Home shower chair, grab bar          Occupational Profile      Most Recent Value   Reason for Services/Referral ADL deficit   Successful Occupations wife,- mother   Occupational History/Life Experiences retired [employer]    Performance Patterns independent in all areas   Patient Goals \"I want to be functional, walk and take care of myself\"           [facility] OT Evaluation and Treatment - 01/15/22 0658        OT Time Calculation    Start Time 0658     Stop Time 0758     Time Calculation (min) 60 min        Session Details    Document Type discharge evaluation     Mode of Treatment occupational therapy;individual therapy        General Information    Patient Profile Reviewed yes     General Observations of Patient Pt. in bed; agreeable to ADL for washing and dressing.        Cognition/Psychosocial    " Affect/Mental Status (Cognition) WFL        Range of Motion (ROM)    Left Upper Extremity (ROM) left UE ROM is WFL except;shoulder     Shoulder, Left (ROM) Recently completed therapy for cL clavicular fx but was WFL for basic ADL tasks.        Strength (Manual Muscle Testing)    Strength (Manual Muscle Testing) strength is WFL   4+/5 MMT throughout bilateral UEs all pivots       Bed Mobility    [place], Supine to Sit close supervision     Verbal Cues (Supine to Sit) technique        Transfers    Transfers stand pivot transfer;toilet transfer        Sit to Stand Transfer    [place], Sit to Stand Transfer close supervision     Verbal Cues safety     Assistive Device walker, front-wheeled;gait belt        Stand to Sit Transfer    [place], Stand to Sit Transfer close supervision     Verbal Cues safety     Assistive Device gait belt;walker, front-wheeled        Stand Pivot Transfer    [place], Stand Pivot/Stand Step Transfer close supervision     Verbal Cues safety     Assistive Device gait belt;walker, front-wheeled     Comment via amb approach        Toilet Transfer    Transfer Technique stand pivot     [place], Toilet Transfer close supervision;safety considerations     Verbal Cues safety     Assistive Device commode, 3-in-1;gait belt;walker, front-wheeled     Comment amb approach to DME per home set up        Shower Transfer    Comment Pt. declines shower/ shower transfers this session. Per last OT status, steadying assist required.        Motor Skills    Motor Skills coordination     Coordination WFL        Bathing    Comment Pt. declines bathing tasks this session; per last OT status, up to steadying assist requires for seated bathing. Best performance supervision with use of AE.        Upper Body Dressing    Tasks pull over garment     Self-Performance obtains clothes;threads left arm, shirt;threads right arm, shirt;pulls shirt over head/around back;pulls shirt down/adjusts      [place] close supervision     Position edge of bed sitting     Adaptive Equipment none     Comment CL S for CR with RW and UBD seated EOB.        Lower Body Dressing    Tasks underwear;pants/bottoms;socks;shoes/slippers     Self-Performance obtains clothes;threads left leg, underpants;threads right leg, underpants;pulls underpants up or down;threads left leg, pants/shorts;threads right leg, pants/shorts;pulls pants/shorts up or down;dons/doffs left sock;dons/doffs right sock     [place] Assistance dons/doffs left shoe;dons/doffs right shoe     [place] close supervision     Position edge of bed sitting;sitting up in bed;supported standing     Adaptive Equipment reacher;dressing stick;sock aid     [place], Footwear minimum assist (75% or more patient effort)     Comment Overall, Min A for use of LHAE to thread clothing over LEs and Min A for shoe managmeent. CL S for balance at RW to manage over hips.        Grooming    Self-Performance washes, rinses and dries hands;washes, rinses and dries face;brushes/baker hair;oral care (brushing teeth, cleaning dentures)     [place] supervision     Position supported standing;sink side     Adaptive Equipment none     [place], Oral Hygiene supervision     Comment Supervision for grooming routine completed standing at sink with RW.        Toileting    [place] close supervision     Position supported sitting;supported standing     Setup Assistance adaptive equipment setup     Adaptive Equipment commode, 3-in-1     Comment CL S for toileting tasks at DME with RW for balance.        IADL Recommendations and Interventions    Home Recommendations (IADLs) adaptive equipment recommendation   reacher prn during CR with RW       Discharge Summary (OT)    Outcomes Achieved Upon Discharge (OT) goals partially achieved prior to discharge     Discharge Summary Statement (OT) Pt made excellent progress in area of ADL and is touch assist to cl sup for bathing,  grooming, dressing and toileting w DME using RW w cues for posture and safety sequence. Pt will be seen by homecare who can assess tub tx should husb have more questions or concerns.     Transfer to Another Level of Care or Facility (OT) recommend continued therapy following discharge                           IRF OT Goals      Most Recent Value   Transfer Goal 1    Activity/Assistive Device toilet at 01/14/2022 1238   [place] supervision required at 01/14/2022 1238   Time Frame short-term goal (STG), 5 - 7 days at 01/14/2022 1238   Strategies/Barriers DME at 01/10/2022 0938   Progress/Outcome good progress toward goal, goal partially met at 01/15/2022 0658   Transfer Goal 2    Activity/Assistive Device toilet at 01/10/2022 0938   [place] modified independence at 01/10/2022 0938   Time Frame 2 weeks at 01/10/2022 0938   Strategies/Barriers DME at 01/02/2022 0805   Progress/Outcome goal not met at 01/15/2022 0658   Transfer Goal 3    Activity/Assistive Device shower at 01/14/2022 1238   [place] supervision required at 01/12/2022 0737   Time Frame short-term goal (STG), 5 - 7 days at 01/12/2022 0737   Strategies/Barriers DME at 01/02/2022 0805   Progress/Outcome good progress toward goal, goal not met at 01/15/2022 0658   Transfer Goal 4    Activity/Assistive Device shower at 01/14/2022 1238   [place] supervision required at 01/10/2022 0938   Time Frame long-term goal (LTG), 2 weeks at 01/10/2022 0938   Strategies/Barriers DME at 01/02/2022 0805   Progress/Outcome good progress toward goal, goal not met at 01/15/2022 0658   Bathing Goal 1    Activity/Assistive Device bathing skills, all at 01/14/2022 1238   [place] supervision required at 01/14/2022 1238   Time Frame short-term goal (STG), 1 week at 01/14/2022 1238   Strategies/Barriers DME at 01/02/2022 0805   Progress/Outcome good progress toward goal, goal partially met at 01/15/2022 0658   Bathing Goal 2    Activity/Assistive Device  bathing skills, all at 01/14/2022 1238   [place] supervision required at 01/14/2022 1238   Time Frame long-term goal (LTG), 1 week at 01/14/2022 1238   Strategies/Barriers DME at 01/02/2022 0805   Progress/Outcome good progress toward goal, goal partially met at 01/15/2022 0658   UB Dressing Goal 1    Activity/Assistive Device upper body dressing at 01/14/2022 1238   [place] supervision required at 01/14/2022 1238   Time Frame short-term goal (STG), 5 - 7 days at 01/14/2022 1238   Strategies/Barriers p set up at 01/14/2022 1238   Progress/Outcome goal partially met at 01/14/2022 1238   UB Dressing Goal 2    Activity/Assistive Device upper body dressing at 01/14/2022 1238   [place] modified independence at 01/02/2022 0805   Time Frame long-term goal (LTG) at 01/14/2022 1238   Strategies/Barriers incl s/u at 01/02/2022 0805   Progress/Outcome goal partially met at 01/15/2022 0658   LB Dressing Goal 1    Activity/Assistive Device lower body dressing at 01/14/2022 1238   [facility] tactile cues required at 01/12/2022 0737   Time Frame short-term goal (STG), 5 - 7 days at 01/12/2022 0737   Strategies/Barriers AD at 01/02/2022 0805   Progress/Outcome goal partially met at 01/15/2022 0658   LB Dressing Goal 2    Activity/Assistive Device lower body dressing at 01/14/2022 1238   [place] supervision required at 01/02/2022 0805   Time Frame long-term goal (LTG), 4 weeks at 01/02/2022 0805   Strategies/Barriers AD at 01/02/2022 0805   Progress/Outcome goal partially met at 01/15/2022 0658   Grooming Goal 1    Activity/Assistive Device grooming skills, all at 01/14/2022 1238   [place] supervision required at 01/12/2022 0737   Time Frame short-term goal (STG), 5 - 7 days at 01/12/2022 0737   Strategies/Barriers std @ sink at 01/05/2022 1408   Progress/Outcome goal met at 01/15/2022 0658   Grooming Goal 2    Activity/Assistive Device grooming skills, all at 01/14/2022 1238   [place] modified  independence at 01/02/2022 0805   Time Frame long-term goal (LTG), 4 weeks at 01/02/2022 0805   Strategies/Barriers std @ sink at 01/02/2022 0805   Progress/Outcome goal partially met at 01/15/2022 0658   Toileting Goal 1    Activity/Assistive Device toileting skills, all at 01/14/2022 1238   [place] supervision required at 01/12/2022 0737   Time Frame short-term goal (STG), 5 - 7 days at 01/12/2022 0737   Strategies/Barriers DME at 01/02/2022 0805   Progress/Outcome goal partially met at 01/15/2022 0658   Toileting Goal 2    Activity/Assistive Device toileting skills, all at 01/14/2022 1238   [place] supervision required at 01/02/2022 0805   Time Frame long-term goal (LTG), 4 weeks at 01/02/2022 0805   Strategies/Barriers DME at 01/02/2022 0805   Progress/Outcome goal partially met at 01/15/2022 0658

## 2022-01-15 NOTE — ASSESSMENT & PLAN NOTE
Patient's pain has improved over stay.  Will change Tylenol to as needed at home.  Oxycodone as needed and we discussed weaning off of this at home.  Patient will need to contact her orthopedic surgeon or primary care physician if she requires further use of oxycodone beyond 5-day supply of oxycodone from hospital stay.  Lidocaine patches as needed.

## 2022-01-15 NOTE — PROGRESS NOTES
Patient: Bee Cobb  Location: [facility] [address]  MRN: [id number]  Today's date: 1/15/2022    History of Present Illness  Bee is a 81 y.o. female admitted on 1/1/2022 with Hip fracture requiring operative repair, left, closed, initial encounter (CMS/[facility]) [S72.002A]. Principal problem is Hip fracture requiring operative repair, left, closed, initial encounter (CMS/[facility]).    81 y.o. f who presents with left hip pain after falling out of bed. She had an immediate onset of left hip pain and inability to ambulate.  No LOC.  Radiographic evaluation showed a displaced transcervical femoral neck fracture by CT.  On 12-29-21 she underwent left hip hemiarthroplasty.  Pt is WBAT to LLE and anterior hip precautions x 6 weeks. Ortho recommends aspirin 325mg/day x 6 weeks in addition to pt's home pradaxa which was restarted postoperatively. Pt did well postoperatively, but was noted to have postop anemia with a hgb drop of 12-->9. Per [place], this is expected due to Pradaxa use.  Pain is being managed with multimodal analgesia; scheduled tylenol, lido patch, PRN oxycodone, ice therapy.  PT/OT/PM&R evaluated pt and recommend acute rehab at discharge.          Past Medical History  Bee has a past medical history of Atrial fibrillation (CMS/[facility]), Glaucoma, History of transfusion, Hyperthyroidism, Lipid disorder, Osteoporosis, PAN (polyarteritis nodosa) (CMS/[facility]), and Thalassemia. H/o recent clavicle fx 9/11/21 Completed course of therapy and has full ROM      PT Vitals    Date/Time Pulse HR Source Pt Activity O2 Therapy BP MAP BP Location BP Method Pt Position [facility]   01/15/22 1105 73 Monitor At rest None (Room air) 156/68 98 mmHg Right upper arm Automatic Sitting SJ      PT Pain    Date/Time Pain Type Side/Orientation Location Rating: Rest Rating: Activity Interventions [facility]   01/15/22 1105 Pain Assessment left hip 5 -- premedicated for activity [facility]   01/15/22 1155 Pain Reassessment;Post Activity  left hip -- 5 position adjusted SJM          Prior Living Environment      Most Recent Value   People in Home spouse   Current Living Arrangements home  [9 MADY, FF inside]   Home Accessibility stairs to enter home (Group), stairs within home (Group)   Living Environment Comment Row home 13 steps into house, 9 steps to BR on 2nd floor   Number of Stairs, Main Entrance 9   Surface of Stairs, Main Entrance concrete   Stair Railings, Main Entrance railings on both sides of stairs   Location, Bathroom second floor, must negotiate stairs to access   [place], Bathroom tile floor   Bathroom Access Comment ztub/shower combo, hand held shower, bench and suction grab bars ( checks stability prior to each shower)   Number of Stairs, Within Home, Primary --  [13]   Stair Railings, Within Home, Primary railing on right side (ascending)          Prior Level of Function      Most Recent Value   Dominant Hand left   Ambulation independent   Transferring independent   Toileting independent   Bathing independent   Dressing independent   Eating independent   Assistive Device Currently Used at Home shower chair, grab bar           IRF PT Evaluation and Treatment - 01/15/22 1107        PT Time Calculation    Start Time 1100     Stop Time 1200     Time Calculation (min) 60 min        Session Details    Document Type discharge evaluation     Mode of Treatment individual therapy;physical therapy        General Information    Patient Profile Reviewed yes     Existing Precautions/Restrictions fall;hip;weight bearing        Weight-bearing Status    Left LE Weight-Bearing Status weight-bearing as tolerated (WBAT)        Cognition/Psychosocial    Affect/Mental Status (Cognition) WFL     Orientation Status (Cognition) oriented x 4     Follows Commands (Cognition) WFL;follows multi-step commands;over 90% accuracy     Cognitive Function WFL        Sensory Assessment (Somatosensory)    Left LE Sensory Assessment general sensation;light touch  awareness;light touch localization;proprioception;intact   assessed plantar surface of foot, proprioception at great toe    Right LE Sensory Assessment general sensation;light touch awareness;light touch localization;proprioception;intact   assessed plantar surface of foot, proprioception at great toe       Range of Motion (ROM)    Range of Motion ROM is WFL;bilateral lower extremities        Strength (Manual Muscle Testing)    Strength (Manual Muscle Testing) strength is WFL;right lower extremity;left lower extremity strength deficit     Left Lower Extremity Strength left LE strength is WFL except;hip     Hip, Left (Strength) Flexion 3/5, extension 3+/5, abd 3/5, add 23/5, ER 2+/5, IR NT d/t THPs     Knee, Left (Strength) Knee extension 4/5, flexion 4/5     Ankle, Left (Strength) WFL/4/5 t/o all motions including DF, PF, Inv, Ev        Bed Mobility    [place], Roll Left safety considerations;unable to assess;not tested   deferred d/t THPs    [place], Roll Right safety considerations;unable to assess;not tested   deferred d/t THPs    [place], Supine to Sit close supervision     [place], Sit to Supine close supervision     Assistive Device none     Comment (Bed Mobility) supine<>sit on flat hospital bed        Sit to Stand Transfer    [place], Sit to Stand Transfer close supervision     Verbal Cues safety     Assistive Device walker, front-wheeled     Comment Close S required for safety        Stand to Sit Transfer    [place], Stand to Sit Transfer close supervision     Verbal Cues safety     Assistive Device walker, front-wheeled     Comment Close S required for safety        Stand Pivot Transfer    [place], Stand Pivot/Stand Step Transfer close supervision     Verbal Cues safety     Assistive Device walker, front-wheeled     Comment via amb approach w/ Close S required for safety        Car Transfer    Transfer Technique stand pivot     [place], Car Transfer close supervision  "    Verbal Cues safety     Assistive Device walker, front-wheeled     Comment via amb approach to simulated car with Close S required for safety        Gait Training    [place], Gait close supervision     Assistive Device walker, front-wheeled     Distance in Feet 150 feet     Pattern (Gait) step-through     Deviations/Abnormal Patterns (Gait) antalgic;base of support, narrow;gait speed decreased     Bilateral Gait Deviations heel strike decreased     [place], Picking Up Object close supervision   from stance at RW with reacher    Comment (Gait/Stairs) Close S required for safety        Rough/Uneven Surface Gait Skills    [place] close supervision     Assistive Device walker, front-wheeled     Distance in Feet 50 feet     Comment over low-pile carpet        Stairs Training    [place], Stairs minimum assist (75% or more patient effort)     Assistive Device railing     Handrail Location (Stairs) right side (ascending);left side (descending)     Number of Stairs 12     Stair Height 7 inches     Ascending Stairs Technique step-to-step     Descending Stairs Technique step-to-step     Comment performed full flight of carpeted stairs with PT (12 steps of 7\") with R HR and L HHA ascending and LHR and R HHA descending. Increased VC's when descending d/t increased anxiety, but able to complete safely with Janice for balance and safety. Patient intermittently switching to 2 hands on 1 HR with descent, but did so safely without LOB.        Wheelchair Mobility/Management    Comment, Wheelchair Mobility not needed for d/c, patient is amb level        Balance    Static Sitting Balance WFL;unsupported;sitting, edge of bed     Dynamic Sitting Balance WFL;unsupported;sitting, edge of bed     Static Standing Balance mild impairment;standing   in stance at RW    Dynamic Standing Balance mild impairment;supported;standing   in stance at RW       Timed Up and Go Test    Trial One: Timed Up and Go Test 49.97     Comment, " Timed Up and Go Test with close S using RW        Motor Skills    Coordination WFL;bilateral;lower extremity     Functional Endurance fair, mild LEMOS noted     Muscle Tone WNL;bilateral;lower extremity(s)        Postural Deviations    Head and Neck forward head     Shoulder left shoulder forward;right shoulder forward     Upper Back kyphosis     Pelvis posterior pelvic tilt        Discharge Summary (PT)    Outcomes Achieved/Progress Made Upon Discharge (PT) goals partially achieved prior to discharge;progress was made toward goals;patient transferred to another care setting/facility     Transfer to Another Level of Care or Facility (PT) recommend therapy via home health     Discharge Summary Statement (PT) Patient is an 80 yo female who underwent a left hip hemiarthroplasty s/p fall.  Pt adm to [facility] 1/1/22 with the following impairments: Increased L hip pain, decreased strength LLE, decreased endurance, impaired balance w/ need for RW. Current functional status: Close S  supine<>sit, Close S sit<>stand, SPT with RW, Close S  to amb up to 150 feet at best, min A up/down 12 steps with 1  HR  + opposite HHA and TUG test time improved to 50 seconds.  PTA, patient indep without use of AD.  Patient issued RW and SBQC via [employer]. Patient issued and reviewed HEP handout and instructed to complete once daily in bed. Patient’s  completed FT and demo appropriate guarding at a close S to min A level as recommended by PT.  Patient planned and appropriate for d/c home w/ spouse 1/16/22 and will benefit from ongoing HC PT services to continue to address the above impairments to maximize safety and indep with mobility in the home to allow her to return to [facility].                      Education Documentation  Instructions, taught by Karoline Aguilar, PT at 1/15/2022 11:46 AM.  Learner: Patient  Readiness: Acceptance  Method: Explanation, Handout  Response: Verbalizes Understanding  Comment: Patient issued and reviewed HEP  handout and instructed to complete the following: AP, GS, QS, SAQs, heel slides to be done 3 sets x 10 reps each, once daily in bed. Hard copy placed in patient's EMR.          IRF PT Goals      Most Recent Value   Bed Mobility Goal 1    Activity/Assistive Device scooting, sit to supine, supine to sit at 01/02/2022 1102   [place] minimum assist (75% or more patient effort) at 01/02/2022 1102   Time Frame short-term goal (STG), 1 week at 01/02/2022 1102   Progress/Outcome goal met at 01/06/2022 0815   Bed Mobility Goal 2    Activity/Assistive Device scooting, sit to supine, supine to sit at 01/02/2022 1102   [place] modified independence at 01/02/2022 1102   Time Frame 5 days at 01/11/2022 0848   Progress/Outcome unable to make needed progress, goal not met at 01/15/2022 1107   Transfer Goal 1    Activity/Assistive Device sit-to-stand/stand-to-sit, stand pivot, walker, front-wheeled at 01/02/2022 1102   [place] supervision required at 01/02/2022 1102   Time Frame short-term goal (STG), 3 - 5 days at 01/06/2022 0815   Progress/Outcome goal met at 01/11/2022 0848   Transfer Goal 2    Activity/Assistive Device sit-to-stand/stand-to-sit, stand pivot, walker, front-wheeled at 01/02/2022 1102   [place] modified independence at 01/02/2022 1102   Time Frame 5 days at 01/11/2022 0848   Progress/Outcome unable to make needed progress, goal not met at 01/15/2022 1107   Gait/Walking Locomotion Goal 1    Activity/Assistive Device gait (walking locomotion), decrease asymmetrical patterns, decrease fall risk, forward stepping, improve balance and speed at 01/02/2022 1102   Distance 100 feet at 01/06/2022 0815   [place] supervision required at 01/02/2022 1102   Time Frame short-term goal (STG), 3 - 5 days at 01/06/2022 0815   Progress/Outcome goal met at 01/11/2022 0848   Gait/Walking Locomotion Goal 2    Activity/Assistive Device gait (walking locomotion), decrease asymmetrical patterns, decrease fall risk,  forward stepping, improve balance and speed at 01/02/2022 1102   Distance 200 feet at 01/11/2022 0848   [place] modified independence at 01/02/2022 1102   Time Frame 5 days at 01/11/2022 0848   Progress/Outcome unable to make needed progress, goal not met at 01/15/2022 1107   Stairs Goal 1    Activity/Assistive Device ascending stairs, descending stairs at 01/04/2022 1505   Number of Stairs 8 at 01/04/2022 1505   [place] minimum assist (75% or more patient effort) at 01/04/2022 1505   Time Frame 3 days at 01/11/2022 0848   Progress/Outcome goal met at 01/15/2022 1107   Stairs Goal 2    Activity/Assistive Device ascending stairs, descending stairs at 01/04/2022 1505   Number of Stairs 12 at 01/04/2022 1505   [place] supervision required at 01/04/2022 1505   Time Frame 5 days at 01/11/2022 0848   Progress/Outcome goal not met at 01/15/2022 1107

## 2022-01-15 NOTE — ASSESSMENT & PLAN NOTE
Mrs. Cobb is an 81-year-old female who presented to [facility] [place] on 12/29/2021 with complaints of left hip pain after falling out of bed.  X-ray left hip revealed left femoral neck fracture.  CT left hip confirmed displaced fracture left femoral neck.  X-ray left knee was negative.  Head CT was negative for skull fracture or intracranial hemorrhage.  CT cervical spine was negative for fracture dislocation.  Dr. Pena from orthopedic surgery was consulted and performed left total hip replacement on 12/29/2021.  She is cleared for weightbearing to left lower extremity with anterior hip precautions for 6 weeks.  She is anticoagulated with Pradaxa for atrial fibrillation.  Aspirin 325 mg daily for 6 weeks was added for DVT prophylaxis on top of Pradaxa.  Postoperative anemia stabilized at 8.6.  She was ultimately determined to be medically stable for transfer to [facility] on 1/1/2022 for an acute inpatient rehabilitation program to address deficits related to left hip fracture status post total hip replacement.    She is weightbearing as tolerated.  She will be maintained on left hip precautions and aspirin for 6 weeks.      The patient is completing a comprehensive inpatient rehabilitation program and is reaching supervision to modified independent level with transfers and ambulation and self-care activities.  Family training completed.  Family will be at assist patient on return to home.  Home care to start including nursing, physical therapy and Occupational Therapy.    Pain overall controlled with current medications.  Discharge analgesics discussed as well as tapering off of oxycodone at home.    Staples removed during rehab stay.  Incision healing well with resolution of drainage.    Patient will follow-up with Dr. Pena of orthopedic surgery.    Follow-up with primary care physician.    Hypertension treated with the addition of clonidine and more recently hydralazine.  Patient remains on  Cardizem.  Cardiology followed throughout stay and adjusted antihypertensives.  Internal medicine followed and assisted with Co. medical management during stay.    Discharge planning discussed at length including discharge medications.    Discharge medications and instructions reviewed with the patient.

## 2022-01-16 VITALS
DIASTOLIC BLOOD PRESSURE: 60 MMHG | RESPIRATION RATE: 16 BRPM | TEMPERATURE: 97.8 F | HEART RATE: 69 BPM | HEIGHT: 66 IN | BODY MASS INDEX: 20.66 KG/M2 | OXYGEN SATURATION: 98 % | WEIGHT: 128.53 LBS | SYSTOLIC BLOOD PRESSURE: 138 MMHG

## 2022-01-16 PROCEDURE — 63700000 HC SELF-ADMINISTRABLE DRUG: Performed by: INTERNAL MEDICINE

## 2022-01-16 PROCEDURE — 63700000 HC SELF-ADMINISTRABLE DRUG: Performed by: HOSPITALIST

## 2022-01-16 PROCEDURE — 63700000 HC SELF-ADMINISTRABLE DRUG: Performed by: PHYSICAL MEDICINE & REHABILITATION

## 2022-01-16 PROCEDURE — 25000000 HC PHARMACY GENERAL: Performed by: PHYSICAL MEDICINE & REHABILITATION

## 2022-01-16 RX ADMIN — ACETAMINOPHEN 1000 MG: 500 TABLET, FILM COATED ORAL at 05:39

## 2022-01-16 RX ADMIN — DORZOLAMIDE HYDROCHLORIDE 1 DROP: 20 SOLUTION/ DROPS OPHTHALMIC at 05:49

## 2022-01-16 RX ADMIN — Medication 1000 UNITS: at 08:53

## 2022-01-16 RX ADMIN — TIOTROPIUM BROMIDE INHALATION SPRAY 2 PUFF: 3.12 SPRAY, METERED RESPIRATORY (INHALATION) at 08:54

## 2022-01-16 RX ADMIN — CLONIDINE HYDROCHLORIDE 0.1 MG: 0.1 TABLET ORAL at 08:53

## 2022-01-16 RX ADMIN — LEVOTHYROXINE SODIUM 75 MCG: 75 TABLET ORAL at 05:39

## 2022-01-16 RX ADMIN — PANTOPRAZOLE SODIUM 40 MG: 40 TABLET, DELAYED RELEASE ORAL at 08:53

## 2022-01-16 RX ADMIN — DILTIAZEM HYDROCHLORIDE 240 MG: 120 CAPSULE, COATED, EXTENDED RELEASE ORAL at 08:53

## 2022-01-16 RX ADMIN — BUDESONIDE 0.5 MG: 0.5 SUSPENSION RESPIRATORY (INHALATION) at 05:50

## 2022-01-16 RX ADMIN — OXYCODONE HYDROCHLORIDE 5 MG: 5 TABLET ORAL at 11:10

## 2022-01-16 RX ADMIN — DIGOXIN 250 MCG: 250 TABLET ORAL at 05:39

## 2022-01-16 RX ADMIN — THERA TABS 1 TABLET: TAB at 08:53

## 2022-01-16 RX ADMIN — DABIGATRAN ETEXILATE MESYLATE 150 MG: 150 CAPSULE ORAL at 08:53

## 2022-01-16 RX ADMIN — HYDRALAZINE HYDROCHLORIDE 10 MG: 10 TABLET, FILM COATED ORAL at 05:46

## 2022-01-16 RX ADMIN — FERROUS SULFATE TAB 325 MG (65 MG ELEMENTAL FE) 325 MG: 325 (65 FE) TAB at 08:53

## 2022-01-16 NOTE — PLAN OF CARE
Plan of Care Review  Plan of Care Reviewed With: patient  Progress: no change  Outcome Summary: Pt. cont. refused bowel meds. last BM on 15. Refused SCD at night. pain controlled with scheadule Tylenol. Used call bell for assistance.

## 2022-01-16 NOTE — SUBJECTIVE & OBJECTIVE
"   Patient was seen and examined.   Attestation Notes: Face to face encounter completed    Subjective    The patient feels well.  Feels ready for discharge home today with family.  Objective     Visit Vitals  /60 (BP Location: Left upper arm, Patient Position: Lying)   Pulse 69   Temp 36.6 °C (97.8 °F) (Oral)   Resp 16   Ht 1.676 m (5' 5.98\")   Wt 58.3 kg (128 lb 8.5 oz)   SpO2 98%   BMI 20.76 kg/m²     Review of Systems:  Pertinent items are noted in HPI.  Denies chest pain and shortness of breath.  Review of systems otherwise negative.    Labs     Results from last 7 days   Lab Units 01/12/22  0619   WBC K/uL 11.58*   HEMOGLOBIN g/dL 7.9*   HEMATOCRIT % 25.7*   PLATELETS K/uL 504*     Results from last 7 days   Lab Units 01/10/22  0450   SODIUM mEQ/L 138   POTASSIUM mEQ/L 4.1   CHLORIDE mEQ/L 106   CO2 mEQ/L 25   BUN mg/dL 11   CREATININE mg/dL 0.5*   CALCIUM mg/dL 8.2*   ALBUMIN g/dL 2.6*   BILIRUBIN TOTAL mg/dL 0.7   ALK PHOS IU/L 103   ALT IU/L 13   AST IU/L 21   GLUCOSE mg/dL 81       Full Code    Physical Exam  General      Alert, cooperative, no distress, appears stated age.   Head:    Normocephalic, without obvious abnormality, atraumatic.   Eyes:    PERRL, conjunctiva/corneas clear, EOM's intact.        Nose:   Nares normal, septum midline, mucosa normal, no drainage or            sinus tenderness.   Throat:   Lips, mucosa, and tongue normal.    Neck:   Supple, symmetrical, trachea midline.    Back:     Symmetric, no curvature.   Lungs:     Clear to auscultation bilaterally, respirations unlabored.   Chest wall:    No tenderness or deformity.   Heart:    Regular rate and rhythm, S1 and S2 normal.   Abdomen:     Soft, non-tender, bowel sounds active all four quadrants,     no masses, no organomegaly.   Extremities:  Musculoskeletal:  1+ left lower extremity edema.  Left hip replacement.   Pulses:   1+ and symmetric all extremities.   Skin:  Left lateral hip incision healing well. "   Neurologic:          Behavior/  Emotional:  CNII-XII intact.  Alert and oriented ×3.  Motor exam stable left hip weakness.  Sensory exam intact.  Reflexes stable decreased reflexes.      Appropriate, cooperative           Plan of care was discussed with patient

## 2022-01-16 NOTE — PLAN OF CARE
Pt discharged home in stable condition accompanied by her . Discharge instructions read and explained to pt and the . A copy of the discharge instructions, a quad cane and a rollling walker were given to the pt.

## 2022-01-16 NOTE — PLAN OF CARE
Plan of Care Review  Plan of Care Reviewed With: patient  Progress: improving  Outcome Summary: Pt is alert oriented. no c/o pain at this time. Continent with bladder. No BM. Safety maintained. All meds and care reviewed with pt.

## 2022-01-16 NOTE — PROGRESS NOTES
Cardiology Progress Note    Subjective:  -On hydralazine 10 TID   - No CV complaints  - Leaving today and appears anxious but optimistic about going home    Allergies: Atorvastatin; Penicillins; Shellfish derived; Iodinated contrast media; Rosuvastatin; Covid-19 vaccine, mrna, [id number], lnp-s (moderna); and Covid-19 vaccine, [id number], lnp-s (moderna)    ROS:  Negative except those listed in HPI and A&P      Physical Exam:  Constitutional: Appears well-developed and well-nourished. No distress.    Cardiovascular: RRR, no murmur noted.  Pulmonary/Chest: CTA b/l  Abdominal: Soft. Bowel sounds are normal.  Musculoskeletal: No edema  Neurological: AAOx3.    VS:  Patient Vitals for the past 72 hrs:   BP Temp Temp src Pulse Resp SpO2   01/16/22 0618 138/60 36.6 °C (97.8 °F) Oral 69 16 98 %   01/16/22 0546 (!) 162/70 -- -- 67 -- --   01/16/22 0539 (!) 162/70 -- -- 67 -- --   01/15/22 2101 135/62 -- -- 73 -- --   01/15/22 2002 135/62 36.3 °C (97.3 °F) Oral 73 18 95 %   01/15/22 1505 (!) 122/50 37 °C (98.6 °F) Oral 76 18 96 %   01/15/22 1105 (!) 156/68 -- -- 73 -- --   01/15/22 1033 (!) 124/46 -- -- -- -- --   01/15/22 0753 (!) 169/74 -- -- 76 -- --   01/15/22 0658 (!) 170/70 -- -- 66 -- --   01/15/22 0616 (!) 147/61 -- -- 74 -- --   01/14/22 2124 131/62 -- -- 68 -- --   01/14/22 1900 137/65 36.4 °C (97.5 °F) Oral 70 18 96 %   01/14/22 1551 134/60 36.7 °C (98 °F) Oral 75 16 93 %   01/14/22 1505 (!) 152/67 -- -- 85 -- --   01/14/22 1332 (!) 150/65 -- -- 80 -- --   01/14/22 1238 124/64 -- -- 75 -- --   01/14/22 0535 (!) 152/67 37.3 °C (99.1 °F) Oral 70 15 94 %   01/13/22 1539 (!) 127/58 -- -- 68 -- --   01/13/22 1537 (!) 127/58 36.7 °C (98.1 °F) Oral 68 18 96 %   01/13/22 1306 (!) 142/43 -- -- 77 -- --   01/13/22 1151 (!) 171/74 -- -- 80 -- --     Labs:  Recent labs reviewed  Results from last 7 days   Lab Units 01/12/22  0619 01/10/22  0450   WBC K/uL 11.58* 13.50*   HEMOGLOBIN g/dL 7.9* 7.5*   HEMATOCRIT % 25.7* 24.6*  "  PLATELETS K/uL 504* 516*   SODIUM mEQ/L  --  138   POTASSIUM mEQ/L  --  4.1   CHLORIDE mEQ/L  --  106   CO2 mEQ/L  --  25   GLUCOSE mg/dL  --  81   BUN mg/dL  --  11   CREATININE mg/dL  --  0.5*   CALCIUM mg/dL  --  8.2*   ANION GAP mEQ/L  --  7   AST IU/L  --  21   ALT IU/L  --  13   ALBUMIN g/dL  --  2.6*   EGFR mL/min/1.73m*2  --  >60.0   MAGNESIUM mg/dL  --  2.1     No intake or output data in the 24 hours ending 01/16/22 1126     Tests:  EKG 12/29/21:  Normal sinus rhythm with sinus arrhythmia   Normal ECG     EKG 01/06/22:  Normal sinus rhythm   Moderate voltage criteria for LVH, may be normal variant if age is < 37 yrs      TTE 3/5/21:   1. Normal functioning bioprosthetic aortic valve replacement    2. Left ventricular hypertrophy with normal systolic function and moderate diastolic dysfunction      Catheterization:  [facility] 1/25/2019 \"Coronary angiography revealed no obstructive coronary artery disease in a codominant distribution.\"     Current CV Medications:    •  cloNIDine (CATAPRES) tablet 0.1 mg, 0.1 mg, oral, BID  •  dabigatran etexilate (PRADAXA) capsule 150 mg, 150 mg, oral, BID  •  digoxin (LANOXIN) tablet 250 mcg, 250 mcg, oral, Daily (6a)  •  dilTIAZem CD (CARDIZEM CD) 24 hr ER capsule 240 mg, 240 mg, oral, Daily  •  hydrALAZINE (APRESOLINE) tablet 10 mg, 10 mg, oral, q8h SLADE  •  levothyroxine (SYNTHROID) tablet 75 mcg, 75 mcg, oral, Daily (6a)  •  rosuvastatin (CRESTOR) tablet 5 mg, 5 mg, oral, Daily (6p)    Assessment and Plan:  Bee Cobb is a 81 y.o. female with h/o severe AS s/p TAVR, PAFib (on Pradaxa), HTN CARRINGTON who presented to [facility] ER c/o L hip pain s/p fall out of bed.      1. Mechanical fall with L hip Fx s/p  -also contributing to elevated HR, need for good pain control  -per Ortho     2. PAfib  - EKG showed NSR  -[id number]= 4, on pradaxa  -on Dig 0.250 mg po daily and Cardizem  daily     3. h/o severe AS s/p TAVR  -Echo 3/2021:  Normal functioning bioprosthetic " aortic valve replacement      4. anemia  -post op anemia due to chronic blood loss on iron/mvi  -alpha thalassemia trait with chronic anemia  -also contributing to elevated HR     5. H/o CARRINGTON and COPD  -per IM    6. HTN  - BP improved but slightly elevated with addition of hyralazine  - Given comorbidities, will avoid ACEi/ARB for now due to recent hyponatremia, will avoid BB due to h/o COPD, will hold off for now dihydropyridines CCB given on Diltiazem (non-dihydropyridines) for rate control.  - On low dose Clonidine 0.1mg BID for now (also for HR control) and hydralazine 10mg TID      [facility]  Francisco Root PA-C

## 2022-01-16 NOTE — ASSESSMENT & PLAN NOTE
Mrs. Cobb is an 81-year-old female who presented to [facility] on 12/29/2021 with complaints of left hip pain after falling out of bed.  X-ray left hip revealed left femoral neck fracture.  CT left hip confirmed displaced fracture left femoral neck.  X-ray left knee was negative.  Head CT was negative for skull fracture or intracranial hemorrhage.  CT cervical spine was negative for fracture dislocation.  Dr. Pena from orthopedic surgery was consulted and performed left total hip replacement on 12/29/2021.  She is cleared for weightbearing to left lower extremity with anterior hip precautions for 6 weeks.  She is anticoagulated with Pradaxa for atrial fibrillation.  Aspirin 325 mg daily for 6 weeks was added for DVT prophylaxis on top of Pradaxa.  Postoperative anemia stabilized at 8.6.  She was ultimately determined to be medically stable for transfer to [facility] on 1/1/2022 for an acute inpatient rehabilitation program to address deficits related to left hip fracture status post total hip replacement.    She is weightbearing as tolerated.  She will be maintained on left hip precautions and aspirin for 6 weeks.      The patient has completed a comprehensive inpatient rehabilitation program and is reaching supervision to modified independent level with transfers and ambulation and self-care activities.  Family training completed.  Family will be at assist patient on return to home.  Home care to start including nursing, physical therapy and Occupational Therapy.    Pain overall controlled with current medications.  Discharge analgesics discussed as well as tapering off of oxycodone at home.    Staples removed during rehab stay.  Incision healing well with resolution of drainage.    Patient will follow-up with Dr. Pena of orthopedic surgery.    Follow-up with primary care physician.    Hypertension treated with the addition of clonidine and more recently hydralazine.  Patient remains on  Cardizem.  Cardiology followed throughout stay and adjusted antihypertensives.  Internal medicine followed and assisted with Co. medical management during stay.    Discharge planning discussed at length including discharge medications.    Discharge medications and instructions reviewed with the patient.

## 2022-01-16 NOTE — ASSESSMENT & PLAN NOTE
PCP Dr. Kanchan Zamarripa after discharge  [phone]     Cardiology after discharge  Ortho Dr. Pena.

## 2022-01-16 NOTE — PROGRESS NOTES
"Daily Progress Note       Patient was seen and examined.   Attestation Notes: Face to face encounter completed    Subjective    The patient feels well.  Feels ready for discharge home today with family.  Objective     Visit Vitals  /60 (BP Location: Left upper arm, Patient Position: Lying)   Pulse 69   Temp 36.6 °C (97.8 °F) (Oral)   Resp 16   Ht 1.676 m (5' 5.98\")   Wt 58.3 kg (128 lb 8.5 oz)   SpO2 98%   BMI 20.76 kg/m²     Review of Systems:  Pertinent items are noted in HPI.  Denies chest pain and shortness of breath.  Review of systems otherwise negative.    Labs     Results from last 7 days   Lab Units 01/12/22  0619   WBC K/uL 11.58*   HEMOGLOBIN g/dL 7.9*   HEMATOCRIT % 25.7*   PLATELETS K/uL 504*     Results from last 7 days   Lab Units 01/10/22  0450   SODIUM mEQ/L 138   POTASSIUM mEQ/L 4.1   CHLORIDE mEQ/L 106   CO2 mEQ/L 25   BUN mg/dL 11   CREATININE mg/dL 0.5*   CALCIUM mg/dL 8.2*   ALBUMIN g/dL 2.6*   BILIRUBIN TOTAL mg/dL 0.7   ALK PHOS IU/L 103   ALT IU/L 13   AST IU/L 21   GLUCOSE mg/dL 81       Full Code    Physical Exam  General      Alert, cooperative, no distress, appears stated age.   Head:    Normocephalic, without obvious abnormality, atraumatic.   Eyes:    PERRL, conjunctiva/corneas clear, EOM's intact.        Nose:   Nares normal, septum midline, mucosa normal, no drainage or            sinus tenderness.   Throat:   Lips, mucosa, and tongue normal.    Neck:   Supple, symmetrical, trachea midline.    Back:     Symmetric, no curvature.   Lungs:     Clear to auscultation bilaterally, respirations unlabored.   Chest wall:    No tenderness or deformity.   Heart:    Regular rate and rhythm, S1 and S2 normal.   Abdomen:     Soft, non-tender, bowel sounds active all four quadrants,     no masses, no organomegaly.   Extremities:  Musculoskeletal:  1+ left lower extremity edema.  Left hip replacement.   Pulses:   1+ and symmetric all extremities.   Skin:  Left lateral hip incision healing " well.   Neurologic:          Behavior/  Emotional:  CNII-XII intact.  Alert and oriented ×3.  Motor exam stable left hip weakness.  Sensory exam intact.  Reflexes stable decreased reflexes.      Appropriate, cooperative           Plan of care was discussed with patient    Assessment & Plan  * Hip fracture requiring operative repair, left, closed, initial encounter (CMS/[facility], located within [facility])  Assessment & Plan  Mrs. Cobb is an 81-year-old female who presented to [facility] on 12/29/2021 with complaints of left hip pain after falling out of bed.  X-ray left hip revealed left femoral neck fracture.  CT left hip confirmed displaced fracture left femoral neck.  X-ray left knee was negative.  Head CT was negative for skull fracture or intracranial hemorrhage.  CT cervical spine was negative for fracture dislocation.  Dr. Pena from orthopedic surgery was consulted and performed left total hip replacement on 12/29/2021.  She is cleared for weightbearing to left lower extremity with anterior hip precautions for 6 weeks.  She is anticoagulated with Pradaxa for atrial fibrillation.  Aspirin 325 mg daily for 6 weeks was added for DVT prophylaxis on top of Pradaxa.  Postoperative anemia stabilized at 8.6.  She was ultimately determined to be medically stable for transfer to [place] [facility] on 1/1/2022 for an acute inpatient rehabilitation program to address deficits related to left hip fracture status post total hip replacement.    She is weightbearing as tolerated.  She will be maintained on left hip precautions and aspirin for 6 weeks.      The patient has completed a comprehensive inpatient rehabilitation program and is reaching supervision to modified independent level with transfers and ambulation and self-care activities.  Family training completed.  Family will be at assist patient on return to home.  Home care to start including nursing, physical therapy and Occupational Therapy.    Pain overall controlled with current  medications.  Discharge analgesics discussed as well as tapering off of oxycodone at home.    Staples removed during rehab stay.  Incision healing well with resolution of drainage.    Patient will follow-up with Dr. Pena of orthopedic surgery.    Follow-up with primary care physician.    Hypertension treated with the addition of clonidine and more recently hydralazine.  Patient remains on Cardizem.  Cardiology followed throughout stay and adjusted antihypertensives.  Internal medicine followed and assisted with Co. medical management during stay.    Discharge planning discussed at length including discharge medications.    Discharge medications and instructions reviewed with the patient.    Follow up  Assessment & Plan  PCP Dr. Kanchan Zamarripa after discharge  [phone]     Cardiology after discharge  Ortho Dr. Pena.    Risk for falls  Assessment & Plan  Patient remained safe throughout stay without falls.    Pain  Assessment & Plan  Patient's pain has improved over stay.  Will change Tylenol to as needed at home.  Oxycodone as needed and we discussed weaning off of this at home.  Patient will need to contact her orthopedic surgeon or primary care physician if she requires further use of oxycodone beyond 5-day supply of oxycodone from hospital stay.  Lidocaine patches as needed.    Postoperative anemia  Assessment & Plan  Iron replacement.  The patient has a history of thalassemia trait and discussed the reasoning behind iron replacement post hip replacement.  Hemoglobin stable at 7.9.    Paroxysmal atrial fibrillation (CMS/HCC)  Assessment & Plan  Rate controlled on dig and cardizem, anti-coag on pradaxa    Hypothyroidism  Assessment & Plan  Continue synthroid    Hypertension  Assessment & Plan  Labile blood pressures.  Continue Cardizem with blood pressure parameters.  Continue Catapres.  Hydralazine added for elevated blood pressure.    Glaucoma  Assessment & Plan  Trusopt, xalatan gtt    COPD  (chronic obstructive pulmonary disease) (CMS/[facility])  Assessment & Plan  Cont pulmicort, spiriva        Expected Discharge Date:  1/16/2022

## 2022-01-16 NOTE — DISCHARGE SUMMARY
Inpatient Discharge Summary    BRIEF OVERVIEW  Admitting Provider: Rolan Crooks MD  Discharge Provider: Rolan Crooks MD  Primary Care Physician at Discharge: Kanchan Gilliam -[phone]     Admission Date: 1/1/2022     Discharge Date: 1/16/2022    Hospital Course    Hip fracture requiring operative repair, left, closed, initial encounter (CMS/[facility])  Mrs. Cobb is an 81-year-old female who presented to [facility] on 12/29/2021 with complaints of left hip pain after falling out of bed.  X-ray left hip revealed left femoral neck fracture.  CT left hip confirmed displaced fracture left femoral neck.  X-ray left knee was negative.  Head CT was negative for skull fracture or intracranial hemorrhage.  CT cervical spine was negative for fracture dislocation.  Dr. Pena from orthopedic surgery was consulted and performed left total hip replacement on 12/29/2021.  She is cleared for weightbearing to left lower extremity with anterior hip precautions for 6 weeks.  She is anticoagulated with Pradaxa for atrial fibrillation.  Aspirin 325 mg daily for 6 weeks was added for DVT prophylaxis on top of Pradaxa.  Postoperative anemia stabilized at 8.6.  She was ultimately determined to be medically stable for transfer to [place] [facility]hab on 1/1/2022 for an acute inpatient rehabilitation program to address deficits related to left hip fracture status post total hip replacement.    She is weightbearing as tolerated.  She will be maintained on left hip precautions and aspirin for 6 weeks.      The patient has completed a comprehensive inpatient rehabilitation program and is reaching supervision to modified independent level with transfers and ambulation and self-care activities.  Family training completed.  Family will be at assist patient on return to home.  Home care to start including nursing, physical therapy and Occupational Therapy.    Pain overall controlled with current medications.  Discharge  analgesics discussed as well as tapering off of oxycodone at home.    Staples removed during rehab stay.  Incision healing well with resolution of drainage.    Patient will follow-up with Dr. Pena of orthopedic surgery.    Follow-up with primary care physician.    Hypertension treated with the addition of clonidine and more recently hydralazine.  Patient remains on Cardizem.  Cardiology followed throughout stay and adjusted antihypertensives.  Internal medicine followed and assisted with Co. medical management during stay.    Discharge planning discussed at length including discharge medications.    Discharge medications and instructions reviewed with the patient.    COPD (chronic obstructive pulmonary disease) (CMS/HCC)  Cont pulmicort, spiriva    Glaucoma  Trusopt, xalatan gtt    Hypertension  Labile blood pressures.  Continue Cardizem with blood pressure parameters.  Continue Catapres.  Hydralazine added for elevated blood pressure.    Hypothyroidism  Continue synthroid    Paroxysmal atrial fibrillation (CMS/HCC)  Rate controlled on dig and cardizem, anti-coag on pradaxa    Postoperative anemia  Iron replacement.  The patient has a history of thalassemia trait and discussed the reasoning behind iron replacement post hip replacement.  Hemoglobin stable at 7.9.    Pain  Patient's pain has improved over stay.  Will change Tylenol to as needed at home.  Oxycodone as needed and we discussed weaning off of this at home.  Patient will need to contact her orthopedic surgeon or primary care physician if she requires further use of oxycodone beyond 5-day supply of oxycodone from hospital stay.  Lidocaine patches as needed.    Risk for falls  Patient remained safe throughout stay without falls.    Follow up  PCP Dr. Kanchan Zamarripa after discharge  [phone]     Cardiology after discharge  Ortho Dr. Pena.          Discharge Disposition  [facility] - [facility]  Code Status at Discharge: Full Code    Discharge  Medications     Medication List      START taking these medications    cloNIDine 0.1 mg tablet  Commonly known as: CATAPRES  Take 1 tablet (0.1 mg total) by mouth 2 (two) times a day.  Dose: 0.1 mg     hydrALAZINE 10 mg tablet  Commonly known as: APRESOLINE  Take 1 tablet (10 mg total) by mouth every 8 (eight) hours.  Dose: 10 mg        CHANGE how you take these medications    acetaminophen 500 mg tablet  Commonly known as: TYLENOL  Take 2 tablets (1,000 mg total) by mouth every 8 (eight) hours.  Dose: 1,000 mg  What changed: when to take this        CONTINUE taking these medications    albuterol HFA 90 mcg/actuation inhaler  Commonly known as: VENTOLIN HFA  Inhale 2 puffs every 4 (four) hours as needed for wheezing or shortness of breath.  Dose: 2 puff     cholecalciferol (vitamin D3) 25 mcg (1,000 unit) capsule  Take by mouth.     digoxin 250 mcg (0.25 mg) tablet  Commonly known as: LANOXIN  Take 250 mcg by mouth once daily.  Dose: 250 mcg     dilTIAZem  mg 24 hr capsule  Commonly known as: CARDIZEM CD  Take 240 mg by mouth daily.  Dose: 240 mg     dorzolamide 2 % ophthalmic solution  Commonly known as: TRUSOPT  Administer 1 drop into both eyes 2 (two) times a day.  Dose: 1 drop     fluticasone  mcg/actuation inhaler  Commonly known as: FLOVENT HFA  Inhale 1 puff 2 (two) times a day.   Rinse mouth with water after use to reduce aftertaste and incidence of candidiasis.   Do not swallow.  For patients not on a ventilator, a spacer is recommended to be used with this medication/inhaler.  Dose: 1 puff     latanoprost 0.005 % ophthalmic solution  Commonly known as: XALATAN  Administer 1 drop into both eyes nightly.  Dose: 1 drop     multivitamin tablet  Commonly known as: THERAGRAN  Take 1 tablet by mouth.  Dose: 1 tablet     oxyCODONE 5 mg immediate release tablet  Commonly known as: ROXICODONE  Take 1 tablet (5 mg total) by mouth every 6 (six) hours as needed (severe pain (7-10) on a 0-10 pain scale) for  up to 5 days.  Dose: 5 mg     PRADAXA 150 mg capsu  Take 150 mg by mouth 2 (two) times a day.  Dose: 150 mg  Generic drug: dabigatran etexilate     rosuvastatin 5 mg tablet  Commonly known as: CRESTOR  Take 5 mg by mouth daily.  Dose: 5 mg     sennosides-docusate sodium 8.6-50 mg  Commonly known as: SENOKOT-S  Take 1 tablet by mouth 2 (two) times a day.  Dose: 1 tablet     SPIRIVA RESPIMAT 2.5 mcg/actuation mist inhaler  Inhale 2 puffs daily.  Dose: 2 puff  Generic drug: tiotropium bromide     SYNTHROID 75 mcg tablet  Take 75 mcg by mouth once daily.  Dose: 75 mcg  Generic drug: levothyroxine        STOP taking these medications    aspirin 325 mg EC tablet     azithromycin 250 mg tablet  Commonly known as: ZITHROMAX     clindamycin 150 mg capsule  Commonly known as: CLEOCIN     denosumab 120 mg/1.7 mL (70 mg/mL) injection  Commonly known as: XGEVA     pantoprazole 40 mg EC tablet  Commonly known as: PROTONIX     rifAMPin 300 mg capsule  Commonly known as: RIFADIN               Outpatient Follow-Up  Encounter Information    This patient does not currently have any appointments scheduled.

## 2022-01-16 NOTE — DISCHARGE INSTRUCTIONS
Please contact your surgeon or PCP immediately upon discharge to arrange refills for pain medications after the intial 5 day prescription given to you at discharge from rehab.    Follow up with your PCP in 1 week to recheck your blood pressure.    Notify your surgeon of any of the following possible signs or symptoms of infection:elevated temperature >101 degrees F, redness, warmth, increased swelling, increased pain or drainage.    Do not apply cream, ,lotion or ointment.    No soaking or swimming.    Keep your incision clean, dry and open to air.

## 2022-02-13 NOTE — SUBJECTIVE & OBJECTIVE
"   Patient was seen and examined.   Attestation Notes: Face to face encounter completed    Subjective    The patient feels well this morning.  Pain control improving.  Reviewed discharge planning for tomorrow.  Medications reviewed in detail.  Objective     Visit Vitals  BP (!) 124/46   Pulse 76   Temp 36.4 °C (97.5 °F) (Oral)   Resp 18   Ht 1.676 m (5' 5.98\")   Wt 58.3 kg (128 lb 8.5 oz)   SpO2 96%   BMI 20.76 kg/m²       Review of Systems:  Pertinent items are noted in HPI.  Denies chest pain and shortness of breath.  Review of systems otherwise negative.    Labs     Results from last 7 days   Lab Units 01/12/22  0619   WBC K/uL 11.58*   HEMOGLOBIN g/dL 7.9*   HEMATOCRIT % 25.7*   PLATELETS K/uL 504*     Results from last 7 days   Lab Units 01/10/22  0450   SODIUM mEQ/L 138   POTASSIUM mEQ/L 4.1   CHLORIDE mEQ/L 106   CO2 mEQ/L 25   BUN mg/dL 11   CREATININE mg/dL 0.5*   CALCIUM mg/dL 8.2*   ALBUMIN g/dL 2.6*   BILIRUBIN TOTAL mg/dL 0.7   ALK PHOS IU/L 103   ALT IU/L 13   AST IU/L 21   GLUCOSE mg/dL 81     Full Code    Physical Exam  General      Alert, cooperative, no distress, appears stated age.   Head:    Normocephalic, without obvious abnormality, atraumatic.   Eyes:    PERRL, conjunctiva/corneas clear, EOM's intact.        Nose:   Nares normal, septum midline, mucosa normal, no drainage or            sinus tenderness.   Throat:   Lips, mucosa, and tongue normal.    Neck:   Supple, symmetrical, trachea midline.    Back:     Symmetric, no curvature.   Lungs:     Clear to auscultation bilaterally, respirations unlabored.   Chest wall:    No tenderness or deformity.   Heart:    Regular rate and rhythm, S1 and S2 normal.   Abdomen:     Soft, non-tender, bowel sounds active all four quadrants,     no masses, no organomegaly.   Extremities:  Musculoskeletal:  1+ left lower extremity edema.  Left hip replacement.   Pulses:   1+ and symmetric all extremities.   Skin:  Incision intact without further drainage. "   Neurologic:          Behavior/  Emotional:  CNII-XII intact.  Alert and oriented ×3.  Motor exam able left hip weakness postsurgery.  Sensory exam intact.  Reflexes stable decreased reflexes.      Appropriate, cooperative           Plan of care was discussed with patient   Statement Selected

## (undated) DEVICE — ***USE 138531*** SUTURE VICRYL 2-0 J266H CP-1 UNDYED

## (undated) DEVICE — Device

## (undated) DEVICE — DRAPE C-ARM X-RAY EQUIPMENT IMAGE

## (undated) DEVICE — DRAPE-U-1015

## (undated) DEVICE — GLOVE SZ 8.5 LINER PROTEXIS PI BL

## (undated) DEVICE — STAPLER SKIN

## (undated) DEVICE — TIP BOVIE NEEDLE

## (undated) DEVICE — SUCTION 18FR FRAZIER DISPOSABLE

## (undated) DEVICE — RETRACTOR ARMY NAVY 8 1/2 IN

## (undated) DEVICE — TUBING SMOKE EVAC PENCIL COATED

## (undated) DEVICE — STOCKINETTE 6IN MEDC

## (undated) DEVICE — PACK RFID TOTAL HIP

## (undated) DEVICE — BLADE RECIPROCATING DBL SIDED

## (undated) DEVICE — SPONGE LAP 18X18 SAFE-T RFID ENHANCED XRAY

## (undated) DEVICE — GLOVE PROTEXIS PI ORTHO 8.5

## (undated) DEVICE — STIRRUP STRAP DISPOSABLE

## (undated) DEVICE — SOLN IRRIG .9%SOD 1000ML

## (undated) DEVICE — PAD GROUND ELECTROSURGICAL W/CORD

## (undated) DEVICE — PARTICLES HEMOSTATIC ARISTA 3 GRAM

## (undated) DEVICE — SOLN IRRIG .9%SOD 3L

## (undated) DEVICE — ***USE 56952*** SUTURE VICRYL 1 J371H CTX 36IN VIOLET

## (undated) DEVICE — ADHESIVE SKIN DERMABOND ADVANCED 0.7ML

## (undated) DEVICE — MANIFOLD FOUR PORT NEPTUNE

## (undated) DEVICE — DRAPE IOBAN

## (undated) DEVICE — ***USE 57023*** SUTURE ETHIBOND 5 MB46G

## (undated) DEVICE — HOOD T7 PLUS W/PEEL AWAY SHIELD

## (undated) DEVICE — DRESSING MEPILEX 4X10 BORDER

## (undated) DEVICE — APPLICATOR CHLORAPREP 26ML ORANGE TINT